# Patient Record
Sex: MALE | Race: WHITE | NOT HISPANIC OR LATINO | Employment: FULL TIME | URBAN - METROPOLITAN AREA
[De-identification: names, ages, dates, MRNs, and addresses within clinical notes are randomized per-mention and may not be internally consistent; named-entity substitution may affect disease eponyms.]

---

## 2018-06-06 ENCOUNTER — APPOINTMENT (EMERGENCY)
Dept: RADIOLOGY | Facility: HOSPITAL | Age: 53
End: 2018-06-06
Payer: SUBSIDIZED

## 2018-06-06 ENCOUNTER — HOSPITAL ENCOUNTER (EMERGENCY)
Facility: HOSPITAL | Age: 53
Discharge: HOME | End: 2018-06-06
Attending: EMERGENCY MEDICINE | Admitting: EMERGENCY MEDICINE
Payer: SUBSIDIZED

## 2018-06-06 VITALS
SYSTOLIC BLOOD PRESSURE: 155 MMHG | HEART RATE: 90 BPM | WEIGHT: 255 LBS | RESPIRATION RATE: 18 BRPM | HEIGHT: 72 IN | DIASTOLIC BLOOD PRESSURE: 94 MMHG | TEMPERATURE: 97.4 F | OXYGEN SATURATION: 96 % | BODY MASS INDEX: 34.54 KG/M2

## 2018-06-06 DIAGNOSIS — S62.91XA CLOSED FRACTURE OF RIGHT HAND, INITIAL ENCOUNTER: Primary | ICD-10-CM

## 2018-06-06 PROCEDURE — 73130 X-RAY EXAM OF HAND: CPT

## 2018-06-06 PROCEDURE — 99283 EMERGENCY DEPT VISIT LOW MDM: CPT

## 2018-06-06 RX ORDER — NAPROXEN 500 MG/1
500 TABLET ORAL 2 TIMES DAILY WITH MEALS
Qty: 20 TABLET | Refills: 0 | Status: SHIPPED | OUTPATIENT
Start: 2018-06-06 | End: 2020-12-05

## 2018-06-06 RX ORDER — IBUPROFEN 400 MG/1
400 TABLET ORAL EVERY 6 HOURS PRN
COMMUNITY
End: 2020-12-05

## 2018-06-06 NOTE — ED PROVIDER NOTES
History  Chief Complaint   Patient presents with    Hand Injury     patient c/o right hand and swelling - states he got it caught between a dresser and the wall 1 5 weeks ago  46 y/o male presenting with right hand injury that occurred about a week and half ago after he was moving a dresser and slammed against his hand in the wall  Noted some swelling that has overall gone down however persists  Pain is 6/10 nonradiating  Pain is throbbing sensation  Denies numbness, paresthesias, discoloration or bruising  Prior to Admission Medications   Prescriptions Last Dose Informant Patient Reported? Taking?   ibuprofen (MOTRIN) 400 mg tablet   Yes Yes   Sig: Take 400 mg by mouth every 6 (six) hours as needed for mild pain      Facility-Administered Medications: None       History reviewed  No pertinent past medical history  History reviewed  No pertinent surgical history  History reviewed  No pertinent family history  I have reviewed and agree with the history as documented  Social History   Substance Use Topics    Smoking status: Never Smoker    Smokeless tobacco: Never Used    Alcohol use Yes      Comment: on weekends        Review of Systems   Constitutional: Negative  HENT: Negative  Eyes: Negative  Respiratory: Negative  Cardiovascular: Negative  Gastrointestinal: Negative  Genitourinary: Negative  Musculoskeletal: Positive for arthralgias and joint swelling  Negative for back pain, gait problem, myalgias, neck pain and neck stiffness  Skin: Negative  Neurological: Negative  All other systems reviewed and are negative  Physical Exam  Physical Exam   Constitutional: He is oriented to person, place, and time  He appears well-developed and well-nourished  HENT:   Head: Normocephalic and atraumatic  Eyes: Conjunctivae are normal    Cardiovascular: Normal rate and intact distal pulses  Pulmonary/Chest:   spo2 is 96% indicating adequate oxygenation  Musculoskeletal:        Arms:  Neurological: He is alert and oriented to person, place, and time  Skin: Skin is dry  Nursing note and vitals reviewed        Vital Signs  ED Triage Vitals [06/06/18 0954]   Temperature Pulse Respirations Blood Pressure SpO2   (!) 97 4 °F (36 3 °C) 90 18 155/94 96 %      Temp Source Heart Rate Source Patient Position - Orthostatic VS BP Location FiO2 (%)   Tympanic Monitor Sitting Left arm --      Pain Score       7           Vitals:    06/06/18 0954   BP: 155/94   Pulse: 90   Patient Position - Orthostatic VS: Sitting       Visual Acuity      ED Medications  Medications - No data to display    Diagnostic Studies  Results Reviewed     None                 XR hand 3+ views RIGHT   ED Interpretation by Ceci Lamar PA-C (06/06 1024)   5th metacarpal fracture                 Procedures  Static Splint Application  Date/Time: 6/6/2018 10:55 AM  Performed by: Elissa Griffin by: Nanette Gerard     Patient location:  Bedside  Procedure performed by emergency physician: Yes    Other Assisting Provider: Yes (comment)    Consent:     Consent obtained:  Verbal    Consent given by:  Patient    Risks discussed:  Discoloration    Alternatives discussed:  No treatment  Universal protocol:     Procedure explained and questions answered to patient or proxy's satisfaction: yes      Relevant documents present and verified: yes      Test results available and properly labeled: yes      Imaging studies available: yes      Required blood products, implants, devices, and special equipment available: yes      Site/side marked: yes      Immediately prior to procedure a time out was called: yes      Patient identity confirmed:  Verbally with patient  Indication:     Indications: fracture    Pre-procedure details:     Sensation:  Normal  Procedure details:     Laterality:  Right    Location:  Hand    Hand:  R hand    Splint type:  Ulnar gutter    Supplies:  Cotton padding, elastic bandage and prefabricated splint  Post-procedure details:     Pain:  Unchanged    Sensation:  Normal    Neurovascular Exam: skin pink, capillary refill <2 sec, normal pulses and skin intact, warm, and dry      Patient tolerance of procedure: Tolerated well, no immediate complications           Phone Contacts  ED Phone Contact    ED Course                               MDM  Number of Diagnoses or Management Options  Closed fracture of right hand, initial encounter:   Diagnosis management comments: Discussed x-ray results  Patient was placed in ulnar gutter splint assessed by me with good neurovascular exam before and after  Will have patient follow up with Orthopedics for re-evaluation or return for any worsening symptoms  Patient verbalizes understanding and agrees with the above assessment and plan  Amount and/or Complexity of Data Reviewed  Tests in the radiology section of CPT®: reviewed and ordered  Review and summarize past medical records: yes  Independent visualization of images, tracings, or specimens: yes      CritCare Time    Disposition  Final diagnoses:   Closed fracture of right hand, initial encounter     Time reflects when diagnosis was documented in both MDM as applicable and the Disposition within this note     Time User Action Codes Description Comment    6/6/2018 10:58 AM Mihir Rhodes Add [S62 [id number]] Closed fracture of right hand, initial encounter       ED Disposition     ED Disposition Condition Comment    Discharge  Pavel Lundberg discharge to home/self care      Condition at discharge: Good        Follow-up Information     Follow up With Specialties Details Why Contact Info Additional [address] Emergency Department Emergency Medicine Go to If symptoms worsen [address] [facility]  [phone] [facility], [place], [place], [address]    Ria Mota MD Orthopedic Surgery Schedule an appointment as soon as possible for a visit in 1 day  [address] [facility] [address]  [phone]             Patient's Medications   Discharge Prescriptions    NAPROXEN (NAPROSYN) 500 MG TABLET    Take 1 tablet (500 mg total) by mouth 2 (two) times a day with meals for 10 days       Start Date: 6/6/2018  End Date: 6/16/2018       Order Dose: 500 mg       Quantity: 20 tablet    Refills: 0     No discharge procedures on file      ED Provider  Electronically Signed by           Nazario Vargas PA-C  06/06/18 1053

## 2018-06-06 NOTE — DISCHARGE INSTRUCTIONS
Hand Fracture   WHAT YOU NEED TO KNOW:   A hand fracture is a break in one of the bones in your hand  This includes the bones in the wrist and fingers, and those that connect the wrist to the fingers  A hand fracture may be caused by twisting or bending the hand in the wrong way  It may also be caused by a fall, a crush injury, or a sports injury  DISCHARGE INSTRUCTIONS:   Return to the emergency department if:   · Your have severe pain that does not get better, even with pain medicine  · Your injured hand or forearm is cold, numb, or pale  · Your cast or splint gets wet, damaged, or comes off  · Your arm feels warm, tender, and painful  It may look swollen and red  Contact your healthcare provider if:   · You have new sores around your brace, cast, or splint  · You notice a bad smell coming from under your cast     · You have questions or concerns about your condition or care  Medicines:   · NSAIDs , such as ibuprofen, help decrease swelling, pain, and fever  This medicine is available with or without a doctor's order  NSAIDs can cause stomach bleeding or kidney problems in certain people  If you take blood thinner medicine, always ask your healthcare provider if NSAIDs are safe for you  Always read the medicine label and follow directions  · Acetaminophen  decreases pain and fever  It is available without a doctor's order  Ask how much to take and how often to take it  Follow directions  Acetaminophen can cause liver damage if not taken correctly  · Prescription pain medicine  may be given  Ask how to take this medicine safely  · Take your medicine as directed  Contact your healthcare provider if you think your medicine is not helping or if you have side effects  Tell him or her if you are allergic to any medicine  Keep a list of the medicines, vitamins, and herbs you take  Include the amounts, and when and why you take them  Bring the list or the pill bottles to follow-up visits   Carry your medicine list with you in case of an emergency  Follow up with your healthcare provider or hand specialist as directed: You may need to return to have your cast, splint, or stitches removed  Write down your questions so you remember to ask them during your visits  Manage your symptoms:   · Wear your splint as directed  Do not remove the splint until you follow up with your healthcare provider or hand specialist      · Apply ice  on your hand for 15 to 20 minutes every hour or as directed  Use an ice pack, or put crushed ice in a plastic bag  Cover it with a towel before you apply it to your skin  Ice helps prevent tissue damage and decreases swelling and pain  · Elevate  your hand above the level of his or her heart as often as you can  This will help decrease swelling and pain  Prop your hand on pillows or blankets to keep it elevated comfortably  · Go to physical therapy as directed  A physical therapist teaches you exercises to help improve movement and strength and to decrease pain  Bathing with a cast or splint:  Do not let your cast or splint get wet  Before bathing, cover the cast or splint with a plastic bag  Tape the bag to your skin above the cast or splint to seal out the water  Keep your hand out of the water in case the bag leaks  Follow instructions about when it is okay to take a bath or shower  Cast or splint care:   · Check the skin around the cast or splint for redness or sores every day  · Do not push down or lean on any part of the cast or splint because it may break  · Do not use a sharp or pointed object to scratch your skin under the cast or splint  Activity:  You may not be able to drive for up to 2 weeks  Ask when it is safe for you to drive and return to other activities such as sports  © 2017 [address] Information is for End User's use only and may not be sold, redistributed or otherwise used for commercial purposes   All illustrations and images included in CareNotes® are the copyrighted property of A D A M , Inc  or Ronaldo Ritter  The above information is an  only  It is not intended as medical advice for individual conditions or treatments  Talk to your doctor, nurse or pharmacist before following any medical regimen to see if it is safe and effective for you

## 2020-09-17 ENCOUNTER — APPOINTMENT (EMERGENCY)
Dept: RADIOLOGY | Facility: HOSPITAL | Age: 55
End: 2020-09-17

## 2020-09-17 ENCOUNTER — HOSPITAL ENCOUNTER (EMERGENCY)
Facility: HOSPITAL | Age: 55
Discharge: HOME | End: 2020-09-17
Attending: EMERGENCY MEDICINE | Admitting: EMERGENCY MEDICINE

## 2020-09-17 VITALS
RESPIRATION RATE: 20 BRPM | WEIGHT: 212 LBS | SYSTOLIC BLOOD PRESSURE: 173 MMHG | HEART RATE: 91 BPM | TEMPERATURE: 98.2 F | BODY MASS INDEX: 28.75 KG/M2 | DIASTOLIC BLOOD PRESSURE: 111 MMHG | OXYGEN SATURATION: 97 %

## 2020-09-17 DIAGNOSIS — S02.842A CLOSED FRACTURE OF LATERAL WALL OF LEFT ORBIT, INITIAL ENCOUNTER (HCC): Primary | ICD-10-CM

## 2020-09-17 PROCEDURE — 70486 CT MAXILLOFACIAL W/O DYE: CPT

## 2020-09-17 PROCEDURE — G1004 CDSM NDSC: HCPCS

## 2020-09-17 PROCEDURE — 99284 EMERGENCY DEPT VISIT MOD MDM: CPT

## 2020-09-17 PROCEDURE — 70450 CT HEAD/BRAIN W/O DYE: CPT

## 2020-09-17 PROCEDURE — 71045 X-RAY EXAM CHEST 1 VIEW: CPT

## 2020-09-17 PROCEDURE — 72125 CT NECK SPINE W/O DYE: CPT

## 2020-09-17 PROCEDURE — 99284 EMERGENCY DEPT VISIT MOD MDM: CPT | Performed by: EMERGENCY MEDICINE

## 2020-09-17 RX ORDER — TRAMADOL HYDROCHLORIDE 50 MG/1
50 TABLET ORAL EVERY 6 HOURS PRN
Qty: 10 TABLET | Refills: 0 | Status: SHIPPED | OUTPATIENT
Start: 2020-09-17 | End: 2020-09-27
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
## 2020-12-05 ENCOUNTER — HOSPITAL ENCOUNTER (INPATIENT)
Facility: HOSPITAL | Age: 55
LOS: 3 days | Discharge: HOME | DRG: 053 | End: 2020-12-08
Attending: GENERAL PRACTICE | Admitting: ANESTHESIOLOGY
Payer: COMMERCIAL

## 2020-12-05 ENCOUNTER — APPOINTMENT (EMERGENCY)
Dept: RADIOLOGY | Facility: HOSPITAL | Age: 55
DRG: 053 | End: 2020-12-05
Payer: COMMERCIAL

## 2020-12-05 DIAGNOSIS — F10.10 ALCOHOL ABUSE: Chronic | ICD-10-CM

## 2020-12-05 DIAGNOSIS — F10.230 ALCOHOL WITHDRAWAL SYNDROME WITHOUT COMPLICATION (HCC): ICD-10-CM

## 2020-12-05 DIAGNOSIS — Z72.0 TOBACCO ABUSE: Chronic | ICD-10-CM

## 2020-12-05 DIAGNOSIS — F10.230 ALCOHOL WITHDRAWAL SEIZURE WITHOUT COMPLICATION (HCC): Primary | ICD-10-CM

## 2020-12-05 DIAGNOSIS — R56.9 ALCOHOL WITHDRAWAL SEIZURE WITHOUT COMPLICATION (HCC): Primary | ICD-10-CM

## 2020-12-05 DIAGNOSIS — R74.01 TRANSAMINITIS: ICD-10-CM

## 2020-12-05 PROBLEM — N17.9 AKI (ACUTE KIDNEY INJURY) (HCC): Status: ACTIVE | Noted: 2020-12-05

## 2020-12-05 PROBLEM — F10.239 ALCOHOL WITHDRAWAL SEIZURE (HCC): Status: ACTIVE | Noted: 2020-12-05

## 2020-12-05 PROBLEM — D69.6 THROMBOCYTOPENIA (HCC): Status: ACTIVE | Noted: 2020-12-05

## 2020-12-05 PROBLEM — E87.6 HYPOKALEMIA: Status: ACTIVE | Noted: 2020-12-05

## 2020-12-05 PROBLEM — R74.8 ELEVATED LIPASE: Status: ACTIVE | Noted: 2020-12-05

## 2020-12-05 PROBLEM — E87.20 LACTIC ACIDOSIS: Status: ACTIVE | Noted: 2020-12-05

## 2020-12-05 PROBLEM — E87.2 LACTIC ACIDOSIS: Status: ACTIVE | Noted: 2020-12-05

## 2020-12-05 PROBLEM — F10.939 ALCOHOL WITHDRAWAL SEIZURE (HCC): Status: ACTIVE | Noted: 2020-12-05

## 2020-12-05 LAB
ALBUMIN SERPL BCP-MCNC: 3.7 G/DL (ref 3.5–5)
ALP SERPL-CCNC: 208 U/L (ref 46–116)
ALT SERPL W P-5'-P-CCNC: 48 U/L (ref 12–78)
ANION GAP SERPL CALCULATED.3IONS-SCNC: 13 MMOL/L (ref 4–13)
ANION GAP SERPL CALCULATED.3IONS-SCNC: 30 MMOL/L (ref 4–13)
ANION GAP SERPL CALCULATED.3IONS-SCNC: 7 MMOL/L (ref 4–13)
AST SERPL W P-5'-P-CCNC: 175 U/L (ref 5–45)
BASE EX.OXY STD BLDV CALC-SCNC: 97.2 % (ref 60–80)
BASE EXCESS BLDV CALC-SCNC: -4.1 MMOL/L
BASOPHILS # BLD AUTO: 0.11 THOUSANDS/ΜL (ref 0–0.1)
BASOPHILS NFR BLD AUTO: 1 % (ref 0–1)
BILIRUB DIRECT SERPL-MCNC: 2.27 MG/DL (ref 0–0.2)
BILIRUB SERPL-MCNC: 4.5 MG/DL (ref 0.2–1)
BUN SERPL-MCNC: 6 MG/DL (ref 5–25)
BUN SERPL-MCNC: 7 MG/DL (ref 5–25)
BUN SERPL-MCNC: 8 MG/DL (ref 5–25)
CALCIUM SERPL-MCNC: 7.8 MG/DL (ref 8.3–10.1)
CALCIUM SERPL-MCNC: 8.2 MG/DL (ref 8.3–10.1)
CALCIUM SERPL-MCNC: 9 MG/DL (ref 8.3–10.1)
CHLORIDE SERPL-SCNC: 100 MMOL/L (ref 100–108)
CHLORIDE SERPL-SCNC: 101 MMOL/L (ref 100–108)
CHLORIDE SERPL-SCNC: 94 MMOL/L (ref 100–108)
CK MB SERPL-MCNC: 2.3 NG/ML (ref 0–5)
CK MB SERPL-MCNC: <1 % (ref 0–2.5)
CK SERPL-CCNC: 270 U/L (ref 39–308)
CO2 SERPL-SCNC: 10 MMOL/L (ref 21–32)
CO2 SERPL-SCNC: 21 MMOL/L (ref 21–32)
CO2 SERPL-SCNC: 24 MMOL/L (ref 21–32)
CREAT SERPL-MCNC: 0.83 MG/DL (ref 0.6–1.3)
CREAT SERPL-MCNC: 0.94 MG/DL (ref 0.6–1.3)
CREAT SERPL-MCNC: 1.36 MG/DL (ref 0.6–1.3)
EOSINOPHIL # BLD AUTO: 0.19 THOUSAND/ΜL (ref 0–0.61)
EOSINOPHIL NFR BLD AUTO: 2 % (ref 0–6)
ERYTHROCYTE [DISTWIDTH] IN BLOOD BY AUTOMATED COUNT: 14.4 % (ref 11.6–15.1)
ETHANOL SERPL-MCNC: <3 MG/DL (ref 0–3)
FLUAV RNA RESP QL NAA+PROBE: NEGATIVE
FLUBV RNA RESP QL NAA+PROBE: NEGATIVE
GFR SERPL CREATININE-BSD FRML MDRD: 100 ML/MIN/1.73SQ M
GFR SERPL CREATININE-BSD FRML MDRD: 59 ML/MIN/1.73SQ M
GFR SERPL CREATININE-BSD FRML MDRD: 92 ML/MIN/1.73SQ M
GLUCOSE SERPL-MCNC: 144 MG/DL (ref 65–140)
GLUCOSE SERPL-MCNC: 146 MG/DL (ref 65–140)
GLUCOSE SERPL-MCNC: 97 MG/DL (ref 65–140)
HCO3 BLDV-SCNC: 19.3 MMOL/L (ref 24–30)
HCT VFR BLD AUTO: 50.8 % (ref 36.5–49.3)
HGB BLD-MCNC: 16.2 G/DL (ref 12–17)
IMM GRANULOCYTES # BLD AUTO: 0.05 THOUSAND/UL (ref 0–0.2)
IMM GRANULOCYTES NFR BLD AUTO: 1 % (ref 0–2)
INR PPP: 1.49 (ref 0.84–1.19)
LACTATE SERPL-SCNC: 1.4 MMOL/L (ref 0.5–2)
LACTATE SERPL-SCNC: 13.1 MMOL/L (ref 0.5–2)
LACTATE SERPL-SCNC: 2.3 MMOL/L (ref 0.5–2)
LIPASE SERPL-CCNC: 588 U/L (ref 73–393)
LYMPHOCYTES # BLD AUTO: 1.65 THOUSANDS/ΜL (ref 0.6–4.47)
LYMPHOCYTES NFR BLD AUTO: 16 % (ref 14–44)
MAGNESIUM SERPL-MCNC: 1.8 MG/DL (ref 1.6–2.6)
MCH RBC QN AUTO: 33.1 PG (ref 26.8–34.3)
MCHC RBC AUTO-ENTMCNC: 31.9 G/DL (ref 31.4–37.4)
MCV RBC AUTO: 104 FL (ref 82–98)
MONOCYTES # BLD AUTO: 0.94 THOUSAND/ΜL (ref 0.17–1.22)
MONOCYTES NFR BLD AUTO: 9 % (ref 4–12)
NEUTROPHILS # BLD AUTO: 7.15 THOUSANDS/ΜL (ref 1.85–7.62)
NEUTS SEG NFR BLD AUTO: 71 % (ref 43–75)
NRBC BLD AUTO-RTO: 0 /100 WBCS
O2 CT BLDV-SCNC: 20.3 ML/DL
PCO2 BLDV: 31 MM HG (ref 42–50)
PH BLDV: 7.41 [PH] (ref 7.3–7.4)
PHOSPHATE SERPL-MCNC: 1.8 MG/DL (ref 2.7–4.5)
PLATELET # BLD AUTO: 43 THOUSANDS/UL (ref 149–390)
PMV BLD AUTO: 11.6 FL (ref 8.9–12.7)
PO2 BLDV: 187.6 MM HG (ref 35–45)
POTASSIUM SERPL-SCNC: 3.1 MMOL/L (ref 3.5–5.3)
POTASSIUM SERPL-SCNC: 3.7 MMOL/L (ref 3.5–5.3)
POTASSIUM SERPL-SCNC: 3.8 MMOL/L (ref 3.5–5.3)
PROT SERPL-MCNC: 9.4 G/DL (ref 6.4–8.2)
PROTHROMBIN TIME: 17.8 SECONDS (ref 11.6–14.5)
RBC # BLD AUTO: 4.9 MILLION/UL (ref 3.88–5.62)
RSV RNA RESP QL NAA+PROBE: NEGATIVE
SARS-COV-2 RNA RESP QL NAA+PROBE: NEGATIVE
SODIUM SERPL-SCNC: 132 MMOL/L (ref 136–145)
SODIUM SERPL-SCNC: 134 MMOL/L (ref 136–145)
SODIUM SERPL-SCNC: 134 MMOL/L (ref 136–145)
TROPONIN I SERPL-MCNC: <0.02 NG/ML
WBC # BLD AUTO: 10.09 THOUSAND/UL (ref 4.31–10.16)

## 2020-12-05 PROCEDURE — 83690 ASSAY OF LIPASE: CPT | Performed by: GENERAL PRACTICE

## 2020-12-05 PROCEDURE — 82248 BILIRUBIN DIRECT: CPT | Performed by: GENERAL PRACTICE

## 2020-12-05 PROCEDURE — 83605 ASSAY OF LACTIC ACID: CPT | Performed by: GENERAL PRACTICE

## 2020-12-05 PROCEDURE — 84100 ASSAY OF PHOSPHORUS: CPT

## 2020-12-05 PROCEDURE — 80048 BASIC METABOLIC PNL TOTAL CA: CPT | Performed by: GENERAL PRACTICE

## 2020-12-05 PROCEDURE — 70450 CT HEAD/BRAIN W/O DYE: CPT

## 2020-12-05 PROCEDURE — 83605 ASSAY OF LACTIC ACID: CPT | Performed by: NURSE PRACTITIONER

## 2020-12-05 PROCEDURE — 83735 ASSAY OF MAGNESIUM: CPT | Performed by: GENERAL PRACTICE

## 2020-12-05 PROCEDURE — 80048 BASIC METABOLIC PNL TOTAL CA: CPT | Performed by: NURSE PRACTITIONER

## 2020-12-05 PROCEDURE — 83930 ASSAY OF BLOOD OSMOLALITY: CPT | Performed by: GENERAL PRACTICE

## 2020-12-05 PROCEDURE — 85610 PROTHROMBIN TIME: CPT | Performed by: NURSE PRACTITIONER

## 2020-12-05 PROCEDURE — 82553 CREATINE MB FRACTION: CPT

## 2020-12-05 PROCEDURE — 84484 ASSAY OF TROPONIN QUANT: CPT | Performed by: GENERAL PRACTICE

## 2020-12-05 PROCEDURE — 87040 BLOOD CULTURE FOR BACTERIA: CPT | Performed by: NURSE PRACTITIONER

## 2020-12-05 PROCEDURE — 96375 TX/PRO/DX INJ NEW DRUG ADDON: CPT

## 2020-12-05 PROCEDURE — 72125 CT NECK SPINE W/O DYE: CPT

## 2020-12-05 PROCEDURE — 93005 ELECTROCARDIOGRAM TRACING: CPT

## 2020-12-05 PROCEDURE — 82550 ASSAY OF CK (CPK): CPT

## 2020-12-05 PROCEDURE — 82805 BLOOD GASES W/O2 SATURATION: CPT | Performed by: NURSE PRACTITIONER

## 2020-12-05 PROCEDURE — 36415 COLL VENOUS BLD VENIPUNCTURE: CPT | Performed by: GENERAL PRACTICE

## 2020-12-05 PROCEDURE — 80320 DRUG SCREEN QUANTALCOHOLS: CPT | Performed by: GENERAL PRACTICE

## 2020-12-05 PROCEDURE — 0241U HB NFCT DS VIR RESP RNA 4 TRGT: CPT | Performed by: NURSE PRACTITIONER

## 2020-12-05 PROCEDURE — 99223 1ST HOSP IP/OBS HIGH 75: CPT | Performed by: ANESTHESIOLOGY

## 2020-12-05 PROCEDURE — 87081 CULTURE SCREEN ONLY: CPT | Performed by: NURSE PRACTITIONER

## 2020-12-05 PROCEDURE — G1004 CDSM NDSC: HCPCS

## 2020-12-05 PROCEDURE — 71045 X-RAY EXAM CHEST 1 VIEW: CPT

## 2020-12-05 PROCEDURE — 99285 EMERGENCY DEPT VISIT HI MDM: CPT

## 2020-12-05 PROCEDURE — 96365 THER/PROPH/DIAG IV INF INIT: CPT

## 2020-12-05 PROCEDURE — 99285 EMERGENCY DEPT VISIT HI MDM: CPT | Performed by: GENERAL PRACTICE

## 2020-12-05 PROCEDURE — 96361 HYDRATE IV INFUSION ADD-ON: CPT

## 2020-12-05 PROCEDURE — 80053 COMPREHEN METABOLIC PANEL: CPT | Performed by: GENERAL PRACTICE

## 2020-12-05 PROCEDURE — 85025 COMPLETE CBC W/AUTO DIFF WBC: CPT | Performed by: GENERAL PRACTICE

## 2020-12-05 RX ORDER — ONDANSETRON 2 MG/ML
INJECTION INTRAMUSCULAR; INTRAVENOUS
Status: COMPLETED
Start: 2020-12-05 | End: 2020-12-05

## 2020-12-05 RX ORDER — POTASSIUM CHLORIDE 14.9 MG/ML
20 INJECTION INTRAVENOUS ONCE
Status: COMPLETED | OUTPATIENT
Start: 2020-12-05 | End: 2020-12-05

## 2020-12-05 RX ORDER — MAGNESIUM SULFATE HEPTAHYDRATE 40 MG/ML
2 INJECTION, SOLUTION INTRAVENOUS ONCE
Status: COMPLETED | OUTPATIENT
Start: 2020-12-05 | End: 2020-12-05

## 2020-12-05 RX ORDER — POTASSIUM CHLORIDE 29.8 MG/ML
40 INJECTION INTRAVENOUS ONCE
Status: DISCONTINUED | OUTPATIENT
Start: 2020-12-05 | End: 2020-12-05

## 2020-12-05 RX ORDER — PHENOBARBITAL SODIUM 65 MG/ML
130 INJECTION INTRAMUSCULAR ONCE
Status: COMPLETED | OUTPATIENT
Start: 2020-12-05 | End: 2020-12-05

## 2020-12-05 RX ORDER — SODIUM CHLORIDE, SODIUM GLUCONATE, SODIUM ACETATE, POTASSIUM CHLORIDE, MAGNESIUM CHLORIDE, SODIUM PHOSPHATE, DIBASIC, AND POTASSIUM PHOSPHATE .53; .5; .37; .037; .03; .012; .00082 G/100ML; G/100ML; G/100ML; G/100ML; G/100ML; G/100ML; G/100ML
75 INJECTION, SOLUTION INTRAVENOUS CONTINUOUS
Status: DISCONTINUED | OUTPATIENT
Start: 2020-12-05 | End: 2020-12-06

## 2020-12-05 RX ORDER — THIAMINE MONONITRATE (VIT B1) 100 MG
100 TABLET ORAL DAILY
Status: DISCONTINUED | OUTPATIENT
Start: 2020-12-06 | End: 2020-12-08 | Stop reason: HOSPADM

## 2020-12-05 RX ORDER — LORAZEPAM 2 MG/ML
2 INJECTION INTRAMUSCULAR ONCE
Status: COMPLETED | OUTPATIENT
Start: 2020-12-05 | End: 2020-12-05

## 2020-12-05 RX ORDER — ONDANSETRON 2 MG/ML
4 INJECTION INTRAMUSCULAR; INTRAVENOUS EVERY 6 HOURS PRN
Status: DISCONTINUED | OUTPATIENT
Start: 2020-12-05 | End: 2020-12-08 | Stop reason: HOSPADM

## 2020-12-05 RX ORDER — FOLIC ACID 1 MG/1
1 TABLET ORAL DAILY
Status: DISCONTINUED | OUTPATIENT
Start: 2020-12-06 | End: 2020-12-08 | Stop reason: HOSPADM

## 2020-12-05 RX ADMIN — MAGNESIUM SULFATE HEPTAHYDRATE 2 G: 40 INJECTION, SOLUTION INTRAVENOUS at 18:26

## 2020-12-05 RX ADMIN — SODIUM CHLORIDE 1000 ML: 0.9 INJECTION, SOLUTION INTRAVENOUS at 17:29

## 2020-12-05 RX ADMIN — SODIUM CHLORIDE 1000 ML: 0.9 INJECTION, SOLUTION INTRAVENOUS at 16:46

## 2020-12-05 RX ADMIN — POTASSIUM CHLORIDE 20 MEQ: 14.9 INJECTION, SOLUTION INTRAVENOUS at 18:38

## 2020-12-05 RX ADMIN — SODIUM CHLORIDE, SODIUM GLUCONATE, SODIUM ACETATE, POTASSIUM CHLORIDE, MAGNESIUM CHLORIDE, SODIUM PHOSPHATE, DIBASIC, AND POTASSIUM PHOSPHATE 125 ML/HR: .53; .5; .37; .037; .03; .012; .00082 INJECTION, SOLUTION INTRAVENOUS at 22:38

## 2020-12-05 RX ADMIN — LORAZEPAM 2 MG: 2 INJECTION INTRAMUSCULAR; INTRAVENOUS at 16:46

## 2020-12-05 RX ADMIN — POTASSIUM CHLORIDE 20 MEQ: 14.9 INJECTION, SOLUTION INTRAVENOUS at 20:58

## 2020-12-05 RX ADMIN — ONDANSETRON 4 MG: 2 INJECTION INTRAMUSCULAR; INTRAVENOUS at 21:07

## 2020-12-05 RX ADMIN — PHENOBARBITAL SODIUM 130 MG: 65 INJECTION INTRAMUSCULAR; INTRAVENOUS at 21:09

## 2020-12-05 RX ADMIN — POTASSIUM & SODIUM PHOSPHATES POWDER PACK 280-160-250 MG 2 PACKET: 280-160-250 PACK at 23:09

## 2020-12-05 RX ADMIN — SODIUM CHLORIDE 500 ML: 0.9 INJECTION, SOLUTION INTRAVENOUS at 21:00

## 2020-12-05 RX ADMIN — PHENOBARBITAL SODIUM 373 MG: 65 INJECTION INTRAMUSCULAR at 19:43

## 2020-12-06 PROBLEM — Z72.0 TOBACCO ABUSE: Chronic | Status: ACTIVE | Noted: 2020-12-06

## 2020-12-06 PROBLEM — N17.9 AKI (ACUTE KIDNEY INJURY) (HCC): Status: RESOLVED | Noted: 2020-12-05 | Resolved: 2020-12-06

## 2020-12-06 PROBLEM — E87.20 LACTIC ACIDOSIS: Status: RESOLVED | Noted: 2020-12-05 | Resolved: 2020-12-06

## 2020-12-06 PROBLEM — E87.6 HYPOKALEMIA: Status: RESOLVED | Noted: 2020-12-05 | Resolved: 2020-12-06

## 2020-12-06 PROBLEM — E87.2 LACTIC ACIDOSIS: Status: RESOLVED | Noted: 2020-12-05 | Resolved: 2020-12-06

## 2020-12-06 LAB
ALBUMIN SERPL BCP-MCNC: 2.7 G/DL (ref 3.5–5)
ALP SERPL-CCNC: 141 U/L (ref 46–116)
ALT SERPL W P-5'-P-CCNC: 36 U/L (ref 12–78)
ANION GAP SERPL CALCULATED.3IONS-SCNC: 10 MMOL/L (ref 4–13)
AST SERPL W P-5'-P-CCNC: 138 U/L (ref 5–45)
BASOPHILS # BLD AUTO: 0.03 THOUSANDS/ΜL (ref 0–0.1)
BASOPHILS NFR BLD AUTO: 1 % (ref 0–1)
BILIRUB SERPL-MCNC: 4.3 MG/DL (ref 0.2–1)
BUN SERPL-MCNC: 5 MG/DL (ref 5–25)
CALCIUM ALBUM COR SERPL-MCNC: 8.7 MG/DL (ref 8.3–10.1)
CALCIUM SERPL-MCNC: 7.7 MG/DL (ref 8.3–10.1)
CHLORIDE SERPL-SCNC: 101 MMOL/L (ref 100–108)
CO2 SERPL-SCNC: 24 MMOL/L (ref 21–32)
CREAT SERPL-MCNC: 0.9 MG/DL (ref 0.6–1.3)
EOSINOPHIL # BLD AUTO: 0.09 THOUSAND/ΜL (ref 0–0.61)
EOSINOPHIL NFR BLD AUTO: 2 % (ref 0–6)
ERYTHROCYTE [DISTWIDTH] IN BLOOD BY AUTOMATED COUNT: 14.3 % (ref 11.6–15.1)
GFR SERPL CREATININE-BSD FRML MDRD: 96 ML/MIN/1.73SQ M
GLUCOSE SERPL-MCNC: 111 MG/DL (ref 65–140)
HCT VFR BLD AUTO: 39 % (ref 36.5–49.3)
HGB BLD-MCNC: 13.2 G/DL (ref 12–17)
IMM GRANULOCYTES # BLD AUTO: 0.01 THOUSAND/UL (ref 0–0.2)
IMM GRANULOCYTES NFR BLD AUTO: 0 % (ref 0–2)
LIPASE SERPL-CCNC: 414 U/L (ref 73–393)
LYMPHOCYTES # BLD AUTO: 0.38 THOUSANDS/ΜL (ref 0.6–4.47)
LYMPHOCYTES NFR BLD AUTO: 7 % (ref 14–44)
MAGNESIUM SERPL-MCNC: 2.1 MG/DL (ref 1.6–2.6)
MCH RBC QN AUTO: 32.6 PG (ref 26.8–34.3)
MCHC RBC AUTO-ENTMCNC: 33.1 G/DL (ref 31.4–37.4)
MCV RBC AUTO: 99 FL (ref 82–98)
MONOCYTES # BLD AUTO: 0.45 THOUSAND/ΜL (ref 0.17–1.22)
MONOCYTES NFR BLD AUTO: 8 % (ref 4–12)
NEUTROPHILS # BLD AUTO: 4.7 THOUSANDS/ΜL (ref 1.85–7.62)
NEUTS SEG NFR BLD AUTO: 82 % (ref 43–75)
NRBC BLD AUTO-RTO: 0 /100 WBCS
OSMOLALITY UR/SERPL-RTO: 284 MMOL/KG (ref 282–298)
PHOSPHATE SERPL-MCNC: 2.3 MG/DL (ref 2.7–4.5)
PLATELET # BLD AUTO: 23 THOUSANDS/UL (ref 149–390)
PMV BLD AUTO: 11.3 FL (ref 8.9–12.7)
POTASSIUM SERPL-SCNC: 3.2 MMOL/L (ref 3.5–5.3)
PROT SERPL-MCNC: 7 G/DL (ref 6.4–8.2)
RBC # BLD AUTO: 3.96 MILLION/UL (ref 3.88–5.62)
SODIUM SERPL-SCNC: 135 MMOL/L (ref 136–145)
WBC # BLD AUTO: 5.66 THOUSAND/UL (ref 4.31–10.16)

## 2020-12-06 PROCEDURE — 99233 SBSQ HOSP IP/OBS HIGH 50: CPT | Performed by: ANESTHESIOLOGY

## 2020-12-06 PROCEDURE — 83735 ASSAY OF MAGNESIUM: CPT | Performed by: NURSE PRACTITIONER

## 2020-12-06 PROCEDURE — 85025 COMPLETE CBC W/AUTO DIFF WBC: CPT | Performed by: NURSE PRACTITIONER

## 2020-12-06 PROCEDURE — 80053 COMPREHEN METABOLIC PANEL: CPT | Performed by: NURSE PRACTITIONER

## 2020-12-06 PROCEDURE — 84100 ASSAY OF PHOSPHORUS: CPT | Performed by: NURSE PRACTITIONER

## 2020-12-06 PROCEDURE — 83690 ASSAY OF LIPASE: CPT | Performed by: NURSE PRACTITIONER

## 2020-12-06 RX ORDER — PHENOBARBITAL SODIUM 65 MG/ML
65 INJECTION INTRAMUSCULAR ONCE
Status: COMPLETED | OUTPATIENT
Start: 2020-12-06 | End: 2020-12-06

## 2020-12-06 RX ORDER — POTASSIUM CHLORIDE 20 MEQ/1
40 TABLET, EXTENDED RELEASE ORAL ONCE
Status: COMPLETED | OUTPATIENT
Start: 2020-12-06 | End: 2020-12-06

## 2020-12-06 RX ORDER — ACETAMINOPHEN 325 MG/1
650 TABLET ORAL EVERY 6 HOURS PRN
Status: DISCONTINUED | OUTPATIENT
Start: 2020-12-06 | End: 2020-12-08 | Stop reason: HOSPADM

## 2020-12-06 RX ORDER — POTASSIUM CHLORIDE 14.9 MG/ML
20 INJECTION INTRAVENOUS ONCE
Status: COMPLETED | OUTPATIENT
Start: 2020-12-06 | End: 2020-12-06

## 2020-12-06 RX ORDER — NICOTINE 21 MG/24HR
14 PATCH, TRANSDERMAL 24 HOURS TRANSDERMAL DAILY
Status: DISCONTINUED | OUTPATIENT
Start: 2020-12-06 | End: 2020-12-08 | Stop reason: HOSPADM

## 2020-12-06 RX ADMIN — PHENOBARBITAL SODIUM 279 MG: 65 INJECTION INTRAMUSCULAR at 07:45

## 2020-12-06 RX ADMIN — POTASSIUM CHLORIDE 20 MEQ: 14.9 INJECTION, SOLUTION INTRAVENOUS at 07:52

## 2020-12-06 RX ADMIN — DIBASIC SODIUM PHOSPHATE, MONOBASIC POTASSIUM PHOSPHATE AND MONOBASIC SODIUM PHOSPHATE 1 TABLET: 852; 155; 130 TABLET ORAL at 17:51

## 2020-12-06 RX ADMIN — POTASSIUM CHLORIDE 20 MEQ: 14.9 INJECTION, SOLUTION INTRAVENOUS at 10:51

## 2020-12-06 RX ADMIN — DIBASIC SODIUM PHOSPHATE, MONOBASIC POTASSIUM PHOSPHATE AND MONOBASIC SODIUM PHOSPHATE 1 TABLET: 852; 155; 130 TABLET ORAL at 12:43

## 2020-12-06 RX ADMIN — Medication 100 MG: at 08:21

## 2020-12-06 RX ADMIN — PHENOBARBITAL SODIUM 65 MG: 65 INJECTION INTRAMUSCULAR; INTRAVENOUS at 12:44

## 2020-12-06 RX ADMIN — SODIUM CHLORIDE, SODIUM GLUCONATE, SODIUM ACETATE, POTASSIUM CHLORIDE, MAGNESIUM CHLORIDE, SODIUM PHOSPHATE, DIBASIC, AND POTASSIUM PHOSPHATE 75 ML/HR: .53; .5; .37; .037; .03; .012; .00082 INJECTION, SOLUTION INTRAVENOUS at 07:43

## 2020-12-06 RX ADMIN — POTASSIUM CHLORIDE 40 MEQ: 1500 TABLET, EXTENDED RELEASE ORAL at 07:54

## 2020-12-06 RX ADMIN — ACETAMINOPHEN 650 MG: 325 TABLET, FILM COATED ORAL at 12:43

## 2020-12-06 RX ADMIN — DIBASIC SODIUM PHOSPHATE, MONOBASIC POTASSIUM PHOSPHATE AND MONOBASIC SODIUM PHOSPHATE 1 TABLET: 852; 155; 130 TABLET ORAL at 21:54

## 2020-12-06 RX ADMIN — PHENOBARBITAL SODIUM 279 MG: 65 INJECTION INTRAMUSCULAR at 01:16

## 2020-12-06 RX ADMIN — B-COMPLEX W/ C & FOLIC ACID TAB 1 TABLET: TAB at 08:20

## 2020-12-06 RX ADMIN — FOLIC ACID 1 MG: 1 TABLET ORAL at 08:27

## 2020-12-07 ENCOUNTER — APPOINTMENT (INPATIENT)
Dept: RADIOLOGY | Facility: HOSPITAL | Age: 55
DRG: 053 | End: 2020-12-07
Payer: COMMERCIAL

## 2020-12-07 PROBLEM — E80.6 HYPERBILIRUBINEMIA: Status: ACTIVE | Noted: 2020-12-05

## 2020-12-07 LAB
ALBUMIN SERPL BCP-MCNC: 2.8 G/DL (ref 3.5–5)
ALP SERPL-CCNC: 151 U/L (ref 46–116)
ALT SERPL W P-5'-P-CCNC: 37 U/L (ref 12–78)
ANION GAP SERPL CALCULATED.3IONS-SCNC: 9 MMOL/L (ref 4–13)
AST SERPL W P-5'-P-CCNC: 141 U/L (ref 5–45)
BILIRUB SERPL-MCNC: 5.1 MG/DL (ref 0.2–1)
BUN SERPL-MCNC: 7 MG/DL (ref 5–25)
CALCIUM ALBUM COR SERPL-MCNC: 9.3 MG/DL (ref 8.3–10.1)
CALCIUM SERPL-MCNC: 8.3 MG/DL (ref 8.3–10.1)
CHLORIDE SERPL-SCNC: 97 MMOL/L (ref 100–108)
CO2 SERPL-SCNC: 25 MMOL/L (ref 21–32)
CREAT SERPL-MCNC: 0.85 MG/DL (ref 0.6–1.3)
ERYTHROCYTE [DISTWIDTH] IN BLOOD BY AUTOMATED COUNT: 14.1 % (ref 11.6–15.1)
GFR SERPL CREATININE-BSD FRML MDRD: 99 ML/MIN/1.73SQ M
GLUCOSE SERPL-MCNC: 93 MG/DL (ref 65–140)
HCT VFR BLD AUTO: 41 % (ref 36.5–49.3)
HGB BLD-MCNC: 13.9 G/DL (ref 12–17)
MAGNESIUM SERPL-MCNC: 1.8 MG/DL (ref 1.6–2.6)
MCH RBC QN AUTO: 33.6 PG (ref 26.8–34.3)
MCHC RBC AUTO-ENTMCNC: 33.9 G/DL (ref 31.4–37.4)
MCV RBC AUTO: 99 FL (ref 82–98)
MRSA NOSE QL CULT: NORMAL
PHOSPHATE SERPL-MCNC: 3.2 MG/DL (ref 2.7–4.5)
PLATELET # BLD AUTO: 29 THOUSANDS/UL (ref 149–390)
PMV BLD AUTO: 12.3 FL (ref 8.9–12.7)
POTASSIUM SERPL-SCNC: 3.6 MMOL/L (ref 3.5–5.3)
PROT SERPL-MCNC: 7.5 G/DL (ref 6.4–8.2)
RBC # BLD AUTO: 4.14 MILLION/UL (ref 3.88–5.62)
SODIUM SERPL-SCNC: 131 MMOL/L (ref 136–145)
WBC # BLD AUTO: 6.23 THOUSAND/UL (ref 4.31–10.16)

## 2020-12-07 PROCEDURE — 99232 SBSQ HOSP IP/OBS MODERATE 35: CPT | Performed by: FAMILY MEDICINE

## 2020-12-07 PROCEDURE — 85027 COMPLETE CBC AUTOMATED: CPT | Performed by: NURSE PRACTITIONER

## 2020-12-07 PROCEDURE — 83735 ASSAY OF MAGNESIUM: CPT | Performed by: NURSE PRACTITIONER

## 2020-12-07 PROCEDURE — 76705 ECHO EXAM OF ABDOMEN: CPT

## 2020-12-07 PROCEDURE — 80053 COMPREHEN METABOLIC PANEL: CPT | Performed by: NURSE PRACTITIONER

## 2020-12-07 PROCEDURE — 84100 ASSAY OF PHOSPHORUS: CPT | Performed by: NURSE PRACTITIONER

## 2020-12-07 RX ADMIN — B-COMPLEX W/ C & FOLIC ACID TAB 1 TABLET: TAB at 08:43

## 2020-12-07 RX ADMIN — FOLIC ACID 1 MG: 1 TABLET ORAL at 08:43

## 2020-12-07 RX ADMIN — Medication 100 MG: at 08:43

## 2020-12-07 RX ADMIN — ACETAMINOPHEN 650 MG: 325 TABLET, FILM COATED ORAL at 23:03

## 2020-12-08 VITALS
HEIGHT: 67 IN | OXYGEN SATURATION: 96 % | RESPIRATION RATE: 18 BRPM | TEMPERATURE: 98.3 F | HEART RATE: 76 BPM | SYSTOLIC BLOOD PRESSURE: 133 MMHG | WEIGHT: 217.59 LBS | BODY MASS INDEX: 34.15 KG/M2 | DIASTOLIC BLOOD PRESSURE: 80 MMHG

## 2020-12-08 PROCEDURE — 99239 HOSP IP/OBS DSCHRG MGMT >30: CPT | Performed by: FAMILY MEDICINE

## 2020-12-08 RX ORDER — MULTIVITAMIN
1 TABLET ORAL DAILY
Qty: 30 TABLET | Refills: 0 | Status: SHIPPED | OUTPATIENT
Start: 2020-12-08

## 2020-12-08 RX ORDER — NICOTINE 21 MG/24HR
1 PATCH, TRANSDERMAL 24 HOURS TRANSDERMAL DAILY
Qty: 28 PATCH | Refills: 0 | Status: SHIPPED | OUTPATIENT
Start: 2020-12-09 | End: 2022-01-15 | Stop reason: HOSPADM

## 2020-12-08 RX ORDER — FOLIC ACID 1 MG/1
1 TABLET ORAL DAILY
Qty: 30 TABLET | Refills: 0 | Status: ON HOLD | OUTPATIENT
Start: 2020-12-09 | End: 2022-01-15 | Stop reason: SDUPTHER

## 2020-12-08 RX ORDER — LANOLIN ALCOHOL/MO/W.PET/CERES
100 CREAM (GRAM) TOPICAL DAILY
Qty: 30 TABLET | Refills: 0 | Status: ON HOLD | OUTPATIENT
Start: 2020-12-09 | End: 2022-01-15 | Stop reason: SDUPTHER

## 2020-12-08 RX ADMIN — B-COMPLEX W/ C & FOLIC ACID TAB 1 TABLET: TAB at 08:38

## 2020-12-08 RX ADMIN — Medication 100 MG: at 08:38

## 2020-12-08 RX ADMIN — FOLIC ACID 1 MG: 1 TABLET ORAL at 08:38

## 2020-12-08 RX ADMIN — ACETAMINOPHEN 650 MG: 325 TABLET, FILM COATED ORAL at 11:59

## 2020-12-09 LAB
ATRIAL RATE: 101 BPM
ATRIAL RATE: 106 BPM
P AXIS: 24 DEGREES
P AXIS: 29 DEGREES
PR INTERVAL: 146 MS
PR INTERVAL: 158 MS
QRS AXIS: 46 DEGREES
QRS AXIS: 67 DEGREES
QRSD INTERVAL: 92 MS
QRSD INTERVAL: 92 MS
QT INTERVAL: 376 MS
QT INTERVAL: 384 MS
QTC INTERVAL: 487 MS
QTC INTERVAL: 510 MS
T WAVE AXIS: 23 DEGREES
T WAVE AXIS: 29 DEGREES
VENTRICULAR RATE: 101 BPM
VENTRICULAR RATE: 106 BPM

## 2020-12-09 PROCEDURE — 93010 ELECTROCARDIOGRAM REPORT: CPT | Performed by: INTERNAL MEDICINE

## 2020-12-10 LAB
BACTERIA BLD CULT: NORMAL
BACTERIA BLD CULT: NORMAL

## 2021-03-23 ENCOUNTER — APPOINTMENT (EMERGENCY)
Dept: RADIOLOGY | Facility: HOSPITAL | Age: 56
DRG: 521 | End: 2021-03-23
Payer: COMMERCIAL

## 2021-03-23 ENCOUNTER — HOSPITAL ENCOUNTER (INPATIENT)
Facility: HOSPITAL | Age: 56
LOS: 6 days | Discharge: NURSING, REHAB OR LONG-TERM CARE | DRG: 521 | End: 2021-03-29
Attending: EMERGENCY MEDICINE | Admitting: FAMILY MEDICINE
Payer: COMMERCIAL

## 2021-03-23 DIAGNOSIS — R74.01 TRANSAMINITIS: ICD-10-CM

## 2021-03-23 DIAGNOSIS — E87.6 HYPOKALEMIA: ICD-10-CM

## 2021-03-23 DIAGNOSIS — M25.551 RIGHT HIP PAIN: ICD-10-CM

## 2021-03-23 DIAGNOSIS — E80.6 HYPERBILIRUBINEMIA: ICD-10-CM

## 2021-03-23 DIAGNOSIS — K74.60 DECOMPENSATED HEPATIC CIRRHOSIS (HCC): ICD-10-CM

## 2021-03-23 DIAGNOSIS — F10.239 ALCOHOL WITHDRAWAL SYNDROME WITH COMPLICATION (HCC): Primary | ICD-10-CM

## 2021-03-23 DIAGNOSIS — K72.90 DECOMPENSATED HEPATIC CIRRHOSIS (HCC): ICD-10-CM

## 2021-03-23 DIAGNOSIS — R56.9 SEIZURE (HCC): ICD-10-CM

## 2021-03-23 DIAGNOSIS — S01.512A LACERATION OF TONGUE, INITIAL ENCOUNTER: ICD-10-CM

## 2021-03-23 DIAGNOSIS — E87.1 HYPONATREMIA: ICD-10-CM

## 2021-03-23 DIAGNOSIS — S02.2XXA NASAL FRACTURE: ICD-10-CM

## 2021-03-23 DIAGNOSIS — R25.1 TREMORS OF NERVOUS SYSTEM: ICD-10-CM

## 2021-03-23 PROBLEM — E87.8 ELECTROLYTE ABNORMALITY: Status: ACTIVE | Noted: 2021-03-23

## 2021-03-23 PROBLEM — K70.10 ALCOHOLIC HEPATITIS: Status: ACTIVE | Noted: 2021-03-23

## 2021-03-23 PROBLEM — S01.552A OPEN WOUND OF TONGUE DUE TO BITE: Status: ACTIVE | Noted: 2021-03-23

## 2021-03-23 PROBLEM — F10.939 ALCOHOL WITHDRAWAL (HCC): Status: ACTIVE | Noted: 2021-03-23

## 2021-03-23 LAB
ALBUMIN SERPL BCP-MCNC: 2.5 G/DL (ref 3.5–5)
ALP SERPL-CCNC: 165 U/L (ref 46–116)
ALT SERPL W P-5'-P-CCNC: 42 U/L (ref 12–78)
ANION GAP SERPL CALCULATED.3IONS-SCNC: 10 MMOL/L (ref 4–13)
APAP SERPL-MCNC: <2 UG/ML (ref 10–20)
APTT PPP: 37 SECONDS (ref 23–37)
AST SERPL W P-5'-P-CCNC: 196 U/L (ref 5–45)
BASOPHILS # BLD AUTO: 0.02 THOUSANDS/ΜL (ref 0–0.1)
BASOPHILS NFR BLD AUTO: 0 % (ref 0–1)
BILIRUB SERPL-MCNC: 10 MG/DL (ref 0.2–1)
BUN SERPL-MCNC: 11 MG/DL (ref 5–25)
CALCIUM ALBUM COR SERPL-MCNC: 9.4 MG/DL (ref 8.3–10.1)
CALCIUM SERPL-MCNC: 8.2 MG/DL (ref 8.3–10.1)
CHLORIDE SERPL-SCNC: 94 MMOL/L (ref 100–108)
CO2 SERPL-SCNC: 24 MMOL/L (ref 21–32)
CREAT SERPL-MCNC: 0.97 MG/DL (ref 0.6–1.3)
EOSINOPHIL # BLD AUTO: 0.02 THOUSAND/ΜL (ref 0–0.61)
EOSINOPHIL NFR BLD AUTO: 0 % (ref 0–6)
ERYTHROCYTE [DISTWIDTH] IN BLOOD BY AUTOMATED COUNT: 15.8 % (ref 11.6–15.1)
ETHANOL SERPL-MCNC: <3 MG/DL (ref 0–3)
GFR SERPL CREATININE-BSD FRML MDRD: 88 ML/MIN/1.73SQ M
GLUCOSE SERPL-MCNC: 162 MG/DL (ref 65–140)
HCT VFR BLD AUTO: 37.6 % (ref 36.5–49.3)
HGB BLD-MCNC: 13.1 G/DL (ref 12–17)
IMM GRANULOCYTES # BLD AUTO: 0.04 THOUSAND/UL (ref 0–0.2)
IMM GRANULOCYTES NFR BLD AUTO: 1 % (ref 0–2)
INR PPP: 1.79 (ref 0.84–1.19)
LYMPHOCYTES # BLD AUTO: 0.32 THOUSANDS/ΜL (ref 0.6–4.47)
LYMPHOCYTES NFR BLD AUTO: 4 % (ref 14–44)
MCH RBC QN AUTO: 34.5 PG (ref 26.8–34.3)
MCHC RBC AUTO-ENTMCNC: 34.8 G/DL (ref 31.4–37.4)
MCV RBC AUTO: 99 FL (ref 82–98)
MONOCYTES # BLD AUTO: 0.38 THOUSAND/ΜL (ref 0.17–1.22)
MONOCYTES NFR BLD AUTO: 5 % (ref 4–12)
NEUTROPHILS # BLD AUTO: 6.68 THOUSANDS/ΜL (ref 1.85–7.62)
NEUTS SEG NFR BLD AUTO: 90 % (ref 43–75)
NRBC BLD AUTO-RTO: 0 /100 WBCS
PLATELET # BLD AUTO: 21 THOUSANDS/UL (ref 149–390)
PMV BLD AUTO: 10.2 FL (ref 8.9–12.7)
POTASSIUM SERPL-SCNC: 2.8 MMOL/L (ref 3.5–5.3)
PROT SERPL-MCNC: 7.2 G/DL (ref 6.4–8.2)
PROTHROMBIN TIME: 20.5 SECONDS (ref 11.6–14.5)
RBC # BLD AUTO: 3.8 MILLION/UL (ref 3.88–5.62)
SALICYLATES SERPL-MCNC: <3 MG/DL (ref 3–20)
SODIUM SERPL-SCNC: 128 MMOL/L (ref 136–145)
WBC # BLD AUTO: 7.46 THOUSAND/UL (ref 4.31–10.16)

## 2021-03-23 PROCEDURE — 99285 EMERGENCY DEPT VISIT HI MDM: CPT | Performed by: EMERGENCY MEDICINE

## 2021-03-23 PROCEDURE — 70450 CT HEAD/BRAIN W/O DYE: CPT

## 2021-03-23 PROCEDURE — 74177 CT ABD & PELVIS W/CONTRAST: CPT

## 2021-03-23 PROCEDURE — 96365 THER/PROPH/DIAG IV INF INIT: CPT

## 2021-03-23 PROCEDURE — 85025 COMPLETE CBC W/AUTO DIFF WBC: CPT | Performed by: EMERGENCY MEDICINE

## 2021-03-23 PROCEDURE — 94760 N-INVAS EAR/PLS OXIMETRY 1: CPT

## 2021-03-23 PROCEDURE — G1004 CDSM NDSC: HCPCS

## 2021-03-23 PROCEDURE — 80143 DRUG ASSAY ACETAMINOPHEN: CPT | Performed by: EMERGENCY MEDICINE

## 2021-03-23 PROCEDURE — 80179 DRUG ASSAY SALICYLATE: CPT | Performed by: EMERGENCY MEDICINE

## 2021-03-23 PROCEDURE — 80053 COMPREHEN METABOLIC PANEL: CPT | Performed by: EMERGENCY MEDICINE

## 2021-03-23 PROCEDURE — 36415 COLL VENOUS BLD VENIPUNCTURE: CPT | Performed by: EMERGENCY MEDICINE

## 2021-03-23 PROCEDURE — 99285 EMERGENCY DEPT VISIT HI MDM: CPT

## 2021-03-23 PROCEDURE — 82077 ASSAY SPEC XCP UR&BREATH IA: CPT | Performed by: EMERGENCY MEDICINE

## 2021-03-23 PROCEDURE — 85610 PROTHROMBIN TIME: CPT | Performed by: EMERGENCY MEDICINE

## 2021-03-23 PROCEDURE — 85730 THROMBOPLASTIN TIME PARTIAL: CPT | Performed by: EMERGENCY MEDICINE

## 2021-03-23 PROCEDURE — 96361 HYDRATE IV INFUSION ADD-ON: CPT

## 2021-03-23 PROCEDURE — 94762 N-INVAS EAR/PLS OXIMTRY CONT: CPT

## 2021-03-23 PROCEDURE — 96375 TX/PRO/DX INJ NEW DRUG ADDON: CPT

## 2021-03-23 RX ORDER — CALCIUM CARBONATE 200(500)MG
1000 TABLET,CHEWABLE ORAL DAILY PRN
Status: DISCONTINUED | OUTPATIENT
Start: 2021-03-23 | End: 2021-03-29 | Stop reason: HOSPADM

## 2021-03-23 RX ORDER — LORAZEPAM 2 MG/ML
1 INJECTION INTRAMUSCULAR ONCE
Status: COMPLETED | OUTPATIENT
Start: 2021-03-23 | End: 2021-03-23

## 2021-03-23 RX ORDER — NICOTINE 21 MG/24HR
1 PATCH, TRANSDERMAL 24 HOURS TRANSDERMAL DAILY
Status: DISCONTINUED | OUTPATIENT
Start: 2021-03-24 | End: 2021-03-29 | Stop reason: HOSPADM

## 2021-03-23 RX ORDER — POTASSIUM CHLORIDE 20 MEQ/1
40 TABLET, EXTENDED RELEASE ORAL ONCE
Status: COMPLETED | OUTPATIENT
Start: 2021-03-23 | End: 2021-03-23

## 2021-03-23 RX ORDER — CHLORDIAZEPOXIDE HYDROCHLORIDE 5 MG/1
50 CAPSULE, GELATIN COATED ORAL ONCE
Status: COMPLETED | OUTPATIENT
Start: 2021-03-23 | End: 2021-03-23

## 2021-03-23 RX ORDER — SODIUM CHLORIDE 9 MG/ML
75 INJECTION, SOLUTION INTRAVENOUS CONTINUOUS
Status: DISCONTINUED | OUTPATIENT
Start: 2021-03-23 | End: 2021-03-23

## 2021-03-23 RX ORDER — SODIUM CHLORIDE AND POTASSIUM CHLORIDE .9; .15 G/100ML; G/100ML
75 SOLUTION INTRAVENOUS CONTINUOUS
Status: DISCONTINUED | OUTPATIENT
Start: 2021-03-24 | End: 2021-03-27

## 2021-03-23 RX ORDER — POTASSIUM CHLORIDE 14.9 MG/ML
20 INJECTION INTRAVENOUS ONCE
Status: COMPLETED | OUTPATIENT
Start: 2021-03-23 | End: 2021-03-23

## 2021-03-23 RX ORDER — ONDANSETRON 2 MG/ML
4 INJECTION INTRAMUSCULAR; INTRAVENOUS EVERY 6 HOURS PRN
Status: DISCONTINUED | OUTPATIENT
Start: 2021-03-23 | End: 2021-03-29 | Stop reason: HOSPADM

## 2021-03-23 RX ORDER — CHLORDIAZEPOXIDE HYDROCHLORIDE 25 MG/1
50 CAPSULE, GELATIN COATED ORAL EVERY 6 HOURS SCHEDULED
Status: DISCONTINUED | OUTPATIENT
Start: 2021-03-24 | End: 2021-03-27

## 2021-03-23 RX ORDER — MAGNESIUM SULFATE 1 G/100ML
1 INJECTION INTRAVENOUS ONCE
Status: COMPLETED | OUTPATIENT
Start: 2021-03-23 | End: 2021-03-23

## 2021-03-23 RX ADMIN — MAGNESIUM SULFATE HEPTAHYDRATE 1 G: 1 INJECTION, SOLUTION INTRAVENOUS at 21:53

## 2021-03-23 RX ADMIN — CHLORDIAZEPOXIDE HYDROCHLORIDE 50 MG: 5 CAPSULE ORAL at 19:37

## 2021-03-23 RX ADMIN — POTASSIUM CHLORIDE 40 MEQ: 1500 TABLET, EXTENDED RELEASE ORAL at 19:36

## 2021-03-23 RX ADMIN — SODIUM CHLORIDE 1000 ML: 0.9 INJECTION, SOLUTION INTRAVENOUS at 18:33

## 2021-03-23 RX ADMIN — CHLORDIAZEPOXIDE HYDROCHLORIDE 50 MG: 25 CAPSULE ORAL at 23:43

## 2021-03-23 RX ADMIN — IOHEXOL 100 ML: 350 INJECTION, SOLUTION INTRAVENOUS at 19:26

## 2021-03-23 RX ADMIN — POTASSIUM CHLORIDE 20 MEQ: 14.9 INJECTION, SOLUTION INTRAVENOUS at 19:46

## 2021-03-23 RX ADMIN — SODIUM CHLORIDE 75 ML/HR: 0.9 INJECTION, SOLUTION INTRAVENOUS at 23:44

## 2021-03-23 RX ADMIN — LORAZEPAM 1 MG: 2 INJECTION INTRAMUSCULAR; INTRAVENOUS at 19:43

## 2021-03-23 NOTE — ED PROVIDER NOTES
History  Chief Complaint   Patient presents with    Tremors     patient states his last ETOH intake was about 5 days ago - states has had tremors since then, RLQ abdominal pain, some SOB  speaks in full sentences without difficulty   Abdominal Pain     78-year-old male presents the ED with complaints that had his last ETOH intake approximately 5 days ago state he eats he has some abdominal pain some shortness of breath does have the shakes  No fevers chills nausea vomiting or diarrhea  Patient is awake and alert states he does have a history of seizure with withdrawal in the past   States he has gone 5 days down wants to continue but feels terrible  Prior to Admission Medications   Prescriptions Last Dose Informant Patient Reported? Taking? Multiple Vitamin (multivitamin) tablet   No No   Sig: Take 1 tablet by mouth daily   folic acid (FOLVITE) 1 mg tablet   No No   Sig: Take 1 tablet (1 mg total) by mouth daily   nicotine (NICODERM CQ) 14 mg/24hr TD 24 hr patch   No No   Sig: Place 1 patch on the skin daily   thiamine 100 MG tablet   No No   Sig: Take 1 tablet (100 mg total) by mouth daily      Facility-Administered Medications: None       Past Medical History:   Diagnosis Date    ETOH abuse        Past Surgical History:   Procedure Laterality Date    GALLBLADDER SURGERY         History reviewed  No pertinent family history  I have reviewed and agree with the history as documented  E-Cigarette/Vaping    E-Cigarette Use Never User      E-Cigarette/Vaping Substances     Social History     Tobacco Use    Smoking status: Never Smoker    Smokeless tobacco: Current User   Substance Use Topics    Alcohol use: Yes     Comment: every other day has beery and mixed drinks  last drink 12 hours ago    Drug use: Yes     Types: Marijuana       Review of Systems   Constitutional: Negative for activity change, chills, diaphoresis and fever     HENT: Negative for congestion, ear pain, nosebleeds, sore throat, trouble swallowing and voice change  Eyes: Negative for pain, discharge and redness  Respiratory: Negative for apnea, cough, choking, shortness of breath, wheezing and stridor  Cardiovascular: Negative for chest pain and palpitations  Gastrointestinal: Negative for abdominal distention, abdominal pain, constipation, diarrhea, nausea and vomiting  Endocrine: Negative for polydipsia  Genitourinary: Negative for difficulty urinating, dysuria, flank pain, frequency, hematuria and urgency  Musculoskeletal: Negative for back pain, gait problem, joint swelling, myalgias, neck pain and neck stiffness  Skin: Negative for pallor and rash  Neurological: Positive for tremors  Negative for dizziness, syncope, speech difficulty, weakness, numbness and headaches  Hematological: Negative for adenopathy  Psychiatric/Behavioral: Negative for confusion, hallucinations, self-injury and suicidal ideas  The patient is not nervous/anxious  Physical Exam  Physical Exam  Vitals signs and nursing note reviewed  Constitutional:       General: He is not in acute distress  Appearance: He is well-developed  He is not diaphoretic  Comments: Patient has diffuse tremors most likely due to alcohol withdrawal    HENT:      Head: Normocephalic and atraumatic  Right Ear: External ear normal       Left Ear: External ear normal       Nose: Nose normal       Mouth/Throat:     Eyes:      Conjunctiva/sclera: Conjunctivae normal       Pupils: Pupils are equal, round, and reactive to light  Neck:      Musculoskeletal: Normal range of motion and neck supple  Cardiovascular:      Rate and Rhythm: Normal rate and regular rhythm  Heart sounds: Normal heart sounds  Pulmonary:      Effort: Pulmonary effort is normal       Breath sounds: Normal breath sounds  Abdominal:      General: Bowel sounds are normal       Palpations: Abdomen is soft  Musculoskeletal: Normal range of motion     Skin: General: Skin is warm and dry  Neurological:      Mental Status: He is alert and oriented to person, place, and time  Deep Tendon Reflexes: Reflexes are normal and symmetric  Vital Signs  ED Triage Vitals [03/23/21 1747]   Temperature Pulse Respirations Blood Pressure SpO2   98 4 °F (36 9 °C) (!) 112 19 135/87 96 %      Temp Source Heart Rate Source Patient Position - Orthostatic VS BP Location FiO2 (%)   Tympanic Monitor Sitting Left arm --      Pain Score       7           Vitals:    03/23/21 1915 03/23/21 1946 03/23/21 2000 03/23/21 2035   BP:  154/95  137/88   Pulse: 82 93 90 89   Patient Position - Orthostatic VS:  Lying  Lying         Visual Acuity  Visual Acuity      Most Recent Value   L Pupil Size (mm)  3   R Pupil Size (mm)  3          ED Medications  Medications   potassium chloride 20 mEq IVPB (premix) (20 mEq Intravenous New Bag 3/23/21 1946)   magnesium sulfate IVPB (premix) SOLN 1 g (has no administration in time range)   sodium chloride 0 9 % bolus 1,000 mL (0 mL Intravenous Stopped 3/23/21 1945)   chlordiazePOXIDE (LIBRIUM) capsule 50 mg (50 mg Oral Given 3/23/21 1937)   potassium chloride (K-DUR,KLOR-CON) CR tablet 40 mEq (40 mEq Oral Given 3/23/21 1936)   iohexol (OMNIPAQUE) 350 MG/ML injection (SINGLE-DOSE) 100 mL (100 mL Intravenous Given 3/23/21 1926)   LORazepam (ATIVAN) injection 1 mg (1 mg Intravenous Given 3/23/21 1943)       Diagnostic Studies  Results Reviewed     Procedure Component Value Units Date/Time    Salicylate level [[id number]]  (Abnormal) Collected: 03/23/21 1816    Lab Status: Final result Specimen: Blood from Arm, Left Updated: 36/07/97 0064     Salicylate Lvl <3 mg/dL     Acetaminophen level-If concentration is detectable, please discuss with medical  on call   [[id number]]  (Abnormal) Collected: 03/23/21 1816    Lab Status: Final result Specimen: Blood from Arm, Left Updated: 03/23/21 1842     Acetaminophen Level <2 ug/mL     Comprehensive metabolic panel [[id number]]  (Abnormal) Collected: 03/23/21 1816    Lab Status: Final result Specimen: Blood from Arm, Left Updated: 03/23/21 1840     Sodium 128 mmol/L      Potassium 2 8 mmol/L      Chloride 94 mmol/L      CO2 24 mmol/L      ANION GAP 10 mmol/L      BUN 11 mg/dL      Creatinine 0 97 mg/dL      Glucose 162 mg/dL      Calcium 8 2 mg/dL      Corrected Calcium 9 4 mg/dL       U/L      ALT 42 U/L      Alkaline Phosphatase 165 U/L      Total Protein 7 2 g/dL      Albumin 2 5 g/dL      Total Bilirubin 10 00 mg/dL      eGFR 88 ml/min/1 73sq m     Narrative:      [place] guidelines for Chronic Kidney Disease (CKD):     Stage 1 with normal or high GFR (GFR > 90 mL/min/1 73 square meters)    Stage 2 Mild CKD (GFR = 60-89 mL/min/1 73 square meters)    Stage 3A Moderate CKD (GFR = 45-59 mL/min/1 73 square meters)    Stage 3B Moderate CKD (GFR = 30-44 mL/min/1 73 square meters)    Stage 4 Severe CKD (GFR = 15-29 mL/min/1 73 square meters)    Stage 5 End Stage CKD (GFR <15 mL/min/1 73 square meters)  Note: GFR calculation is accurate only with a steady state creatinine    Protime-INR [[id number]]  (Abnormal) Collected: 03/23/21 1816    Lab Status: Final result Specimen: Blood from Arm, Left Updated: 03/23/21 1837     Protime 20 5 seconds      INR 1 79    APTT [[id number]]  (Normal) Collected: 03/23/21 1816    Lab Status: Final result Specimen: Blood from Arm, Left Updated: 03/23/21 1837     PTT 37 seconds     Ethanol [[id number]]  (Normal) Collected: 03/23/21 1816    Lab Status: Final result Specimen: Blood from Arm, Left Updated: 03/23/21 1835     Ethanol Lvl <3 mg/dL     CBC and differential [[id number]]  (Abnormal) Collected: 03/23/21 1816    Lab Status: Final result Specimen: Blood from Arm, Left Updated: 03/23/21 1835     WBC 7 46 Thousand/uL      RBC 3 80 Million/uL      Hemoglobin 13 1 g/dL      Hematocrit 37 6 %      MCV 99 fL      MCH 34 5 pg      MCHC 34 8 g/dL      RDW 15 8 % MPV 10 2 fL      Platelets 21 Thousands/uL      nRBC 0 /100 WBCs      Neutrophils Relative 90 %      Immat GRANS % 1 %      Lymphocytes Relative 4 %      Monocytes Relative 5 %      Eosinophils Relative 0 %      Basophils Relative 0 %      Neutrophils Absolute 6 68 Thousands/µL      Immature Grans Absolute 0 04 Thousand/uL      Lymphocytes Absolute 0 32 Thousands/µL      Monocytes Absolute 0 38 Thousand/µL      Eosinophils Absolute 0 02 Thousand/µL      Basophils Absolute 0 02 Thousands/µL     Rapid drug screen, urine [[id number]]     Lab Status: No result Specimen: Urine                  CT abdomen pelvis with contrast   Final Result by Tracy Grissom MD (03/23 1947)      Very abnormal appearance of liver most likely cirrhosis with either a cyst or potentially a low-density solid mass in the right lobe  Follow-up hepatic MRI should be considered for better characterization  Splenomegaly and possible tiny upper abdominal varices indicative of portal hypertension  Evidence of old skeletal trauma  Cholecystectomy  Very small left inguinal hernia containing fat            Workstation performed: [id number]         CT head without contrast   Final Result by Tracy Grissom MD (03/23 1930)      No acute intracranial abnormality  Right-sided nasal fractures with swelling  Age indeterminate                    Workstation performed: [id number]                    Procedures  Procedures         ED Course                                           MDM  Number of Diagnoses or Management Options  Diagnosis management comments: I spoke with Dr Patrick Davila from ICU and have requested that the patient be admitted to the critical care unit on step-down due to the fact that patient's sodium and potassium is low, he has a laceration in his tongue with swelling and ecchymosis that makes his tongue swollen, patient has nasal fracture, patient has had tremors since he has been in the ED despite receiving 50 mg of Librium as well as 1 mg of IV Ativan  She states she will have the nurse practitioner come evaluate the patient also  Disposition  Final diagnoses:   Alcohol withdrawal syndrome with complication (HCC)   Hypokalemia   Hyponatremia   Tremors of nervous system   Seizure ([facility] [address] )   Nasal fracture   Laceration of tongue, initial encounter     Time reflects when diagnosis was documented in both MDM as applicable and the Disposition within this note     Time User Action Codes Description Comment    3/23/2021  8:55 PM Paige Breach Add [N83 178] Alcohol withdrawal syndrome with complication ([facility] [address] )     3/73/4402  8:55 PM Paige Breach Add [E87 6] Hypokalemia     3/23/2021  8:55 PM Paige Breach Add [E87 1] Hyponatremia     3/23/2021  8:56 PM Paige Breach Add [R25 1] Tremors of nervous system     3/23/2021  8:56 PM [place] Breach Add [R56 9] Seizure ([facility] [address] )     3/23/2021  8:56 PM Paige Breach Add [S02  2XXA] Nasal fracture     3/23/2021  8:56 PM Paige Breach Add [L14 873O] Laceration of tongue, initial encounter       ED Disposition     ED Disposition Condition Date/Time Comment    Admit Stable Tue Mar 23, 2021  8:55 PM         Follow-up Information    None         Patient's Medications   Discharge Prescriptions    No medications on file     No discharge procedures on file      [address] Review     None          ED Provider  Electronically Signed by           Scarlett Chavez DO  03/23/21 2059       Scarlett Chavez DO  03/23/21 2100

## 2021-03-24 ENCOUNTER — APPOINTMENT (INPATIENT)
Dept: RADIOLOGY | Facility: HOSPITAL | Age: 56
DRG: 521 | End: 2021-03-24
Payer: COMMERCIAL

## 2021-03-24 PROBLEM — S02.2XXA CLOSED FRACTURE OF NASAL BONE: Status: ACTIVE | Noted: 2021-03-24

## 2021-03-24 LAB
ALBUMIN SERPL BCP-MCNC: 2.5 G/DL (ref 3.5–5)
ALP SERPL-CCNC: 152 U/L (ref 46–116)
ALT SERPL W P-5'-P-CCNC: 40 U/L (ref 12–78)
AMMONIA PLAS-SCNC: 52 UMOL/L (ref 11–35)
ANION GAP SERPL CALCULATED.3IONS-SCNC: 7 MMOL/L (ref 4–13)
AST SERPL W P-5'-P-CCNC: 191 U/L (ref 5–45)
BASOPHILS # BLD AUTO: 0.03 THOUSANDS/ΜL (ref 0–0.1)
BASOPHILS NFR BLD AUTO: 0 % (ref 0–1)
BILIRUB DIRECT SERPL-MCNC: 6.2 MG/DL (ref 0–0.2)
BILIRUB SERPL-MCNC: 9.7 MG/DL (ref 0.2–1)
BUN SERPL-MCNC: 10 MG/DL (ref 5–25)
CALCIUM SERPL-MCNC: 8.2 MG/DL (ref 8.3–10.1)
CHLORIDE SERPL-SCNC: 101 MMOL/L (ref 100–108)
CO2 SERPL-SCNC: 27 MMOL/L (ref 21–32)
CREAT SERPL-MCNC: 0.92 MG/DL (ref 0.6–1.3)
EOSINOPHIL # BLD AUTO: 0.1 THOUSAND/ΜL (ref 0–0.61)
EOSINOPHIL NFR BLD AUTO: 1 % (ref 0–6)
ERYTHROCYTE [DISTWIDTH] IN BLOOD BY AUTOMATED COUNT: 16 % (ref 11.6–15.1)
GFR SERPL CREATININE-BSD FRML MDRD: 93 ML/MIN/1.73SQ M
GLUCOSE SERPL-MCNC: 102 MG/DL (ref 65–140)
GLUCOSE SERPL-MCNC: 112 MG/DL (ref 65–140)
HCT VFR BLD AUTO: 36.8 % (ref 36.5–49.3)
HGB BLD-MCNC: 12.6 G/DL (ref 12–17)
IMM GRANULOCYTES # BLD AUTO: 0.02 THOUSAND/UL (ref 0–0.2)
IMM GRANULOCYTES NFR BLD AUTO: 0 % (ref 0–2)
INR PPP: 1.8 (ref 0.84–1.19)
LYMPHOCYTES # BLD AUTO: 0.56 THOUSANDS/ΜL (ref 0.6–4.47)
LYMPHOCYTES NFR BLD AUTO: 8 % (ref 14–44)
MAGNESIUM SERPL-MCNC: 1.9 MG/DL (ref 1.6–2.6)
MCH RBC QN AUTO: 34.2 PG (ref 26.8–34.3)
MCHC RBC AUTO-ENTMCNC: 34.2 G/DL (ref 31.4–37.4)
MCV RBC AUTO: 100 FL (ref 82–98)
MONOCYTES # BLD AUTO: 0.59 THOUSAND/ΜL (ref 0.17–1.22)
MONOCYTES NFR BLD AUTO: 9 % (ref 4–12)
NEUTROPHILS # BLD AUTO: 5.66 THOUSANDS/ΜL (ref 1.85–7.62)
NEUTS SEG NFR BLD AUTO: 82 % (ref 43–75)
NRBC BLD AUTO-RTO: 0 /100 WBCS
PLATELET # BLD AUTO: 26 THOUSANDS/UL (ref 149–390)
PMV BLD AUTO: 11 FL (ref 8.9–12.7)
POTASSIUM SERPL-SCNC: 3.3 MMOL/L (ref 3.5–5.3)
PROT SERPL-MCNC: 6.9 G/DL (ref 6.4–8.2)
PROTHROMBIN TIME: 20.7 SECONDS (ref 11.6–14.5)
RBC # BLD AUTO: 3.68 MILLION/UL (ref 3.88–5.62)
SODIUM SERPL-SCNC: 135 MMOL/L (ref 136–145)
WBC # BLD AUTO: 6.96 THOUSAND/UL (ref 4.31–10.16)

## 2021-03-24 PROCEDURE — 94760 N-INVAS EAR/PLS OXIMETRY 1: CPT

## 2021-03-24 PROCEDURE — 86256 FLUORESCENT ANTIBODY TITER: CPT | Performed by: NURSE PRACTITIONER

## 2021-03-24 PROCEDURE — 86705 HEP B CORE ANTIBODY IGM: CPT | Performed by: NURSE PRACTITIONER

## 2021-03-24 PROCEDURE — 86235 NUCLEAR ANTIGEN ANTIBODY: CPT | Performed by: NURSE PRACTITIONER

## 2021-03-24 PROCEDURE — 82728 ASSAY OF FERRITIN: CPT | Performed by: NURSE PRACTITIONER

## 2021-03-24 PROCEDURE — 93005 ELECTROCARDIOGRAM TRACING: CPT

## 2021-03-24 PROCEDURE — 83550 IRON BINDING TEST: CPT | Performed by: NURSE PRACTITIONER

## 2021-03-24 PROCEDURE — 94762 N-INVAS EAR/PLS OXIMTRY CONT: CPT

## 2021-03-24 PROCEDURE — 85025 COMPLETE CBC W/AUTO DIFF WBC: CPT | Performed by: PHYSICIAN ASSISTANT

## 2021-03-24 PROCEDURE — 83540 ASSAY OF IRON: CPT | Performed by: NURSE PRACTITIONER

## 2021-03-24 PROCEDURE — 86038 ANTINUCLEAR ANTIBODIES: CPT | Performed by: NURSE PRACTITIONER

## 2021-03-24 PROCEDURE — 86803 HEPATITIS C AB TEST: CPT | Performed by: NURSE PRACTITIONER

## 2021-03-24 PROCEDURE — 82140 ASSAY OF AMMONIA: CPT | Performed by: NURSE PRACTITIONER

## 2021-03-24 PROCEDURE — 83735 ASSAY OF MAGNESIUM: CPT | Performed by: PHYSICIAN ASSISTANT

## 2021-03-24 PROCEDURE — 99223 1ST HOSP IP/OBS HIGH 75: CPT | Performed by: FAMILY MEDICINE

## 2021-03-24 PROCEDURE — 82948 REAGENT STRIP/BLOOD GLUCOSE: CPT

## 2021-03-24 PROCEDURE — 86704 HEP B CORE ANTIBODY TOTAL: CPT | Performed by: NURSE PRACTITIONER

## 2021-03-24 PROCEDURE — 97167 OT EVAL HIGH COMPLEX 60 MIN: CPT

## 2021-03-24 PROCEDURE — 92610 EVALUATE SWALLOWING FUNCTION: CPT

## 2021-03-24 PROCEDURE — 87340 HEPATITIS B SURFACE AG IA: CPT | Performed by: NURSE PRACTITIONER

## 2021-03-24 PROCEDURE — 80076 HEPATIC FUNCTION PANEL: CPT | Performed by: PHYSICIAN ASSISTANT

## 2021-03-24 PROCEDURE — 76705 ECHO EXAM OF ABDOMEN: CPT

## 2021-03-24 PROCEDURE — 99254 IP/OBS CNSLTJ NEW/EST MOD 60: CPT | Performed by: INTERNAL MEDICINE

## 2021-03-24 PROCEDURE — 85610 PROTHROMBIN TIME: CPT | Performed by: PHYSICIAN ASSISTANT

## 2021-03-24 PROCEDURE — 80048 BASIC METABOLIC PNL TOTAL CA: CPT | Performed by: PHYSICIAN ASSISTANT

## 2021-03-24 PROCEDURE — 82105 ALPHA-FETOPROTEIN SERUM: CPT | Performed by: NURSE PRACTITIONER

## 2021-03-24 RX ORDER — ACETAMINOPHEN 325 MG/1
650 TABLET ORAL ONCE
Status: COMPLETED | OUTPATIENT
Start: 2021-03-24 | End: 2021-03-24

## 2021-03-24 RX ORDER — POTASSIUM CHLORIDE 14.9 MG/ML
20 INJECTION INTRAVENOUS ONCE
Status: COMPLETED | OUTPATIENT
Start: 2021-03-24 | End: 2021-03-24

## 2021-03-24 RX ORDER — POTASSIUM CHLORIDE 29.8 MG/ML
40 INJECTION INTRAVENOUS ONCE
Status: DISCONTINUED | OUTPATIENT
Start: 2021-03-24 | End: 2021-03-24

## 2021-03-24 RX ORDER — LORAZEPAM 2 MG/ML
4 INJECTION INTRAMUSCULAR ONCE
Status: DISCONTINUED | OUTPATIENT
Start: 2021-03-24 | End: 2021-03-29 | Stop reason: HOSPADM

## 2021-03-24 RX ORDER — DIAZEPAM 5 MG/ML
5 INJECTION, SOLUTION INTRAMUSCULAR; INTRAVENOUS ONCE
Status: COMPLETED | OUTPATIENT
Start: 2021-03-24 | End: 2021-03-24

## 2021-03-24 RX ORDER — LORAZEPAM 2 MG/ML
2 INJECTION INTRAMUSCULAR ONCE
Status: COMPLETED | OUTPATIENT
Start: 2021-03-24 | End: 2021-03-24

## 2021-03-24 RX ORDER — MAGNESIUM SULFATE 1 G/100ML
1 INJECTION INTRAVENOUS ONCE
Status: COMPLETED | OUTPATIENT
Start: 2021-03-24 | End: 2021-03-24

## 2021-03-24 RX ADMIN — CHLORDIAZEPOXIDE HYDROCHLORIDE 50 MG: 25 CAPSULE ORAL at 13:20

## 2021-03-24 RX ADMIN — POTASSIUM CHLORIDE 20 MEQ: 14.9 INJECTION, SOLUTION INTRAVENOUS at 14:01

## 2021-03-24 RX ADMIN — DIAZEPAM 5 MG: 10 INJECTION, SOLUTION INTRAMUSCULAR; INTRAVENOUS at 01:58

## 2021-03-24 RX ADMIN — CHLORDIAZEPOXIDE HYDROCHLORIDE 50 MG: 25 CAPSULE ORAL at 23:56

## 2021-03-24 RX ADMIN — ACETAMINOPHEN 650 MG: 325 TABLET, FILM COATED ORAL at 12:10

## 2021-03-24 RX ADMIN — CHLORDIAZEPOXIDE HYDROCHLORIDE 50 MG: 25 CAPSULE ORAL at 18:21

## 2021-03-24 RX ADMIN — DIAZEPAM 5 MG: 10 INJECTION, SOLUTION INTRAMUSCULAR; INTRAVENOUS at 02:58

## 2021-03-24 RX ADMIN — MAGNESIUM SULFATE HEPTAHYDRATE 1 G: 1 INJECTION, SOLUTION INTRAVENOUS at 10:42

## 2021-03-24 RX ADMIN — SODIUM CHLORIDE AND POTASSIUM CHLORIDE 75 ML/HR: .9; .15 SOLUTION INTRAVENOUS at 00:13

## 2021-03-24 RX ADMIN — SODIUM CHLORIDE AND POTASSIUM CHLORIDE 75 ML/HR: .9; .15 SOLUTION INTRAVENOUS at 18:27

## 2021-03-24 RX ADMIN — CHLORDIAZEPOXIDE HYDROCHLORIDE 50 MG: 25 CAPSULE ORAL at 06:46

## 2021-03-24 RX ADMIN — FOLIC ACID: 5 INJECTION, SOLUTION INTRAMUSCULAR; INTRAVENOUS; SUBCUTANEOUS at 10:27

## 2021-03-24 RX ADMIN — LORAZEPAM 2 MG: 2 INJECTION INTRAMUSCULAR; INTRAVENOUS at 00:13

## 2021-03-24 RX ADMIN — POTASSIUM CHLORIDE 20 MEQ: 14.9 INJECTION, SOLUTION INTRAVENOUS at 12:10

## 2021-03-24 NOTE — PHYSICAL THERAPY NOTE
03/24/21 1445   PT Last Visit   PT Visit Date 03/24/21   Note Type   Note type Evaluation   Cancel Reasons Other  (pt refused "please come back tomorrow")   Licensure   [place] License Number  [id number] [place] [address] [id number]

## 2021-03-24 NOTE — ASSESSMENT & PLAN NOTE
CTH showed Right-sided nasal fractures with swelling  Age indeterminate    Suspect new 2/2 fall vs seizure   Supportive care

## 2021-03-24 NOTE — POST FALL NOTE
Post Fall Note    Date of Fall: 03/24/21  Observer/Reported time of Fall: 70 Henny Pulliam  Name of Provider Notified: Virgilio Portillo  Time Provider Notified: 0211  Assessment of Patient Injury: No injury noted  Post Fall Interventions: Physician notified;Circumstances of fall reviewed and documented; Fall risk precautions implemented/maitained;Comforts rounds continued; Need for additional safety measures intiated if necessary      Brief Description of Events  patint's bed alarm went off, respiratory therapist saw him falling to floor  Hospitalist on duty informed  Last Recorded Vitals  Blood pressure 122/84, pulse (!) 125, temperature 98 1 °F (36 7 °C), temperature source Axillary, resp  rate 20, height 6' (1 829 m), weight 95 3 kg (210 lb), SpO2 96 %        Principal Problem:    Alcohol withdrawal (HCC)  Active Problems:    Transaminitis    Hyperbilirubinemia    Thrombocytopenia (HCC)    Tobacco abuse    Open wound of tongue due to bite    Electrolyte abnormality    Closed fracture of nasal bone

## 2021-03-24 NOTE — ASSESSMENT & PLAN NOTE
Na 128, K 2 8  ER did not check Mag, will obtain in AM   In ER given 40meq PO Kdur, 20meq IV potassium, 1gm IV magnesium  Recheck BMP in am   Fluids with K  Telemetry

## 2021-03-24 NOTE — PLAN OF CARE
Problem: Potential for Falls  Goal: Patient will remain free of falls  Description: INTERVENTIONS:  - Assess patient frequently for physical needs  -  Identify cognitive and physical deficits and behaviors that affect risk of falls  -  [place] fall precautions as indicated by assessment   - Educate patient/family on patient safety including physical limitations  - Instruct patient to call for assistance with activity based on assessment  - Modify environment to reduce risk of injury  - Consider OT/PT consult to assist with strengthening/mobility  Outcome: Progressing     Problem: Prexisting or High Potential for Compromised Skin Integrity  Goal: Skin integrity is maintained or improved  Description: INTERVENTIONS:  - Identify patients at risk for skin breakdown  - Assess and monitor skin integrity  - Assess and monitor nutrition and hydration status  - Monitor labs   - Assess for incontinence   - Turn and reposition patient  - Assist with mobility/ambulation  - Relieve pressure over bony prominences  - Avoid friction and shearing  - Provide appropriate hygiene as needed including keeping skin clean and dry  - Evaluate need for skin moisturizer/barrier cream  - Collaborate with interdisciplinary team   - Patient/family teaching  - Consider wound care consult   Outcome: Progressing     Problem: Nutrition/Hydration-ADULT  Goal: Nutrient/Hydration intake appropriate for improving, restoring or maintaining nutritional needs  Description: Monitor and assess patient's nutrition/hydration status for malnutrition  Collaborate with interdisciplinary team and initiate plan and interventions as ordered  Monitor patient's weight and dietary intake as ordered or per policy  Utilize nutrition screening tool and intervene as necessary  Determine patient's food preferences and provide high-protein, high-caloric foods as appropriate       INTERVENTIONS:  - Monitor oral intake, urinary output, labs, and treatment plans  - Assess nutrition and hydration status and recommend course of action  - Evaluate amount of meals eaten  - Assist patient with eating if necessary   - Allow adequate time for meals  - Recommend/ encourage appropriate diets, oral nutritional supplements, and vitamin/mineral supplements  - Order, calculate, and assess calorie counts as needed  - Recommend, monitor, and adjust tube feedings and TPN/PPN based on assessed needs  - Assess need for intravenous fluids  - Provide specific nutrition/hydration education as appropriate  - Include patient/family/caregiver in decisions related to nutrition  Outcome: Progressing     Problem: PAIN - ADULT  Goal: Verbalizes/displays adequate comfort level or baseline comfort level  Description: Interventions:  - Encourage patient to monitor pain and request assistance  - Assess pain using appropriate pain scale  - Administer analgesics based on type and severity of pain and evaluate response  - Implement non-pharmacological measures as appropriate and evaluate response  - Consider cultural and social influences on pain and pain management  - Notify physician/advanced practitioner if interventions unsuccessful or patient reports new pain  Outcome: Progressing     Problem: INFECTION - ADULT  Goal: Absence or prevention of progression during hospitalization  Description: INTERVENTIONS:  - Assess and monitor for signs and symptoms of infection  - Monitor lab/diagnostic results  - Monitor all insertion sites, i e  indwelling lines, tubes, and drains  - Monitor endotracheal if appropriate and nasal secretions for changes in amount and color  - [place] appropriate cooling/warming therapies per order  - Administer medications as ordered  - Instruct and encourage patient and family to use good hand hygiene technique  - Identify and instruct in appropriate isolation precautions for identified infection/condition  Outcome: Progressing  Goal: Absence of fever/infection during neutropenic period  Description: INTERVENTIONS:  - Monitor WBC    Outcome: Progressing     Problem: SAFETY ADULT  Goal: Patient will remain free of falls  Description: INTERVENTIONS:  - Assess patient frequently for physical needs  -  Identify cognitive and physical deficits and behaviors that affect risk of falls    -  [place] fall precautions as indicated by assessment   - Educate patient/family on patient safety including physical limitations  - Instruct patient to call for assistance with activity based on assessment  - Modify environment to reduce risk of injury  - Consider OT/PT consult to assist with strengthening/mobility  Outcome: Progressing  Goal: Maintain or return to baseline ADL function  Description: INTERVENTIONS:  -  Assess patient's ability to carry out ADLs; assess patient's baseline for ADL function and identify physical deficits which impact ability to perform ADLs (bathing, care of mouth/teeth, toileting, grooming, dressing, etc )  - Assess/evaluate cause of self-care deficits   - Assess range of motion  - Assess patient's mobility; develop plan if impaired  - Assess patient's need for assistive devices and provide as appropriate  - Encourage maximum independence but intervene and supervise when necessary  - Involve family in performance of ADLs  - Assess for home care needs following discharge   - Consider OT consult to assist with ADL evaluation and planning for discharge  - Provide patient education as appropriate  Outcome: Progressing  Goal: Maintain or return mobility status to optimal level  Description: INTERVENTIONS:  - Assess patient's baseline mobility status (ambulation, transfers, stairs, etc )    - Identify cognitive and physical deficits and behaviors that affect mobility  - Identify mobility aids required to assist with transfers and/or ambulation (gait belt, sit-to-stand, lift, walker, cane, etc )  - [place] fall precautions as indicated by assessment  - Record patient progress and toleration of activity level on Mobility SBAR; progress patient to next Phase/Stage  - Instruct patient to call for assistance with activity based on assessment  - Consider rehabilitation consult to assist with strengthening/weightbearing, etc   Outcome: Progressing     Problem: DISCHARGE PLANNING  Goal: Discharge to home or other facility with appropriate resources  Description: INTERVENTIONS:  - Identify barriers to discharge w/patient and caregiver  - Arrange for needed discharge resources and transportation as appropriate  - Identify discharge learning needs (meds, wound care, etc )  - Arrange for interpretive services to assist at discharge as needed  - Refer to Case Management Department for coordinating discharge planning if the patient needs post-hospital services based on physician/advanced practitioner order or complex needs related to functional status, cognitive ability, or social support system  Outcome: Progressing     Problem: Knowledge Deficit  Goal: Patient/family/caregiver demonstrates understanding of disease process, treatment plan, medications, and discharge instructions  Description: Complete learning assessment and assess knowledge base    Interventions:  - Provide teaching at level of understanding  - Provide teaching via preferred learning methods  Outcome: Progressing

## 2021-03-24 NOTE — UTILIZATION REVIEW
Initial Clinical Review    Admission: Date/Time/Statement:   Admission Orders (From admission, onward)     Ordered        03/23/21 2058  Inpatient Admission  Once                   Orders Placed This Encounter   Procedures    Inpatient Admission     Standing Status:   Standing     Number of Occurrences:   1     Order Specific Question:   Level of Care     Answer:   Med Surg [16]     Order Specific Question:   Estimated length of stay     Answer:   More than 2 Midnights     Order Specific Question:   Certification     Answer:   I certify that inpatient services are medically necessary for this patient for a duration of greater than two midnights  See H&P and MD Progress Notes for additional information about the patient's course of treatment  ED Arrival Information     Expected Arrival Acuity Means of Arrival Escorted By Service Admission Type    - 3/23/2021 17:35 Emergent Wheelchair Friend Hospitalist Emergency    Arrival Complaint    Shortness of Breath        Chief Complaint   Patient presents with    Tremors     patient states his last ETOH intake was about 5 days ago - states has had tremors since then, RLQ abdominal pain, some SOB  speaks in full sentences without difficulty   Abdominal Pain     Assessment/Plan:     55year old male presents to ed from home for evaluation and treatment of abdominal pain with shortness of breath  ON arrival he reports tremors and shortness of breath possibly related to alcohol withdrawal   He has not eaten in 3 days due to mouth pain  He has seen hallucinations of flies which he states is resolved  Last drink 5 days ago  PMHX : WITHDRAWAL SEIZURES Clinical assessment significant for diffuse tremors, tachycardia,salicylate <3, acetaminophen<2, sodium 128, platelets 21  Ct abdomen shows liver cirrhosis and los density solid mass on right liver lobe  Hepatic MRI suggested  Ct head shows closed nasal bone fracture    Nasal swelling and open laceration on the tongue with hematoma present  Fall vs seizure suspected  Bilirubin 10  Treated in ed with iv  9% ns bolus, librium x1 , iv kcl 20 meq x 1, kdur 20 meq x1, iv ativan x1, iv mag so4x1  Admit to inpatient medical surgical for alcohol withdrawal, hyperbilirubinemia, hyponatremia and hypokalemia  Plan includes consult gastroenterology, clear liquid diet, seizure precautions, ciwa assessments, telemetry, aspiration precautions, replete potassium, iv fluids and iv folic acid          ED Triage Vitals [03/23/21 1747]   98 4 °F (36 9 °C) (!) 112 19 135/87 96 %      Tympanic Monitor         Pain Score       7          03/23/21 95 3 kg (210 lb)     Additional Vital Signs:     Date/Time  Temp  Pulse  Resp  BP  MAP (mmHg)  SpO2  O2 Device   03/23/21 2357  --  92  --  127/81  --  --  --   03/23/21 2322  --  --  --  --  --  97 %  None (Room air)   03/23/21 2300  --  --  --  --  --  96 %  None (Room air)   03/23/21 2219  98 °F (36 7 °C)  77  20  141/73  --  97 %  None (Room air)   03/23/21 2148  --  87  20  144/79  106  97 %  None (Room air)   03/23/21 2035  98 9 °F (37 2 °C)  89  18  137/88  106  96 %  None (Room air)   03/23/21 2000  --  90  --  --  --  97 %  --   03/23/21 1946  --  93  20  154/95  119  98 %  None (Room air)   03/23/21 1915  --  82  --  --  --  97 %  --   03/23/21 1900  --  78  --  --  --  97 %  --   03/23/21 1845  --  78  --  150/83  111  97 %  --   03/23/21 1834  --  82  --  150/83  --  --  --     Row Name   03/23/21 1945    CIWA-Ar   BP   --    Pulse   --    Nausea and Vomiting   2    Tactile Disturbances   2    Tremor   7    Auditory Disturbances   0    Paroxysmal Sweats   1    Visual Disturbances   0    Anxiety   4    Headache, Fullness in Head   1    Agitation   0    Orientation and Clouding of Sensorium   0    CIWA-Ar Total   17            Pertinent Labs/Diagnostic Test Results:     CT abdomen pelvis with contrast    (03/23 1947)      Very abnormal appearance of liver most likely cirrhosis with either a cyst or potentially a low-density solid mass in the right lobe  Follow-up hepatic MRI should be considered for better characterization  Splenomegaly and possible tiny upper abdominal varices indicative of portal hypertension  Evidence of old skeletal trauma  Cholecystectomy  Very small left inguinal hernia containing fat      CT head without contrast   (03/23 1930)      No acute intracranial abnormality  Right-sided nasal fractures with swelling  Age indeterminate                 Results from last 7 days   Lab Units 03/23/21  1816   WBC Thousand/uL 7 46   HEMOGLOBIN g/dL 13 1   HEMATOCRIT % 37 6   PLATELETS Thousands/uL 21*   NEUTROS ABS Thousands/µL 6 68         Results from last 7 days   Lab Units 03/23/21  1816   SODIUM mmol/L 128*   POTASSIUM mmol/L 2 8*   CHLORIDE mmol/L 94*   CO2 mmol/L 24   ANION GAP mmol/L 10   BUN mg/dL 11   CREATININE mg/dL 0 97   EGFR ml/min/1 73sq m 88   CALCIUM mg/dL 8 2*   MAGNESIUM mg/dL  --      Results from last 7 days   Lab Units 03/23/21  1816   AST U/L 196*   ALT U/L 42   ALK PHOS U/L 165*   TOTAL PROTEIN g/dL 7 2   ALBUMIN g/dL 2 5*   TOTAL BILIRUBIN mg/dL 10 00*   BILIRUBIN DIRECT mg/dL  --        Results from last 7 days   Lab Units 03/23/21  1816   GLUCOSE RANDOM mg/dL 162*       Results from last 7 days   Lab Units 03/23/21  1816   PROTIME seconds 20 5*   INR  1 79*   PTT seconds 37         Results from last 7 days   Lab Units 03/23/21  1816   ETHANOL LVL mg/dL <3   ACETAMINOPHEN LVL ug/mL <2*   SALICYLATE LVL mg/dL <3*       ED Treatment:   Medication Administration from 03/23/2021 1735 to 03/23/2021 2217       Date/Time Order Dose Route Action     03/23/2021 1833 sodium chloride 0 9 % bolus 1,000 mL 1,000 mL Intravenous New Bag     03/23/2021 1937 chlordiazePOXIDE (LIBRIUM) capsule 50 mg 50 mg Oral Given     03/23/2021 1936 potassium chloride (K-DUR,KLOR-CON) CR tablet 40 mEq 40 mEq Oral Given     03/23/2021 1946 potassium chloride 20 mEq IVPB (premix) 20 mEq Intravenous New Bag     03/23/2021 1943 LORazepam (ATIVAN) injection 1 mg 1 mg Intravenous Given     03/23/2021 2153 magnesium sulfate IVPB (premix) SOLN 1 g 1 g Intravenous New Bag        Past Medical History:   Diagnosis    ETOH abuse     Present on Admission:   Tobacco abuse   Transaminitis   Hyperbilirubinemia   Thrombocytopenia (HCC)      Admitting Diagnosis: Hypokalemia [E87 6]  Hyponatremia [E87 1]  Seizure ([address] ) [R56 9]  Nasal fracture [S02  2XXA]  Abdominal pain [R10 9]  Tremors of nervous system [R25 1]  Occasional tremors [R25 1]  Laceration of tongue, initial encounter [S01 512A]  Alcohol withdrawal syndrome with complication (HCC) [Y51 370]      Age/Sex: 55 y o  male     Admission Orders:    chlordiazePOXIDE, 50 mg, Oral, [address]  folic acid 1 mg, thiamine 100 mg in 0 9% sodium chloride 100 mL IVPB, , Intravenous, Daily  LORazepam, 4 mg, Intravenous, Once  nicotine, 1 patch, Transdermal, Daily  potassium chloride, 20 mEq, Intravenous, Once    Followed by  potassium chloride, 20 mEq, Intravenous, Once      Continuous IV Infusions:  sodium chloride 0 9 % with KCl 20 mEq/L, 75 mL/hr, Intravenous, Continuous      PRN Meds:  calcium carbonate, 1,000 mg, Oral, Daily PRN  ondansetron, 4 mg, Intravenous, Q6H PRN        IP CONSULT TO MEDICAL CRITICAL CARE  IP CONSULT TO GASTROENTEROLOGY    Network Utilization Review Department  ATTENTION: Please call with any questions or concerns to [phone] and carefully listen to the prompts so that you are directed to the right person  All voicemails are confidential   Bri Marques all requests for admission clinical reviews, approved or denied determinations and any other requests to dedicated fax number below belonging to the campus where the patient is receiving treatment   List of dedicated fax numbers for the Facilities:  [address] [place] [address] DENIALS (Administrative/Medical Necessity) [phone]   [address] (Maternity/NICU/Pediatrics) 261 [facility],7Th Floor [facility] 40 125 [facility]  [phone]   Yasmin Atkinson 6517 (  Claire Mercado "[address]ny[address]) [address] [place] [address] Trena Tellez 1481 [phone]   [address] 951 [phone]

## 2021-03-24 NOTE — PLAN OF CARE
Pt  Will tolerate least restrictive diet with lthin iquids without s/s of aspiration over 3-4 sessions or as indicted by treating SLP  Pt  Will trial advanced materials with SLP for possible diet upgrade  SLP to determine if VBS with speech is indicated

## 2021-03-24 NOTE — SPEECH THERAPY NOTE
Speech Language/Pathology  Inpatient Bedside Swallow Evaluation        Patient Name: Boaz Cohen    HXLUW'P Date: 3/24/2021     Problem List  Principal Problem:    Alcohol withdrawal ([address] )  Active Problems:    Transaminitis    Hyperbilirubinemia    Thrombocytopenia (HCC)    Tobacco abuse    Open wound of tongue due to bite    Electrolyte abnormality    Closed fracture of nasal bone         Past Surgical History  Past Surgical History:   Procedure Laterality Date    GALLBLADDER SURGERY         Summary    Pt presents with at least mild oropharyngeal dysphagia based on the consistencies assessed during this evaluation  Recommendations:   Diet: mechanically altered/level 2 diet and thin liquids   Meds: as tolerated   Frequent Oral care: 4x/day  Aspiration precautions and compensatory swallowing strategies (new and carryover): upright posture, slow rate of feeding and cue patient to be mindful of where his tongue during chewing  Other Referrals/Recommendations/ considerations: speech to follow       Current Medical Status  Pt is a 55 y o  male who presented to [address] [place] [address] with past medical history significant for heavy alcohol use with prior withdrawal, tobacco abuse, who presents with alcohol withdrawal symptoms  Patient complains of shaking of all 4 extremities, sore throat and anxiety  Patient admits to drinking 2 glasses of vodka a day since he was 16years old and quit 5 days ago  Patient told critical care AP that he fell about 3 days ago but tells me that he has no idea how he obtained tongue bite, right scalp laceration and broke his right nasal bridge  Unsure if he fell or his he even had a seizure  States he has had with alcohol withdrawal seizures in the past   In the past he was only sober for 3 month period of time since he was a young teenager  States he is having pain in his mouth when unable to eat or drink anything over last 3 days  He denies shortness of breath or trouble swallowing  Tells me that a few days ago he developed hallucinations in which she was seeing flies but states this is now resolved  Reports head fullness but denies tactile disturbances  Currently he feels less anxious after getting Ativan and Librium in the emergency department and states his tremors are improved  During my assessment patient's CIWA score was 8-10  Charli Banda Pt  Is now being referred for a swallowing assessment due to recent tongue laceration  Assess for most appropriate PO diet  Past medical history:   Please see H&P for details    Recent special Studies:  3/23/21: CT head wo contrast: No acute intracranial abnormality  Right-sided nasal fractures with swelling  Age indeterminate  3/23/21: CT abdomen pelvis w contrast: Very abnormal appearance of liver most likely cirrhosis with either a cyst or potentially a low-density solid mass in the right lobe  Follow-up hepatic MRI should be considered for better characterization    Splenomegaly and possible tiny upper abdominal varices indicative of portal hypertension    Evidence of old skeletal trauma    Cholecystectomy    Very small left inguinal hernia containing fat    Social/Education/Vocational Hx:  Pt lives unknown  He is confused and questionable historian  Swallow Information   Current Risks for Dysphagia & Aspiration: acute tongue laceration  Current Symptoms/Concerns: decreased oral skills  Current Diet: NPO   Pre-hospitalization Diet: unknown     Baseline Assessment   Behavior/Cognition: alert    Speech/Language Status: able to follow commands inconsistently and verbal but impulsive and does not stay on topic    Patient Positioning: upright in bed     Swallow Mechanism Exam   Facial: swollen right cheek, bruised right chin  Labial: decreased ROM right side  Lingual: macroglossia mainly right side  Bleeding noted     Velum: symmetrical  Mandible: adequate ROM  Dentition: adequate  Vocal quality:clear/adequate   Volitional Cough: unable to initiate volitional cough   Respiratory: room air    Consistencies Assessed and Performance   Consistencies Administered: ice chips, thin liquids and puree  Pt  Was  Noted to continuously have his fingers in his mouth  Stated that he felt that a 'large piece' was stuck in his teeth  Currently on a 1:1  Pt  Was not able to follow directions consistently enough with max cues and constant repetitions about his tongue  Oral Stage: at least a mild impairment  Orientation to be able to self feed was appropriate  No assistance required, hand tremors noted bilaterally secondary to CIWA  Mastication was appropriate for puree, however due to his tongue swelling noted that he would pocket material in his cheeks while masticating  At risk for re-biting his tongue during mastication on harder solids  Bolus formation, and transfer were adequate and timely with materials trialed today  No significant oral residue was noted  Pharyngeal Stage: grossly WFL  Swallow initiation appeared prompt  Laryngeal rise was palpated and judged to be within functional limits  No coughing, throat clearing, change in vocal quality, or respiratory status noted with today's materials        Esophageal Concerns: none reported      Results Reviewed with: patient, MD and CNA   Dysphagia Goals: pt will tolerate Dys 2  with thin liquids without s/s of aspiration x3-4 sessions    Discharge recommendation: dependent on progress    Speech Therapy Prognosis   Prognosis: fair    Prognosis Considerations: medical status and prior medical history     Ria Vergara MS CCC-SLP  Speech Language Pathologist  Available via [facility] License # [id number]  [address] # [id number]

## 2021-03-24 NOTE — UTILIZATION REVIEW
Continued Stay Review    Date: 3-24-21                    Current Patient Class: inpatient  Current Level of Care: med surg    HPI:56 y o  male initially admitted on 3-23-21 for alcohol withdrawal    Assessment/Plan:       Potassium improving to 3 3 but less than normal  CIWA 18  Continue iv kcl, iv fluids, iv folic acid  US of liver ordered  OT evaluation completed showing decreased ability to complete activities of daily living, judgement and  Safety  Post acute rehab requested  Continual observation initiated  Dysphagia diet started        Pertinent Labs/Diagnostic Results:       Results from last 7 days   Lab Units 03/24/21  0502 03/23/21  1816   WBC Thousand/uL 6 96 7 46   HEMOGLOBIN g/dL 12 6 13 1   HEMATOCRIT % 36 8 37 6   PLATELETS Thousands/uL 26* 21*   NEUTROS ABS Thousands/µL 5 66 6 68         Results from last 7 days   Lab Units 03/24/21  0502 03/23/21  1816   SODIUM mmol/L 135* 128*   POTASSIUM mmol/L 3 3* 2 8*   CHLORIDE mmol/L 101 94*   CO2 mmol/L 27 24   ANION GAP mmol/L 7 10   BUN mg/dL 10 11   CREATININE mg/dL 0 92 0 97   EGFR ml/min/1 73sq m 93 88   CALCIUM mg/dL 8 2* 8 2*   MAGNESIUM mg/dL 1 9  --      Results from last 7 days   Lab Units 03/24/21  0502 03/23/21  1816   AST U/L 191* 196*   ALT U/L 40 42   ALK PHOS U/L 152* 165*   TOTAL PROTEIN g/dL 6 9 7 2   ALBUMIN g/dL 2 5* 2 5*   TOTAL BILIRUBIN mg/dL 9 70* 10 00*   BILIRUBIN DIRECT mg/dL 6 20*  --      Results from last 7 days   Lab Units 03/24/21  0225   POC GLUCOSE mg/dl 112     Results from last 7 days   Lab Units 03/24/21  0502 03/23/21  1816   GLUCOSE RANDOM mg/dL 102 162*       Results from last 7 days   Lab Units 03/24/21  0019 03/23/21  1816   PROTIME seconds 20 7* 20 5*   INR  1 80* 1 79*   PTT seconds  --  37       Results from last 7 days   Lab Units 03/23/21  1816   ETHANOL LVL mg/dL <3   ACETAMINOPHEN LVL ug/mL <2*   SALICYLATE LVL mg/dL <3*       Vital Signs:     Date/Time  Temp  Pulse  Resp  BP  MAP (mmHg)  SpO2  O2 Device   03/24/21 0800  98 1 °F (36 7 °C)  125  Abnormal   20  122/84  --  96 %  None (Room air)   03/24/21 0400  --  101  --  146/70  --  --  --   03/24/21 0227  98 8 °F (37 1 °C)  104  20  128/73  95  98 %  None (Room air)   03/24/21 0139  --  104  --  147/80  --  --  --             03/24/21   0800  03/24/21   0400   03/24/21   0139  03/23/21   2357   CIWA-Ar   BP  122/84  146/70   147/80  127/81   Pulse  125Abnormal   101   104  92   Nausea and Vomiting  0  0   1  0   Tactile Disturbances  0  0   1  1   Tremor  3  3   5  5   Auditory Disturbances  0  0   1  0   Paroxysmal Sweats  1  1   3  3   Visual Disturbances  0  0   1  1   Anxiety  1  1   3  1   Headache, Fullness in Head  0  0   0  0   Agitation  2  1   3  1   Orientation and Clouding of Sensorium  0  0   0  0   CIWA-Ar Total  7  6   18  12           Medications:       chlordiazePOXIDE, 50 mg, Oral, F8K LEATHA  folic acid 1 mg, thiamine 100 mg in 0 9% sodium chloride 100 mL IVPB, , Intravenous, Daily  LORazepam, 4 mg, Intravenous, Once  nicotine, 1 patch, Transdermal, Daily  potassium chloride, 20 mEq, Intravenous, Once    Followed by  potassium chloride, 20 mEq, Intravenous, Once      Continuous IV Infusions:  sodium chloride 0 9 % with KCl 20 mEq/L, 75 mL/hr, Intravenous, Continuous      PRN Meds:  calcium carbonate, 1,000 mg, Oral, Daily PRN  ondansetron, 4 mg, Intravenous, Q6H PRN        Discharge Plan: to be determined     Network Utilization Review Department  ATTENTION: Please call with any questions or concerns to [phone] and carefully listen to the prompts so that you are directed to the right person  All voicemails are confidential   Charo Colmenares all requests for admission clinical reviews, approved or denied determinations and any other requests to dedicated fax number below belonging to the campus where the patient is receiving treatment   List of dedicated fax numbers for the Facilities:  FACILITY NAME UR FAX NUMBER   ADMISSION DENIALS (Administrative/Medical Necessity) [phone]   [address] (Maternity/NICU/Pediatrics) 261 [facility] 40 125 [facility]  [phone]   Yasmin Atkinson 2837 (  Claire Mercado "[address]ny[address]) [address] [address] [place] [address] [phone]   [place] [address] [phone]

## 2021-03-24 NOTE — QUICK NOTE
Progress Note - Triage Asssessment   Sidra Quezada 55 y o  male MRN: [id number]    Time Called ( Time): 2010  Date Called: 03/23/21  Room#: ER 7  Person requesting evaluation: Dr Lashonda Giron     Situation:    50yo M with PMH  ETOH use, previous withdrawal, transaminitis, cx thrombocytopenia, tobacco use presents to the ER with CC sore throat and shaking  At baseline, he drinks 2 glasses of vodka per day  He denies drug use  He states 5 days ago, he stopped drinking ETOH in an effort to quit drinking altogether  He further reports that approx 3 days ago, he fell down "a couple stairs", denies LOC, but injured his nose and bit his tongue  In the ER, his initial CIWA was 17, which improved to 6 after IV Ativan and PO librium  Labs reveal hyponatremia, hypokalemia, transaminitis, thrombocytopenia  He is HD stable  CT head without acute intracranial abnormality, R sided nasal fx with swelling  CT abd/pel with likely cirrhosis and a new liver mass, splenomegaly, small left inguinal hernia  On assessment, he is AAO x3, in NAD  He is HD stable  nonlabored breathing  He can recount his full health history  He exhibits some mild BL UE shaking x/w ETOH withdrawal  No focal neuro deficits  Nasal bridge with swelling and R sided scab, without active bleeding  Tongue with marked R sided swelling and some oozing from laceration  He is handling his own secretions, able to swallow without difficulty  Tongue depressor used to examine posterior pharynx -- L side appears mildly erythematous without edema  He reports feeling improved since arrival, and states he can now comfortably swallow fluids and pills      Suggestions:   · Admit to [facility] with tele   · Seizure precautions, aspiration precautions, keep NPO   · CIWA protocol, daily thiamine, folic acid   · Aggressively replete electrolytes with goal K>4, Mag>2   · Serial CMP -- Tbili markedly elevated from previous, though rest of liver indices only mildly elevated from baseline · Baseline Na appears to be low 130s, platelet baseline 07-98  · HD stable   · Suggest US Liver, GI consult, serial abdominal exams for worsening transaminitis, new hepatic mass   · IV hydration as needed   · This was discussed with Dr Zaira Rico, Dr Lore Craig and Radha Adair with [facility]  It is decided he is appropriate for [facility] admission with SLIM  Suggestions for treatment plan on admission as above  Please note that Critical Care is in-house 24/7 and is please to review or assist with the patient's management as needed  All parties are aware they may contact Critical Care for any questions, concerns or updates  Interventions:   N/A         Triage Assessment:     Patient can be admitted to [facility] with SLIM       Recommendations discussed with Dr Zaira Rico, Dr Maryanne Nguyen with [facility]

## 2021-03-24 NOTE — PLAN OF CARE
Problem: Potential for Falls  Goal: Patient will remain free of falls  Description: INTERVENTIONS:  - Assess patient frequently for physical needs  -  Identify cognitive and physical deficits and behaviors that affect risk of falls  -  [place] fall precautions as indicated by assessment   - Educate patient/family on patient safety including physical limitations  - Instruct patient to call for assistance with activity based on assessment  - Modify environment to reduce risk of injury  - Consider OT/PT consult to assist with strengthening/mobility  Outcome: Progressing     Problem: Prexisting or High Potential for Compromised Skin Integrity  Goal: Skin integrity is maintained or improved  Description: INTERVENTIONS:  - Identify patients at risk for skin breakdown  - Assess and monitor skin integrity  - Assess and monitor nutrition and hydration status  - Monitor labs   - Assess for incontinence   - Turn and reposition patient  - Assist with mobility/ambulation  - Relieve pressure over bony prominences  - Avoid friction and shearing  - Provide appropriate hygiene as needed including keeping skin clean and dry  - Evaluate need for skin moisturizer/barrier cream  - Collaborate with interdisciplinary team   - Patient/family teaching  - Consider wound care consult   Outcome: Progressing     Problem: Nutrition/Hydration-ADULT  Goal: Nutrient/Hydration intake appropriate for improving, restoring or maintaining nutritional needs  Description: Monitor and assess patient's nutrition/hydration status for malnutrition  Collaborate with interdisciplinary team and initiate plan and interventions as ordered  Monitor patient's weight and dietary intake as ordered or per policy  Utilize nutrition screening tool and intervene as necessary  Determine patient's food preferences and provide high-protein, high-caloric foods as appropriate       INTERVENTIONS:  - Monitor oral intake, urinary output, labs, and treatment plans  - Assess nutrition and hydration status and recommend course of action  - Evaluate amount of meals eaten  - Assist patient with eating if necessary   - Allow adequate time for meals  - Recommend/ encourage appropriate diets, oral nutritional supplements, and vitamin/mineral supplements  - Order, calculate, and assess calorie counts as needed  - Recommend, monitor, and adjust tube feedings and TPN/PPN based on assessed needs  - Assess need for intravenous fluids  - Provide specific nutrition/hydration education as appropriate  - Include patient/family/caregiver in decisions related to nutrition  Outcome: Progressing     Problem: PAIN - ADULT  Goal: Verbalizes/displays adequate comfort level or baseline comfort level  Description: Interventions:  - Encourage patient to monitor pain and request assistance  - Assess pain using appropriate pain scale  - Administer analgesics based on type and severity of pain and evaluate response  - Implement non-pharmacological measures as appropriate and evaluate response  - Consider cultural and social influences on pain and pain management  - Notify physician/advanced practitioner if interventions unsuccessful or patient reports new pain  Outcome: Progressing     Problem: INFECTION - ADULT  Goal: Absence or prevention of progression during hospitalization  Description: INTERVENTIONS:  - Assess and monitor for signs and symptoms of infection  - Monitor lab/diagnostic results  - Monitor all insertion sites, i e  indwelling lines, tubes, and drains  - Monitor endotracheal if appropriate and nasal secretions for changes in amount and color  - [place] appropriate cooling/warming therapies per order  - Administer medications as ordered  - Instruct and encourage patient and family to use good hand hygiene technique  - Identify and instruct in appropriate isolation precautions for identified infection/condition  Outcome: Progressing  Goal: Absence of fever/infection during neutropenic period  Description: INTERVENTIONS:  - Monitor WBC    Outcome: Progressing     Problem: SAFETY ADULT  Goal: Patient will remain free of falls  Description: INTERVENTIONS:  - Assess patient frequently for physical needs  -  Identify cognitive and physical deficits and behaviors that affect risk of falls    -  [place] fall precautions as indicated by assessment   - Educate patient/family on patient safety including physical limitations  - Instruct patient to call for assistance with activity based on assessment  - Modify environment to reduce risk of injury  - Consider OT/PT consult to assist with strengthening/mobility  Outcome: Progressing  Goal: Maintain or return to baseline ADL function  Description: INTERVENTIONS:  -  Assess patient's ability to carry out ADLs; assess patient's baseline for ADL function and identify physical deficits which impact ability to perform ADLs (bathing, care of mouth/teeth, toileting, grooming, dressing, etc )  - Assess/evaluate cause of self-care deficits   - Assess range of motion  - Assess patient's mobility; develop plan if impaired  - Assess patient's need for assistive devices and provide as appropriate  - Encourage maximum independence but intervene and supervise when necessary  - Involve family in performance of ADLs  - Assess for home care needs following discharge   - Consider OT consult to assist with ADL evaluation and planning for discharge  - Provide patient education as appropriate  Outcome: Progressing  Goal: Maintain or return mobility status to optimal level  Description: INTERVENTIONS:  - Assess patient's baseline mobility status (ambulation, transfers, stairs, etc )    - Identify cognitive and physical deficits and behaviors that affect mobility  - Identify mobility aids required to assist with transfers and/or ambulation (gait belt, sit-to-stand, lift, walker, cane, etc )  - [place] fall precautions as indicated by assessment  - Record patient progress and toleration of activity level on Mobility SBAR; progress patient to next Phase/Stage  - Instruct patient to call for assistance with activity based on assessment  - Consider rehabilitation consult to assist with strengthening/weightbearing, etc   Outcome: Progressing     Problem: DISCHARGE PLANNING  Goal: Discharge to home or other facility with appropriate resources  Description: INTERVENTIONS:  - Identify barriers to discharge w/patient and caregiver  - Arrange for needed discharge resources and transportation as appropriate  - Identify discharge learning needs (meds, wound care, etc )  - Arrange for interpretive services to assist at discharge as needed  - Refer to Case Management Department for coordinating discharge planning if the patient needs post-hospital services based on physician/advanced practitioner order or complex needs related to functional status, cognitive ability, or social support system  Outcome: Progressing     Problem: Knowledge Deficit  Goal: Patient/family/caregiver demonstrates understanding of disease process, treatment plan, medications, and discharge instructions  Description: Complete learning assessment and assess knowledge base    Interventions:  - Provide teaching at level of understanding  - Provide teaching via preferred learning methods  Outcome: Progressing

## 2021-03-24 NOTE — ASSESSMENT & PLAN NOTE
Severe right tongue laceration and hematoma   Unknown etiology per patient   Highly suspicious of possible w/d seizure, ?fall   Ice chips, supportive care, Seriel exams   NPO, speech consult   Consider ENT c/s ?

## 2021-03-24 NOTE — OCCUPATIONAL THERAPY NOTE
Occupational Therapy Evaluation       03/24/21 1155   Note Type   Note type Evaluation   Restrictions/Precautions   Other Precautions 1:1;Impulsive; Chair Alarm; Bed Alarm; Fall Risk   Pain Assessment   Pain Assessment Tool 0-10   Pain Score 7   Pain Location/Orientation Orientation: Bilateral;Location: Hip   Home Living   Type of [address] Multi-level;Stairs to enter with rails  (10 MAGUI)   Bathroom Toilet Standard   Bathroom Equipment Other (Comment)  (None)   Home Equipment Cane   Additional Comments Patient presented to hospital with c/o tremors, recent fall down stairs few days ago resulting in nasal fracture, hx ETOH abuse  Patient s/p fall in hospital when trying to get OOB overnight   Prior Function   Level of [place] Independent with ADLs and functional mobility   Lives With Alone   Receives Help From Friend(s)   ADL Assistance Independent   IADLs Needs assistance   Falls in the last 6 months 1 to 4   Comments Patient is a questionable historian, confused at times; patient reports PTA he was independent in ADLs and mobility,using his mother's cane 'occassionally'; patient reporting staying on first floor and sleeping on a couch and not using stairs however per chart patient fell down stairs a few days ago;  Patient reports he does not drive, has friends  groceries for him   ADL   Eating Assistance 5  Supervision/Setup   Grooming Assistance 5  Supervision/Setup   UB Bathing Assistance 4  Minimal Assistance   LB [address] 4  [address] 4  [address] 3  Moderate Assistance   LB Dressing Deficit Don/doff R shoe;Don/doff L shoe  (Poor dynamic balance while reaching to doff socks)   Toileting Assistance  4  Minimal Assistance   Bed Mobility   Supine to Sit 4  Minimal assistance   Sit to Supine 5  Supervision   Transfers   Sit to Stand 4  Minimal assistance   Additional items Assist x 1   Stand to Sit 4  Minimal assistance   Additional items Assist x 1   Functional Mobility   Functional Mobility 4  Minimal assistance   Additional Comments Patient ambulated short household distance in room x 2 with RW, patient unsteady and impulsive at times, poor dynamic balance when turning, mod verbal cues for safety awareness   Balance   Static Sitting Fair +   Dynamic Sitting Fair -   Static Standing Fair -   Dynamic Standing Fair -   Activity Tolerance   Activity Tolerance Patient limited by fatigue;Patient limited by pain   RUE Assessment   RUE Assessment WFL   LUE Assessment   LUE Assessment WFL   Cognition   Overall Cognitive Status Impaired   Arousal/Participation Alert; Cooperative  (Anxious, forgetful)   Attention Attends with cues to redirect   Orientation Level Oriented to person;Oriented to place   Assessment   Limitation Decreased ADL status; Decreased UE strength;Decreased Safe judgement during ADL;Decreased endurance;Decreased self-care trans;Decreased high-level ADLs   Prognosis Good   Assessment Patient evaluated by Occupational Therapy  Patient admitted with Alcohol withdrawal ([address] )  The patients occupational profile, medical and therapy history includes a extensive additional review of physical, cognitive, or psychosocial history related to current functional performance  Comorbidities affecting functional mobility and ADLS include: ETOH abuse  Prior to admission, patient was independent with functional mobility with cane, independent with ADLS and requiring assist for IADLS  The evaluation identifies the following performance deficits: weakness, impaired balance, decreased endurance, increased fall risk, new onset of impairment of functional mobility, decreased ADLS, decreased IADLS, decreased activity tolerance, decreased safety awareness, impaired judgement and decreased strength, that result in activity limitations and/or participation restrictions   This evaluation requires clinical decision making of high complexity, because the patient presents with comorbidites that affect occupational performance and required significant modification of tasks or assistance with consideration of multiple treatment options  The Barthel Index was used as a functional outcome tool presenting with a score of 50, indicating marked limitations of functional mobility and ADLS  The patient's raw score on the -PAC Daily Activity inpatient short form is 17, standardized score is 37 26, less than 39 4  Patients at this level are likely to benefit from DC to post-acute rehabilitation services  Please refer to the recommendation of the Occupational Therapist for safe DC planning  Patient will benefit from skilled Occupational Therapy services to address above deficits and facilitate a safe return to prior level of function  Goals   Patient Goals "to walk out of here"   STG Time Frame   (1-7 days)   Short Term Goal  Goals established to promote patient goal 'to walk out of here':  Patient will increase standing tolerance to 5 minutes during ADL task to decrease assistance level and decrease fall risk; Patient will increase bed mobility to independent in preparation for ADLS and transfers;  Patient will increase functional mobility to and from bathroom with rolling walker with supervision to increase performance with ADLS and to use a toilet; Patient will tolerate 5 minutes of UE ROM/strengthening to increase general activity tolerance and performance in ADLS/IADLS; Patient will improve functional activity tolerance to 5 minutes of sustained functional tasks to increase participation in basic self-care and decrease assistance level;  Patient will be able to to verbalize understanding and perform energy conservation/proper body mechanics during ADLS and functional mobility at least 75% of the time with minimal cueing to decrease signs of fatigue and increase stamina to return to prior level of function; Patient will increase dynamic sitting balance to fair to improve the ability to sit at edge of bed or on a chair for ADLS;  Patient will increase dynamic standing balance to fair to improve postural stability and decrease fall risk during standing ADLS and transfers  LTG Time Frame   (8-14 days)   Long Term Goal Patient will increase standing tolerance to 10 minutes during ADL task to decrease assistance level and decrease fall risk; Patient will increase functional mobility to and from bathroom with rolling walker independently to increase performance with ADLS and to use a toilet; Patient will tolerate 10 minutes of UE ROM/strengthening to increase general activity tolerance and performance in ADLS/IADLS; Patient will improve functional activity tolerance to 10 minutes of sustained functional tasks to increase participation in basic self-care and decrease assistance level;  Patient will be able to to verbalize understanding and perform energy conservation/proper body mechanics during ADLS and functional mobility at least 90% of the time with nocueing to decrease signs of fatigue and increase stamina to return to prior level of function; Patient will increase static/dynamic sitting balance to fair+ to improve the ability to sit at edge of bed or on a chair for ADLS;  Patient will increase static/dynamic standing balance to fair+ to improve postural stability and decrease fall risk during standing ADLS and transfers     Functional Transfer Goals   Pt Will Perform All Functional Transfers   (STG supervision, LTG independent)   ADL Goals   Pt Will Perform Eating   (LTG independent)   Pt Will Perform Grooming   (LTG independent)   Pt Will Perform Bathing   (STG min assist, LTG supervision)   Pt Will Perform UE Dressing   (STG supervision, LTG independent)   Pt Will Perform LE Dressing   (STG min assist, LTG supervision)   Pt Will Perform Toileting   (STG supervision, LTG independent)   Plan   Treatment Interventions ADL retraining;Functional transfer training;UE strengthening/ROM; Endurance training;Patient/family training;Equipment evaluation/education; Compensatory technique education;Continued evaluation; Energy conservation; Activityengagement   Goal Expiration Date 04/07/21   OT Frequency 3-5x/wk   Recommendation   OT Discharge Recommendation Post-Acute Rehabilitation Services   AM-PAC Daily Activity Inpatient   Lower Body Dressing 2   Bathing 2   Toileting 3   Upper Body Dressing 3   Grooming 3   Eating 4   Daily Activity Raw Score 17   Daily Activity Standardized Score (Calc for Raw Score >=11) 37 26   AM-PAC Applied Cognition Inpatient   Following a Speech/Presentation 4   Understanding Ordinary Conversation 4   Taking Medications 3   Remembering Where Things Are Placed or Put Away 3   Remembering List of 4-5 Errands 3   Taking Care of Complicated Tasks 3   Applied Cognition Raw Score 20   Applied Cognition Standardized Score 41 76   Barthel Index   Feeding 10   Bathing 0   Grooming Score 0   Dressing Score 5   Bladder Score 10   Bowels Score 10   Toilet Use Score 5   Transfers (Bed/Chair) Score 10   Mobility (Level Surface) Score 0   Stairs Score 0   Barthel Index Score 48   Licensure   [place] License Number  MARIBEL [id number]/L [id number]

## 2021-03-24 NOTE — H&P
Karrie 46  H&P- Marielena Oconnell 1965, 55 y o  male MRN: [id number]  Unit/Bed#: [address] [place] [address] Encounter: [id number]  Primary Care Provider: Fitz Koch MD   Date and time admitted to hospital: 3/23/2021  5:47 PM    * Alcohol withdrawal ([facility] [address] )  Assessment & Plan  Admits to drinking heavily since he was 14, longest being sober about 3 months, history of withdrawal seizures  Last drink was 5 days ago  CIWA protocol, PRN ativan  Librium 50mg q6    Case management consult for HOST referral   Thiamine/FA/MV    Hyperbilirubinemia  Assessment & Plan  T bili 10 on admission, previously 4-5 range   , Alk phos 165   CT A/P "Very abnormal appearance of liver most likely cirrhosis with either a cyst or potentially a low-density solid mass in the right lobe  Splenomegaly and possible tiny upper abdominal varices indicative of portal hypertension "  Will order RUQ ultrasound   Consult GI     Closed fracture of nasal bone  Assessment & Plan  CTH showed Right-sided nasal fractures with swelling  Age indeterminate  Suspect new 2/2 fall vs seizure   Supportive care     Electrolyte abnormality  Assessment & Plan  Na 128, K 2 8  ER did not check Mag, will obtain in AM   In ER given 40meq PO Kdur, 20meq IV potassium, 1gm IV magnesium  Recheck BMP in am   Fluids with K  Telemetry     Open wound of tongue due to bite  Assessment & Plan  Severe right tongue laceration and hematoma   Unknown etiology per patient   Highly suspicious of possible w/d seizure, ?fall   Ice chips, supportive care, Seriel exams   NPO, speech consult   Consider ENT c/s ?     Tobacco abuse  Assessment & Plan  NRT     Thrombocytopenia (HCC)  Assessment & Plan  Likely 2/2 underlying cirrhosis   Hold dvt ppx   Daily CBC/plt    VTE Prophylaxis: Pharmacologic VTE Prophylaxis contraindicated due to Thrombocytopenia  / sequential compression device   Code Status:  Full code  POLST: POLST form is not discussed and not completed at this time   Discussion with family:  Patient declined    Anticipated Length of Stay:  Patient will be admitted on an Inpatient basis with an anticipated length of stay of  greater than 2 midnights  Justification for Hospital Stay:  Alcohol withdrawal, possible seizure, GI evaluation    Total Time for Visit, including Counseling / Coordination of Care: 45 minutes  Greater than 50% of this total time spent on direct patient counseling and coordination of care  Chief Complaint:   Alcohol withdrawal    History of Present Illness:    Cindy Swan is a 55 y o  male with past medical history significant for heavy alcohol use with prior withdrawal, tobacco abuse, who presents with alcohol withdrawal symptoms  Patient complains of shaking of all 4 extremities, sore throat and anxiety  Patient admits to drinking 2 glasses of vodka a day since he was 16years old and quit 5 days ago  Patient told critical care AP that he fell about 3 days ago but tells me that he has no idea how he obtained tongue bite, right scalp laceration and broke his right nasal bridge  Unsure if he fell or his he even had a seizure  States he has had with alcohol withdrawal seizures in the past   In the past he was only sober for 3 month period of time since he was a young teenager  States he is having pain in his mouth when unable to eat or drink anything over last 3 days  He denies shortness of breath or trouble swallowing  Tells me that a few days ago he developed hallucinations in which she was seeing flies but states this is now resolved  Reports head fullness but denies tactile disturbances  Currently he feels less anxious after getting Ativan and Librium in the emergency department and states his tremors are improved  During my assessment patient's CIWA score was 8-10  Review of Systems:    Review of Systems   Constitutional: Positive for diaphoresis  Negative for chills and fever  HENT: Negative for congestion      Respiratory: Negative for shortness of breath  Cardiovascular: Negative for chest pain  Gastrointestinal: Negative for abdominal pain  Genitourinary: Negative for dysuria  Musculoskeletal: Positive for gait problem  Negative for back pain  Skin: Positive for wound  Neurological: Positive for tremors and headaches  Psychiatric/Behavioral: Positive for confusion and hallucinations  The patient is nervous/anxious  Past Medical and Surgical History:     Past Medical History:   Diagnosis Date    ETOH abuse        Past Surgical History:   Procedure Laterality Date    GALLBLADDER SURGERY         Meds/Allergies:    Prior to Admission medications    Medication Sig Start Date End Date Taking? Authorizing Provider   folic acid (FOLVITE) 1 mg tablet Take 1 tablet (1 mg total) by mouth daily 12/9/20  Yes Lashanda Issa DO   Multiple Vitamin (multivitamin) tablet Take 1 tablet by mouth daily 12/8/20  Yes Lashanda Issa DO   thiamine 100 MG tablet Take 1 tablet (100 mg total) by mouth daily 12/9/20  Yes Milagros Monk DO   nicotine (NICODERM CQ) 14 mg/24hr TD 24 hr patch Place 1 patch on the skin daily  Patient not taking: Reported on 3/23/2021 12/9/20   Lashanda Issa DO     I have reviewed home medications with patient personally  Allergies: No Known Allergies    Social History:     Marital Status: Single   Occupation:   Patient Pre-hospital Living Situation: brother   Patient Pre-hospital Level of Mobility: no limits   Patient Pre-hospital Diet Restrictions: none   Substance Use History:   Social History     Substance and Sexual Activity   Alcohol Use Yes    Comment: every other day has beery and mixed drinks   last drink 12 hours ago     Social History     Tobacco Use   Smoking Status Never Smoker   Smokeless Tobacco Current User     Social History     Substance and Sexual Activity   Drug Use Not Currently    Types: Marijuana       Family History:    Family History   Problem Relation Age of Onset    Heart disease Mother     Heart disease Father        Physical Exam:     Vitals:   Blood Pressure: 127/81 (03/23/21 2357)  Pulse: 92 (03/23/21 2357)  Temperature: 98 °F (36 7 °C) (03/23/21 2219)  Temp Source: Oral (03/23/21 2219)  Respirations: 20 (03/23/21 2219)  Height: 6' (182 9 cm) (03/23/21 2232)  Weight - Scale: 95 3 kg (210 lb) (03/23/21 2232)  SpO2: 97 % (03/23/21 2322)    Physical Exam  Constitutional:       General: He is in acute distress  Appearance: Normal appearance  He is ill-appearing and diaphoretic  HENT:      Head: Normocephalic  Comments: Right temoporal head lac and right upper nasal bridge lac     Mouth/Throat:      Mouth: Mucous membranes are dry  Comments: Large, severe right sided hematoma on tongue with associated edema and bleeding   Eyes:      General: Scleral icterus present  Neck:      Musculoskeletal: Normal range of motion and neck supple  Cardiovascular:      Rate and Rhythm: Regular rhythm  Tachycardia present  Pulmonary:      Effort: Pulmonary effort is normal       Breath sounds: Normal breath sounds  Abdominal:      General: Bowel sounds are normal       Palpations: Abdomen is soft  Musculoskeletal: Normal range of motion  General: No swelling  Skin:     Coloration: Skin is jaundiced  Neurological:      General: No focal deficit present  Mental Status: He is alert  Comments: Slight tremors b/l hands    Psychiatric:         Mood and Affect: Mood normal       Comments: Anxious      Additional Data:     Lab Results: I have personally reviewed pertinent reports        Results from last 7 days   Lab Units 03/23/21  1816   WBC Thousand/uL 7 46   HEMOGLOBIN g/dL 13 1   HEMATOCRIT % 37 6   PLATELETS Thousands/uL 21*   NEUTROS PCT % 90*   LYMPHS PCT % 4*   MONOS PCT % 5   EOS PCT % 0     Results from last 7 days   Lab Units 03/23/21  1816   SODIUM mmol/L 128*   POTASSIUM mmol/L 2 8*   CHLORIDE mmol/L 94*   CO2 mmol/L 24   BUN mg/dL 11 CREATININE mg/dL 0 97   ANION GAP mmol/L 10   CALCIUM mg/dL 8 2*   ALBUMIN g/dL 2 5*   TOTAL BILIRUBIN mg/dL 10 00*   ALK PHOS U/L 165*   ALT U/L 42   AST U/L 196*   GLUCOSE RANDOM mg/dL 162*     Results from last 7 days   Lab Units 03/23/21  1816   INR  1 79*                   Imaging: I have personally reviewed pertinent reports  CT abdomen pelvis with contrast   Final Result by Tracy Grissom MD (03/23 1947)      Very abnormal appearance of liver most likely cirrhosis with either a cyst or potentially a low-density solid mass in the right lobe  Follow-up hepatic MRI should be considered for better characterization  Splenomegaly and possible tiny upper abdominal varices indicative of portal hypertension  Evidence of old skeletal trauma  Cholecystectomy  Very small left inguinal hernia containing fat            Workstation performed: [id number]         CT head without contrast   Final Result by Tracy Grissom MD (03/23 1930)      No acute intracranial abnormality  Right-sided nasal fractures with swelling  Age indeterminate  Workstation performed: [id number]         US liver    (Results Pending)       EKG, Pathology, and Other Studies Reviewed on Admission:   · EKG: none to review     [facility]     ** Please Note: This note has been constructed using a voice recognition system   **

## 2021-03-24 NOTE — QUICK NOTE
Provider on-call notified patient with increasing agitation, and pulling off telemetry, difficulty really orientation  Administered 5 mg IV Valium at 0158     0211 Notified by RN that patient got himself out of bed and fell onto his buttocks, witnessed by respiratory therapy he was walking down the hallway  Patient not complaining of any pain  Again appears agitated, increasing tremors  Will order another 5 mg IV Valium and 1-1

## 2021-03-24 NOTE — ASSESSMENT & PLAN NOTE
Admits to drinking heavily since he was 16, longest being sober about 3 months, history of withdrawal seizures  Last drink was 5 days ago  CIWA protocol, PRN ativan  Librium 50mg q6    Case management consult for HOST referral   Thiamine/FA/MV

## 2021-03-24 NOTE — CONSULTS
Physician Requesting Consult: Tejas Vasquez DO    Reason for Consult / Principal Problem: alcohol withdrawal, transaminitis     Assessment/Plan:  1  Decompensated Cirrhosis of the liver associated with thrombocytopenia  Decompensated cirrhosis of the liver associated with transaminitis, thrombocytopenia, coagulopathy, and portal hypertension most likely due to alcohol use  Ultrasound done 12/2020 showed : Liver enlarged  The liver measures 20 cm in the midclavicular line  Surface contour is nodular  There is diffuse coarsened heterogeneous echotexture suggesting underlying cirrhotic changes  No evidence of suspicious mass  Liver enzymes and coagulopathy studies have been elevated prior to admission  Liver enzymes on admission:  total bilirubin 10, alk-phos 165, , and ALT 40  Liver enzymes this a m :  Total bilirubin 9 7, direct bilirubin 6 2, alk-phos 152, , and ALT 40  CBC shows thrombocytopenia platelets 21 on admission and platelets 26 this a m  Hemoglobin 12 6  this a m  PT 20 5 and INR 1 79 on admission  CT abdomen/pelvis done 3/23 showed very abnormal appearance of liver most likely cirrhosis with either a cyst or potentially a low-density solid mass in right lobe  Follow-up hepatic MRI should be considered  Splenomegaly and possibly tiny upper abdominal varices indicated of portal hypertension  Evidence of old skeletal trauma  Cholecystectomy  Very small left inguinal hernia containing fat  Ultrasound of liver done today showed cirrhosis  Right hepatic lobe mass noted on CT not identified on the sonogram   Most likely, this is due to difficulties in for feel penetration  Mass best evaluation would be with MRI when patient's condition allows  No ascites seen on ultrasound or CT imaging  No signs of peripheral edema  Positive scleral icterus    Positive jaundice   -Continue CIWA protocol  -Check ammonia level  -Check chronic hepatitis panel, AMA, ASMA ,MARCEL, Iron panel, and AFP  -Continue to monitor liver enzymes  -Patient advised to stop all alcohol use and consequences of continued alcohol use in association with underlying liver disease explained to patient in detail   -Patient encouraged to seek inpatient rehabilitation concerning alcohol usage   -Continue supportive care  -EGD and colonoscopy needs to be done in the future to evaluate for varices   -No need to start diuretics at present time patient has no signs of fluid overload  2  Liver mass  CT abdomen/pelvis done 3/23 showed very abnormal appearance of liver most likely cirrhosis with either a cyst or potentially a low-density solid mass in right lobe  Follow-up hepatic MRI should be considered  Splenomegaly and possibly tiny upper abdominal varices indicated of portal hypertension  Evidence of old skeletal trauma  Cholecystectomy  Very small left inguinal hernia containing fat  Ultrasound of liver done 3/24 showed cirrhosis  Right hepatic lobe mass noted on CT not identified on the sonogram   Most likely, this is due to difficulties in field penetration  Mass best evaluation would be with MRI when patient's condition allows  -Will obtain MRI when patient is no longer going through DTs to further evaluate liver mass  HPI: Jaron Sorensen is a 55 y o  male  Alcohol withdrawal ([facility] [address] )  This is a 55year old male with past medical history the decompensated cirrhosis of the liver secondary to alcohol use, ETOH abuse, tobacco abuse who presented to [place] [id number] on 03/23/2021 with complaints of alcohol withdrawal symptoms and withdrawal seizures  Patient admits to drinking 2 glasses of vodka a day since he was 16years old and stated he quit drinking 5 days ago  Patient reported that he fell approximately 3 days ago but has no idea how he obtained tongue bite, right scalp laceration and broke his right nasal bridge    Patient reports having alcohol withdrawal symptoms in the past   Patient also reported he was only sober for a period of 3 months since age 16  Patient also reported developing hallucinations a few days prior to admission which has since resolved  Patient reported feeling less anxious after receiving Ativan and Librium in the emergency room  GI was consulted secondary to elevated liver enzymes  Patient has a history of decompensated cirrhosis of the liver with thrombocytopenia and coagulopathy  US of abdomen with liver Dopplers 12/07/2020  Liver enlarged  The liver measures 20 cm in the midclavicular line  Surface contour is nodular  There is diffuse coarsened heterogeneous echotexture suggesting underlying cirrhotic changes  No evidence of suspicious mass  Previous medical records show elevated liver enzymes in December 2020  CT abdomen/pelvis done 3/23 showed very abnormal appearance of liver most likely cirrhosis with either a cyst or potentially a low-density solid mass in right lobe  Follow-up hepatic MRI should be considered  Splenomegaly and possibly tiny upper abdominal varices indicated of portal hypertension  Evidence of old skeletal trauma  Cholecystectomy  Very small left inguinal hernia containing fat  Ultrasound of liver done 3/24 showed cirrhosis  Right hepatic lobe mass noted on CT not identified on the sonogram   Most likely, this is due to difficulties in for feel penetration  Mass best evaluation would be with MRI when patient's condition allows  Patient denies nausea, vomiting, acid reflux, heartburn, dysphagia, epigastric or abdominal pain  Patient denies blood in stool, blood from rectal area, or black tarry stool  Patient never had hepatitis panel checked or thorough workup for underlying liver disease  Patient has never had an EGD or colonoscopy  Patient does not smoke tobacco but does use marijuana  No family history gastric or colon cancer      Allergies: No Known Allergies    Medications:  Current Facility-Administered Medications:     calcium carbonate (TUMS) chewable tablet 1,000 mg, 1,000 mg, Oral, Daily PRN, Joseph Herr PA-C    chlordiazePOXIDE (LIBRIUM) capsule 50 mg, 50 mg, Oral, Q6H LEATHA, Joseph Herr PA-C, 50 mg at 87/67/37 6056    folic acid 1 mg, thiamine (VITAMIN B1) 100 mg in sodium chloride 0 9 % 100 mL IV piggyback, , Intravenous, Daily, Joseph Herr PA-C, New Bag at 03/24/21 1027    LORazepam (ATIVAN) injection 4 mg, 4 mg, Intravenous, Once, Lashanda Revankar, DO    nicotine (NICODERM CQ) 14 mg/24hr TD 24 hr patch 1 patch, 1 patch, Transdermal, Daily, Joseph Herr PA-C    ondansetron (ZOFRAN) injection 4 mg, 4 mg, Intravenous, Q6H PRN, Joseph Herr PA-C    potassium chloride 20 mEq IVPB (premix), 20 mEq, Intravenous, Once, Last Rate: 50 mL/hr at 03/24/21 1210, 20 mEq at 03/24/21 1210 **FOLLOWED BY** potassium chloride 20 mEq IVPB (premix), 20 mEq, Intravenous, Once, Lashanda Revankar, DO    sodium chloride 0 9 % with KCl 20 mEq/L infusion (premix), 75 mL/hr, Intravenous, Continuous, Joseph Herr PA-C, Last Rate: 75 mL/hr at 03/24/21 0013, 75 mL/hr at 03/24/21 0013    Past Medical history:  Past Medical History:   Diagnosis Date    ETOH abuse        Past Surgical History:   Past Surgical History:   Procedure Laterality Date    GALLBLADDER SURGERY         Social history:   Social History     Tobacco Use    Smoking status: Never Smoker    Smokeless tobacco: Current User   Substance Use Topics    Alcohol use: Yes     Comment: every other day has beery and mixed drinks  last drink 12 hours ago    Drug use: Not Currently     Types: Marijuana       Review of Systems: Review of Systems   Skin: Positive for color change and wound  Neurological: Positive for tremors  All other systems reviewed and are negative  Physical Exam: Physical Exam  Vitals signs and nursing note reviewed  Constitutional:       General: He is not in acute distress    HENT:      Head: Normocephalic and atraumatic  Mouth/Throat:      Mouth: Mucous membranes are dry  Eyes:      General: Scleral icterus present  Pupils: Pupils are equal, round, and reactive to light  Neck:      Musculoskeletal: Normal range of motion and neck supple  Cardiovascular:      Rate and Rhythm: Normal rate and regular rhythm  Pulses: Normal pulses  Heart sounds: Normal heart sounds  Pulmonary:      Effort: No respiratory distress  Breath sounds: No stridor  No wheezing, rhonchi or rales  Comments: Lungs with decreased breath sounds at bases  Abdominal:      General: Bowel sounds are normal  There is no distension  Palpations: Abdomen is soft  There is no mass  Tenderness: There is no abdominal tenderness  There is no guarding or rebound  Hernia: No hernia is present  Musculoskeletal:      Right lower leg: No edema  Left lower leg: No edema  Skin:     General: Skin is warm and dry  Capillary Refill: Capillary refill takes less than 2 seconds  Coloration: Skin is jaundiced  Skin is not pale  Comments: Abrasion noted to right facial area and laceration to right scalp  Neurological:      Mental Status: He is alert and oriented to person, place, and time  Comments: Tremors noted in hands and upper extremities  Patient restless and impulsive  1:1 Observation at bedside             Lab Results:   Recent Results (from the past 24 hour(s))   CBC and differential    Collection Time: 03/23/21  6:16 PM   Result Value Ref Range    WBC 7 46 4 31 - 10 16 Thousand/uL    RBC 3 80 (L) 3 88 - 5 62 Million/uL    Hemoglobin 13 1 12 0 - 17 0 g/dL    Hematocrit 37 6 36 5 - 49 3 %    MCV 99 (H) 82 - 98 fL    MCH 34 5 (H) 26 8 - 34 3 pg    MCHC 34 8 31 4 - 37 4 g/dL    RDW 15 8 (H) 11 6 - 15 1 %    MPV 10 2 8 9 - 12 7 fL    Platelets 21 (LL) 968 - 390 Thousands/uL    nRBC 0 /100 WBCs    Neutrophils Relative 90 (H) 43 - 75 %    Immat GRANS % 1 0 - 2 %    Lymphocytes Relative 4 (L) 14 - 44 %    Monocytes Relative 5 4 - 12 %    Eosinophils Relative 0 0 - 6 %    Basophils Relative 0 0 - 1 %    Neutrophils Absolute 6 68 1 85 - 7 62 Thousands/µL    Immature Grans Absolute 0 04 0 00 - 0 20 Thousand/uL    Lymphocytes Absolute 0 32 (L) 0 60 - 4 47 Thousands/µL    Monocytes Absolute 0 38 0 17 - 1 22 Thousand/µL    Eosinophils Absolute 0 02 0 00 - 0 61 Thousand/µL    Basophils Absolute 0 02 0 00 - 0 10 Thousands/µL   Protime-INR    Collection Time: 03/23/21  6:16 PM   Result Value Ref Range    Protime 20 5 (H) 11 6 - 14 5 seconds    INR 1 79 (H) 0 84 - 1 19   APTT    Collection Time: 03/23/21  6:16 PM   Result Value Ref Range    PTT 37 23 - 37 seconds   Comprehensive metabolic panel    Collection Time: 03/23/21  6:16 PM   Result Value Ref Range    Sodium 128 (L) 136 - 145 mmol/L    Potassium 2 8 (L) 3 5 - 5 3 mmol/L    Chloride 94 (L) 100 - 108 mmol/L    CO2 24 21 - 32 mmol/L    ANION GAP 10 4 - 13 mmol/L    BUN 11 5 - 25 mg/dL    Creatinine 0 97 0 60 - 1 30 mg/dL    Glucose 162 (H) 65 - 140 mg/dL    Calcium 8 2 (L) 8 3 - 10 1 mg/dL    Corrected Calcium 9 4 8 3 - 10 1 mg/dL     (H) 5 - 45 U/L    ALT 42 12 - 78 U/L    Alkaline Phosphatase 165 (H) 46 - 116 U/L    Total Protein 7 2 6 4 - 8 2 g/dL    Albumin 2 5 (L) 3 5 - 5 0 g/dL    Total Bilirubin 10 00 (H) 0 20 - 1 00 mg/dL    eGFR 88 ml/min/1 73sq m   Ethanol    Collection Time: 03/23/21  6:16 PM   Result Value Ref Range    Ethanol Lvl <3 0 - 3 mg/dL   Salicylate level    Collection Time: 03/23/21  6:16 PM   Result Value Ref Range    Salicylate Lvl <3 (L) 3 - 20 mg/dL   Acetaminophen level-If concentration is detectable, please discuss with medical  on call      Collection Time: 03/23/21  6:16 PM   Result Value Ref Range    Acetaminophen Level <2 (L) 10 - 20 ug/mL   Protime-INR    Collection Time: 03/24/21 12:19 AM   Result Value Ref Range    Protime 20 7 (H) 11 6 - 14 5 seconds    INR 1 80 (H) 0 84 - 1 19   Fingerstick Glucose (POCT)    Collection Time: 03/24/21  2:25 AM   Result Value Ref Range    POC Glucose 112 65 - 140 mg/dl   Basic metabolic panel    Collection Time: 03/24/21  5:02 AM   Result Value Ref Range    Sodium 135 (L) 136 - 145 mmol/L    Potassium 3 3 (L) 3 5 - 5 3 mmol/L    Chloride 101 100 - 108 mmol/L    CO2 27 21 - 32 mmol/L    ANION GAP 7 4 - 13 mmol/L    BUN 10 5 - 25 mg/dL    Creatinine 0 92 0 60 - 1 30 mg/dL    Glucose 102 65 - 140 mg/dL    Calcium 8 2 (L) 8 3 - 10 1 mg/dL    eGFR 93 ml/min/1 73sq m   Hepatic function panel    Collection Time: 03/24/21  5:02 AM   Result Value Ref Range    Total Bilirubin 9 70 (H) 0 20 - 1 00 mg/dL    Bilirubin, Direct 6 20 (H) 0 00 - 0 20 mg/dL    Alkaline Phosphatase 152 (H) 46 - 116 U/L     (H) 5 - 45 U/L    ALT 40 12 - 78 U/L    Total Protein 6 9 6 4 - 8 2 g/dL    Albumin 2 5 (L) 3 5 - 5 0 g/dL   CBC and differential    Collection Time: 03/24/21  5:02 AM   Result Value Ref Range    WBC 6 96 4 31 - 10 16 Thousand/uL    RBC 3 68 (L) 3 88 - 5 62 Million/uL    Hemoglobin 12 6 12 0 - 17 0 g/dL    Hematocrit 36 8 36 5 - 49 3 %     (H) 82 - 98 fL    MCH 34 2 26 8 - 34 3 pg    MCHC 34 2 31 4 - 37 4 g/dL    RDW 16 0 (H) 11 6 - 15 1 %    MPV 11 0 8 9 - 12 7 fL    Platelets 26 (LL) 519 - 390 Thousands/uL    nRBC 0 /100 WBCs    Neutrophils Relative 82 (H) 43 - 75 %    Immat GRANS % 0 0 - 2 %    Lymphocytes Relative 8 (L) 14 - 44 %    Monocytes Relative 9 4 - 12 %    Eosinophils Relative 1 0 - 6 %    Basophils Relative 0 0 - 1 %    Neutrophils Absolute 5 66 1 85 - 7 62 Thousands/µL    Immature Grans Absolute 0 02 0 00 - 0 20 Thousand/uL    Lymphocytes Absolute 0 56 (L) 0 60 - 4 47 Thousands/µL    Monocytes Absolute 0 59 0 17 - 1 22 Thousand/µL    Eosinophils Absolute 0 10 0 00 - 0 61 Thousand/µL    Basophils Absolute 0 03 0 00 - 0 10 Thousands/µL   Magnesium    Collection Time: 03/24/21  5:02 AM   Result Value Ref Range    Magnesium 1 9 1 6 - 2 6 mg/dL           Imaging Studies: Ct Head Without Contrast    Result Date: 3/23/2021  Narrative: CT BRAIN - WITHOUT CONTRAST INDICATION:   Head trauma, mod-severe fall with low platelet count  COMPARISON:  12/5/2020 TECHNIQUE:  CT examination of the brain was performed  In addition to axial images, sagittal and coronal 2D reformatted images were created and submitted for interpretation  Radiation dose length product (DLP) for this visit:  911 3 mGy-cm   This examination, like all CT scans performed in the [facility], was performed utilizing techniques to minimize radiation dose exposure, including the use of iterative reconstruction and automated exposure control  IMAGE QUALITY:  Diagnostic  FINDINGS: PARENCHYMA:  No intracranial mass, mass effect or midline shift  No CT signs of acute infarction  No acute parenchymal hemorrhage  VENTRICLES AND EXTRA-AXIAL SPACES:  Normal for the patient's age  VISUALIZED ORBITS AND PARANASAL SINUSES:  Paranasal sinuses are clear  Orbits appear intact  There are some swelling along the right side of the nose and there are deformities of right nasal bones which are age indeterminate but possibly acute given the presence of the swelling  The right nasal bones appear somewhat thickened as well  CALVARIUM AND EXTRACRANIAL SOFT TISSUES:  Normal      Impression: No acute intracranial abnormality  Right-sided nasal fractures with swelling  Age indeterminate  Workstation performed: [id number]     Ct Abdomen Pelvis With Contrast    Result Date: 3/23/2021  Narrative: CT ABDOMEN AND PELVIS WITH IV CONTRAST INDICATION:   Abdominal pain, acute, nonlocalized abd pain with low platelet count  COMPARISON:  Ultrasound from 12/7/2020 TECHNIQUE:  CT examination of the abdomen and pelvis was performed  Axial, sagittal, and coronal 2D reformatted images were created from the source data and submitted for interpretation  Radiation dose length product (DLP) for this visit:  734 99 mGy-cm     This examination, like all CT scans performed in the [facility], was performed utilizing techniques to minimize radiation dose exposure, including the use of iterative  reconstruction and automated exposure control  IV Contrast:  100 mL of iohexol (OMNIPAQUE) Enteric Contrast:  Enteric contrast was not administered  FINDINGS: ABDOMEN LOWER CHEST:  There is some focal atelectasis posterior limb the left lower lobe adjacent to some old rib fractures  LIVER/BILIARY TREE:  The liver is markedly abnormal in appearance  It is diffusely heterogeneous in attenuation with innumerable tiny low-density nodular foci throughout the entire parenchyma  Overall, the liver is generally somewhat low in attenuation  There is a mass in the posterior aspect of the right lobe of the liver which is relatively well-defined and round in shape and measures 2 2 cm diameter  It measures 25 Hounsfield units internally on portal venous phase imaging  This is indeterminate in etiology  It might just be a slightly complex cyst although a low-density solid mass is not excluded  The liver surface is nodular  There is recanalization of the Umbilical vein  The portal vein and intrahepatic portal branches appear patent  There is no biliary obstruction seen  GALLBLADDER:  Gallbladder is surgically absent  SPLEEN:  The spleen is enlarged, measuring 17 6 x 16 6 x 8 0 cm  PANCREAS:  Unremarkable  ADRENAL GLANDS:  Unremarkable  KIDNEYS/URETERS:  Questionable tiny cystic foci in the left kidney  No worrisome appearing lesions  No hydronephrosis or kidney stones  STOMACH AND BOWEL:  Unremarkable  APPENDIX:  No findings to suggest appendicitis  ABDOMINOPELVIC CAVITY:  No significant ascites  No free air  No pathologic-appearing lymphadenopathy  VESSELS:  Aortic caliber is normal   There appear to be possible small varices within the upper abdomen  As mentioned above, there is recanalization of the paraumbilical vein    There are a few mildly prominent venous collaterals in the periumbilical region  PELVIS REPRODUCTIVE ORGANS:  Unremarkable for patient's age  URINARY BLADDER:  Unremarkable  ABDOMINAL WALL/INGUINAL REGIONS:  Very small fat-containing left inguinal hernia noted  OSSEOUS STRUCTURES:  Several old healed left posterior rib fractures  No acute fracture or destructive bone lesion seen  There is some disc degeneration and lumbar spine  There is evidence of old trauma to the right pubic bone and left ischial tuberosity  Impression: Very abnormal appearance of liver most likely cirrhosis with either a cyst or potentially a low-density solid mass in the right lobe  Follow-up hepatic MRI should be considered for better characterization  Splenomegaly and possible tiny upper abdominal varices indicative of portal hypertension  Evidence of old skeletal trauma  Cholecystectomy   Very small left inguinal hernia containing fat Workstation performed: [id number]       Problem List:   Patient Active Problem List   Diagnosis    Alcohol withdrawal seizure ([address] )    Alcohol abuse    Transaminitis    Hyperbilirubinemia    Thrombocytopenia ([address] )    Tobacco abuse    Open wound of tongue due to bite    Alcohol withdrawal ([facility] [address]    Electrolyte abnormality    Closed fracture of nasal bone         TIMMY Durbin

## 2021-03-24 NOTE — ASSESSMENT & PLAN NOTE
T bili 10 on admission, previously 4-5 range   , Alk phos 165   CT A/P "Very abnormal appearance of liver most likely cirrhosis with either a cyst or potentially a low-density solid mass in the right lobe   Splenomegaly and possible tiny upper abdominal varices indicative of portal hypertension "  Will order RUQ ultrasound   Consult GI

## 2021-03-25 ENCOUNTER — APPOINTMENT (INPATIENT)
Dept: RADIOLOGY | Facility: HOSPITAL | Age: 56
DRG: 521 | End: 2021-03-25
Payer: COMMERCIAL

## 2021-03-25 PROBLEM — R16.0 LIVER MASS: Status: ACTIVE | Noted: 2021-03-25

## 2021-03-25 PROBLEM — K74.60 DECOMPENSATED HEPATIC CIRRHOSIS (HCC): Status: ACTIVE | Noted: 2021-03-25

## 2021-03-25 PROBLEM — K72.90 DECOMPENSATED HEPATIC CIRRHOSIS (HCC): Status: ACTIVE | Noted: 2021-03-25

## 2021-03-25 LAB
AFP-TM SERPL-MCNC: 11 NG/ML (ref 0.5–8)
ALBUMIN SERPL BCP-MCNC: 2.4 G/DL (ref 3.5–5)
ALP SERPL-CCNC: 150 U/L (ref 46–116)
ALT SERPL W P-5'-P-CCNC: 43 U/L (ref 12–78)
ANION GAP SERPL CALCULATED.3IONS-SCNC: 9 MMOL/L (ref 4–13)
AST SERPL W P-5'-P-CCNC: 165 U/L (ref 5–45)
BILIRUB SERPL-MCNC: 9.3 MG/DL (ref 0.2–1)
BUN SERPL-MCNC: 11 MG/DL (ref 5–25)
CALCIUM ALBUM COR SERPL-MCNC: 9.2 MG/DL (ref 8.3–10.1)
CALCIUM SERPL-MCNC: 7.9 MG/DL (ref 8.3–10.1)
CHLORIDE SERPL-SCNC: 101 MMOL/L (ref 100–108)
CO2 SERPL-SCNC: 24 MMOL/L (ref 21–32)
CREAT SERPL-MCNC: 1 MG/DL (ref 0.6–1.3)
ERYTHROCYTE [DISTWIDTH] IN BLOOD BY AUTOMATED COUNT: 16.3 % (ref 11.6–15.1)
FERRITIN SERPL-MCNC: 575 NG/ML (ref 8–388)
GFR SERPL CREATININE-BSD FRML MDRD: 84 ML/MIN/1.73SQ M
GLUCOSE SERPL-MCNC: 108 MG/DL (ref 65–140)
HBV CORE AB SER QL: NORMAL
HBV CORE IGM SER QL: NORMAL
HBV SURFACE AG SER QL: NORMAL
HCT VFR BLD AUTO: 35.3 % (ref 36.5–49.3)
HCV AB SER QL: NORMAL
HGB BLD-MCNC: 11.8 G/DL (ref 12–17)
IRON SATN MFR SERPL: 13 %
IRON SERPL-MCNC: 20 UG/DL (ref 65–175)
MAGNESIUM SERPL-MCNC: 1.8 MG/DL (ref 1.6–2.6)
MCH RBC QN AUTO: 34.2 PG (ref 26.8–34.3)
MCHC RBC AUTO-ENTMCNC: 33.4 G/DL (ref 31.4–37.4)
MCV RBC AUTO: 102 FL (ref 82–98)
PLATELET # BLD AUTO: 33 THOUSANDS/UL (ref 149–390)
PMV BLD AUTO: 12.2 FL (ref 8.9–12.7)
POTASSIUM SERPL-SCNC: 3.5 MMOL/L (ref 3.5–5.3)
PROT SERPL-MCNC: 6.9 G/DL (ref 6.4–8.2)
RBC # BLD AUTO: 3.45 MILLION/UL (ref 3.88–5.62)
SODIUM SERPL-SCNC: 134 MMOL/L (ref 136–145)
TIBC SERPL-MCNC: 151 UG/DL (ref 250–450)
WBC # BLD AUTO: 5.67 THOUSAND/UL (ref 4.31–10.16)

## 2021-03-25 PROCEDURE — 92526 ORAL FUNCTION THERAPY: CPT

## 2021-03-25 PROCEDURE — 97116 GAIT TRAINING THERAPY: CPT

## 2021-03-25 PROCEDURE — 83735 ASSAY OF MAGNESIUM: CPT | Performed by: FAMILY MEDICINE

## 2021-03-25 PROCEDURE — 99232 SBSQ HOSP IP/OBS MODERATE 35: CPT | Performed by: PHYSICIAN ASSISTANT

## 2021-03-25 PROCEDURE — 94760 N-INVAS EAR/PLS OXIMETRY 1: CPT

## 2021-03-25 PROCEDURE — G1004 CDSM NDSC: HCPCS

## 2021-03-25 PROCEDURE — 85027 COMPLETE CBC AUTOMATED: CPT | Performed by: FAMILY MEDICINE

## 2021-03-25 PROCEDURE — 80053 COMPREHEN METABOLIC PANEL: CPT | Performed by: FAMILY MEDICINE

## 2021-03-25 PROCEDURE — 97163 PT EVAL HIGH COMPLEX 45 MIN: CPT

## 2021-03-25 PROCEDURE — 99232 SBSQ HOSP IP/OBS MODERATE 35: CPT | Performed by: INTERNAL MEDICINE

## 2021-03-25 PROCEDURE — A9585 GADOBUTROL INJECTION: HCPCS | Performed by: NURSE PRACTITIONER

## 2021-03-25 PROCEDURE — 74183 MRI ABD W/O CNTR FLWD CNTR: CPT

## 2021-03-25 RX ORDER — LORAZEPAM 1 MG/1
2 TABLET ORAL ONCE
Status: COMPLETED | OUTPATIENT
Start: 2021-03-25 | End: 2021-03-25

## 2021-03-25 RX ORDER — LACTULOSE 20 G/30ML
20 SOLUTION ORAL 2 TIMES DAILY
Status: DISCONTINUED | OUTPATIENT
Start: 2021-03-25 | End: 2021-03-29 | Stop reason: HOSPADM

## 2021-03-25 RX ADMIN — GADOBUTROL 9 ML: 604.72 INJECTION INTRAVENOUS at 13:46

## 2021-03-25 RX ADMIN — FOLIC ACID: 5 INJECTION, SOLUTION INTRAMUSCULAR; INTRAVENOUS; SUBCUTANEOUS at 09:41

## 2021-03-25 RX ADMIN — CHLORDIAZEPOXIDE HYDROCHLORIDE 50 MG: 25 CAPSULE ORAL at 23:06

## 2021-03-25 RX ADMIN — LACTULOSE 20 G: 10 SOLUTION ORAL at 17:26

## 2021-03-25 RX ADMIN — CHLORDIAZEPOXIDE HYDROCHLORIDE 50 MG: 25 CAPSULE ORAL at 11:41

## 2021-03-25 RX ADMIN — CHLORDIAZEPOXIDE HYDROCHLORIDE 50 MG: 25 CAPSULE ORAL at 05:11

## 2021-03-25 RX ADMIN — LACTULOSE 20 G: 10 SOLUTION ORAL at 11:41

## 2021-03-25 RX ADMIN — CHLORDIAZEPOXIDE HYDROCHLORIDE 50 MG: 25 CAPSULE ORAL at 17:26

## 2021-03-25 RX ADMIN — LORAZEPAM 2 MG: 1 TABLET ORAL at 23:50

## 2021-03-25 NOTE — PLAN OF CARE
Problem: Potential for Falls  Goal: Patient will remain free of falls  Description: INTERVENTIONS:  - Assess patient frequently for physical needs  -  Identify cognitive and physical deficits and behaviors that affect risk of falls  -  [place] fall precautions as indicated by assessment   - Educate patient/family on patient safety including physical limitations  - Instruct patient to call for assistance with activity based on assessment  - Modify environment to reduce risk of injury  - Consider OT/PT consult to assist with strengthening/mobility  Outcome: Progressing     Problem: Prexisting or High Potential for Compromised Skin Integrity  Goal: Skin integrity is maintained or improved  Description: INTERVENTIONS:  - Identify patients at risk for skin breakdown  - Assess and monitor skin integrity  - Assess and monitor nutrition and hydration status  - Monitor labs   - Assess for incontinence   - Turn and reposition patient  - Assist with mobility/ambulation  - Relieve pressure over bony prominences  - Avoid friction and shearing  - Provide appropriate hygiene as needed including keeping skin clean and dry  - Evaluate need for skin moisturizer/barrier cream  - Collaborate with interdisciplinary team   - Patient/family teaching  - Consider wound care consult   Outcome: Progressing     Problem: Nutrition/Hydration-ADULT  Goal: Nutrient/Hydration intake appropriate for improving, restoring or maintaining nutritional needs  Description: Monitor and assess patient's nutrition/hydration status for malnutrition  Collaborate with interdisciplinary team and initiate plan and interventions as ordered  Monitor patient's weight and dietary intake as ordered or per policy  Utilize nutrition screening tool and intervene as necessary  Determine patient's food preferences and provide high-protein, high-caloric foods as appropriate       INTERVENTIONS:  - Monitor oral intake, urinary output, labs, and treatment plans  - Assess nutrition and hydration status and recommend course of action  - Evaluate amount of meals eaten  - Assist patient with eating if necessary   - Allow adequate time for meals  - Recommend/ encourage appropriate diets, oral nutritional supplements, and vitamin/mineral supplements  - Order, calculate, and assess calorie counts as needed  - Recommend, monitor, and adjust tube feedings and TPN/PPN based on assessed needs  - Assess need for intravenous fluids  - Provide specific nutrition/hydration education as appropriate  - Include patient/family/caregiver in decisions related to nutrition  Outcome: Progressing     Problem: PAIN - ADULT  Goal: Verbalizes/displays adequate comfort level or baseline comfort level  Description: Interventions:  - Encourage patient to monitor pain and request assistance  - Assess pain using appropriate pain scale  - Administer analgesics based on type and severity of pain and evaluate response  - Implement non-pharmacological measures as appropriate and evaluate response  - Consider cultural and social influences on pain and pain management  - Notify physician/advanced practitioner if interventions unsuccessful or patient reports new pain  Outcome: Progressing     Problem: INFECTION - ADULT  Goal: Absence or prevention of progression during hospitalization  Description: INTERVENTIONS:  - Assess and monitor for signs and symptoms of infection  - Monitor lab/diagnostic results  - Monitor all insertion sites, i e  indwelling lines, tubes, and drains  - Monitor endotracheal if appropriate and nasal secretions for changes in amount and color  - [place] appropriate cooling/warming therapies per order  - Administer medications as ordered  - Instruct and encourage patient and family to use good hand hygiene technique  - Identify and instruct in appropriate isolation precautions for identified infection/condition  Outcome: Progressing  Goal: Absence of fever/infection during neutropenic period  Description: INTERVENTIONS:  - Monitor WBC    Outcome: Progressing     Problem: SAFETY ADULT  Goal: Patient will remain free of falls  Description: INTERVENTIONS:  - Assess patient frequently for physical needs  -  Identify cognitive and physical deficits and behaviors that affect risk of falls    -  [place] fall precautions as indicated by assessment   - Educate patient/family on patient safety including physical limitations  - Instruct patient to call for assistance with activity based on assessment  - Modify environment to reduce risk of injury  - Consider OT/PT consult to assist with strengthening/mobility  Outcome: Progressing  Goal: Maintain or return to baseline ADL function  Description: INTERVENTIONS:  -  Assess patient's ability to carry out ADLs; assess patient's baseline for ADL function and identify physical deficits which impact ability to perform ADLs (bathing, care of mouth/teeth, toileting, grooming, dressing, etc )  - Assess/evaluate cause of self-care deficits   - Assess range of motion  - Assess patient's mobility; develop plan if impaired  - Assess patient's need for assistive devices and provide as appropriate  - Encourage maximum independence but intervene and supervise when necessary  - Involve family in performance of ADLs  - Assess for home care needs following discharge   - Consider OT consult to assist with ADL evaluation and planning for discharge  - Provide patient education as appropriate  Outcome: Progressing  Goal: Maintain or return mobility status to optimal level  Description: INTERVENTIONS:  - Assess patient's baseline mobility status (ambulation, transfers, stairs, etc )    - Identify cognitive and physical deficits and behaviors that affect mobility  - Identify mobility aids required to assist with transfers and/or ambulation (gait belt, sit-to-stand, lift, walker, cane, etc )  - [place] fall precautions as indicated by assessment  - Record patient progress and toleration of activity level on Mobility SBAR; progress patient to next Phase/Stage  - Instruct patient to call for assistance with activity based on assessment  - Consider rehabilitation consult to assist with strengthening/weightbearing, etc   Outcome: Progressing     Problem: DISCHARGE PLANNING  Goal: Discharge to home or other facility with appropriate resources  Description: INTERVENTIONS:  - Identify barriers to discharge w/patient and caregiver  - Arrange for needed discharge resources and transportation as appropriate  - Identify discharge learning needs (meds, wound care, etc )  - Arrange for interpretive services to assist at discharge as needed  - Refer to Case Management Department for coordinating discharge planning if the patient needs post-hospital services based on physician/advanced practitioner order or complex needs related to functional status, cognitive ability, or social support system  Outcome: Progressing     Problem: Knowledge Deficit  Goal: Patient/family/caregiver demonstrates understanding of disease process, treatment plan, medications, and discharge instructions  Description: Complete learning assessment and assess knowledge base    Interventions:  - Provide teaching at level of understanding  - Provide teaching via preferred learning methods  Outcome: Progressing

## 2021-03-25 NOTE — PLAN OF CARE
Problem: Potential for Falls  Goal: Patient will remain free of falls  Description: INTERVENTIONS:  - Assess patient frequently for physical needs  -  Identify cognitive and physical deficits and behaviors that affect risk of falls  -  [place] fall precautions as indicated by assessment   - Educate patient/family on patient safety including physical limitations  - Instruct patient to call for assistance with activity based on assessment  - Modify environment to reduce risk of injury  - Consider OT/PT consult to assist with strengthening/mobility  3/25/2021 1127 by Freida Alonzo RN  Outcome: Progressing  3/25/2021 [facility] [place]  [address]  by Freida Alonzo RN  Outcome: Progressing     Problem: Prexisting or High Potential for Compromised Skin Integrity  Goal: Skin integrity is maintained or improved  Description: INTERVENTIONS:  - Identify patients at risk for skin breakdown  - Assess and monitor skin integrity  - Assess and monitor nutrition and hydration status  - Monitor labs   - Assess for incontinence   - Turn and reposition patient  - Assist with mobility/ambulation  - Relieve pressure over bony prominences  - Avoid friction and shearing  - Provide appropriate hygiene as needed including keeping skin clean and dry  - Evaluate need for skin moisturizer/barrier cream  - Collaborate with interdisciplinary team   - Patient/family teaching  - Consider wound care consult   3/25/2021 1127 by Freida Alonzo RN  Outcome: Progressing  3/25/2021 [facility] [place]  [address]  by Freida Alonzo RN  Outcome: Progressing     Problem: Nutrition/Hydration-ADULT  Goal: Nutrient/Hydration intake appropriate for improving, restoring or maintaining nutritional needs  Description: Monitor and assess patient's nutrition/hydration status for malnutrition  Collaborate with interdisciplinary team and initiate plan and interventions as ordered  Monitor patient's weight and dietary intake as ordered or per policy   Utilize nutrition screening tool and intervene as necessary  Determine patient's food preferences and provide high-protein, high-caloric foods as appropriate       INTERVENTIONS:  - Monitor oral intake, urinary output, labs, and treatment plans  - Assess nutrition and hydration status and recommend course of action  - Evaluate amount of meals eaten  - Assist patient with eating if necessary   - Allow adequate time for meals  - Recommend/ encourage appropriate diets, oral nutritional supplements, and vitamin/mineral supplements  - Order, calculate, and assess calorie counts as needed  - Recommend, monitor, and adjust tube feedings and TPN/PPN based on assessed needs  - Assess need for intravenous fluids  - Provide specific nutrition/hydration education as appropriate  - Include patient/family/caregiver in decisions related to nutrition  3/25/2021 1127 by Ashanti Rubin RN  Outcome: Progressing  3/25/2021 0947 by Ashanti Rubin RN  Outcome: Progressing     Problem: PAIN - ADULT  Goal: Verbalizes/displays adequate comfort level or baseline comfort level  Description: Interventions:  - Encourage patient to monitor pain and request assistance  - Assess pain using appropriate pain scale  - Administer analgesics based on type and severity of pain and evaluate response  - Implement non-pharmacological measures as appropriate and evaluate response  - Consider cultural and social influences on pain and pain management  - Notify physician/advanced practitioner if interventions unsuccessful or patient reports new pain  3/25/2021 1127 by Ashanti Rubin RN  Outcome: Progressing  3/25/2021 [facility]  97  by Ashanti Rubin RN  Outcome: Progressing     Problem: INFECTION - ADULT  Goal: Absence or prevention of progression during hospitalization  Description: INTERVENTIONS:  - Assess and monitor for signs and symptoms of infection  - Monitor lab/diagnostic results  - Monitor all insertion sites, i e  indwelling lines, tubes, and drains  - Monitor endotracheal if appropriate and nasal secretions for changes in amount and color  - [place] appropriate cooling/warming therapies per order  - Administer medications as ordered  - Instruct and encourage patient and family to use good hand hygiene technique  - Identify and instruct in appropriate isolation precautions for identified infection/condition  3/25/2021 1127 by Fatoumata Bush RN  Outcome: Progressing  3/25/2021 [facility] [place]  [address]  by Fatoumata Bush RN  Outcome: Progressing  Goal: Absence of fever/infection during neutropenic period  Description: INTERVENTIONS:  - Monitor WBC    3/25/2021 1127 by Fatoumata Bush RN  Outcome: Progressing  3/25/2021 [facility] [place]  97  by Fatoumata Bush RN  Outcome: Progressing     Problem: SAFETY ADULT  Goal: Patient will remain free of falls  Description: INTERVENTIONS:  - Assess patient frequently for physical needs  -  Identify cognitive and physical deficits and behaviors that affect risk of falls    -  [place] fall precautions as indicated by assessment   - Educate patient/family on patient safety including physical limitations  - Instruct patient to call for assistance with activity based on assessment  - Modify environment to reduce risk of injury  - Consider OT/PT consult to assist with strengthening/mobility  3/25/2021 1127 by Fatoumata Bush RN  Outcome: Progressing  3/25/2021 [facility]  97  by Fatoumata Bush RN  Outcome: Progressing  Goal: Maintain or return to baseline ADL function  Description: INTERVENTIONS:  -  Assess patient's ability to carry out ADLs; assess patient's baseline for ADL function and identify physical deficits which impact ability to perform ADLs (bathing, care of mouth/teeth, toileting, grooming, dressing, etc )  - Assess/evaluate cause of self-care deficits   - Assess range of motion  - Assess patient's mobility; develop plan if impaired  - Assess patient's need for assistive devices and provide as appropriate  - Encourage maximum independence but intervene and supervise when necessary  - Involve family in performance of ADLs  - Assess for home care needs following discharge   - Consider OT consult to assist with ADL evaluation and planning for discharge  - Provide patient education as appropriate  3/25/2021 1127 by Rod Mike RN  Outcome: Progressing  3/25/2021 [facility]  by Rod Mike RN  Outcome: Progressing  Goal: Maintain or return mobility status to optimal level  Description: INTERVENTIONS:  - Assess patient's baseline mobility status (ambulation, transfers, stairs, etc )    - Identify cognitive and physical deficits and behaviors that affect mobility  - Identify mobility aids required to assist with transfers and/or ambulation (gait belt, sit-to-stand, lift, walker, cane, etc )  - [place] fall precautions as indicated by assessment  - Record patient progress and toleration of activity level on Mobility SBAR; progress patient to next Phase/Stage  - Instruct patient to call for assistance with activity based on assessment  - Consider rehabilitation consult to assist with strengthening/weightbearing, etc   3/25/2021 1127 by Rod Mike RN  Outcome: Progressing  3/25/2021 0947 by Rod Mike RN  Outcome: Progressing     Problem: DISCHARGE PLANNING  Goal: Discharge to home or other facility with appropriate resources  Description: INTERVENTIONS:  - Identify barriers to discharge w/patient and caregiver  - Arrange for needed discharge resources and transportation as appropriate  - Identify discharge learning needs (meds, wound care, etc )  - Arrange for interpretive services to assist at discharge as needed  - Refer to Case Management Department for coordinating discharge planning if the patient needs post-hospital services based on physician/advanced practitioner order or complex needs related to functional status, cognitive ability, or social support system  3/25/2021 1127 by Rod Mike RN  Outcome: Progressing  3/25/2021 [facility] [place]  by Rod Mike RN  Outcome: Progressing     Problem: Knowledge Deficit  Goal: Patient/family/caregiver demonstrates understanding of disease process, treatment plan, medications, and discharge instructions  Description: Complete learning assessment and assess knowledge base    Interventions:  - Provide teaching at level of understanding  - Provide teaching via preferred learning methods  3/25/2021 1127 by Bogdan Marquez RN  Outcome: Progressing  3/25/2021 0947 by Bogdan Marquez, RN  Outcome: Progressing

## 2021-03-25 NOTE — SPEECH THERAPY NOTE
Speech Language/Pathology    Speech/Language Pathology Progress Note    Patient Name: Cindy BOURNE Date: 3/25/2021     Problem List  Principal Problem:    Alcohol withdrawal ([address] )  Active Problems:    Transaminitis    Hyperbilirubinemia    Thrombocytopenia (HCC)    Tobacco abuse    Open wound of tongue due to bite    Electrolyte abnormality    Closed fracture of nasal bone       Past Medical History  Past Medical History:   Diagnosis Date    ETOH abuse         Past Surgical History  Past Surgical History:   Procedure Laterality Date    GALLBLADDER SURGERY           Subjective:  Pt  Was in bed awake, still with a 1:1  Had pulled out IV  Had breakfast tray in front of him  Objective:  Pt  Was seen for dysphagia therapy to follow up on diet tolerance and to assess for possible diet upgrade  1:1 stated that he continues to "pick at his tongue"  Upon visual exam, his tongue has stopped bleeding, however remains swollen and enlarged on right side  ROM has only slightly improved  He had pureed eggs and sausage for breakfast with yogurt and hot tea and juice  Pt  Was able to eat 100% with no s/s of aspiration  Due to CIWA remains tremulous and impulsive  Assessment:  No s/s of aspiration on current diet  Pt 's tongue remains swollen so will continue to benefit from decreased diet consistency so that he does not re-bite his tongue and can masticate appropriately  Plan/Recommendations:  Continue current diet recommendations  Speech to follow       Roly Verduzco MS CCC-SLP  Speech Language Pathologist  Available via [facility] License # [id number]  [address] # [id number]

## 2021-03-25 NOTE — ASSESSMENT & PLAN NOTE
Admits to drinking heavily since he was 16, longest being sober about 3 months, history of withdrawal seizures  Last drink was 5 days ago  CIWA protocol, PRN ativan  Librium 50mg q6  - continuing this dosing for now, patient still symptomatic  Case management consult for HOST referral   Thiamine/FA/MV

## 2021-03-25 NOTE — ASSESSMENT & PLAN NOTE
CT abdomen pelvis on presentation showed abnormal liver appearance with possible mass in right lobe    Follow-up ultrasound did not confirm this  Gastroenterology ordering MRI

## 2021-03-25 NOTE — PROGRESS NOTES
Progress Note - [facility]  Lana Strickland 55 y o  male MRN: [id number]    Unit/Bed#: 3 [address] Encounter: [id number]      Assessment/Plan:  1  Decompensated Cirrhosis of the liver associated with thrombocytopenia  Decompensated cirrhosis of the liver associated with transaminitis, thrombocytopenia, coagulopathy, and portal hypertension most likely due to alcohol use  Ultrasound done 12/2020 showed : Liver enlarged  The liver measures 20 cm in the midclavicular line  Surface contour is nodular  There is diffuse coarsened heterogeneous echotexture suggesting underlying cirrhotic changes  No evidence of suspicious mass  Liver enzymes and coagulopathy studies have been elevated prior to admission  Liver enzymes : total bilirubin 10, alk-phos 150, , and ALT 43  Total bilirubin 9 3, direct bilirubin 6 2  Thrombocytopenia platelets 33  Hemoglobin 11 8  PT 20 5 and INR 1 79 on admission  Ammonia level 55  CT abdomen/pelvis done 3/23 showed very abnormal appearance of liver most likely cirrhosis with either a cyst or potentially a low-density solid mass in right lobe  Follow-up hepatic MRI should be considered  Splenomegaly and possibly tiny upper abdominal varices indicated of portal hypertension  Evidence of old skeletal trauma  Cholecystectomy  Very small left inguinal hernia containing fat  Ultrasound of liver done today showed cirrhosis  Right hepatic lobe mass noted on CT not identified on the sonogram   Most likely, this is due to difficulties in for feel penetration  Mass best evaluation would be with MRI when patient's condition allows  No ascites seen on ultrasound or CT imaging  No signs of peripheral edema  Positive scleral icterus  Positive jaundice  MELD score 23   Child Erazo Score class B   -Continue CIWA protocol  -Start lactulose 20 g b i d   -Check chronic hepatitis panel, AMA, ASMA ,MARCEL, Iron panel, and AFP  -Continue to monitor liver enzymes, ammonia level, CBC, and INR  -Patient advised to stop all alcohol use and consequences of continued alcohol use in association with underlying liver disease explained to patient in detail   -Patient encouraged to seek inpatient rehabilitation concerning alcohol usage   -Continue supportive care  -EGD and colonoscopy needs to be done in the future to evaluate for varices   -No need to start diuretics at present time patient has no signs of fluid overload      2  Liver mass  CT abdomen/pelvis done 3/23 showed very abnormal appearance of liver most likely cirrhosis with either a cyst or potentially a low-density solid mass in right lobe  Follow-up hepatic MRI should be considered  Splenomegaly and possibly tiny upper abdominal varices indicated of portal hypertension  Evidence of old skeletal trauma  Cholecystectomy  Very small left inguinal hernia containing fat  Ultrasound of liver done 3/24 showed cirrhosis  Right hepatic lobe mass noted on CT not identified on the sonogram   Most likely, this is due to difficulties in field penetration  AFP 11   -Will obtain MRI to further evaluate liver mass        Subjective:   Patient lying in bed in no acute distress  Remains impulsive and restless  Patient patient tell me he was in [facility] and year was 57  patient denies nausea, vomiting, acid reflux, heartburn, epigastric or abdominal pain  Per nursing staff no reports of blood in stool, blood rectal area, or black tarry stool  No shortness of breath or chest pain  Objective:     Vitals: Blood pressure 146/89, pulse (!) 106, temperature 98 1 °F (36 7 °C), temperature source Tympanic, resp  rate 19, height 6' (1 829 m), weight 95 3 kg (210 lb), SpO2 98 %  ,Body mass index is 28 48 kg/m²  Intake/Output Summary (Last 24 hours) at 3/25/2021 1345  Last data filed at 3/25/2021 0700  Gross per 24 hour   Intake 1191 25 ml   Output 1325 ml   Net -133 75 ml       Physical Exam: Physical Exam  Vitals signs and nursing note reviewed  Constitutional:       General: He is not in acute distress  Eyes:      General: Scleral icterus present  Cardiovascular:      Rate and Rhythm: Normal rate and regular rhythm  Pulses: Normal pulses  Heart sounds: Normal heart sounds  Pulmonary:      Effort: Pulmonary effort is normal  No respiratory distress  Breath sounds: No stridor  No wheezing, rhonchi or rales  Abdominal:      General: Bowel sounds are normal  There is no distension  Palpations: Abdomen is soft  There is no mass  Tenderness: There is no abdominal tenderness  There is no guarding or rebound  Hernia: No hernia is present  Musculoskeletal:      Right lower leg: No edema  Left lower leg: No edema  Skin:     General: Skin is warm and dry  Capillary Refill: Capillary refill takes less than 2 seconds  Coloration: Skin is jaundiced  Skin is not pale  Neurological:      Mental Status: He is alert  Comments: Patient restless and impulsive  No tremors noted  Disoriented to place and year  Patient stated was 57 and he was in [facility]           Invasive Devices     Peripheral Intravenous Line            Peripheral IV 03/25/21 Right;Ventral (anterior) Forearm less than 1 day                Current Facility-Administered Medications:     calcium carbonate (TUMS) chewable tablet 1,000 mg, 1,000 mg, Oral, Daily PRN, Joseph Herr PA-C    chlordiazePOXIDE (LIBRIUM) capsule 50 mg, 50 mg, Oral, Q6H LEATHA, Joseph Herr PA-C, 50 mg at 43/15/99 0310    folic acid 1 mg, thiamine (VITAMIN B1) 100 mg in sodium chloride 0 9 % 100 mL IV piggyback, , Intravenous, Daily, Joseph Herr PA-C, New Bag at 03/25/21 0941    Gadobutrol injection (SINGLE-DOSE) SOLN 9 mL, 9 mL, Intravenous, Once in imaging, TIMMY Solares    lactulose oral solution 20 g, 20 g, Oral, BID, TIMMY Solares, 20 g at 03/25/21 1141    LORazepam (ATIVAN) injection 4 mg, 4 mg, Intravenous, Once, Chetna Urban Revjessikaar,     nicotine (NICODERM CQ) 14 mg/24hr TD 24 hr patch 1 patch, 1 patch, Transdermal, Daily, Joseph Herr PA-C, Stopped at 03/25/21 0937    ondansetron (ZOFRAN) injection 4 mg, 4 mg, Intravenous, Q6H PRN, Joseph Herr PA-C    sodium chloride 0 9 % with KCl 20 mEq/L infusion (premix), 75 mL/hr, Intravenous, Continuous, Joseph Herr PA-C, Last Rate: 75 mL/hr at 03/24/21 1827, 75 mL/hr at 03/24/21 1827    Lab Results:   Recent Results (from the past 24 hour(s))   Ammonia    Collection Time: 03/24/21  7:31 PM   Result Value Ref Range    Ammonia 52 (H) 11 - 35 umol/L   Chronic Hepatitis Panel    Collection Time: 03/24/21  7:31 PM   Result Value Ref Range    Hepatitis B Surface Ag Non-reactive Non-reactive, NonReactive - Confirmed    Hepatitis C Ab Non-reactive Non-reactive    Hep B C IgM Non-reactive Non-reactive    Hep B Core Total Ab Non-reactive Non-reactive   AFP tumor marker    Collection Time: 03/24/21  7:32 PM   Result Value Ref Range    AFP TUMOR MARKER 11 0 (H) 0 5 - 8 ng/mL   Iron Saturation %    Collection Time: 03/24/21  7:32 PM   Result Value Ref Range    Iron Saturation 13 %    TIBC 151 (L) 250 - 450 ug/dL    Iron 20 (L) 65 - 175 ug/dL   Ferritin    Collection Time: 03/24/21  7:32 PM   Result Value Ref Range    Ferritin 575 (H) 8 - 388 ng/mL   Comprehensive metabolic panel    Collection Time: 03/25/21  5:10 AM   Result Value Ref Range    Sodium 134 (L) 136 - 145 mmol/L    Potassium 3 5 3 5 - 5 3 mmol/L    Chloride 101 100 - 108 mmol/L    CO2 24 21 - 32 mmol/L    ANION GAP 9 4 - 13 mmol/L    BUN 11 5 - 25 mg/dL    Creatinine 1 00 0 60 - 1 30 mg/dL    Glucose 108 65 - 140 mg/dL    Calcium 7 9 (L) 8 3 - 10 1 mg/dL    Corrected Calcium 9 2 8 3 - 10 1 mg/dL     (H) 5 - 45 U/L    ALT 43 12 - 78 U/L    Alkaline Phosphatase 150 (H) 46 - 116 U/L    Total Protein 6 9 6 4 - 8 2 g/dL    Albumin 2 4 (L) 3 5 - 5 0 g/dL    Total Bilirubin 9 30 (H) 0 20 - 1 00 mg/dL    eGFR 84 ml/min/1 73sq m   CBC    Collection Time: 03/25/21  5:10 AM   Result Value Ref Range    WBC 5 67 4 31 - 10 16 Thousand/uL    RBC 3 45 (L) 3 88 - 5 62 Million/uL    Hemoglobin 11 8 (L) 12 0 - 17 0 g/dL    Hematocrit 35 3 (L) 36 5 - 49 3 %     (H) 82 - 98 fL    MCH 34 2 26 8 - 34 3 pg    MCHC 33 4 31 4 - 37 4 g/dL    RDW 16 3 (H) 11 6 - 15 1 %    Platelets 33 (LL) 898 - 390 Thousands/uL    MPV 12 2 8 9 - 12 7 fL   Magnesium    Collection Time: 03/25/21  5:10 AM   Result Value Ref Range    Magnesium 1 8 1 6 - 2 6 mg/dL             Imaging Studies: Ct Head Without Contrast    Result Date: 3/23/2021  Narrative: CT BRAIN - WITHOUT CONTRAST INDICATION:   Head trauma, mod-severe fall with low platelet count  COMPARISON:  12/5/2020 TECHNIQUE:  CT examination of the brain was performed  In addition to axial images, sagittal and coronal 2D reformatted images were created and submitted for interpretation  Radiation dose length product (DLP) for this visit:  911 3 mGy-cm   This examination, like all CT scans performed in the [facility], was performed utilizing techniques to minimize radiation dose exposure, including the use of iterative reconstruction and automated exposure control  IMAGE QUALITY:  Diagnostic  FINDINGS: PARENCHYMA:  No intracranial mass, mass effect or midline shift  No CT signs of acute infarction  No acute parenchymal hemorrhage  VENTRICLES AND EXTRA-AXIAL SPACES:  Normal for the patient's age  VISUALIZED ORBITS AND PARANASAL SINUSES:  Paranasal sinuses are clear  Orbits appear intact  There are some swelling along the right side of the nose and there are deformities of right nasal bones which are age indeterminate but possibly acute given the presence of the swelling  The right nasal bones appear somewhat thickened as well  CALVARIUM AND EXTRACRANIAL SOFT TISSUES:  Normal      Impression: No acute intracranial abnormality  Right-sided nasal fractures with swelling    Age indeterminate  Workstation performed: [id number]     Us Liver    Result Date: 3/24/2021  Narrative: RIGHT UPPER QUADRANT ULTRASOUND INDICATION:     ? mass on CTA/P    history of alcohol abuse  COMPARISON:  CT of the abdomen and pelvis from March 23, 2021  TECHNIQUE:   Real-time ultrasound of the right upper quadrant was performed with a curvilinear transducer with both volumetric sweeps and still imaging techniques  FINDINGS: PANCREAS:  Portions of the pancreas are obscured by bowel gas  Visualized portions of the pancreas are unremarkable  AORTA AND IVC:  Obscured by bowel gas  LIVER: Size:  Within normal range  The liver measures 17 0 cm in the midclavicular line  Contour:  Nodular surface contour  Parenchyma:  Diffuse heterogeneity of the hepatic echotexture  No mass identified  Specifically, no evidence of mass in the region of the abnormality in segment 7, demonstrated on yesterday's CTA  This is likely due to limitations in the evaluation of the far field of the liver  Color Doppler demonstrates normal flow in the hepatic artery  Flow could not be confirmed in the portal vein with color Doppler  With pulse wave Doppler, there is a pulsatile waveform in the main portal vein (note: There is normal enhancement of the portal vein on yesterday's CT  GALLBLADDER: Post-cholecystectomy  BILE DUCTS: No intrahepatic or extrahepatic bile duct dilatation  CBD measures 6 mm  No evidence of choledocholithiasis  RIGHT KIDNEY: Right kidney measures 13 1 x 6 3 cm  No mass, calculus or hydronephrosis  ASCITES:   None  Impression: 1  Cirrhosis  2   Right hepatic lobe mass noted on CT not identified on this sonogram   Most likely, this is due to difficulties and far field penetration  As noted in the CT report, this mass is best evaluated with MRI when the patient's condition allows   Workstation performed: [id number]     Ct Abdomen Pelvis With Contrast    Result Date: 3/23/2021  Narrative: CT ABDOMEN AND PELVIS WITH IV CONTRAST INDICATION:   Abdominal pain, acute, nonlocalized abd pain with low platelet count  COMPARISON:  Ultrasound from 12/7/2020 TECHNIQUE:  CT examination of the abdomen and pelvis was performed  Axial, sagittal, and coronal 2D reformatted images were created from the source data and submitted for interpretation  Radiation dose length product (DLP) for this visit:  734 99 mGy-cm   This examination, like all CT scans performed in the [facility], was performed utilizing techniques to minimize radiation dose exposure, including the use of iterative  reconstruction and automated exposure control  IV Contrast:  100 mL of iohexol (OMNIPAQUE) Enteric Contrast:  Enteric contrast was not administered  FINDINGS: ABDOMEN LOWER CHEST:  There is some focal atelectasis posterior limb the left lower lobe adjacent to some old rib fractures  LIVER/BILIARY TREE:  The liver is markedly abnormal in appearance  It is diffusely heterogeneous in attenuation with innumerable tiny low-density nodular foci throughout the entire parenchyma  Overall, the liver is generally somewhat low in attenuation  There is a mass in the posterior aspect of the right lobe of the liver which is relatively well-defined and round in shape and measures 2 2 cm diameter  It measures 25 Hounsfield units internally on portal venous phase imaging  This is indeterminate in etiology  It might just be a slightly complex cyst although a low-density solid mass is not excluded  The liver surface is nodular  There is recanalization of the Umbilical vein  The portal vein and intrahepatic portal branches appear patent  There is no biliary obstruction seen  GALLBLADDER:  Gallbladder is surgically absent  SPLEEN:  The spleen is enlarged, measuring 17 6 x 16 6 x 8 0 cm  PANCREAS:  Unremarkable  ADRENAL GLANDS:  Unremarkable  KIDNEYS/URETERS:  Questionable tiny cystic foci in the left kidney  No worrisome appearing lesions    No hydronephrosis or kidney stones  STOMACH AND BOWEL:  Unremarkable  APPENDIX:  No findings to suggest appendicitis  ABDOMINOPELVIC CAVITY:  No significant ascites  No free air  No pathologic-appearing lymphadenopathy  VESSELS:  Aortic caliber is normal   There appear to be possible small varices within the upper abdomen  As mentioned above, there is recanalization of the paraumbilical vein  There are a few mildly prominent venous collaterals in the periumbilical region  PELVIS REPRODUCTIVE ORGANS:  Unremarkable for patient's age  URINARY BLADDER:  Unremarkable  ABDOMINAL WALL/INGUINAL REGIONS:  Very small fat-containing left inguinal hernia noted  OSSEOUS STRUCTURES:  Several old healed left posterior rib fractures  No acute fracture or destructive bone lesion seen  There is some disc degeneration and lumbar spine  There is evidence of old trauma to the right pubic bone and left ischial tuberosity  Impression: Very abnormal appearance of liver most likely cirrhosis with either a cyst or potentially a low-density solid mass in the right lobe  Follow-up hepatic MRI should be considered for better characterization  Splenomegaly and possible tiny upper abdominal varices indicative of portal hypertension  Evidence of old skeletal trauma  Cholecystectomy   Very small left inguinal hernia containing fat Workstation performed: [id number]           TIMMY Cabral

## 2021-03-25 NOTE — ASSESSMENT & PLAN NOTE
Patient presents with decompensated liver cirrhosis with thrombocytopenia, coagulopathy, transaminitis, and portal hypertension, most likely due to heavy alcohol abuse  CT with irregular appearance of liver and possible mass  Appreciate GI following along  Will order hepatic MRI as DTs are improved  Checking serologies, hepatitis panel  AFP mildly elevated  Checking ammonia level  Trend LFTs  Deferring diuretic at this time as patient euvolemic    Outpatient colonoscopy/EGD recommended

## 2021-03-25 NOTE — PROGRESS NOTES
Karrie 46  Progress Note - Jaron Sorensen 1965, 55 y o  male MRN: [id number]  Unit/Bed#: [address] [place] [address] Encounter: [id number]  Primary Care Provider: Sam Perea MD   Date and time admitted to hospital: 3/23/2021  5:47 PM    Decompensated hepatic cirrhosis [facility])  Assessment & Plan  Patient presents with decompensated liver cirrhosis with thrombocytopenia, coagulopathy, transaminitis, and portal hypertension, most likely due to heavy alcohol abuse  CT with irregular appearance of liver and possible mass  Appreciate GI following along  Will order hepatic MRI as DTs are improved  Checking serologies, hepatitis panel  AFP mildly elevated  Checking ammonia level  Trend LFTs  Deferring diuretic at this time as patient euvolemic  Outpatient colonoscopy/EGD recommended    * Alcohol withdrawal ([facility] [address] )  Assessment & Plan  Admits to drinking heavily since he was 14, longest being sober about 3 months, history of withdrawal seizures  Last drink was 5 days ago  CIWA protocol, PRN ativan  Librium 50mg q6  - continuing this dosing for now, patient still symptomatic  Case management consult for HOST referral   Thiamine/FA/MV    Liver mass  Assessment & Plan  CT abdomen pelvis on presentation showed abnormal liver appearance with possible mass in right lobe  Follow-up ultrasound did not confirm this  Gastroenterology ordering MRI    Closed fracture of nasal bone  Assessment & Plan  [facility] showed Right-sided nasal fractures with swelling  Age indeterminate    Suspect new 2/2 fall vs seizure   Supportive care     Electrolyte abnormality  Assessment & Plan  Na 128, K 2 8  ER did not check Mag, will obtain in AM   In ER given 40meq PO Kdur, 20meq IV potassium, 1gm IV magnesium  Recheck BMP in am   Fluids with K  Telemetry     Open wound of tongue due to bite  Assessment & Plan  Severe right tongue laceration and hematoma   Unknown etiology per patient   Highly suspicious of possible w/d seizure, ?fall   Ice chips, supportive care, Seriel exams   NPO, speech consult   Consider ENT c/s ? Tobacco abuse  Assessment & Plan  NRT     Thrombocytopenia (HCC)  Assessment & Plan  Likely 2/2 underlying cirrhosis   Hold dvt ppx   Daily CBC/plt    Hyperbilirubinemia  Assessment & Plan  T bili 10 on admission, previously 4-5 range   , Alk phos 165   CT A/P "Very abnormal appearance of liver most likely cirrhosis with either a cyst or potentially a low-density solid mass in the right lobe  Splenomegaly and possible tiny upper abdominal varices indicative of portal hypertension "  RUQ ultrasound suspicious for liver mass  GI following, plan to order MRI    Transaminitis  Assessment & Plan  In the setting of alcohol abuse  Also checking hepatitis panel and serologies as above      VTE Pharmacologic Prophylaxis:   Pharmacologic: Pharmacologic VTE Prophylaxis contraindicated due to Thrombocytopenia  Mechanical VTE Prophylaxis in Place: Yes    Patient Centered Rounds: I have performed bedside rounds with nursing staff today  Discussions with Specialists or Other Care Team Provider:  Discussed with nursing, case management, GI    Education and Discussions with Family / Patient:  I discussed with patient  He asked me not to update his family at this time because he does not want them to be worried  Time Spent for Care: 45 minutes  More than 50% of total time spent on counseling and coordination of care as described above  Current Length of Stay: 2 day(s)    Current Patient Status: Inpatient   Certification Statement: The patient will continue to require additional inpatient hospital stay due to Hepatic MRI, acute alcohol withdrawal requiring frequent monitoring    Discharge Plan:  Not ready    Code Status: Level 1 - Full Code      Subjective:   Uncomfortable  No shortness of breath or abdominal pain  No chest pain      Objective:     Vitals:   Temp (24hrs), Av 6 °F (37 °C), Min:97 7 °F (36 5 °C), Max:99 4 °F (37 4 °C)    Temp:  [97 7 °F (36 5 °C)-99 4 °F (37 4 °C)] 98 1 °F (36 7 °C)  HR:  [104-112] 106  Resp:  [18-19] 19  BP: (139-146)/(74-92) 146/89  SpO2:  [96 %-98 %] 98 %  Body mass index is 28 48 kg/m²  Input and Output Summary (last 24 hours): Intake/Output Summary (Last 24 hours) at 3/25/2021 1625  Last data filed at 3/25/2021 0700  Gross per 24 hour   Intake 1191 25 ml   Output 1325 ml   Net -133 75 ml       Physical Exam:     Physical Exam  Vitals signs and nursing note reviewed  Exam conducted with a chaperone present  Constitutional:       Appearance: He is well-developed  He is diaphoretic  HENT:      Head: Normocephalic and atraumatic  Eyes:      Conjunctiva/sclera: Conjunctivae normal    Neck:      Musculoskeletal: Neck supple  Cardiovascular:      Rate and Rhythm: Regular rhythm  Tachycardia present  Heart sounds: No murmur  Pulmonary:      Effort: Pulmonary effort is normal  No respiratory distress  Breath sounds: Normal breath sounds  Abdominal:      Palpations: Abdomen is soft  Tenderness: There is no abdominal tenderness  Skin:     General: Skin is warm  Neurological:      Mental Status: He is alert           Additional Data:     Labs:    Results from last 7 days   Lab Units 03/25/21  0510 03/24/21  0502   WBC Thousand/uL 5 67 6 96   HEMOGLOBIN g/dL 11 8* 12 6   HEMATOCRIT % 35 3* 36 8   PLATELETS Thousands/uL 33* 26*   NEUTROS PCT %  --  82*   LYMPHS PCT %  --  8*   MONOS PCT %  --  9   EOS PCT %  --  1     Results from last 7 days   Lab Units 03/25/21  0510   SODIUM mmol/L 134*   POTASSIUM mmol/L 3 5   CHLORIDE mmol/L 101   CO2 mmol/L 24   BUN mg/dL 11   CREATININE mg/dL 1 00   ANION GAP mmol/L 9   CALCIUM mg/dL 7 9*   ALBUMIN g/dL 2 4*   TOTAL BILIRUBIN mg/dL 9 30*   ALK PHOS U/L 150*   ALT U/L 43   AST U/L 165*   GLUCOSE RANDOM mg/dL 108     Results from last 7 days   Lab Units 03/24/21  0019   INR  1 80*     Results from last 7 days   Lab Units 03/24/21  0225   POC GLUCOSE mg/dl 112                   * I Have Reviewed All Lab Data Listed Above  * Additional Pertinent Lab Tests Reviewed: All Labs Within Last 24 Hours Reviewed    Imaging:    Imaging Reports Reviewed Today Include:  CT abdomen pelvis, ultrasound  Imaging Personally Reviewed by Myself Includes:  None    Recent Cultures (last 7 days):           Last 24 Hours Medication List:   Current Facility-Administered Medications   Medication Dose Route Frequency Provider Last Rate    calcium carbonate  1,000 mg Oral Daily PRN Joseph Herr PA-C      chlordiazePOXIDE  50 mg Oral Q6H [facility] Joseph Herr PA-C      folic acid 1 mg, thiamine 100 mg in 0 9% sodium chloride 100 mL IVPB   Intravenous Daily Joseph Herr PA-C      lactulose  20 g Oral BID TIMMY Solares      LORazepam  4 mg Intravenous Once Lashanda Revankar, DO      nicotine  1 patch Transdermal Daily Joseph Herr PA-C      ondansetron  4 mg Intravenous Q6H PRN Joseph Herr PA-C      sodium chloride 0 9 % with KCl 20 mEq/L  75 mL/hr Intravenous Continuous Joseph Herr PA-C 75 mL/hr (03/24/21 1827)        Today, Patient Was Seen By: Shae Lucero PA-C    ** Please Note: Dictation voice to text software may have been used in the creation of this document   **

## 2021-03-25 NOTE — UTILIZATION REVIEW
Continued Stay Review    Date: 3/25/21                        Current Patient Class: inpatient  Current Level of Care: med surg  HPI:56 y o  male initially admitted on 3/23/21  Assessment/Plan:   Tmax 99 4  Alcohol withdrawal with scleral icterus & jaundice, disoriented to year & place ([facility] [address]  MELD score 23, CIWA score @ 12, tachycardic with persistent tremors, sweats, anxiety, impulsive, restless & agitated, requiring ativan  Remains on Librium dosing at this time  IVF & daily banana bag continued     Pertinent Labs/Diagnostic Results:   Results from last 7 days   Lab Units 03/25/21  0510 03/24/21  0502 03/23/21  1816   WBC Thousand/uL 5 67 6 96 7 46   HEMOGLOBIN g/dL 11 8* 12 6 13 1   HEMATOCRIT % 35 3* 36 8 37 6   PLATELETS Thousands/uL 33* 26* 21*   NEUTROS ABS Thousands/µL  --  5 66 6 68     Results from last 7 days   Lab Units 03/25/21  0510 03/24/21  0502 03/23/21  1816   SODIUM mmol/L 134* 135* 128*   POTASSIUM mmol/L 3 5 3 3* 2 8*   CHLORIDE mmol/L 101 101 94*   CO2 mmol/L 24 27 24   ANION GAP mmol/L 9 7 10   BUN mg/dL 11 10 11   CREATININE mg/dL 1 00 0 92 0 97   EGFR ml/min/1 73sq m 84 93 88   CALCIUM mg/dL 7 9* 8 2* 8 2*   MAGNESIUM mg/dL 1 8 1 9  --      Results from last 7 days   Lab Units 03/25/21  0510 03/24/21  1931 03/24/21  0502 03/23/21  1816   AST U/L 165*  --  191* 196*   ALT U/L 43  --  40 42   ALK PHOS U/L 150*  --  152* 165*   TOTAL PROTEIN g/dL 6 9  --  6 9 7 2   ALBUMIN g/dL 2 4*  --  2 5* 2 5*   TOTAL BILIRUBIN mg/dL 9 30*  --  9 70* 10 00*   BILIRUBIN DIRECT mg/dL  --   --  6 20*  --    AMMONIA umol/L  --  52*  --   --      Results from last 7 days   Lab Units 03/24/21  0225   POC GLUCOSE mg/dl 112     Results from last 7 days   Lab Units 03/25/21  0510 03/24/21  0502 03/23/21  1816   GLUCOSE RANDOM mg/dL 108 102 162*     Results from last 7 days   Lab Units 03/24/21  0019 03/23/21  1816   PROTIME seconds 20 7* 20 5*   INR  1 80* 1 79*   PTT seconds  --  37 Results from last 7 days   Lab Units 03/24/21  1932   FERRITIN ng/mL 575*     Results from last 7 days   Lab Units 03/24/21  1931   HEP B S AG  Non-reactive   HEP C AB  Non-reactive   HEP B C IGM  Non-reactive   HEP B C TOTAL AB  Non-reactive     Results from last 7 days   Lab Units 03/23/21  1816   ETHANOL LVL mg/dL <3   ACETAMINOPHEN LVL ug/mL <2*   SALICYLATE LVL mg/dL <3*     Vital Signs:   03/25/21 2020  100 °F (37 8 °C)  85  18  134/77  --  96 %  None (Room air)  Lying   03/25/21 0803  98 1 °F (36 7 °C)  106  Abnormal   19  146/89  --  98 %  None (Room air)  Lying   03/25/21 0307  --  --  --  --  --  97 %  None (Room air)  --   03/25/21 0300  97 7 °F (36 5 °C)  104  18  142/74  102  --  --  Lying   03/25/21 0046  99 4 °F (37 4 °C)  112  Abnormal   18  141/92  111  --  --  Lying     Medications:   chlordiazePOXIDE, 50 mg, Oral, M0R LEATHA  folic acid 1 mg, thiamine 100 mg in 0 9% sodium chloride 100 mL IVPB, , Intravenous, Daily  lactulose, 20 g, Oral, BID  LORazepam, 4 mg, Intravenous, Once  nicotine, 1 patch, Transdermal, Daily    Continuous IV Infusions:  sodium chloride 0 9 % with KCl 20 mEq/L, 75 mL/hr, Intravenous, Continuous    PRN Meds:  calcium carbonate, 1,000 mg, Oral, Daily PRN  ondansetron, 4 mg, Intravenous, Q6H PRN    Discharge Plan: [facility]    Network Utilization Review Department  ATTENTION: Please call with any questions or concerns to [phone] and carefully listen to the prompts so that you are directed to the right person  All voicemails are confidential   Natasha García all requests for admission clinical reviews, approved or denied determinations and any other requests to dedicated fax number below belonging to the campus where the patient is receiving treatment   List of dedicated fax numbers for the Facilities:  [address] DENIALS (Administrative/Medical Necessity) [phone]   [address] (Maternity/NICU/Pediatrics) [phone] 5000 [facility] Radha Matthew [phone]   [address] [phone]   Yasmin Atkinson 7985 (  Claire Mercado "Bonny" 103) [address] [place] [address] Trena Tellez [facility] [phone]   [place] [address] [facility] [phone]

## 2021-03-25 NOTE — ASSESSMENT & PLAN NOTE
T bili 10 on admission, previously 4-5 range   , Alk phos 165   CT A/P "Very abnormal appearance of liver most likely cirrhosis with either a cyst or potentially a low-density solid mass in the right lobe   Splenomegaly and possible tiny upper abdominal varices indicative of portal hypertension "  RUQ ultrasound suspicious for liver mass  GI following, plan to order MRI

## 2021-03-26 LAB
ACTIN IGG SERPL-ACNC: 14 UNITS (ref 0–19)
ALBUMIN SERPL BCP-MCNC: 2.4 G/DL (ref 3.5–5)
ALP SERPL-CCNC: 151 U/L (ref 46–116)
ALT SERPL W P-5'-P-CCNC: 44 U/L (ref 12–78)
ANION GAP SERPL CALCULATED.3IONS-SCNC: 10 MMOL/L (ref 4–13)
AST SERPL W P-5'-P-CCNC: 143 U/L (ref 5–45)
BILIRUB SERPL-MCNC: 10.2 MG/DL (ref 0.2–1)
BUN SERPL-MCNC: 10 MG/DL (ref 5–25)
CALCIUM ALBUM COR SERPL-MCNC: 9.6 MG/DL (ref 8.3–10.1)
CALCIUM SERPL-MCNC: 8.3 MG/DL (ref 8.3–10.1)
CHLORIDE SERPL-SCNC: 102 MMOL/L (ref 100–108)
CO2 SERPL-SCNC: 25 MMOL/L (ref 21–32)
CREAT SERPL-MCNC: 0.96 MG/DL (ref 0.6–1.3)
ERYTHROCYTE [DISTWIDTH] IN BLOOD BY AUTOMATED COUNT: 16.4 % (ref 11.6–15.1)
GFR SERPL CREATININE-BSD FRML MDRD: 89 ML/MIN/1.73SQ M
GLUCOSE SERPL-MCNC: 85 MG/DL (ref 65–140)
HCT VFR BLD AUTO: 37.1 % (ref 36.5–49.3)
HGB BLD-MCNC: 12.3 G/DL (ref 12–17)
INR PPP: 1.8 (ref 0.84–1.19)
MCH RBC QN AUTO: 34.4 PG (ref 26.8–34.3)
MCHC RBC AUTO-ENTMCNC: 33.2 G/DL (ref 31.4–37.4)
MCV RBC AUTO: 104 FL (ref 82–98)
MITOCHONDRIA M2 IGG SER-ACNC: <20 UNITS (ref 0–20)
PLATELET # BLD AUTO: 40 THOUSANDS/UL (ref 149–390)
PMV BLD AUTO: 11.5 FL (ref 8.9–12.7)
POTASSIUM SERPL-SCNC: 3.6 MMOL/L (ref 3.5–5.3)
PROT SERPL-MCNC: 7.2 G/DL (ref 6.4–8.2)
PROTHROMBIN TIME: 20.6 SECONDS (ref 11.6–14.5)
RBC # BLD AUTO: 3.58 MILLION/UL (ref 3.88–5.62)
RYE IGE QN: NEGATIVE
SODIUM SERPL-SCNC: 137 MMOL/L (ref 136–145)
WBC # BLD AUTO: 5.38 THOUSAND/UL (ref 4.31–10.16)

## 2021-03-26 PROCEDURE — 99232 SBSQ HOSP IP/OBS MODERATE 35: CPT | Performed by: NURSE PRACTITIONER

## 2021-03-26 PROCEDURE — 85610 PROTHROMBIN TIME: CPT | Performed by: NURSE PRACTITIONER

## 2021-03-26 PROCEDURE — 80053 COMPREHEN METABOLIC PANEL: CPT | Performed by: NURSE PRACTITIONER

## 2021-03-26 PROCEDURE — 99232 SBSQ HOSP IP/OBS MODERATE 35: CPT | Performed by: INTERNAL MEDICINE

## 2021-03-26 PROCEDURE — 85027 COMPLETE CBC AUTOMATED: CPT | Performed by: NURSE PRACTITIONER

## 2021-03-26 PROCEDURE — 92526 ORAL FUNCTION THERAPY: CPT

## 2021-03-26 RX ORDER — LORAZEPAM 1 MG/1
2 TABLET ORAL ONCE
Status: COMPLETED | OUTPATIENT
Start: 2021-03-26 | End: 2021-03-26

## 2021-03-26 RX ADMIN — SODIUM CHLORIDE AND POTASSIUM CHLORIDE 75 ML/HR: .9; .15 SOLUTION INTRAVENOUS at 16:08

## 2021-03-26 RX ADMIN — LACTULOSE 20 G: 10 SOLUTION ORAL at 10:24

## 2021-03-26 RX ADMIN — CHLORDIAZEPOXIDE HYDROCHLORIDE 50 MG: 25 CAPSULE ORAL at 12:04

## 2021-03-26 RX ADMIN — LACTULOSE 20 G: 10 SOLUTION ORAL at 17:53

## 2021-03-26 RX ADMIN — CHLORDIAZEPOXIDE HYDROCHLORIDE 50 MG: 25 CAPSULE ORAL at 23:51

## 2021-03-26 RX ADMIN — CHLORDIAZEPOXIDE HYDROCHLORIDE 50 MG: 25 CAPSULE ORAL at 17:53

## 2021-03-26 RX ADMIN — FOLIC ACID: 5 INJECTION, SOLUTION INTRAMUSCULAR; INTRAVENOUS; SUBCUTANEOUS at 10:24

## 2021-03-26 RX ADMIN — CHLORDIAZEPOXIDE HYDROCHLORIDE 50 MG: 25 CAPSULE ORAL at 05:54

## 2021-03-26 RX ADMIN — SODIUM CHLORIDE AND POTASSIUM CHLORIDE 75 ML/HR: .9; .15 SOLUTION INTRAVENOUS at 02:15

## 2021-03-26 RX ADMIN — LORAZEPAM 2 MG: 1 TABLET ORAL at 10:15

## 2021-03-26 NOTE — ASSESSMENT & PLAN NOTE
T bili 10 on admission, previously 4-5 range   , Alk phos 165   CT A/P "Very abnormal appearance of liver most likely cirrhosis with either a cyst or potentially a low-density solid mass in the right lobe   Splenomegaly and possible tiny upper abdominal varices indicative of portal hypertension "  RUQ ultrasound suspicious for liver mass  GI following  MRI performed, follow-up on results

## 2021-03-26 NOTE — CASE MANAGEMENT
LOS: 2 DAYS  UNPLANNED RA RISK SCORE: 12  RA: NO  BUNDLE: NO    CM met with patient and discussed available services and the role of CM  Patient states he lives with his sister and her  in a house  He lives in the attack on the first floor  There are 15 steps into the house and a total of 14 steps to his bedroom  There is one bathroom on the 2nd floor  He states he was independent of all ADL's and ambulated without problems prior to admission  Discussed PT's recommendation for  SNF at [place] and he was agreeable  Patient was provided with choice and a referral was placed to [facility], [facility] and [facility] in [place]  Patient as a h/o alcoholism and admits it  He states he has never been to rehab before  He states he has attended AA in the past  [facility]ussed [facility] and he is interested in information  Information provided  No h/o MH issues  Patient has medication coverage and uses the [facility] in [place]  His PCP is Dr Rip Dockery  His sister provides transportation for patient  No POA or LW and information provided  Patient will need transport to SNF at [place]  CM to follow

## 2021-03-26 NOTE — ASSESSMENT & PLAN NOTE
In the setting of alcohol abuse  Also checking hepatitis panel and serologies  Hepatitis panel negative

## 2021-03-26 NOTE — UTILIZATION REVIEW
Continued Stay Review    Date:  3-26-21                        Current Patient Class: inpatient  Current Level of Care: med surg    HPI:56 y o  male initially admitted on  3-23-21 for alcohol withdrawal with advanced liver disease and jaundice Decompensated  Cirrhosis of the liver associated with thrombocytopenia  Liver mass  Assessment/Plan:     Platelets slowly improving 21 to 40  Mental status is improving  Tremors present in upper extremities  Continue iv fluids with kcl 75/hr  Ciwa 7-8  Patient received lactulose, librium and ativan          Pertinent Labs/Diagnostic Results:       Results from last 7 days   Lab Units 03/26/21 0427 03/25/21  0510 03/24/21  0502 03/23/21  1816   WBC Thousand/uL 5 38 5 67 6 96 7 46   HEMOGLOBIN g/dL 12 3 11 8* 12 6 13 1   HEMATOCRIT % 37 1 35 3* 36 8 37 6   PLATELETS Thousands/uL 40* 33* 26* 21*   NEUTROS ABS Thousands/µL  --   --  5 66 6 68         Results from last 7 days   Lab Units 03/26/21 0427 03/25/21  0510 03/24/21  0502 03/23/21  1816   SODIUM mmol/L 137 134* 135* 128*   POTASSIUM mmol/L 3 6 3 5 3 3* 2 8*   CHLORIDE mmol/L 102 101 101 94*   CO2 mmol/L 25 24 27 24   ANION GAP mmol/L 10 9 7 10   BUN mg/dL 10 11 10 11   CREATININE mg/dL 0 96 1 00 0 92 0 97   EGFR ml/min/1 73sq m 89 84 93 88   CALCIUM mg/dL 8 3 7 9* 8 2* 8 2*   MAGNESIUM mg/dL  --  1 8 1 9  --      Results from last 7 days   Lab Units 03/26/21 0427 03/25/21  0510 03/24/21  1931 03/24/21  0502 03/23/21  1816   AST U/L 143* 165*  --  191* 196*   ALT U/L 44 43  --  40 42   ALK PHOS U/L 151* 150*  --  152* 165*   TOTAL PROTEIN g/dL 7 2 6 9  --  6 9 7 2   ALBUMIN g/dL 2 4* 2 4*  --  2 5* 2 5*   TOTAL BILIRUBIN mg/dL 10 20* 9 30*  --  9 70* 10 00*   BILIRUBIN DIRECT mg/dL  --   --   --  6 20*  --    AMMONIA umol/L  --   --  52*  --   --      Results from last 7 days   Lab Units 03/24/21  0225   POC GLUCOSE mg/dl 112     Results from last 7 days   Lab Units 03/26/21  0427 03/25/21  0510 03/24/21  0502 03/23/21  1816   GLUCOSE RANDOM mg/dL 85 108 102 162*       Results from last 7 days   Lab Units 03/26/21  0427 03/24/21  0019 03/23/21  1816   PROTIME seconds 20 6* 20 7* 20 5*   INR  1 80* 1 80* 1 79*   PTT seconds  --   --  37       Results from last 7 days   Lab Units 03/24/21  1932   FERRITIN ng/mL 575*     Results from last 7 days   Lab Units 03/24/21  1931   HEP B S AG  Non-reactive   HEP C AB  Non-reactive   HEP B C IGM  Non-reactive   HEP B C TOTAL AB  Non-reactive         Results from last 7 days   Lab Units 03/23/21  1816   ETHANOL LVL mg/dL <3   ACETAMINOPHEN LVL ug/mL <2*   SALICYLATE LVL mg/dL <3*       Vital Signs:     Date/Time  Temp  Pulse  Resp  BP  MAP (mmHg)  SpO2  O2 Device  Patient Position - Orthostatic VS   03/26/21 1500  98 4 °F (36 9 °C)  90  16  124/79  --  95 %  None (Room air)  Sitting   03/26/21 0900  98 4 °F (36 9 °C)  81  18  118/74  79  95 %  None (Room air)  Lying   03/26/21 0500  --  92  --  119/75  --  --  --  --   03/26/21 0100  --  85  --  122/76  --  --  --  --       Row Name  03/26/21   1500  03/26/21   0900  03/26/21   0500  03/26/21   0100  03/25/21   2343   CIWA-Ar   BP  124/79  118/74  119/75  122/76  126/87   Pulse  90  81  92  85  88   Nausea and Vomiting  0  0  0  0  0   Tactile Disturbances  0  1  0  0  1   Tremor  4  4  4  3  4   Auditory Disturbances  0  0  0  0  0   Paroxysmal Sweats  1  1  2  1  1   Visual Disturbances  0  0  0  0  0   Anxiety  1  1  1  1  5   Headache, Fullness in Head  0  0  0  0  0   Agitation  0  0  0  0  1   Orientation and Clouding of Sensorium  1  1  0  0  0   CIWA-Ar Total  7  8  7  5  12           Medications:       Medication Administration - last 24 hours from 03/25/2021 1532 to 03/26/2021 1532       Date/Time Order Dose Route Action     03/26/2021 1204 chlordiazePOXIDE (LIBRIUM) capsule 50 mg 50 mg Oral Given     03/26/2021 0559 chlordiazePOXIDE (LIBRIUM) capsule 50 mg 50 mg Oral Given     03/25/2021 9449 chlordiazePOXIDE (LIBRIUM) capsule 50 mg 50 mg Oral Given     03/25/2021 1726 chlordiazePOXIDE (LIBRIUM) capsule 50 mg 50 mg Oral Given     03/26/2021 1024 lactulose oral solution 20 g 20 g Oral Given     03/25/2021 1726 lactulose oral solution 20 g 20 g Oral Given     03/25/2021 2350 LORazepam (ATIVAN) tablet 2 mg 2 mg Oral Given     03/26/2021 1015 LORazepam (ATIVAN) tablet 2 mg 2 mg Oral Given       chlordiazePOXIDE, 50 mg, Oral, U1L LEATHA  folic acid 1 mg, thiamine 100 mg in 0 9% sodium chloride 100 mL IVPB, , Intravenous, Daily  lactulose, 20 g, Oral, BID  LORazepam, 4 mg, Intravenous, Once  nicotine, 1 patch, Transdermal, Daily      Continuous IV Infusions:  sodium chloride 0 9 % with KCl 20 mEq/L, 75 mL/hr, Intravenous, Continuous      PRN Meds:  calcium carbonate, 1,000 mg, Oral, Daily PRN  ondansetron, 4 mg, Intravenous, Q6H PRN        Discharge Plan: To be determined     Network Utilization Review Department  ATTENTION: Please call with any questions or concerns to [phone] and carefully listen to the prompts so that you are directed to the right person  All voicemails are confidential   Helene [place] all requests for admission clinical reviews, approved or denied determinations and any other requests to dedicated fax number below belonging to the campus where the patient is receiving treatment   List of dedicated fax numbers for the Facilities:  [address] DENIALS (Administrative/Medical Necessity) [phone]   [address] (Maternity/NICU/Pediatrics) [phone]   [address] [place] [address] Dr Montserrat Atkinson 3463 (  Claire Mercado "Bonny" 103) [address] [place] [address] Trena Tellez [address] [place]) [facility] [place] [address] [phone]

## 2021-03-26 NOTE — PROGRESS NOTES
Progress Note - [facility] Gastroenterology  Lore Chu 55 y o  male MRN: [id number]    Unit/Bed#: [address] [place] [address] Encounter: [id number]      Assessment/Plan:  1  Decompensated Cirrhosis of the liver associated with thrombocytopenia  Decompensated cirrhosis of the liver associated with transaminitis, thrombocytopenia, coagulopathy, and portal hypertension most likely due to alcohol use  Ultrasound done 12/2020 showed : Liver enlarged  The liver measures 20 cm in the midclavicular line  Surface contour is nodular  There is diffuse coarsened heterogeneous echotexture suggesting underlying cirrhotic changes   No evidence of suspicious mass  Liver enzymes and coagulopathy studies have been elevated prior to admission  Liver enzymes : total bilirubin 10, alk-phos 151, , and ALT 44  Total bilirubin 10 2, direct bilirubin 6 2  Thrombocytopenia platelets 40  Hemoglobin 12 3  PT 20 6 and INR 1 80  Ammonia level 55  CT abdomen/pelvis done 3/23 showed very abnormal appearance of liver most likely cirrhosis with either a cyst or potentially a low-density solid mass in right lobe  Follow-up hepatic MRI should be considered   Splenomegaly and possibly tiny upper abdominal varices indicated of portal hypertension  Evidence of old skeletal trauma  Cholecystectomy  Very small left inguinal hernia containing fat   Ultrasound of liver done today showed cirrhosis   Right hepatic lobe mass noted on CT not identified on the sonogram   Most likely, this is due to difficulties in for feel penetration  MRI results pending  No ascites seen on ultrasound or CT imaging   No signs of peripheral edema  Hepatitis panel nonreactive   Positive scleral icterus  Positive jaundice  MELD score 23  Child Erazo Score class B  Patient's mental status a little clearer today  Patient  Mental status improving, he is aware he is in [facility] and [facility] but does not know the month or year  Continues with Scleral icterus and jaundice  Continues to be impulsive and restless  No signs of GI bleed  Positive tremors of upper extremities  No Asterixis   -Continue [facility] protocol  -Continue lactulose 20 g b i d   -AMA, ASMA ,MARCEL still pending  -Continue to monitor liver enzymes, ammonia level and  CBC  -Patient advised to stop all alcohol use and consequences of continued alcohol use in association with underlying liver disease including death explained to patient in detail   -Patient encouraged to seek inpatient rehabilitation concerning alcohol usage   -Continue supportive care  -EGD and colonoscopy needs to be done in the future to evaluate for varices   -No need to start diuretics at present time patient has no signs of fluid overload      2  Liver mass  CT abdomen/pelvis done 3/23 showed very abnormal appearance of liver most likely cirrhosis with either a cyst or potentially a low-density solid mass in right lobe   Follow-up hepatic MRI should be considered   Splenomegaly and possibly tiny upper abdominal varices indicated of portal hypertension   Evidence of old skeletal trauma   Cholecystectomy   Very small left inguinal hernia containing fat   Ultrasound of liver done 3/24 showed cirrhosis   Right hepatic lobe mass noted on CT not identified on the sonogram   Most likely, this is due to difficulties in field penetration  AFP 11   -MRI results pending    Subjective:   Patient's mental status a little clearer today  Patient aware he is in [facility] and [employer] but does not know the month or year  Denies nausea, vomiting, acid reflux, heartburn, dysphagia, epigastric, or abdominal pain  Denies blood in stool,  blood from rectal area, or black tarry stool  Tolerating diet  No fever or chills  No rash or itching  Objective:     Vitals: Blood pressure 118/74, pulse 81, temperature 98 4 °F (36 9 °C), temperature source Oral, resp  rate 18, height 6' (1 829 m), weight 95 3 kg (210 lb), SpO2 95 %  ,Body mass index is 28 48 kg/m²  Intake/Output Summary (Last 24 hours) at 3/26/2021 1209  Last data filed at 3/25/2021 1830  Gross per 24 hour   Intake 240 ml   Output --   Net 240 ml       Physical Exam: Physical Exam  Vitals signs and nursing note reviewed  Constitutional:       General: He is not in acute distress  Eyes:      General: Scleral icterus present  Neck:      Musculoskeletal: Normal range of motion and neck supple  Cardiovascular:      Rate and Rhythm: Normal rate and regular rhythm  Pulses: Normal pulses  Heart sounds: Normal heart sounds  Pulmonary:      Effort: No respiratory distress  Breath sounds: No stridor  No wheezing, rhonchi or rales  Abdominal:      General: There is no distension  Palpations: There is no mass  Tenderness: There is no abdominal tenderness  There is no guarding or rebound  Hernia: No hernia is present  Musculoskeletal:      Right lower leg: No edema  Left lower leg: No edema  Skin:     General: Skin is warm and dry  Coloration: Skin is jaundiced  Skin is not pale  Findings: No rash  Neurological:      Mental Status: He is alert  Comments: Patient able tell me his at [facility]   Patient is unsure of month and year  Patient able to tell me the President Fortino Richards is Chris  Tremors noted to upper extremities            Invasive Devices     Peripheral Intravenous Line            Peripheral IV 03/25/21 Right;Ventral (anterior) Forearm 1 day                Current Facility-Administered Medications:     calcium carbonate (TUMS) chewable tablet 1,000 mg, 1,000 mg, Oral, Daily PRN, Joseph Herr PA-C    chlordiazePOXIDE (LIBRIUM) capsule 50 mg, 50 mg, Oral, Q6H LEATHA, Joseph Herr PA-C, 50 mg at 15/87/64 1468    folic acid 1 mg, thiamine (VITAMIN B1) 100 mg in sodium chloride 0 9 % 100 mL IV piggyback, , Intravenous, Daily, Joseph Herr PA-C, Stopped at 03/26/21 1121    lactulose oral solution 20 g, 20 g, Oral, BID, Emily Noé CRNP, 20 g at 03/26/21 1024    LORazepam (ATIVAN) injection 4 mg, 4 mg, Intravenous, Once, Lashanda Issa DO    nicotine (NICODERM CQ) 14 mg/24hr TD 24 hr patch 1 patch, 1 patch, Transdermal, Daily, Joseph Herr PA-C, Stopped at 03/25/21 0937    ondansetron (ZOFRAN) injection 4 mg, 4 mg, Intravenous, Q6H PRN, Joseph Herr PA-C    sodium chloride 0 9 % with KCl 20 mEq/L infusion (premix), 75 mL/hr, Intravenous, Continuous, Joseph Herr PA-C, Last Rate: 75 mL/hr at 03/26/21 0215, 75 mL/hr at 03/26/21 0215    Lab Results:   Recent Results (from the past 24 hour(s))   Comprehensive metabolic panel    Collection Time: 03/26/21  4:27 AM   Result Value Ref Range    Sodium 137 136 - 145 mmol/L    Potassium 3 6 3 5 - 5 3 mmol/L    Chloride 102 100 - 108 mmol/L    CO2 25 21 - 32 mmol/L    ANION GAP 10 4 - 13 mmol/L    BUN 10 5 - 25 mg/dL    Creatinine 0 96 0 60 - 1 30 mg/dL    Glucose 85 65 - 140 mg/dL    Calcium 8 3 8 3 - 10 1 mg/dL    Corrected Calcium 9 6 8 3 - 10 1 mg/dL     (H) 5 - 45 U/L    ALT 44 12 - 78 U/L    Alkaline Phosphatase 151 (H) 46 - 116 U/L    Total Protein 7 2 6 4 - 8 2 g/dL    Albumin 2 4 (L) 3 5 - 5 0 g/dL    Total Bilirubin 10 20 (H) 0 20 - 1 00 mg/dL    eGFR 89 ml/min/1 73sq m   CBC and Platelet    Collection Time: 03/26/21  4:27 AM   Result Value Ref Range    WBC 5 38 4 31 - 10 16 Thousand/uL    RBC 3 58 (L) 3 88 - 5 62 Million/uL    Hemoglobin 12 3 12 0 - 17 0 g/dL    Hematocrit 37 1 36 5 - 49 3 %     (H) 82 - 98 fL    MCH 34 4 (H) 26 8 - 34 3 pg    MCHC 33 2 31 4 - 37 4 g/dL    RDW 16 4 (H) 11 6 - 15 1 %    Platelets 40 (LL) 095 - 390 Thousands/uL    MPV 11 5 8 9 - 12 7 fL   Protime-INR    Collection Time: 03/26/21  4:27 AM   Result Value Ref Range    Protime 20 6 (H) 11 6 - 14 5 seconds    INR 1 80 (H) 0 84 - 1 19             Imaging Studies: Ct Head Without Contrast    Result Date: 3/23/2021  Narrative: CT BRAIN - WITHOUT CONTRAST INDICATION:   Head trauma, mod-severe fall with low platelet count  COMPARISON:  12/5/2020 TECHNIQUE:  CT examination of the brain was performed  In addition to axial images, sagittal and coronal 2D reformatted images were created and submitted for interpretation  Radiation dose length product (DLP) for this visit:  911 3 mGy-cm   This examination, like all CT scans performed in the [facility], was performed utilizing techniques to minimize radiation dose exposure, including the use of iterative reconstruction and automated exposure control  IMAGE QUALITY:  Diagnostic  FINDINGS: PARENCHYMA:  No intracranial mass, mass effect or midline shift  No CT signs of acute infarction  No acute parenchymal hemorrhage  VENTRICLES AND EXTRA-AXIAL SPACES:  Normal for the patient's age  VISUALIZED ORBITS AND PARANASAL SINUSES:  Paranasal sinuses are clear  Orbits appear intact  There are some swelling along the right side of the nose and there are deformities of right nasal bones which are age indeterminate but possibly acute given the presence of the swelling  The right nasal bones appear somewhat thickened as well  CALVARIUM AND EXTRACRANIAL SOFT TISSUES:  Normal      Impression: No acute intracranial abnormality  Right-sided nasal fractures with swelling  Age indeterminate  Workstation performed: [id number]     Us Liver    Result Date: 3/24/2021  Narrative: RIGHT UPPER QUADRANT ULTRASOUND INDICATION:     ? mass on CTA/P    history of alcohol abuse  COMPARISON:  CT of the abdomen and pelvis from March 23, 2021  TECHNIQUE:   Real-time ultrasound of the right upper quadrant was performed with a curvilinear transducer with both volumetric sweeps and still imaging techniques  FINDINGS: PANCREAS:  Portions of the pancreas are obscured by bowel gas  Visualized portions of the pancreas are unremarkable  AORTA AND IVC:  Obscured by bowel gas  LIVER: Size:  Within normal range    The liver measures 17 0 cm in the midclavicular line  Contour:  Nodular surface contour  Parenchyma:  Diffuse heterogeneity of the hepatic echotexture  No mass identified  Specifically, no evidence of mass in the region of the abnormality in segment 7, demonstrated on yesterday's CTA  This is likely due to limitations in the evaluation of the far field of the liver  Color Doppler demonstrates normal flow in the hepatic artery  Flow could not be confirmed in the portal vein with color Doppler  With pulse wave Doppler, there is a pulsatile waveform in the main portal vein (note: There is normal enhancement of the portal vein on yesterday's CT  GALLBLADDER: Post-cholecystectomy  BILE DUCTS: No intrahepatic or extrahepatic bile duct dilatation  CBD measures 6 mm  No evidence of choledocholithiasis  RIGHT KIDNEY: Right kidney measures 13 1 x 6 3 cm  No mass, calculus or hydronephrosis  ASCITES:   None  Impression: 1  Cirrhosis  2   Right hepatic lobe mass noted on CT not identified on this sonogram   Most likely, this is due to difficulties and far field penetration  As noted in the CT report, this mass is best evaluated with MRI when the patient's condition allows  Workstation performed: [id number]     Ct Abdomen Pelvis With Contrast    Result Date: 3/23/2021  Narrative: CT ABDOMEN AND PELVIS WITH IV CONTRAST INDICATION:   Abdominal pain, acute, nonlocalized abd pain with low platelet count  COMPARISON:  Ultrasound from 12/7/2020 TECHNIQUE:  CT examination of the abdomen and pelvis was performed  Axial, sagittal, and coronal 2D reformatted images were created from the source data and submitted for interpretation  Radiation dose length product (DLP) for this visit:  734 99 mGy-cm   This examination, like all CT scans performed in the [facility], was performed utilizing techniques to minimize radiation dose exposure, including the use of iterative  reconstruction and automated exposure control   IV Contrast:  100 mL of iohexol (OMNIPAQUE) Enteric Contrast:  Enteric contrast was not administered  FINDINGS: ABDOMEN LOWER CHEST:  There is some focal atelectasis posterior limb the left lower lobe adjacent to some old rib fractures  LIVER/BILIARY TREE:  The liver is markedly abnormal in appearance  It is diffusely heterogeneous in attenuation with innumerable tiny low-density nodular foci throughout the entire parenchyma  Overall, the liver is generally somewhat low in attenuation  There is a mass in the posterior aspect of the right lobe of the liver which is relatively well-defined and round in shape and measures 2 2 cm diameter  It measures 25 Hounsfield units internally on portal venous phase imaging  This is indeterminate in etiology  It might just be a slightly complex cyst although a low-density solid mass is not excluded  The liver surface is nodular  There is recanalization of the Umbilical vein  The portal vein and intrahepatic portal branches appear patent  There is no biliary obstruction seen  GALLBLADDER:  Gallbladder is surgically absent  SPLEEN:  The spleen is enlarged, measuring 17 6 x 16 6 x 8 0 cm  PANCREAS:  Unremarkable  ADRENAL GLANDS:  Unremarkable  KIDNEYS/URETERS:  Questionable tiny cystic foci in the left kidney  No worrisome appearing lesions  No hydronephrosis or kidney stones  STOMACH AND BOWEL:  Unremarkable  APPENDIX:  No findings to suggest appendicitis  ABDOMINOPELVIC CAVITY:  No significant ascites  No free air  No pathologic-appearing lymphadenopathy  VESSELS:  Aortic caliber is normal   There appear to be possible small varices within the upper abdomen  As mentioned above, there is recanalization of the paraumbilical vein  There are a few mildly prominent venous collaterals in the periumbilical region  PELVIS REPRODUCTIVE ORGANS:  Unremarkable for patient's age  URINARY BLADDER:  Unremarkable   ABDOMINAL WALL/INGUINAL REGIONS:  Very small fat-containing left inguinal hernia noted  OSSEOUS STRUCTURES:  Several old healed left posterior rib fractures  No acute fracture or destructive bone lesion seen  There is some disc degeneration and lumbar spine  There is evidence of old trauma to the right pubic bone and left ischial tuberosity  Impression: Very abnormal appearance of liver most likely cirrhosis with either a cyst or potentially a low-density solid mass in the right lobe  Follow-up hepatic MRI should be considered for better characterization  Splenomegaly and possible tiny upper abdominal varices indicative of portal hypertension  Evidence of old skeletal trauma  Cholecystectomy   Very small left inguinal hernia containing fat Workstation performed: [id number]           TIMMY Isabel

## 2021-03-26 NOTE — ASSESSMENT & PLAN NOTE
Severe right tongue laceration and hematoma   Unknown etiology per patient   Highly suspicious of possible w/d seizure, ?fall   Ice chips, supportive care, Serial exams   Speech consulted, indicated that patient may have regular textured food with thin liquids

## 2021-03-26 NOTE — PROGRESS NOTES
Karrie 46  Progress Note - Sidra Quezada 1965, 55 y o  male MRN: [id number]  Unit/Bed#: [address] [place] [address] Encounter: [id number]  Primary Care Provider: Ashley Barrera MD   Date and time admitted to hospital: 3/23/2021  5:47 PM    * Alcohol withdrawal ([facility] [address] )  Assessment & Plan  Admits to drinking heavily since he was 16, longest being sober about 3 months, history of withdrawal seizures  Last drink was 5 days ago  CIWA protocol, PRN ativan  Librium 50mg q6  - continuing this dosing for now, patient still symptomatic with tremors, impulsivity  Case management consult for HOST referral   Thiamine/FA/MV    Tobacco abuse  Assessment & Plan  Nicotine patch  Encourage smoking cessation    Transaminitis  Assessment & Plan  In the setting of alcohol abuse  Also checking hepatitis panel and serologies  Hepatitis panel negative    Hyperbilirubinemia  Assessment & Plan  T bili 10 on admission, previously 4-5 range   , Alk phos 165   CT A/P "Very abnormal appearance of liver most likely cirrhosis with either a cyst or potentially a low-density solid mass in the right lobe   Splenomegaly and possible tiny upper abdominal varices indicative of portal hypertension "  RUQ ultrasound suspicious for liver mass  GI following  MRI performed, follow-up on results    Thrombocytopenia ([facility] [address] )  Assessment & Plan  Likely 2/2 underlying cirrhosis   Hold dvt ppx   Daily CBC/plt    Open wound of tongue due to bite  Assessment & Plan  Severe right tongue laceration and hematoma   Unknown etiology per patient   Highly suspicious of possible w/d seizure, ?fall   Ice chips, supportive care, Serial exams   Speech consulted, indicated that patient may have regular textured food with thin liquids     Electrolyte abnormality  Assessment & Plan  Na 128, K 2 8 on admission  Electrolytes repleted, improved  Recheck BMP in am   Fluids with K  Telemetry showed no overnight events, discontinued    Closed fracture of nasal bone  Assessment & Plan  [facility] showed Right-sided nasal fractures with swelling  Age indeterminate  Suspect new 2/2 fall vs seizure   Supportive care     Decompensated hepatic cirrhosis [facility])  Assessment & Plan  Patient presents with decompensated liver cirrhosis with thrombocytopenia, coagulopathy, transaminitis, and portal hypertension, most likely due to heavy alcohol abuse  CT with irregular appearance of liver and possible mass  Appreciate GI following along  Hepatic MRI as DTs are improved, follow-up on results  Checking serologies, hepatitis panel; hepatitis panel negative  AFP mildly elevated  Check ammonia level  Trend LFTs  Deferring diuretic at this time as patient euvolemic  Outpatient colonoscopy/EGD recommended    Liver mass  Assessment & Plan  CT abdomen pelvis on presentation showed abnormal liver appearance with possible mass in right lobe  Follow-up ultrasound did not confirm this  Follow-up on MRI results  GI following        VTE Pharmacologic Prophylaxis:   Pharmacologic: Pharmacologic VTE Prophylaxis contraindicated due to Thrombocytopenia  Mechanical VTE Prophylaxis in Place: Yes    Patient Centered Rounds: I have performed bedside rounds with nursing staff today  Discussions with Specialists or Other Care Team Provider:  Attending and Case Management    Education and Discussions with Family / Patient:  I spoke with the patient at the bedside, I have answered all questions to the best of my abilities  Time Spent for Care: 30 minutes  More than 50% of total time spent on counseling and coordination of care as described above  Current Length of Stay: 3 day(s)    Current Patient Status: Inpatient   Certification Statement: The patient will continue to require additional inpatient hospital stay due to Continued CIWA monitoring, IV fluids, repeat labs in a m      Discharge Plan:  Not stable for discharge today    Code Status: Level 1 - Full Code      Subjective:   Patient seen sleeping, awoken when spoken to  He is not certain where he is at, thinks that he is at [employer]  He is tremulous and unsteady with his gait  He is also impulsive  He is able to eat some food, denies any nausea or vomiting  States that he is very tired    Objective:     Vitals:   Temp (24hrs), Av °F (37 2 °C), Min:98 4 °F (36 9 °C), Max:100 °F (37 8 °C)    Temp:  [98 4 °F (36 9 °C)-100 °F (37 8 °C)] 98 4 °F (36 9 °C)  HR:  [81-92] 90  Resp:  [16-19] 16  BP: (118-134)/(74-87) 124/79  SpO2:  [95 %-97 %] 95 %  Body mass index is 28 48 kg/m²  Input and Output Summary (last 24 hours):     No intake or output data in the 24 hours ending 21    Physical Exam:     Physical Exam  Vitals signs and nursing note reviewed  Constitutional:       General: He is not in acute distress  Appearance: He is ill-appearing  HENT:      Head:      Comments: Patient has laceration on right side of his nose, edges approximated, no drainage  Does appear swollen  Also has laceration on right side tongue     Mouth/Throat:      Mouth: Mucous membranes are moist    Eyes:      Extraocular Movements: Extraocular movements intact  Neck:      Musculoskeletal: Normal range of motion  Cardiovascular:      Rate and Rhythm: Normal rate and regular rhythm  Pulses: Normal pulses  Heart sounds: Normal heart sounds  Pulmonary:      Breath sounds: Normal breath sounds  Abdominal:      General: Bowel sounds are normal  There is no distension  Palpations: Abdomen is soft  Tenderness: There is no abdominal tenderness  Genitourinary:     Comments: Voiding spontaneously  Musculoskeletal:      Comments: Tremulous, gait unsteady   Skin:     Capillary Refill: Capillary refill takes less than 2 seconds  Comments: Multiple small lacerations and ecchymotic areas noted on patient's forearms, when asked if patient fell he stated he could not remember     Neurological:      Comments: Patient is able to tell me his name, was not able to tell me where were at told me that we were at [facility]; was unable to tell me the chain of events that led him to being here at the hospital   He drops off to sleep very quickly   Psychiatric:      Comments: Quiet, cooperative with exam         Additional Data:     Labs:    Results from last 7 days   Lab Units 03/26/21 0427 03/24/21  0502   WBC Thousand/uL 5 38   < > 6 96   HEMOGLOBIN g/dL 12 3   < > 12 6   HEMATOCRIT % 37 1   < > 36 8   PLATELETS Thousands/uL 40*   < > 26*   NEUTROS PCT %  --   --  82*   LYMPHS PCT %  --   --  8*   MONOS PCT %  --   --  9   EOS PCT %  --   --  1    < > = values in this interval not displayed  Results from last 7 days   Lab Units 03/26/21 0427   POTASSIUM mmol/L 3 6   CHLORIDE mmol/L 102   CO2 mmol/L 25   BUN mg/dL 10   CREATININE mg/dL 0 96   CALCIUM mg/dL 8 3   ALK PHOS U/L 151*   ALT U/L 44   AST U/L 143*     Results from last 7 days   Lab Units 03/26/21  0427   INR  1 80*       * I Have Reviewed All Lab Data Listed Above  * Additional Pertinent Lab Tests Reviewed:  All Labs Within Last 24 Hours Reviewed    Imaging:    Imaging Reports Reviewed Today Include:  None    Imaging Personally Reviewed by Myself Includes:  None    Recent Cultures (last 7 days):           Last 24 Hours Medication List:   Current Facility-Administered Medications   Medication Dose Route Frequency Provider Last Rate    calcium carbonate  1,000 mg Oral Daily PRN Joseph Herr PA-C      chlordiazePOXIDE  50 mg Oral Q6H [address] Joseph Herr PA-C      folic acid 1 mg, thiamine 100 mg in 0 9% sodium chloride 100 mL IVPB   Intravenous Daily Joseph Herr PA-C Stopped (03/26/21 1121)    lactulose  20 g Oral BID TIMMY Solares      LORazepam  4 mg Intravenous Once Lashanda Revankar,       nicotine  1 patch Transdermal Daily Joseph Herr PA-C      ondansetron  4 mg Intravenous Q6H PRN Joseph Herr PA-C      sodium chloride 0 9 % with KCl 20 mEq/L  75 mL/hr Intravenous Continuous Ld Herr PA-C 75 mL/hr (03/26/21 1267)        Today, Patient Was Seen By: TIMMY Antunez    ** Please Note: Dictation voice to text software may have been used in the creation of this document   **

## 2021-03-26 NOTE — ASSESSMENT & PLAN NOTE
Admits to drinking heavily since he was 16, longest being sober about 3 months, history of withdrawal seizures  Last drink was 5 days ago  CIWA protocol, PRN ativan  Librium 50mg q6  - continuing this dosing for now, patient still symptomatic with tremors, impulsivity  Case management consult for HOST referral   Thiamine/FA/MV

## 2021-03-26 NOTE — SPEECH THERAPY NOTE
Speech Language/Pathology    Speech/Language Pathology Progress Note    Patient Name: Anne-Mraie Lo  [id number]'I Date: 3/26/2021     Problem List  Principal Problem:    Alcohol withdrawal ([facility] )  Active Problems:    Transaminitis    Hyperbilirubinemia    Thrombocytopenia (HCC)    Tobacco abuse    Open wound of tongue due to bite    Electrolyte abnormality    Closed fracture of nasal bone    Decompensated hepatic cirrhosis ([facility] )    Liver mass       Past Medical History  Past Medical History:   Diagnosis Date    ETOH abuse         Past Surgical History  Past Surgical History:   Procedure Laterality Date    GALLBLADDER SURGERY           Subjective:  Pt  Sitting in bed watching TV  Now off 1:1  Noting better mentation with answering questions  Oriented to self and able to recall some events and information from the prior day  Objective:  Pt  Was seen for dysphagia treatment as follow up to assess current diet and possible diet upgrade  With exam of tongue, pt  Has increased ROM, slightly less swelling, however continues to bleed slightly  He was noted to have stopped picking it and poking it  Stated "now that my tongue is not in a ball, I can breathe again"  Pt  Is able to exhibit adequate ROM for mastication of hard solids if he uses extra caution  Assessment:  No s/s of aspiration on harder consistencies and thin liquids  Tongue appears to be improved with less swelling and more ROM  Pt  Has better mentation and is able to masticate harder consistencies while being mindful not to bite his tongue       Plan/Recommendations:  Upgrade diet consistency to regular with thin liquids  Medications as tolerated  Speech to discharge at this time  Reconsult if warranted    Gadiel West 88 CCC-SLP  Speech Language Pathologist  Available via Christina SHANNON License # [id number]  [address] # [id number]

## 2021-03-26 NOTE — ASSESSMENT & PLAN NOTE
Na 128, K 2 8 on admission  Electrolytes repleted, improved  Recheck BMP in am   Fluids with K  Telemetry showed no overnight events, discontinued

## 2021-03-26 NOTE — ASSESSMENT & PLAN NOTE
Patient presents with decompensated liver cirrhosis with thrombocytopenia, coagulopathy, transaminitis, and portal hypertension, most likely due to heavy alcohol abuse  CT with irregular appearance of liver and possible mass  Appreciate GI following along  Hepatic MRI as DTs are improved, follow-up on results  Checking serologies, hepatitis panel; hepatitis panel negative  AFP mildly elevated  Check ammonia level  Trend LFTs  Deferring diuretic at this time as patient euvolemic    Outpatient colonoscopy/EGD recommended

## 2021-03-26 NOTE — CASE MANAGEMENT
CM received call back from [facility] at [facility] and patient is accepted at their facility

## 2021-03-26 NOTE — ASSESSMENT & PLAN NOTE
CT abdomen pelvis on presentation showed abnormal liver appearance with possible mass in right lobe    Follow-up ultrasound did not confirm this  Follow-up on MRI results  GI following

## 2021-03-27 LAB
ALBUMIN SERPL BCP-MCNC: 2.2 G/DL (ref 3.5–5)
ALP SERPL-CCNC: 144 U/L (ref 46–116)
ALT SERPL W P-5'-P-CCNC: 41 U/L (ref 12–78)
AMMONIA PLAS-SCNC: 14 UMOL/L (ref 11–35)
AMMONIA PLAS-SCNC: <10 UMOL/L (ref 11–35)
ANION GAP SERPL CALCULATED.3IONS-SCNC: 7 MMOL/L (ref 4–13)
AST SERPL W P-5'-P-CCNC: 112 U/L (ref 5–45)
ATRIAL RATE: 104 BPM
ATRIAL RATE: 95 BPM
BILIRUB SERPL-MCNC: 9.6 MG/DL (ref 0.2–1)
BUN SERPL-MCNC: 8 MG/DL (ref 5–25)
CALCIUM ALBUM COR SERPL-MCNC: 9.4 MG/DL (ref 8.3–10.1)
CALCIUM SERPL-MCNC: 8 MG/DL (ref 8.3–10.1)
CHLORIDE SERPL-SCNC: 106 MMOL/L (ref 100–108)
CO2 SERPL-SCNC: 26 MMOL/L (ref 21–32)
CREAT SERPL-MCNC: 0.87 MG/DL (ref 0.6–1.3)
ERYTHROCYTE [DISTWIDTH] IN BLOOD BY AUTOMATED COUNT: 16.2 % (ref 11.6–15.1)
GFR SERPL CREATININE-BSD FRML MDRD: 97 ML/MIN/1.73SQ M
GLUCOSE SERPL-MCNC: 93 MG/DL (ref 65–140)
HCT VFR BLD AUTO: 36 % (ref 36.5–49.3)
HGB BLD-MCNC: 12 G/DL (ref 12–17)
MAGNESIUM SERPL-MCNC: 1.7 MG/DL (ref 1.6–2.6)
MCH RBC QN AUTO: 35 PG (ref 26.8–34.3)
MCHC RBC AUTO-ENTMCNC: 33.3 G/DL (ref 31.4–37.4)
MCV RBC AUTO: 105 FL (ref 82–98)
P AXIS: 18 DEGREES
P AXIS: 30 DEGREES
PLATELET # BLD AUTO: 45 THOUSANDS/UL (ref 149–390)
PMV BLD AUTO: 10.4 FL (ref 8.9–12.7)
POTASSIUM SERPL-SCNC: 3.7 MMOL/L (ref 3.5–5.3)
PR INTERVAL: 152 MS
PR INTERVAL: 156 MS
PROT SERPL-MCNC: 6.7 G/DL (ref 6.4–8.2)
QRS AXIS: 56 DEGREES
QRS AXIS: 67 DEGREES
QRSD INTERVAL: 90 MS
QRSD INTERVAL: 90 MS
QT INTERVAL: 412 MS
QT INTERVAL: 420 MS
QTC INTERVAL: 527 MS
QTC INTERVAL: 541 MS
RBC # BLD AUTO: 3.43 MILLION/UL (ref 3.88–5.62)
SODIUM SERPL-SCNC: 139 MMOL/L (ref 136–145)
T WAVE AXIS: 42 DEGREES
T WAVE AXIS: 46 DEGREES
VENTRICULAR RATE: 104 BPM
VENTRICULAR RATE: 95 BPM
WBC # BLD AUTO: 4.11 THOUSAND/UL (ref 4.31–10.16)

## 2021-03-27 PROCEDURE — 80053 COMPREHEN METABOLIC PANEL: CPT | Performed by: NURSE PRACTITIONER

## 2021-03-27 PROCEDURE — 94760 N-INVAS EAR/PLS OXIMETRY 1: CPT

## 2021-03-27 PROCEDURE — 85027 COMPLETE CBC AUTOMATED: CPT | Performed by: NURSE PRACTITIONER

## 2021-03-27 PROCEDURE — 82140 ASSAY OF AMMONIA: CPT | Performed by: NURSE PRACTITIONER

## 2021-03-27 PROCEDURE — 99232 SBSQ HOSP IP/OBS MODERATE 35: CPT | Performed by: NURSE PRACTITIONER

## 2021-03-27 PROCEDURE — 99232 SBSQ HOSP IP/OBS MODERATE 35: CPT | Performed by: INTERNAL MEDICINE

## 2021-03-27 PROCEDURE — 83735 ASSAY OF MAGNESIUM: CPT | Performed by: NURSE PRACTITIONER

## 2021-03-27 PROCEDURE — 93010 ELECTROCARDIOGRAM REPORT: CPT | Performed by: INTERNAL MEDICINE

## 2021-03-27 RX ORDER — CHLORDIAZEPOXIDE HYDROCHLORIDE 25 MG/1
50 CAPSULE, GELATIN COATED ORAL EVERY 8 HOURS SCHEDULED
Status: DISCONTINUED | OUTPATIENT
Start: 2021-03-27 | End: 2021-03-28

## 2021-03-27 RX ADMIN — CHLORDIAZEPOXIDE HYDROCHLORIDE 50 MG: 25 CAPSULE ORAL at 13:33

## 2021-03-27 RX ADMIN — CHLORDIAZEPOXIDE HYDROCHLORIDE 50 MG: 25 CAPSULE ORAL at 06:21

## 2021-03-27 RX ADMIN — FOLIC ACID: 5 INJECTION, SOLUTION INTRAMUSCULAR; INTRAVENOUS; SUBCUTANEOUS at 10:29

## 2021-03-27 RX ADMIN — SODIUM CHLORIDE AND POTASSIUM CHLORIDE 75 ML/HR: .9; .15 SOLUTION INTRAVENOUS at 09:46

## 2021-03-27 RX ADMIN — SODIUM CHLORIDE AND POTASSIUM CHLORIDE 75 ML/HR: .9; .15 SOLUTION INTRAVENOUS at 03:41

## 2021-03-27 RX ADMIN — LACTULOSE 20 G: 10 SOLUTION ORAL at 17:04

## 2021-03-27 RX ADMIN — CHLORDIAZEPOXIDE HYDROCHLORIDE 50 MG: 25 CAPSULE ORAL at 22:00

## 2021-03-27 RX ADMIN — LACTULOSE 20 G: 10 SOLUTION ORAL at 09:45

## 2021-03-27 NOTE — ASSESSMENT & PLAN NOTE
T bili 10 on admission, previously 4-5 range  T bili 7 2 today  , Alk phos 165, today  Alk phos 161  CT A/P "Very abnormal appearance of liver most likely cirrhosis with either a cyst or potentially a low-density solid mass in the right lobe  Splenomegaly and possible tiny upper abdominal varices indicative of portal hypertension "  RUQ ultrasound suspicious for liver mass  GI following  MRI abdomen showed cirrhosis without evidence for hepatocellular carcinoma and portal hypertension  Discussed with patient

## 2021-03-27 NOTE — ASSESSMENT & PLAN NOTE
Admits to drinking heavily since he was 16, longest being sober about 3 months, history of withdrawal seizures  Last drink was 5 days ago  Case management consult for HOST referral   [facility] protocol, PRN ativan  Started on Librium 50mg q6 - patient improving - Librium adjusted to 25 mg Q8 , will tapered down to 20 mg q 12 hours    Patient continues to improve, still symptomatic with mild tremors   Continue Thiamine/FA/MV  Continue lactulose 20 g BID as per GI

## 2021-03-27 NOTE — PROGRESS NOTES
[address] [facility]  Progress Note - Alisia Barnett 1965, 55 y o  male MRN: [id number]  Unit/Bed#: [address] [place] [address] Encounter: [id number]  Primary Care Provider: Satinder Mauricio MD   Date and time admitted to hospital: 3/23/2021  5:47 PM    * Alcohol withdrawal ([facility] )  Assessment & Plan  Admits to drinking heavily since he was 16, longest being sober about 3 months, history of withdrawal seizures  Last drink was 5 days ago  Case management consult for HOST referral   CIWA protocol, PRN ativan  Started on Librium 50mg q6 patient improving - Librium adjusted to 50 mg Q8   Patient improving, still symptomatic with mild tremors  Continue Thiamine/FA/MV  Continue lactulose 20 g BID as per GI    Tobacco abuse  Assessment & Plan  Nicotine patch  Encourage smoking cessation    Transaminitis  Assessment & Plan  In the setting of alcohol abuse  Also checking hepatitis panel and serologies  Hepatitis panel negative    Hyperbilirubinemia  Assessment & Plan  T bili 10 on admission, previously 4-5 range   , Alk phos 165   CT A/P "Very abnormal appearance of liver most likely cirrhosis with either a cyst or potentially a low-density solid mass in the right lobe  Splenomegaly and possible tiny upper abdominal varices indicative of portal hypertension "  RUQ ultrasound suspicious for liver mass  GI following  MRI abdomen showed cirrhosis without evidence for hepatocellular carcinoma and portal hypertension  Discussed with patient       Thrombocytopenia ([facility] )  Assessment & Plan  Likely 2/2 underlying cirrhosis   Hold dvt ppx   Daily CBC/plt    Open wound of tongue due to bite  Assessment & Plan  Severe right tongue laceration and hematoma   Unknown etiology per patient   Highly suspicious of possible w/d seizure, ?fall   Ice chips, supportive care, Serial exams   Speech consulted, indicated that patient may have regular textured food with thin liquids     Electrolyte abnormality  Assessment & Plan  Na 128, K 2 8 on admission   today  Fluids with K stopped as patient tolerates regular diet       Closed fracture of nasal bone  Assessment & Plan  CTH showed Right-sided nasal fractures with swelling  Age indeterminate  Suspect new 2/2 fall vs seizure   Supportive care     Decompensated hepatic cirrhosis [facility])  Assessment & Plan  Patient presents with decompensated liver cirrhosis with thrombocytopenia, coagulopathy, transaminitis, and portal hypertension, most likely due to heavy alcohol abuse  CT with irregular appearance of liver and possible mass  Appreciate GI following along  MRI showed cirrhosis without evidence for hepatocellular carcinoma  Portal hypertension evidenced by recanalized periumbilical vein, splenomegaly, trace ascites and probable tiny esophageal varices  Checking serologies, hepatitis panel; hepatitis panel negative  AFP mildly elevated  Trend LFTs  Deferring diuretic at this time as patient euvolemic  Outpatient colonoscopy/EGD recommended  Check ammonia level in a m  Liver mass  Assessment & Plan  CT abdomen pelvis on presentation showed abnormal liver appearance with possible mass in right lobe  Follow-up ultrasound did not confirm this  MRI showed cirrhosis without evidence for hepatocellular carcinoma, portal hypertension  GI following      VTE Pharmacologic Prophylaxis:   Pharmacologic: Pharmacologic VTE Prophylaxis contraindicated due to Thrombocytopenia  Mechanical VTE Prophylaxis in Place: Yes    Patient Centered Rounds: I have performed bedside rounds with nursing staff today  Discussions with Specialists or Other Care Team Provider:  Attending and Case Management    Education and Discussions with Family / Patient:  I spoke with the patient at the bedside, I have answered all questions to the best of my abilities  Time Spent for Care: 30 minutes  More than 50% of total time spent on counseling and coordination of care as described above      Current Length of Stay: 4 day(s)    Current Patient Status: Inpatient   Certification Statement: The patient will continue to require additional inpatient hospital stay due to Continued CIWA monitoring, repeat labs in a m  Discharge Plan:  Not stable for discharge today    Code Status: Level 1 - Full Code      Subjective:   Patient seen sleeping, awoken when spoken to  He is not certain where he is at, thinks that he is at [employer]  He is tremulous and unsteady with his gait  He is also impulsive  He is able to eat some food, denies any nausea or vomiting  States that he is very tired    Objective:     Vitals:   Temp (24hrs), Av 3 °F (36 8 °C), Min:98 1 °F (36 7 °C), Max:98 6 °F (37 °C)    Temp:  [98 1 °F (36 7 °C)-98 6 °F (37 °C)] 98 6 °F (37 °C)  HR:  [72-92] 92  Resp:  [16-20] 18  BP: (108-126)/(76-88) 108/79  SpO2:  [95 %-96 %] 95 %  Body mass index is 28 48 kg/m²  Input and Output Summary (last 24 hours): Intake/Output Summary (Last 24 hours) at 3/27/2021 1341  Last data filed at 3/27/2021 0946  Gross per 24 hour   Intake 666 25 ml   Output 950 ml   Net -283 75 ml       Physical Exam:     Physical Exam  Vitals signs and nursing note reviewed  Constitutional:       General: He is not in acute distress  HENT:      Head:      Comments: Patient has laceration on right side of his nose, edges approximated, no drainage  Does appear swollen  Also has laceration on right side tongue     Mouth/Throat:      Mouth: Mucous membranes are moist    Eyes:      Extraocular Movements: Extraocular movements intact  Neck:      Musculoskeletal: Normal range of motion  Cardiovascular:      Rate and Rhythm: Normal rate and regular rhythm  Pulses: Normal pulses  Heart sounds: Normal heart sounds  Pulmonary:      Breath sounds: Normal breath sounds  Abdominal:      General: Bowel sounds are normal  There is no distension  Palpations: Abdomen is soft  Tenderness: There is no abdominal tenderness  Genitourinary:     Comments: Voiding spontaneously  Musculoskeletal:      Comments: Tremulous   Skin:     Capillary Refill: Capillary refill takes less than 2 seconds  Comments: Multiple small lacerations and ecchymotic areas noted on patient's forearms  Neurological:      Mental Status: He is oriented to person, place, and time  Psychiatric:         Mood and Affect: Mood normal          Behavior: Behavior normal          Thought Content: Thought content normal          Judgment: Judgment normal       Comments: Quiet, cooperative with exam         Additional Data:     Labs:    Results from last 7 days   Lab Units 03/27/21  0628  03/24/21  0502   WBC Thousand/uL 4 11*   < > 6 96   HEMOGLOBIN g/dL 12 0   < > 12 6   HEMATOCRIT % 36 0*   < > 36 8   PLATELETS Thousands/uL 45*   < > 26*   NEUTROS PCT %  --   --  82*   LYMPHS PCT %  --   --  8*   MONOS PCT %  --   --  9   EOS PCT %  --   --  1    < > = values in this interval not displayed  Results from last 7 days   Lab Units 03/27/21  0628   POTASSIUM mmol/L 3 7   CHLORIDE mmol/L 106   CO2 mmol/L 26   BUN mg/dL 8   CREATININE mg/dL 0 87   CALCIUM mg/dL 8 0*   ALK PHOS U/L 144*   ALT U/L 41   AST U/L 112*     Results from last 7 days   Lab Units 03/26/21  0427   INR  1 80*       * I Have Reviewed All Lab Data Listed Above  * Additional Pertinent Lab Tests Reviewed:  All Labs Within Last 24 Hours Reviewed    Imaging:    Imaging Reports Reviewed Today Include:  MRI abdomen    Imaging Personally Reviewed by Myself Includes:  None    Recent Cultures (last 7 days):           Last 24 Hours Medication List:   Current Facility-Administered Medications   Medication Dose Route Frequency Provider Last Rate    calcium carbonate  1,000 mg Oral Daily PRN Dilan Herr PA-C      chlordiazePOXIDE  50 mg Oral [facility] TIMMY Randhawa      folic acid 1 mg, thiamine 100 mg in 0 9% sodium chloride 100 mL IVPB   Intravenous Daily Joseph Herr LUCIE 0 mL/hr at 03/26/21 1121    lactulose  20 g Oral BID TIMMY Solares      LORazepam  4 mg Intravenous Once Lashanda Revankar, DO      nicotine  1 patch Transdermal Daily Joseph Herr PA-C      ondansetron  4 mg Intravenous Q6H PRN Lesa Herr PA-C          Today, Patient Was Seen By: TIMMY Schmidt    ** Please Note: Dictation voice to text software may have been used in the creation of this document   **

## 2021-03-27 NOTE — PROGRESS NOTES
Progress Note - Syl Martinez 55 y o  male MRN: [id number]    Unit/Bed#: [address] [place] [address] Encounter: [id number]        Assessment/Plan: 1  Decompensated Cirrhosis of the liver associated with thrombocytopenia  Decompensated cirrhosis of the liver associated with transaminitis, thrombocytopenia, coagulopathy, and portal hypertension most likely due to alcohol use  Ultrasound done 12/2020 showed : Liver enlarged  Surface contour is nodular  There is diffuse coarsened heterogeneous echotexture suggesting underlying cirrhotic changes   No evidence of suspicious mass  Liver enzymes and coagulopathy studies have been elevated prior to admission  CT abdomen/pelvis done 3/23 showed very abnormal appearance of liver most likely cirrhosis with either a cyst or potentially a low-density solid mass in right lobe  Follow-up hepatic MRI should be considered   Splenomegaly and possibly tiny upper abdominal varices indicated of portal hypertension  Evidence of old skeletal trauma  Cholecystectomy  Very small left inguinal hernia containing fat   Ultrasound of liver done today showed cirrhosis   Right hepatic lobe mass noted on CT not identified on the sonogram   Most likely, this is due to difficulties in for feel penetration  No ascites seen on ultrasound or CT imaging   No signs of peripheral edema  Hepatitis panel nonreactive  MELD score 23  -Continue CIWA protocol  -Continue lactulose 20 g b i d   -AMA, ASMA ,MARCEL are WNL  -Continue to monitor liver enzymes, ammonia level and CBC  -Patient advised to stop all alcohol use  -Patient encouraged to seek inpatient rehabilitation concerning alcohol usage    -EGD and colonoscopy needs to be done in the future to evaluate for varices   -No need to start diuretics at present time patient has no signs of fluid overload      2  Liver mass  CT abdomen/pelvis done 3/23 showed very abnormal appearance of liver most likely cirrhosis with either a cyst or potentially a low-density solid mass in right lobe    Splenomegaly and possibly tiny upper abdominal varices indicated of portal hypertension   Evidence of old skeletal trauma   Cholecystectomy   Very small left inguinal hernia containing fat   Ultrasound of liver done 3/24 showed cirrhosis   Right hepatic lobe mass noted on CT not identified on the sonogram   Most likely, this is due to difficulties in field penetration   AFP 11  MRI showed cirrhosis without evidence for hepatocellular carcinoma  2 cm nodule in the right hepatic lobe on CT likely corresponds to a regenerative nodule  Periodic surveillance will be necessary with follow-up MRI  Portal hypertension evidenced by recanalized periumbilical vein, splenomegaly, trace ascites and probable tiny esophageal varices  Spoke with hospitalist team regarding case and case will be discussed with Dr Deepti Tellez    Subjective:   Patient is lying in bed  He is sleeping and offers no pertinent complaints  Patient states that he moved his bowels once today and he knows where he is and he knows the current date  Patient denies any abdominal pain and is tolerating a diet      Objective:     Vitals: /79 (BP Location: Left arm)   Pulse 92   Temp 98 6 °F (37 °C) (Oral)   Resp 18   Ht 6' (1 829 m)   Wt 95 3 kg (210 lb)   SpO2 95%   BMI 28 48 kg/m²       Physical Exam:  Gen-alert no acute distress  Heart normal S1-S2  Lungs clear to auscultate  Abd-positive bowel sounds nontender nondistended, no r r g       Lab, Imaging and other studies:   Recent Results (from the past 72 hour(s))   Ammonia    Collection Time: 03/24/21  7:31 PM   Result Value Ref Range    Ammonia 52 (H) 11 - 35 umol/L   Chronic Hepatitis Panel    Collection Time: 03/24/21  7:31 PM   Result Value Ref Range    Hepatitis B Surface Ag Non-reactive Non-reactive, NonReactive - Confirmed    Hepatitis C Ab Non-reactive Non-reactive    Hep B C IgM Non-reactive Non-reactive    Hep B Core Total Ab Non-reactive Non-reactive   Anti-smooth muscle antibody, IgG    Collection Time: 03/24/21  7:32 PM   Result Value Ref Range    Smooth Muscle Ab 14 0 - 19 Units   AFP tumor marker    Collection Time: 03/24/21  7:32 PM   Result Value Ref Range    AFP TUMOR MARKER 11 0 (H) 0 5 - 8 ng/mL   Iron Saturation %    Collection Time: 03/24/21  7:32 PM   Result Value Ref Range    Iron Saturation 13 %    TIBC 151 (L) 250 - 450 ug/dL    Iron 20 (L) 65 - 175 ug/dL   Ferritin    Collection Time: 03/24/21  7:32 PM   Result Value Ref Range    Ferritin 575 (H) 8 - 388 ng/mL   Antimitochondrial antibody    Collection Time: 03/24/21  7:33 PM   Result Value Ref Range    Mitochondrial Ab <20 0 0 0 - 20 0 Units   MARCEL Screen w/ Reflex to Titer/Pattern    Collection Time: 03/24/21  7:34 PM   Result Value Ref Range    MARCEL Negative Negative   Comprehensive metabolic panel    Collection Time: 03/25/21  5:10 AM   Result Value Ref Range    Sodium 134 (L) 136 - 145 mmol/L    Potassium 3 5 3 5 - 5 3 mmol/L    Chloride 101 100 - 108 mmol/L    CO2 24 21 - 32 mmol/L    ANION GAP 9 4 - 13 mmol/L    BUN 11 5 - 25 mg/dL    Creatinine 1 00 0 60 - 1 30 mg/dL    Glucose 108 65 - 140 mg/dL    Calcium 7 9 (L) 8 3 - 10 1 mg/dL    Corrected Calcium 9 2 8 3 - 10 1 mg/dL     (H) 5 - 45 U/L    ALT 43 12 - 78 U/L    Alkaline Phosphatase 150 (H) 46 - 116 U/L    Total Protein 6 9 6 4 - 8 2 g/dL    Albumin 2 4 (L) 3 5 - 5 0 g/dL    Total Bilirubin 9 30 (H) 0 20 - 1 00 mg/dL    eGFR 84 ml/min/1 73sq m   CBC    Collection Time: 03/25/21  5:10 AM   Result Value Ref Range    WBC 5 67 4 31 - 10 16 Thousand/uL    RBC 3 45 (L) 3 88 - 5 62 Million/uL    Hemoglobin 11 8 (L) 12 0 - 17 0 g/dL    Hematocrit 35 3 (L) 36 5 - 49 3 %     (H) 82 - 98 fL    MCH 34 2 26 8 - 34 3 pg    MCHC 33 4 31 4 - 37 4 g/dL    RDW 16 3 (H) 11 6 - 15 1 %    Platelets 33 (LL) 606 - 390 Thousands/uL    MPV 12 2 8 9 - 12 7 fL   Magnesium    Collection Time: 03/25/21  5:10 AM   Result Value Ref Range    Magnesium 1 8 1 6 - 2 6 mg/dL Comprehensive metabolic panel    Collection Time: 03/26/21  4:27 AM   Result Value Ref Range    Sodium 137 136 - 145 mmol/L    Potassium 3 6 3 5 - 5 3 mmol/L    Chloride 102 100 - 108 mmol/L    CO2 25 21 - 32 mmol/L    ANION GAP 10 4 - 13 mmol/L    BUN 10 5 - 25 mg/dL    Creatinine 0 96 0 60 - 1 30 mg/dL    Glucose 85 65 - 140 mg/dL    Calcium 8 3 8 3 - 10 1 mg/dL    Corrected Calcium 9 6 8 3 - 10 1 mg/dL     (H) 5 - 45 U/L    ALT 44 12 - 78 U/L    Alkaline Phosphatase 151 (H) 46 - 116 U/L    Total Protein 7 2 6 4 - 8 2 g/dL    Albumin 2 4 (L) 3 5 - 5 0 g/dL    Total Bilirubin 10 20 (H) 0 20 - 1 00 mg/dL    eGFR 89 ml/min/1 73sq m   CBC and Platelet    Collection Time: 03/26/21  4:27 AM   Result Value Ref Range    WBC 5 38 4 31 - 10 16 Thousand/uL    RBC 3 58 (L) 3 88 - 5 62 Million/uL    Hemoglobin 12 3 12 0 - 17 0 g/dL    Hematocrit 37 1 36 5 - 49 3 %     (H) 82 - 98 fL    MCH 34 4 (H) 26 8 - 34 3 pg    MCHC 33 2 31 4 - 37 4 g/dL    RDW 16 4 (H) 11 6 - 15 1 %    Platelets 40 (LL) 454 - 390 Thousands/uL    MPV 11 5 8 9 - 12 7 fL   Protime-INR    Collection Time: 03/26/21  4:27 AM   Result Value Ref Range    Protime 20 6 (H) 11 6 - 14 5 seconds    INR 1 80 (H) 0 84 - 1 19   Ammonia    Collection Time: 03/27/21  6:28 AM   Result Value Ref Range    Ammonia 14 11 - 35 umol/L   CBC and Platelet    Collection Time: 03/27/21  6:28 AM   Result Value Ref Range    WBC 4 11 (L) 4 31 - 10 16 Thousand/uL    RBC 3 43 (L) 3 88 - 5 62 Million/uL    Hemoglobin 12 0 12 0 - 17 0 g/dL    Hematocrit 36 0 (L) 36 5 - 49 3 %     (H) 82 - 98 fL    MCH 35 0 (H) 26 8 - 34 3 pg    MCHC 33 3 31 4 - 37 4 g/dL    RDW 16 2 (H) 11 6 - 15 1 %    Platelets 45 (LL) 554 - 390 Thousands/uL    MPV 10 4 8 9 - 12 7 fL   Comprehensive metabolic panel    Collection Time: 03/27/21  6:28 AM   Result Value Ref Range    Sodium 139 136 - 145 mmol/L    Potassium 3 7 3 5 - 5 3 mmol/L    Chloride 106 100 - 108 mmol/L    CO2 26 21 - 32 mmol/L ANION GAP 7 4 - 13 mmol/L    BUN 8 5 - 25 mg/dL    Creatinine 0 87 0 60 - 1 30 mg/dL    Glucose 93 65 - 140 mg/dL    Calcium 8 0 (L) 8 3 - 10 1 mg/dL    Corrected Calcium 9 4 8 3 - 10 1 mg/dL     (H) 5 - 45 U/L    ALT 41 12 - 78 U/L    Alkaline Phosphatase 144 (H) 46 - 116 U/L    Total Protein 6 7 6 4 - 8 2 g/dL    Albumin 2 2 (L) 3 5 - 5 0 g/dL    Total Bilirubin 9 60 (H) 0 20 - 1 00 mg/dL    eGFR 97 ml/min/1 73sq m   Magnesium    Collection Time: 03/27/21  6:28 AM   Result Value Ref Range    Magnesium 1 7 1 6 - 2 6 mg/dL

## 2021-03-27 NOTE — ASSESSMENT & PLAN NOTE
Patient presents with decompensated liver cirrhosis with thrombocytopenia, coagulopathy, transaminitis, and portal hypertension, most likely due to heavy alcohol abuse  CT with irregular appearance of liver and possible mass  Appreciate GI following along  MRI showed cirrhosis without evidence for hepatocellular carcinoma  Portal hypertension evidenced by recanalized periumbilical vein, splenomegaly, trace ascites and probable tiny esophageal varices  Checking serologies, hepatitis panel; hepatitis panel negative  AFP mildly elevated  Trend LFTs  Deferring diuretic at this time as patient euvolemic    Outpatient colonoscopy/EGD recommended

## 2021-03-27 NOTE — UTILIZATION REVIEW
Continued Stay Review    Date: 3/27/21                        Current Patient Class: inpatient  Current Level of Care: med surg  HPI:56 y o  male initially admitted on 3/23/21  Assessment/Plan:   Tremulous & disoriented, unsteady gait, impulsive  Remains on librium dosing & daily IV banana bag  Continue CIWA monitoring   LFT's trending downward, repeat labs scheduled in am   Pertinent Labs/Diagnostic Results:   Results from last 7 days   Lab Units 03/27/21  0628 03/26/21 0427 03/25/21  0510 03/24/21  0502 03/23/21  1816   WBC Thousand/uL 4 11* 5 38 5 67 6 96 7 46   HEMOGLOBIN g/dL 12 0 12 3 11 8* 12 6 13 1   HEMATOCRIT % 36 0* 37 1 35 3* 36 8 37 6   PLATELETS Thousands/uL 45* 40* 33* 26* 21*   NEUTROS ABS Thousands/µL  --   --   --  5 66 6 68     Results from last 7 days   Lab Units 03/27/21 0628 03/26/21 0427 03/25/21  0510 03/24/21  0502 03/23/21  1816   SODIUM mmol/L 139 137 134* 135* 128*   POTASSIUM mmol/L 3 7 3 6 3 5 3 3* 2 8*   CHLORIDE mmol/L 106 102 101 101 94*   CO2 mmol/L 26 25 24 27 24   ANION GAP mmol/L 7 10 9 7 10   BUN mg/dL 8 10 11 10 11   CREATININE mg/dL 0 87 0 96 1 00 0 92 0 97   EGFR ml/min/1 73sq m 97 89 84 93 88   CALCIUM mg/dL 8 0* 8 3 7 9* 8 2* 8 2*   MAGNESIUM mg/dL 1 7  --  1 8 1 9  --      Results from last 7 days   Lab Units 03/27/21  0628 03/26/21 0427 03/25/21  0510 03/24/21  1931 03/24/21  0502 03/23/21  1816   AST U/L 112* 143* 165*  --  191* 196*   ALT U/L 41 44 43  --  40 42   ALK PHOS U/L 144* 151* 150*  --  152* 165*   TOTAL PROTEIN g/dL 6 7 7 2 6 9  --  6 9 7 2   ALBUMIN g/dL 2 2* 2 4* 2 4*  --  2 5* 2 5*   TOTAL BILIRUBIN mg/dL 9 60* 10 20* 9 30*  --  9 70* 10 00*   BILIRUBIN DIRECT mg/dL  --   --   --   --  6 20*  --    AMMONIA umol/L 14  --   --  52*  --   --      Results from last 7 days   Lab Units 03/24/21  0225   POC GLUCOSE mg/dl 112     Results from last 7 days   Lab Units 03/27/21  0628 03/26/21  0427 03/25/21  0510 03/24/21  0502 03/23/21  1816   GLUCOSE RANDOM mg/dL 93 85 108 102 162*     Results from last 7 days   Lab Units 03/26/21  0427 03/24/21  0019 03/23/21  1816   PROTIME seconds 20 6* 20 7* 20 5*   INR  1 80* 1 80* 1 79*   PTT seconds  --   --  37     Results from last 7 days   Lab Units 03/24/21  1932   FERRITIN ng/mL 575*     Results from last 7 days   Lab Units 03/24/21  1931   HEP B S AG  Non-reactive   HEP C AB  Non-reactive   HEP B C IGM  Non-reactive   HEP B C TOTAL AB  Non-reactive     Results from last 7 days   Lab Units 03/23/21  1816   ETHANOL LVL mg/dL <3   ACETAMINOPHEN LVL ug/mL <2*   SALICYLATE LVL mg/dL <3*     Vital Signs: /79 (BP Location: Left arm)   Pulse 92   Temp 98 6 °F (37 °C) (Oral)   Resp 18   Ht 6' (1 829 m)   Wt 95 3 kg (210 lb)   SpO2 95%   BMI 28 48 kg/m²     Medications:   chlordiazePOXIDE, 50 mg, Oral, M1A LEATHA  folic acid 1 mg, thiamine 100 mg in 0 9% sodium chloride 100 mL IVPB, , Intravenous, Daily  lactulose, 20 g, Oral, BID  LORazepam, 4 mg, Intravenous, Once  nicotine, 1 patch, Transdermal, Daily    PRN Meds:  calcium carbonate, 1,000 mg, Oral, Daily PRN  ondansetron, 4 mg, Intravenous, Q6H PRN    Discharge Plan: d  Network Utilization Review Department  ATTENTION: Please call with any questions or concerns to [phone] and carefully listen to the prompts so that you are directed to the right person  All voicemails are confidential   Tila Zenon all requests for admission clinical reviews, approved or denied determinations and any other requests to dedicated fax number below belonging to the campus where the patient is receiving treatment   List of dedicated fax numbers for the Facilities:  [address] DENIALS (Administrative/Medical Necessity) [phone]   [address] (Maternity/NICU/Pediatrics) 261 [facility],7Th Floor [place] [address] Taryn Miles [facility] [address] [address] [place] [address] Trena Tellez [address] [phone]   [place] [address] [phone]

## 2021-03-27 NOTE — ASSESSMENT & PLAN NOTE
CT abdomen pelvis on presentation showed abnormal liver appearance with possible mass in right lobe  Follow-up ultrasound did not confirm this  MRI showed cirrhosis without evidence for hepatocellular carcinoma, portal hypertension    GI following, follow-up with GI as an outpatient

## 2021-03-28 LAB
ALBUMIN SERPL BCP-MCNC: 2.3 G/DL (ref 3.5–5)
ALP SERPL-CCNC: 161 U/L (ref 46–116)
ALT SERPL W P-5'-P-CCNC: 42 U/L (ref 12–78)
ANION GAP SERPL CALCULATED.3IONS-SCNC: 9 MMOL/L (ref 4–13)
AST SERPL W P-5'-P-CCNC: 100 U/L (ref 5–45)
BILIRUB SERPL-MCNC: 7.2 MG/DL (ref 0.2–1)
BUN SERPL-MCNC: 9 MG/DL (ref 5–25)
CALCIUM ALBUM COR SERPL-MCNC: 9.8 MG/DL (ref 8.3–10.1)
CALCIUM SERPL-MCNC: 8.4 MG/DL (ref 8.3–10.1)
CHLORIDE SERPL-SCNC: 103 MMOL/L (ref 100–108)
CO2 SERPL-SCNC: 24 MMOL/L (ref 21–32)
CREAT SERPL-MCNC: 1.08 MG/DL (ref 0.6–1.3)
ERYTHROCYTE [DISTWIDTH] IN BLOOD BY AUTOMATED COUNT: 16.4 % (ref 11.6–15.1)
GFR SERPL CREATININE-BSD FRML MDRD: 77 ML/MIN/1.73SQ M
GLUCOSE SERPL-MCNC: 95 MG/DL (ref 65–140)
HCT VFR BLD AUTO: 38.1 % (ref 36.5–49.3)
HGB BLD-MCNC: 12.5 G/DL (ref 12–17)
INR PPP: 1.61 (ref 0.84–1.19)
MCH RBC QN AUTO: 34.6 PG (ref 26.8–34.3)
MCHC RBC AUTO-ENTMCNC: 32.8 G/DL (ref 31.4–37.4)
MCV RBC AUTO: 106 FL (ref 82–98)
PLATELET # BLD AUTO: 57 THOUSANDS/UL (ref 149–390)
PMV BLD AUTO: 10.9 FL (ref 8.9–12.7)
POTASSIUM SERPL-SCNC: 3.7 MMOL/L (ref 3.5–5.3)
PROT SERPL-MCNC: 7.3 G/DL (ref 6.4–8.2)
PROTHROMBIN TIME: 19 SECONDS (ref 11.6–14.5)
RBC # BLD AUTO: 3.61 MILLION/UL (ref 3.88–5.62)
SODIUM SERPL-SCNC: 136 MMOL/L (ref 136–145)
WBC # BLD AUTO: 5.33 THOUSAND/UL (ref 4.31–10.16)

## 2021-03-28 PROCEDURE — 85610 PROTHROMBIN TIME: CPT | Performed by: NURSE PRACTITIONER

## 2021-03-28 PROCEDURE — 99232 SBSQ HOSP IP/OBS MODERATE 35: CPT | Performed by: NURSE PRACTITIONER

## 2021-03-28 PROCEDURE — 80053 COMPREHEN METABOLIC PANEL: CPT | Performed by: NURSE PRACTITIONER

## 2021-03-28 PROCEDURE — 94760 N-INVAS EAR/PLS OXIMETRY 1: CPT

## 2021-03-28 PROCEDURE — 85027 COMPLETE CBC AUTOMATED: CPT | Performed by: NURSE PRACTITIONER

## 2021-03-28 RX ORDER — IBUPROFEN 400 MG/1
400 TABLET ORAL ONCE
Status: COMPLETED | OUTPATIENT
Start: 2021-03-28 | End: 2021-03-28

## 2021-03-28 RX ORDER — CHLORDIAZEPOXIDE HYDROCHLORIDE 25 MG/1
25 CAPSULE, GELATIN COATED ORAL EVERY 8 HOURS SCHEDULED
Status: DISCONTINUED | OUTPATIENT
Start: 2021-03-28 | End: 2021-03-29

## 2021-03-28 RX ADMIN — CHLORDIAZEPOXIDE HYDROCHLORIDE 25 MG: 25 CAPSULE ORAL at 20:54

## 2021-03-28 RX ADMIN — CHLORDIAZEPOXIDE HYDROCHLORIDE 50 MG: 25 CAPSULE ORAL at 06:19

## 2021-03-28 RX ADMIN — FOLIC ACID: 5 INJECTION, SOLUTION INTRAMUSCULAR; INTRAVENOUS; SUBCUTANEOUS at 09:27

## 2021-03-28 RX ADMIN — CHLORDIAZEPOXIDE HYDROCHLORIDE 25 MG: 25 CAPSULE ORAL at 17:32

## 2021-03-28 RX ADMIN — LACTULOSE 20 G: 10 SOLUTION ORAL at 17:32

## 2021-03-28 RX ADMIN — IBUPROFEN 400 MG: 400 TABLET ORAL at 06:19

## 2021-03-28 RX ADMIN — LACTULOSE 20 G: 10 SOLUTION ORAL at 10:12

## 2021-03-28 NOTE — PROGRESS NOTES
Karrie 46  Progress Note - Cornell Grande 1965, 55 y o  male MRN: [id number]  Unit/Bed#: [address] [place] [address] Encounter: [id number]  Primary Care Provider: Sesar Cage MD   Date and time admitted to hospital: 3/23/2021  5:47 PM    * Alcohol withdrawal ([facility] )  Assessment & Plan  Admits to drinking heavily since he was 14, longest being sober about 3 months, history of withdrawal seizures  Last drink was 5 days ago  Case management consult for HOST referral   WA protocol, PRN ativan  Started on Librium 50mg q6 - patient improving - Librium adjusted to 25 mg Q8   Patient continues to improve, still symptomatic with mild tremors   Continue Thiamine/FA/MV  Continue lactulose 20 g BID as per GI    Liver mass  Assessment & Plan  CT abdomen pelvis on presentation showed abnormal liver appearance with possible mass in right lobe  Follow-up ultrasound did not confirm this  MRI showed cirrhosis without evidence for hepatocellular carcinoma, portal hypertension  GI following    Decompensated hepatic cirrhosis ([facility] 75 )  Assessment & Plan  Patient presents with decompensated liver cirrhosis with thrombocytopenia, coagulopathy, transaminitis, and portal hypertension, most likely due to heavy alcohol abuse  CT with irregular appearance of liver and possible mass  Appreciate GI following along  MRI showed cirrhosis without evidence for hepatocellular carcinoma  Portal hypertension evidenced by recanalized periumbilical vein, splenomegaly, trace ascites and probable tiny esophageal varices  Checking serologies, hepatitis panel; hepatitis panel negative  AFP mildly elevated  Trend LFTs  Deferring diuretic at this time as patient euvolemic    Outpatient colonoscopy/EGD recommended      Hyperbilirubinemia  Assessment & Plan  T bili 10 on admission, previously 4-5 range  T bili 7 2 today  , Alk phos 165, today  Alk phos 161  CT A/P "Very abnormal appearance of liver most likely cirrhosis with either a cyst or potentially a low-density solid mass in the right lobe  Splenomegaly and possible tiny upper abdominal varices indicative of portal hypertension "  RUQ ultrasound suspicious for liver mass  GI following  MRI abdomen showed cirrhosis without evidence for hepatocellular carcinoma and portal hypertension  Discussed with patient  Transaminitis  Assessment & Plan  In the setting of alcohol abuse  Also checking hepatitis panel and serologies  Hepatitis panel negative    Tobacco abuse  Assessment & Plan  Nicotine patch  Encourage smoking cessation    Thrombocytopenia (HCC)  Assessment & Plan  Likely 2/2 underlying cirrhosis   Hold dvt ppx   Daily CBC/plt    Open wound of tongue due to bite  Assessment & Plan  Severe right tongue laceration and hematoma   Unknown etiology per patient   Highly suspicious of possible w/d seizure, ?fall   Ice chips, supportive care, Serial exams   Speech consulted, indicated that patient may have regular textured food with thin liquids     Electrolyte abnormality  Assessment & Plan  Na 128, K 2 8 on admission  , K 3 7 today  Fluids with K stopped as patient tolerates regular diet       Closed fracture of nasal bone  Assessment & Plan  CTH showed Right-sided nasal fractures with swelling  Age indeterminate  Suspect new 2/2 fall vs seizure   Supportive care       VTE Pharmacologic Prophylaxis:   Pharmacologic: Pharmacologic VTE Prophylaxis contraindicated due to Thrombocytopenia  Mechanical VTE Prophylaxis in Place: Yes    Patient Centered Rounds: I have performed bedside rounds with nursing staff today  Discussions with Specialists or Other Care Team Provider:  Attending and Case Management    Education and Discussions with Family / Patient:  I spoke with the patient at the bedside, I have answered all questions to the best of my abilities  Time Spent for Care: 30 minutes    More than 50% of total time spent on counseling and coordination of care as described above  Current Length of Stay: 5 day(s)    Current Patient Status: Inpatient   Certification Statement: The patient will continue to require additional inpatient hospital stay due to Continued CIWA monitoring, repeat labs in a m  Discharge Plan:  Not stable for discharge today    Code Status: Level 1 - Full Code      Subjective:   Patient seen sleeping, awoken when spoken to  He is not certain where he is at, thinks that he is at [employer]  He is tremulous and unsteady with his gait  He is also impulsive  He is able to eat some food, denies any nausea or vomiting  States that he is very tired    Objective:     Vitals:   Temp (24hrs), Av 6 °F (37 °C), Min:98 1 °F (36 7 °C), Max:99 2 °F (37 3 °C)    Temp:  [98 1 °F (36 7 °C)-99 2 °F (37 3 °C)] 98 1 °F (36 7 °C)  HR:  [74-89] 74  Resp:  [18-20] 18  BP: (102-140)/(62-88) 127/81  SpO2:  [95 %-98 %] 95 %  Body mass index is 28 48 kg/m²  Input and Output Summary (last 24 hours): Intake/Output Summary (Last 24 hours) at 3/28/2021 1043  Last data filed at 3/27/2021 1830  Gross per 24 hour   Intake 800 ml   Output 500 ml   Net 300 ml       Physical Exam:     Physical Exam  Vitals signs and nursing note reviewed  Constitutional:       General: He is not in acute distress  HENT:      Head:      Comments: Patient has laceration on right side of his nose, edges approximated, no drainage  Does appear swollen  Also has laceration on right side tongue     Mouth/Throat:      Mouth: Mucous membranes are moist    Eyes:      Extraocular Movements: Extraocular movements intact  Comments: Scleral icterus   Neck:      Musculoskeletal: Normal range of motion  Cardiovascular:      Rate and Rhythm: Normal rate and regular rhythm  Pulses: Normal pulses  Heart sounds: Normal heart sounds  Pulmonary:      Breath sounds: Normal breath sounds  Abdominal:      General: Bowel sounds are normal  There is no distension        Palpations: Abdomen is soft  Tenderness: There is no abdominal tenderness  Genitourinary:     Comments: Voiding spontaneously  Musculoskeletal:      Comments: Tremulous   Skin:     Capillary Refill: Capillary refill takes less than 2 seconds  Comments: Multiple small lacerations and ecchymotic areas noted on patient's forearms  Positive jaundice   Neurological:      Mental Status: He is oriented to person, place, and time  Psychiatric:         Mood and Affect: Mood normal          Behavior: Behavior normal          Thought Content: Thought content normal          Judgment: Judgment normal       Comments: Quiet, cooperative with exam         Additional Data:     Labs:    Results from last 7 days   Lab Units 03/28/21  0600  03/24/21  0502   WBC Thousand/uL 5 33   < > 6 96   HEMOGLOBIN g/dL 12 5   < > 12 6   HEMATOCRIT % 38 1   < > 36 8   PLATELETS Thousands/uL 57*   < > 26*   NEUTROS PCT %  --   --  82*   LYMPHS PCT %  --   --  8*   MONOS PCT %  --   --  9   EOS PCT %  --   --  1    < > = values in this interval not displayed  Results from last 7 days   Lab Units 03/28/21  0600   POTASSIUM mmol/L 3 7   CHLORIDE mmol/L 103   CO2 mmol/L 24   BUN mg/dL 9   CREATININE mg/dL 1 08   CALCIUM mg/dL 8 4   ALK PHOS U/L 161*   ALT U/L 42   AST U/L 100*     Results from last 7 days   Lab Units 03/28/21  0600   INR  1 61*       * I Have Reviewed All Lab Data Listed Above  * Additional Pertinent Lab Tests Reviewed:  All Labs Within Last 24 Hours Reviewed    Imaging:    Imaging Reports Reviewed Today Include: None    Imaging Personally Reviewed by Myself Includes:  None    Recent Cultures (last 7 days):           Last 24 Hours Medication List:   Current Facility-Administered Medications   Medication Dose Route Frequency Provider Last Rate    calcium carbonate  1,000 mg Oral Daily PRN Joseph Herr PA-C      chlordiazePOXIDE  25 mg Oral [facility] TIMMY Read      folic acid 1 mg, thiamine 100 mg in 0 9% sodium chloride 100 mL IVPB   Intravenous Daily Joseph Herr PA-C 0 mL/hr at 03/26/21 1121    lactulose  20 g Oral BID TIMMY Solares      LORazepam  4 mg Intravenous Once Lashanda Revankar, DO      nicotine  1 patch Transdermal Daily Joseph Herr PA-C      ondansetron  4 mg Intravenous Q6H PRN Elfida Marbin Herr PA-C          Today, Patient Was Seen By: TIMMY Fraser    ** Please Note: Dictation voice to text software may have been used in the creation of this document   **

## 2021-03-28 NOTE — UTILIZATION REVIEW
Continued Stay Review    Date: 3/28/21                        Current Patient Class: inpatient  Current Level of Care: med surg  HPI:56 y o  male initially admitted on 3/23/21  Assessment/Plan:   Tmax 99 2  Scleral icterus, jaundiced  A&O, poor safety awareness  Persistent tremors & anxiety, CIWA score 6   Remains on librium dosing & IV banana bag for alcohol withdrawal    Pertinent Labs/Diagnostic Results:   Results from last 7 days   Lab Units 03/28/21  0600 03/27/21  0628 03/26/21  0427 03/25/21  0510 03/24/21  0502 03/23/21  1816   WBC Thousand/uL 5 33 4 11* 5 38 5 67 6 96 7 46   HEMOGLOBIN g/dL 12 5 12 0 12 3 11 8* 12 6 13 1   HEMATOCRIT % 38 1 36 0* 37 1 35 3* 36 8 37 6   PLATELETS Thousands/uL 57* 45* 40* 33* 26* 21*   NEUTROS ABS Thousands/µL  --   --   --   --  5 66 6 68     Results from last 7 days   Lab Units 03/28/21  0600 03/27/21  0628 03/26/21 0427 03/25/21  0510 03/24/21  0502   SODIUM mmol/L 136 139 137 134* 135*   POTASSIUM mmol/L 3 7 3 7 3 6 3 5 3 3*   CHLORIDE mmol/L 103 106 102 101 101   CO2 mmol/L 24 26 25 24 27   ANION GAP mmol/L 9 7 10 9 7   BUN mg/dL 9 8 10 11 10   CREATININE mg/dL 1 08 0 87 0 96 1 00 0 92   EGFR ml/min/1 73sq m 77 97 89 84 93   CALCIUM mg/dL 8 4 8 0* 8 3 7 9* 8 2*   MAGNESIUM mg/dL  --  1 7  --  1 8 1 9     Results from last 7 days   Lab Units 03/28/21  0600 03/27/21  1435 03/27/21  0628 03/26/21  0427 03/25/21  0510 03/24/21  1931 03/24/21  0502   AST U/L 100*  --  112* 143* 165*  --  191*   ALT U/L 42  --  41 44 43  --  40   ALK PHOS U/L 161*  --  144* 151* 150*  --  152*   TOTAL PROTEIN g/dL 7 3  --  6 7 7 2 6 9  --  6 9   ALBUMIN g/dL 2 3*  --  2 2* 2 4* 2 4*  --  2 5*   TOTAL BILIRUBIN mg/dL 7 20*  --  9 60* 10 20* 9 30*  --  9 70*   BILIRUBIN DIRECT mg/dL  --   --   --   --   --   --  6 20*   AMMONIA umol/L  --  <10* 14  --   --  52*  --      Results from last 7 days   Lab Units 03/24/21  0225   POC GLUCOSE mg/dl 112     Results from last 7 days   Lab Units 03/28/21  0600 03/27/21  0628 03/26/21  0427 03/25/21  0510 03/24/21  0502 03/23/21  1816   GLUCOSE RANDOM mg/dL 95 93 85 108 102 162*     Results from last 7 days   Lab Units 03/28/21  0600 03/26/21  0427 03/24/21  0019 03/23/21  1816   PROTIME seconds 19 0* 20 6* 20 7* 20 5*   INR  1 61* 1 80* 1 80* 1 79*   PTT seconds  --   --   --  37     Results from last 7 days   Lab Units 03/24/21  1932   FERRITIN ng/mL 575*     Results from last 7 days   Lab Units 03/24/21  1931   HEP B S AG  Non-reactive   HEP C AB  Non-reactive   HEP B C IGM  Non-reactive   HEP B C TOTAL AB  Non-reactive     Results from last 7 days   Lab Units 03/23/21  1816   ETHANOL LVL mg/dL <3   ACETAMINOPHEN LVL ug/mL <2*   SALICYLATE LVL mg/dL <3*     Vital Signs: /81 (BP Location: Left arm)   Pulse 74   Temp 98 1 °F (36 7 °C) (Oral)   Resp 18   Ht 6' (1 829 m)   Wt 95 3 kg (210 lb)   SpO2 95%   BMI 28 48 kg/m²     Medications:   chlordiazePOXIDE, 25 mg, Oral, N6G LEATHA  folic acid 1 mg, thiamine 100 mg in 0 9% sodium chloride 100 mL IVPB, , Intravenous, Daily  lactulose, 20 g, Oral, BID  nicotine, 1 patch, Transdermal, Daily    PRN Meds:  calcium carbonate, 1,000 mg, Oral, Daily PRN  ondansetron, 4 mg, Intravenous, Q6H PRN    Discharge Plan: tbd  Network Utilization Review Department  ATTENTION: Please call with any questions or concerns to [phone] and carefully listen to the prompts so that you are directed to the right person  All voicemails are confidential   Maci Hunter all requests for admission clinical reviews, approved or denied determinations and any other requests to dedicated fax number below belonging to the campus where the patient is receiving treatment   List of dedicated fax numbers for the Facilities:  [address] DENIALS (Administrative/Medical Necessity) [phone]   [address] (Maternity/NICU/Pediatrics) [address] [place] [address],[address] Floor [phone]   [address]  [phone]   Yasmin Atkinson [address] ([address]) [address] [place] [address] Trena Tellez [address] [phone]   [place] [address] [phone]

## 2021-03-29 ENCOUNTER — TELEPHONE (OUTPATIENT)
Dept: GASTROENTEROLOGY | Facility: CLINIC | Age: 56
End: 2021-03-29

## 2021-03-29 VITALS
RESPIRATION RATE: 18 BRPM | BODY MASS INDEX: 28.44 KG/M2 | TEMPERATURE: 98.5 F | SYSTOLIC BLOOD PRESSURE: 111 MMHG | WEIGHT: 210 LBS | OXYGEN SATURATION: 98 % | HEIGHT: 72 IN | HEART RATE: 85 BPM | DIASTOLIC BLOOD PRESSURE: 66 MMHG

## 2021-03-29 PROBLEM — M25.551 RIGHT HIP PAIN: Status: ACTIVE | Noted: 2021-03-29

## 2021-03-29 LAB
ALBUMIN SERPL BCP-MCNC: 2.1 G/DL (ref 3.5–5)
ALP SERPL-CCNC: 151 U/L (ref 46–116)
ALT SERPL W P-5'-P-CCNC: 40 U/L (ref 12–78)
ANION GAP SERPL CALCULATED.3IONS-SCNC: 6 MMOL/L (ref 4–13)
AST SERPL W P-5'-P-CCNC: 87 U/L (ref 5–45)
BILIRUB SERPL-MCNC: 5.9 MG/DL (ref 0.2–1)
BUN SERPL-MCNC: 9 MG/DL (ref 5–25)
CALCIUM ALBUM COR SERPL-MCNC: 9.9 MG/DL (ref 8.3–10.1)
CALCIUM SERPL-MCNC: 8.4 MG/DL (ref 8.3–10.1)
CHLORIDE SERPL-SCNC: 105 MMOL/L (ref 100–108)
CO2 SERPL-SCNC: 26 MMOL/L (ref 21–32)
CREAT SERPL-MCNC: 0.97 MG/DL (ref 0.6–1.3)
ERYTHROCYTE [DISTWIDTH] IN BLOOD BY AUTOMATED COUNT: 16.4 % (ref 11.6–15.1)
GFR SERPL CREATININE-BSD FRML MDRD: 88 ML/MIN/1.73SQ M
GLUCOSE SERPL-MCNC: 91 MG/DL (ref 65–140)
HCT VFR BLD AUTO: 36.2 % (ref 36.5–49.3)
HGB BLD-MCNC: 11.9 G/DL (ref 12–17)
INR PPP: 1.66 (ref 0.84–1.19)
MCH RBC QN AUTO: 34.4 PG (ref 26.8–34.3)
MCHC RBC AUTO-ENTMCNC: 32.9 G/DL (ref 31.4–37.4)
MCV RBC AUTO: 105 FL (ref 82–98)
PLATELET # BLD AUTO: 56 THOUSANDS/UL (ref 149–390)
PMV BLD AUTO: 11.1 FL (ref 8.9–12.7)
POTASSIUM SERPL-SCNC: 4 MMOL/L (ref 3.5–5.3)
PROT SERPL-MCNC: 6.8 G/DL (ref 6.4–8.2)
PROTHROMBIN TIME: 19.4 SECONDS (ref 11.6–14.5)
RBC # BLD AUTO: 3.46 MILLION/UL (ref 3.88–5.62)
SODIUM SERPL-SCNC: 137 MMOL/L (ref 136–145)
WBC # BLD AUTO: 4.35 THOUSAND/UL (ref 4.31–10.16)

## 2021-03-29 PROCEDURE — 99239 HOSP IP/OBS DSCHRG MGMT >30: CPT | Performed by: INTERNAL MEDICINE

## 2021-03-29 PROCEDURE — 85610 PROTHROMBIN TIME: CPT | Performed by: NURSE PRACTITIONER

## 2021-03-29 PROCEDURE — 97110 THERAPEUTIC EXERCISES: CPT

## 2021-03-29 PROCEDURE — 97535 SELF CARE MNGMENT TRAINING: CPT

## 2021-03-29 PROCEDURE — 97530 THERAPEUTIC ACTIVITIES: CPT

## 2021-03-29 PROCEDURE — NC001 PR NO CHARGE: Performed by: INTERNAL MEDICINE

## 2021-03-29 PROCEDURE — 99232 SBSQ HOSP IP/OBS MODERATE 35: CPT | Performed by: INTERNAL MEDICINE

## 2021-03-29 PROCEDURE — 85027 COMPLETE CBC AUTOMATED: CPT | Performed by: NURSE PRACTITIONER

## 2021-03-29 PROCEDURE — 80053 COMPREHEN METABOLIC PANEL: CPT | Performed by: NURSE PRACTITIONER

## 2021-03-29 PROCEDURE — 94760 N-INVAS EAR/PLS OXIMETRY 1: CPT

## 2021-03-29 RX ORDER — ACETAMINOPHEN 325 MG/1
650 TABLET ORAL EVERY 6 HOURS PRN
Status: DISCONTINUED | OUTPATIENT
Start: 2021-03-29 | End: 2021-03-29 | Stop reason: HOSPADM

## 2021-03-29 RX ORDER — CHLORDIAZEPOXIDE HYDROCHLORIDE 25 MG/1
25 CAPSULE, GELATIN COATED ORAL EVERY 12 HOURS SCHEDULED
Status: DISCONTINUED | OUTPATIENT
Start: 2021-03-29 | End: 2021-03-29 | Stop reason: HOSPADM

## 2021-03-29 RX ORDER — CHLORDIAZEPOXIDE HYDROCHLORIDE 25 MG/1
25 CAPSULE, GELATIN COATED ORAL EVERY 12 HOURS SCHEDULED
Qty: 4 CAPSULE | Refills: 0
Start: 2021-03-29 | End: 2022-01-15 | Stop reason: HOSPADM

## 2021-03-29 RX ORDER — LACTULOSE 20 G/30ML
20 SOLUTION ORAL 2 TIMES DAILY
Refills: 0 | Status: ON HOLD
Start: 2021-03-29 | End: 2022-01-15 | Stop reason: SDUPTHER

## 2021-03-29 RX ORDER — ACETAMINOPHEN 325 MG/1
650 TABLET ORAL EVERY 6 HOURS PRN
Refills: 0
Start: 2021-03-29 | End: 2022-04-22 | Stop reason: HOSPADM

## 2021-03-29 RX ORDER — CALCIUM CARBONATE 200(500)MG
1000 TABLET,CHEWABLE ORAL DAILY PRN
Refills: 0
Start: 2021-03-29 | End: 2022-04-22 | Stop reason: HOSPADM

## 2021-03-29 RX ORDER — IBUPROFEN 400 MG/1
800 TABLET ORAL
Status: DISCONTINUED | OUTPATIENT
Start: 2021-03-29 | End: 2021-03-29 | Stop reason: HOSPADM

## 2021-03-29 RX ORDER — IBUPROFEN 800 MG/1
800 TABLET ORAL
Qty: 4 TABLET | Refills: 0
Start: 2021-03-29 | End: 2022-01-15 | Stop reason: HOSPADM

## 2021-03-29 RX ADMIN — FOLIC ACID: 5 INJECTION, SOLUTION INTRAMUSCULAR; INTRAVENOUS; SUBCUTANEOUS at 10:44

## 2021-03-29 RX ADMIN — LACTULOSE 20 G: 10 SOLUTION ORAL at 10:44

## 2021-03-29 RX ADMIN — CHLORDIAZEPOXIDE HYDROCHLORIDE 25 MG: 25 CAPSULE ORAL at 05:53

## 2021-03-29 RX ADMIN — LACTULOSE 20 G: 10 SOLUTION ORAL at 18:29

## 2021-03-29 RX ADMIN — IBUPROFEN 800 MG: 400 TABLET ORAL at 14:49

## 2021-03-29 RX ADMIN — CHLORDIAZEPOXIDE HYDROCHLORIDE 25 MG: 25 CAPSULE ORAL at 14:50

## 2021-03-29 NOTE — UTILIZATION REVIEW
Continued Stay Review    Date:   3/29/21                        Current Patient Class: inpatient    Current Level of Care: acute    Assessment/Plan:   Alcoholic withdrawal continue symptomatic with tremors,continue thiamine fa mv continue librium taper  Continue lactulose bid per gi   Liver mass f/u with gi outpatient   Thrombocytopenia likely 2/2 Cirrhosis hold dvt ppx ,daily cbc plt    Pertinent Labs/Diagnostic Results:       Results from last 7 days   Lab Units 03/29/21  0530 03/28/21  0600 03/27/21  0628 03/26/21  0427 03/25/21  0510 03/24/21  0502 03/23/21  1816   WBC Thousand/uL 4 35 5 33 4 11* 5 38 5 67 6 96 7 46   HEMOGLOBIN g/dL 11 9* 12 5 12 0 12 3 11 8* 12 6 13 1   HEMATOCRIT % 36 2* 38 1 36 0* 37 1 35 3* 36 8 37 6   PLATELETS Thousands/uL 56* 57* 45* 40* 33* 26* 21*   NEUTROS ABS Thousands/µL  --   --   --   --   --  5 66 6 68     Results from last 7 days   Lab Units 03/29/21  0530 03/28/21  0600 03/27/21  0628 03/26/21  0427 03/25/21  0510 03/24/21  0502   SODIUM mmol/L 137 136 139 137 134* 135*   POTASSIUM mmol/L 4 0 3 7 3 7 3 6 3 5 3 3*   CHLORIDE mmol/L 105 103 106 102 101 101   CO2 mmol/L 26 24 26 25 24 27   ANION GAP mmol/L 6 9 7 10 9 7   BUN mg/dL 9 9 8 10 11 10   CREATININE mg/dL 0 97 1 08 0 87 0 96 1 00 0 92   EGFR ml/min/1 73sq m 88 77 97 89 84 93   CALCIUM mg/dL 8 4 8 4 8 0* 8 3 7 9* 8 2*   MAGNESIUM mg/dL  --   --  1 7  --  1 8 1 9     Results from last 7 days   Lab Units 03/29/21  0530 03/28/21  0600 03/27/21  1435 03/27/21  0628 03/26/21  0427 03/25/21  0510 03/24/21  1931 03/24/21  0502   AST U/L 87* 100*  --  112* 143* 165*  --  191*   ALT U/L 40 42  --  41 44 43  --  40   ALK PHOS U/L 151* 161*  --  144* 151* 150*  --  152*   TOTAL PROTEIN g/dL 6 8 7 3  --  6 7 7 2 6 9  --  6 9   ALBUMIN g/dL 2 1* 2 3*  --  2 2* 2 4* 2 4*  --  2 5*   TOTAL BILIRUBIN mg/dL 5 90* 7 20*  --  9 60* 10 20* 9 30*  --  9 70*   BILIRUBIN DIRECT mg/dL  --   --   --   --   --   --   --  6 20*   AMMONIA umol/L  -- --  <10* 14  --   --  52*  --      Results from last 7 days   Lab Units 03/24/21  0225   POC GLUCOSE mg/dl 112     Results from last 7 days   Lab Units 03/29/21  0530 03/28/21  0600 03/27/21  0628 03/26/21  0427 03/25/21  0510 03/24/21  0502 03/23/21  1816   GLUCOSE RANDOM mg/dL 91 95 93 85 108 102 162*     Results from last 7 days   Lab Units 03/29/21  0530 03/28/21  0600 03/26/21  0427  03/23/21  1816   PROTIME seconds 19 4* 19 0* 20 6*   < > 20 5*   INR  1 66* 1 61* 1 80*   < > 1 79*   PTT seconds  --   --   --   --  37    < > = values in this interval not displayed  Results from last 7 days   Lab Units 03/24/21  1932   FERRITIN ng/mL 575*     Results from last 7 days   Lab Units 03/24/21  1931   HEP B S AG  Non-reactive   HEP C AB  Non-reactive   HEP B C IGM  Non-reactive   HEP B C TOTAL AB  Non-reactive     Results from last 7 days   Lab Units 03/23/21  1816   ETHANOL LVL mg/dL <3   ACETAMINOPHEN LVL ug/mL <2*   SALICYLATE LVL mg/dL <3*       Vital Signs:   03/29/21 0900  98 3 °F (36 8 °C)  69  18  114/70  95 %  None (Room air)  Lying   03/28/21 2053  --  --  --  --  97 %  None (Room air)  --   03/28/21 2025  98 7 °F (37 1 °C)  73  18  109/69  94 %  None (Room air)           Medications:   Scheduled Medications:  chlordiazePOXIDE, 25 mg, Oral, Q5X LEATHA  folic acid 1 mg, thiamine 100 mg in 0 9% sodium chloride 100 mL IVPB, , Intravenous, Daily  lactulose, 20 g, Oral, BID  LORazepam, 4 mg, Intravenous, Once  nicotine, 1 patch, Transdermal, Daily      Continuous IV Infusions:     PRN Meds:  calcium carbonate, 1,000 mg, Oral, Daily PRN  ondansetron, 4 mg, Intravenous, Q6H PRN        Discharge Plan: tbd  Network Utilization Review Department  ATTENTION: Please call with any questions or concerns to [phone] and carefully listen to the prompts so that you are directed to the right person   All voicemails are confidential   Albert Hercules all requests for admission clinical reviews, approved or denied determinations and any other requests to dedicated fax number below belonging to the campus where the patient is receiving treatment   List of dedicated fax numbers for the Facilities:  [address] [place] [address] DENIALS (Administrative/Medical Necessity) [phone]   [address] (Maternity/NICU/Pediatrics) [phone] [address] Montserrat Atkinson [address] (  [address]) [address] [address] Yolanda Tellez 1481 [address] [place] [address] [phone]

## 2021-03-29 NOTE — PLAN OF CARE
Problem: Potential for Falls  Goal: Patient will remain free of falls  Description: INTERVENTIONS:  - Assess patient frequently for physical needs  -  Identify cognitive and physical deficits and behaviors that affect risk of falls  -  Strawn fall precautions as indicated by assessment   - Educate patient/family on patient safety including physical limitations  - Instruct patient to call for assistance with activity based on assessment  - Modify environment to reduce risk of injury  - Consider OT/PT consult to assist with strengthening/mobility  Outcome: Progressing     Problem: Prexisting or High Potential for Compromised Skin Integrity  Goal: Skin integrity is maintained or improved  Description: INTERVENTIONS:  - Identify patients at risk for skin breakdown  - Assess and monitor skin integrity  - Assess and monitor nutrition and hydration status  - Monitor labs   - Assess for incontinence   - Turn and reposition patient  - Assist with mobility/ambulation  - Relieve pressure over bony prominences  - Avoid friction and shearing  - Provide appropriate hygiene as needed including keeping skin clean and dry  - Evaluate need for skin moisturizer/barrier cream  - Collaborate with interdisciplinary team   - Patient/family teaching  - Consider wound care consult   Outcome: Progressing     Problem: Nutrition/Hydration-ADULT  Goal: Nutrient/Hydration intake appropriate for improving, restoring or maintaining nutritional needs  Description: Monitor and assess patient's nutrition/hydration status for malnutrition  Collaborate with interdisciplinary team and initiate plan and interventions as ordered  Monitor patient's weight and dietary intake as ordered or per policy  Utilize nutrition screening tool and intervene as necessary  Determine patient's food preferences and provide high-protein, high-caloric foods as appropriate       INTERVENTIONS:  - Monitor oral intake, urinary output, labs, and treatment plans  - Assess nutrition and hydration status and recommend course of action  - Evaluate amount of meals eaten  - Assist patient with eating if necessary   - Allow adequate time for meals  - Recommend/ encourage appropriate diets, oral nutritional supplements, and vitamin/mineral supplements  - Order, calculate, and assess calorie counts as needed  - Recommend, monitor, and adjust tube feedings and TPN/PPN based on assessed needs  - Assess need for intravenous fluids  - Provide specific nutrition/hydration education as appropriate  - Include patient/family/caregiver in decisions related to nutrition  Outcome: Progressing     Problem: PAIN - ADULT  Goal: Verbalizes/displays adequate comfort level or baseline comfort level  Description: Interventions:  - Encourage patient to monitor pain and request assistance  - Assess pain using appropriate pain scale  - Administer analgesics based on type and severity of pain and evaluate response  - Implement non-pharmacological measures as appropriate and evaluate response  - Consider cultural and social influences on pain and pain management  - Notify physician/advanced practitioner if interventions unsuccessful or patient reports new pain  Outcome: Progressing     Problem: PAIN - ADULT  Goal: Verbalizes/displays adequate comfort level or baseline comfort level  Description: Interventions:  - Encourage patient to monitor pain and request assistance  - Assess pain using appropriate pain scale  - Administer analgesics based on type and severity of pain and evaluate response  - Implement non-pharmacological measures as appropriate and evaluate response  - Consider cultural and social influences on pain and pain management  - Notify physician/advanced practitioner if interventions unsuccessful or patient reports new pain  Outcome: Progressing     Problem: INFECTION - ADULT  Goal: Absence or prevention of progression during hospitalization  Description: INTERVENTIONS:  - Assess and monitor for signs and symptoms of infection  - Monitor lab/diagnostic results  - Monitor all insertion sites, i e  indwelling lines, tubes, and drains  - Monitor endotracheal if appropriate and nasal secretions for changes in amount and color  - [place] appropriate cooling/warming therapies per order  - Administer medications as ordered  - Instruct and encourage patient and family to use good hand hygiene technique  - Identify and instruct in appropriate isolation precautions for identified infection/condition  Outcome: Progressing  Goal: Absence of fever/infection during neutropenic period  Description: INTERVENTIONS:  - Monitor WBC    Outcome: Progressing

## 2021-03-29 NOTE — DISCHARGE SUMMARY
Discharge Summary - [facility] Internal Medicine    Patient Information: Ayaz Damon 55 y o  male MRN: [id number]  Unit/Bed#: [facility] 664-87 Encounter: [id number]    Discharging Physician / Practitioner: Kaylan Cervantes MD  PCP: Nataly Osuna MD  Admission Date: 3/23/2021  Discharge Date: 03/29/21    Reason for Admission: Tremors (patient states his last ETOH intake was about 5 days ago - states has had tremors since then, RLQ abdominal pain, some SOB  speaks in full sentences without difficulty ) and Abdominal Pain      Discharge Diagnoses:     Primary Discharge Diagnosis:     Principal Problem:    Alcohol withdrawal ([address] )  Active Problems:    Liver mass    Decompensated hepatic cirrhosis (HCC)    Hyperbilirubinemia    Transaminitis    Thrombocytopenia (HCC)    Tobacco abuse    Open wound of tongue due to bite    Electrolyte abnormality    Closed fracture of nasal bone    Right hip pain  Resolved Problems:    * No resolved hospital problems  *    Consultations During Hospital Stay:  IP CONSULT TO MEDICAL CRITICAL CARE  IP CONSULT TO GASTROENTEROLOGY    Procedures Performed:     Significant Findings:   · Refer to hospital course and above listed diagnosis related plan for details    Imaging while in hospital:  Of the abdomen with and without contrast  Liver ultrasound  CT scan of the abdomen pelvis with contrast  CT of the head without contrast  Incidental Findings:   Liver nodule follow-up with GI as an outpatient  Test Results Pending at Discharge (will require follow up):   · As per After Visit Summary     Outpatient Tests Requested:    Complications:  Refer to hospital course and above listed diagnosis related plan, if any    Hospital Course:     Ayaz Damon is a 55 y o  male patient with PMHx of alcohol abuse, prior history of alcohol withdrawal, who originally presented to the hospital on 3/23/2021 due to chief complaints of shaking tremors    Patient was initially admitted for alcohol withdrawal  Patient was treated with CIWA protocol and was on Librium taper  He received Ativan p r n  As well for withdrawal symptoms  During his hospitalization patient initially was found to have transaminitis which gradually improved  GI was consulted  Patient had a CT scan of the head and was negative for any acute intracranial abnormalities, patient had a CT scan of the abdomen pelvis which showed a liver nodule or lesion  Patient underwent MRI of the abdomen which did not show any malignant features and showed a liver nodule well-circumscribed  Patient needs to follow-up with GI as an outpatient for colonoscopy  Patient hemoglobin was stable  Patient was gradually weaned on does Librium and eventually was on Librium 25 mg twice a day  Patient was deconditioned and weak he also complained of some right hip pain for which he was started on ibuprofen  Patient was recommended to go to short-term rehab and patient was approved for the same  Patient will be discharged to short-term rehab at [facility]  He will need to follow-up with primary care physician GI as an outpatient  Please see above list of diagnoses and related plan for additional information  Condition at Discharge: fair     Discharge Day Visit / Exam:     Subjective:  I have seen and examined the patient at bedside  Overnight events reviewed with the RN  Vitals: Blood Pressure: 111/66 (03/29/21 1449)  Pulse: 85 (03/29/21 1449)  Temperature: 98 5 °F (36 9 °C) (03/29/21 1449)  Temp Source: Oral (03/29/21 1449)  Respirations: 18 (03/29/21 1449)  Height: 6' (182 9 cm) (03/23/21 2232)  Weight - Scale: 95 3 kg (210 lb) (03/23/21 2232)  SpO2: 98 % (03/29/21 1449)  Exam:   Physical Exam  General Exam: Alert and Oriented x 3, NAD  Eyes: ANDRESSA  Neck: Supple  CVS: S1, S2 [place], RRR    R/S: Clear to auscultate anteriorly  Abd: Soft, NT, ND, BS+ve  Extremities: No edema noted  Skin: No acute Rash noted  CNS: No acute FND   Moves all 4 extremities  Psych: Co-operative, Not agitated  Discharge instructions/Information to patient and family:(Discharge Medications and Follow up):   See after visit summary for information provided to patient and family  Provisions for Follow-Up Care:  See after visit summary for information related to follow-up care and any pertinent home health orders  Disposition: Short-term rehab at [address] [facility] Readmission:  No     Discharge Statement:  I spent 35 minutes discharging the patient  This time was spent on the day of discharge  I had direct contact with the patient on the day of discharge  Greater than 50% of the total time was spent examining patient, answering all patient questions, arranging and discussing plan of care with patient as well as directly providing post-discharge instructions  Additional time then spent on discharge activities  Discharge Medications:  See after visit summary for reconciled discharge medications provided to patient and family  ** Please Note: "This note has been constructed using a voice recognition system  Therefore there may be syntax, spelling, and/or grammatical errors   Please call if you have any questions  "**

## 2021-03-29 NOTE — PROGRESS NOTES
Progress Note- Gloria Boyd 55 y o  male MRN: [id number]    Unit/Bed#: [address] [place] [address] Encounter: [id number]      Assessment and Plan: 1  Decompensated Cirrhosis of the liver associated with thrombocytopenia  Decompensated cirrhosis associated with transaminitis, thrombocytopenia, coagulopathy, and portal hypertension most likely due to alcohol use   MELD score 23>>20  MDF 37 6 on admission, now 28 4  No current need for steroids   -Hepatitis panel nonreactive  AMA, ASMA ,MARCEL are WNL  -Continue CIWA protocol per primary team  -Patient advised to stop all alcohol use and encouraged to seek inpatient rehabilitation concerning alcohol usage  -HE: Continue lactulose 20 g b i d   -HCC Surveillance: AFP 11, CT imaging showed cyst or potential low density solid mass in right lobe  MRI done for follow up which showed 2cm regenerative nodule in right hepatic lobe  No evidence of HCC  Next AFP and imaging due at the end of September (every 6 months)  -Ascites: No need to start diuretics at present time patient has no signs of fluid overload  Should follow 2gm low sodium diet  -Variceal Surveillance: EGD needs to be done in the future to evaluate for varices  Probably tiny esophageal varices seen on MRI  Colonoscopy should be done at that time as well  -Transplant: MELD improving to 20 today  Not currently a transplant candidate due to active alcohol use  -Continue to monitor liver enzymes and CBC  -Complications of advanced liver disease discussed   -Follow up closely as an outpatient        2  Liver mass  CT abdomen/pelvis done 3/23 showed very abnormal appearance of liver most likely cirrhosis with either a cyst or potentially a low-density solid mass in right lobe  -AFP 11    -MRI showed cirrhosis without evidence for hepatocellular carcinoma   2 cm nodule in the right hepatic lobe on CT likely corresponds to a regenerative nodule     -Periodic surveillance will be necessary with follow-up MRI       ______________________________________________________________________    Subjective:     Patient seen lying in bed  C/o right hip pain/pain under right ribs  Reports he was supposed to get R hip replacement in the past but never did  Discussed again the importance of alcohol cessation, he is not interested in alcohol rehab because he is worried about taking off from work and going without pay  Discussed there may be options regarding this such as FMLA leave etc  He denies any reflux, nausea/vomiting, abdominal pain, constipation, diarrhea, confusion, melena, hematochezia  Medication Administration - last 24 hours from 03/28/2021 0840 to 03/29/2021 0840       Date/Time Order Dose Route Action Action by     75/34/0050 9661 folic acid 1 mg, thiamine (VITAMIN B1) 100 mg in sodium chloride 0 9 % 100 mL IV piggyback   Intravenous [address] Tisha Kenyon RN     03/28/2021 1011 nicotine (NICODERM CQ) 14 mg/24hr TD 24 hr patch 1 patch 1 patch Transdermal Not Given Tisha Kenyon RN     03/28/2021 0924 sodium chloride 0 9 % with KCl 20 mEq/L infusion (premix) 0 mL/hr Intravenous Stopped Tisha Kenyon RN     03/28/2021 1732 lactulose oral solution 20 g 20 g Oral Given Tisha Kenyon RN     03/28/2021 1012 lactulose oral solution 20 g 20 g Oral Given Tisha Kenyon RN     03/29/2021 0553 chlordiazePOXIDE (LIBRIUM) capsule 25 mg 25 mg Oral Given Casey Obrien RN     03/28/2021 2309 chlordiazePOXIDE (LIBRIUM) capsule 25 mg   Oral Canceled Entry Casey Obrien RN     03/28/2021 2054 chlordiazePOXIDE (LIBRIUM) capsule 25 mg 25 mg Oral Given Casey Obrien RN     03/28/2021 1732 chlordiazePOXIDE (LIBRIUM) capsule 25 mg 25 mg Oral Given Tisha Kenyon RN          Objective:     Vitals: Blood pressure 109/69, pulse 73, temperature 98 7 °F (37 1 °C), temperature source Oral, resp  rate 18, height 6' (1 829 m), weight 95 3 kg (210 lb), SpO2 97 %  ,Body mass index is 28 48 kg/m²      No intake or output data in the 24 hours ending 03/29/21 0840    Physical Exam:   General Appearance: Awake and alert, in no acute distress  Abdomen: Soft, slightly tender R abdomen/hip area no rebound tenderness or guarding present, non-distended; bowel sounds normal; no masses or no organomegaly    Invasive Devices     Peripheral Intravenous Line            Peripheral IV 03/25/21 Right;Ventral (anterior) Forearm 4 days                Lab Results:  No results displayed because visit has over 200 results  Imaging Studies: I have personally reviewed pertinent imaging studies

## 2021-03-29 NOTE — TELEPHONE ENCOUNTER
----- Message from Cedrick Kaiser, [address] sent at 3/29/2021  1:35 PM EDT -----  Regarding: hosptial f/u  Please schedule patient for hospital follow up with Dr Molly Salguero or Dr Jong Dodge regarding cirrhosis  He will need EGD/Colonoscopy as well which we can schedule at appointment  I think he is going to be discharged to a nursing home for rehab  Thank you!

## 2021-03-29 NOTE — OCCUPATIONAL THERAPY NOTE
OT TREATMENT       03/29/21 1518   Note Type   Note Type Treatment   Restrictions/Precautions   Other Precautions Chair Alarm; Bed Alarm; Fall Risk;Pain   Pain Assessment   Pain Assessment Tool 0-10   Pain Score 7   Pain Location/Orientation Orientation: Right;Location: Hip   ADL   Eating Assistance 7  Independent   Grooming Assistance 5  Supervision/Setup   Grooming Deficit Wash/dry face; Wash/dry hands   Grooming Comments seated   UB Bathing Assistance 4  Minimal Assistance   LB Bathing Assistance 3  Moderate Assistance   UB Dressing Assistance 4  [address] 4  Minimal Assistance   LB Dressing Deficit Don/doff R sock; Don/doff L sock   LB Dressing Comments seated on edge of bed    Toileting Assistance  4  Minimal Assistance   Toileting Deficit Bedside commode   Bed Mobility   Supine to Sit 5  Supervision   Sit to Supine 5  Supervision   Transfers   Sit to Stand 4  Minimal assistance   Stand to Sit 4  Minimal assistance   Toilet transfer 4  Minimal assistance   Additional items Commode   Functional Mobility   Functional Mobility 4  Minimal assistance   Additional Comments 10 feet x 2   Additional items Rolling walker   ROM- Right Upper Extremities   R Shoulder AROM; Flexion; Horizontal ABduction   R Elbow AROM;Elbow flexion;Elbow extension   R Hand AROM; Index finger; Thumb; Long finger;Ring finger;Little finger   R Weight/Reps/Sets 20 times each seated on edge of bed    ROM - Left Upper Extremities    L Shoulder AROM; Flexion; Horizontal ABduction   L Elbow AROM;Elbow flexion;Elbow extension   L Hand AROM; Thumb; Index finger; Long finger;Ring finger;Little finger   L Weight/Reps/Sets 20 times each seated on edge of bed    Cognition   Following Commands Follows multistep commands with increased time or repetition   Assessment   Assessment Patient is motivated, cooperative and pleasant  Patient is limited by R hip pain  Patient tolerated treatment well    Patient requires min assist for transfers and functional mobility, min/mod assist for LE ADLS  Patient will benefit from continued OT services to maximize functional performance with ADLS  The patient's raw score on the AM-PAC Daily Activity inpatient short form is 18, standardized score is 38 66, less than 39 4  Patients at this level are likely to benefit from DC to post-acute rehabilitation services  Please refer to the recommendation of the Occupational Therapist for safe DC planning  Plan   Treatment Interventions ADL retraining;Functional transfer training;UE strengthening/ROM; Patient/family training; Endurance training;Equipment evaluation/education; Activityengagement; Compensatory technique education   OT Frequency 3-5x/wk   Recommendation   OT Discharge Recommendation Post-Acute Rehabilitation Services   AM-[facility] Daily Activity Inpatient   Lower Body Dressing 3   Bathing 2   Toileting 3   Upper Body Dressing 3   Grooming 3   Eating 4   Daily Activity Raw Score 18   Daily Activity Standardized Score (Calc for Raw Score >=11) 38 66   AM-[facility] Applied Cognition Inpatient   Following a Speech/Presentation 4   Understanding Ordinary Conversation 4   Taking Medications 3   Remembering Where Things Are Placed or Put Away 3   Remembering List of 4-5 Errands 3   Taking Care of Complicated Tasks 3   Applied Cognition Raw Score 20   Applied Cognition Standardized Score 85 00   Licensure   [place] License Number  [place] [address] OTR/L [id number]

## 2021-03-29 NOTE — PHYSICAL THERAPY NOTE
PT TREATMENT     03/29/21 1400   Note Type   Note Type Treatment   Pain Assessment   Pain Assessment Tool 0-10   Pain Score 7  (Right hip and pelvis area)   Restrictions/Precautions   Other Precautions Chair Alarm; Bed Alarm; Fall Risk;Pain   General   Chart Reviewed Yes   Family/Caregiver Present No   Cognition   Arousal/Participation Cooperative   Subjective   Subjective Patient reports chronic pain in right hip and pelvis  Worse at this time due to fall prior to admission   Bed Mobility   Supine to Sit 5  Supervision   Sit to Supine 4  Minimal assistance   Additional items Assist x 1;Verbal cues;LE management   Transfers   Sit to Stand 4  Minimal assistance   Additional items Assist x 1;Verbal cues   Stand to Sit 4  Minimal assistance   Additional items Assist x 1;Verbal cues   Ambulation/Elevation   Gait Assistance 4  Minimal assist   Additional items Assist x 1;Verbal cues; Tactile cues   Assistive Device Rolling walker   Distance 40 ft with change in direction and guidance for proper walker usage at times  Shortened stride length and narrow base of support demonstrated  Second gait without assistive device Min to mod assist of 1 person, 30 ft  Activity Tolerance   Activity Tolerance Patient limited by fatigue;Patient limited by pain   Nurse Made Aware yes   Exercises   Heelslides Supine;10 reps;Bilateral   Hip Flexion Sitting;15 reps;Bilateral   Hip Abduction Sitting;15 reps;Bilateral   Knee AROM Short Arc Quad Supine;10 reps;Bilateral   Knee AROM Long Arc Quad Sitting;15 reps;Bilateral   Ankle Pumps Sitting;20 reps;Bilateral   Marching Standing;10 reps;Bilateral   Balance training  Sidestepping and backward walking completed for balance and strength training   Assessment   Assessment Patient cooperative although lethargic at times  Patient required roller walker for safe gait and will benefit from continued physical therapy with progression as tolerated to regain independence       The patient's AM-PAC Basic Mobility Inpatient Short Form Raw Score is 19, Standardized Score is 42 48  A standardized score less than 42 9 suggests the patient may benefit from discharge to post-acute rehabilitation services  Please also refer to the recommendation of the Physical Therapist for safe discharge planning  Plan   Treatment/Interventions ADL retraining;Functional transfer training;LE strengthening/ROM; Therapeutic exercise; Endurance training;Patient/family training;Equipment eval/education; Bed mobility;Gait training; Compensatory technique education   PT Frequency 5x/wk   Recommendation   PT Discharge Recommendation Post-Acute Rehabilitation Services   AM-PAC Basic Mobility Inpatient   Turning in Bed Without Bedrails 4   Lying on Back to Sitting on Edge of Flat Bed 4   Moving Bed to Chair 3   Standing Up From Chair 3   Walk in Room 3   Climb 3-5 Stairs 2   Basic Mobility Inpatient Raw Score 19   Basic Mobility Standardized Score 47 26   Licensure   [place] License Number  [employer] [id number]

## 2021-03-29 NOTE — PLAN OF CARE
Problem: OCCUPATIONAL THERAPY ADULT  Goal: Performs self-care activities at highest level of function for planned discharge setting  See evaluation for individualized goals  Description:   Outcome: Progressing  Note: Limitation: Decreased ADL status, Decreased UE strength, Decreased Safe judgement during ADL, Decreased endurance, Decreased self-care trans, Decreased high-level ADLs  Prognosis: Good  Assessment: Patient is motivated, cooperative and pleasant  Patient is limited by R hip pain  Patient tolerated treatment well  Patient requires min assist for transfers and functional mobility, min/mod assist for LE ADLS  Patient will benefit from continued OT services to maximize functional performance with ADLS        OT Discharge Recommendation: Post-Acute Rehabilitation Services

## 2021-03-29 NOTE — PROGRESS NOTES
[address] [facility]  Progress Note - Jarad Gallardo 1965, 55 y o  male MRN: [id number]  Unit/Bed#: [address] [place] [address] Encounter: [id number]  Primary Care Provider: Vickey Ellis MD   Date and time admitted to hospital: 3/23/2021  5:47 PM    * Alcohol withdrawal ([place] [address] )  Assessment & Plan  Admits to drinking heavily since he was 14, longest being sober about 3 months, history of withdrawal seizures  Last drink was 5 days ago  Case management consult for HOST referral   CIWA protocol, PRN ativan  Started on Librium 50mg q6 - patient improving - Librium adjusted to 25 mg Q8 , will tapered down to 20 mg q 12 hours  Patient continues to improve, still symptomatic with mild tremors   Continue Thiamine/FA/MV  Continue lactulose 20 g BID as per GI       Liver mass  Assessment & Plan  CT abdomen pelvis on presentation showed abnormal liver appearance with possible mass in right lobe  Follow-up ultrasound did not confirm this  MRI showed cirrhosis without evidence for hepatocellular carcinoma, portal hypertension  GI following, follow-up with GI as an outpatient    Decompensated hepatic cirrhosis ([place] [address] )  Assessment & Plan  Patient presents with decompensated liver cirrhosis with thrombocytopenia, coagulopathy, transaminitis, and portal hypertension, most likely due to heavy alcohol abuse  CT with irregular appearance of liver and possible mass  Appreciate GI following along  MRI showed cirrhosis without evidence for hepatocellular carcinoma  Portal hypertension evidenced by recanalized periumbilical vein, splenomegaly, trace ascites and probable tiny esophageal varices  Checking serologies, hepatitis panel; hepatitis panel negative  AFP mildly elevated  Trend LFTs  Deferring diuretic at this time as patient euvolemic    Outpatient colonoscopy/EGD recommended      Hyperbilirubinemia  Assessment & Plan  T bili 10 on admission, previously 4-5 range  T bili 7 2 today  , Alk phos 165, today  Alk phos 161  CT A/P "Very abnormal appearance of liver most likely cirrhosis with either a cyst or potentially a low-density solid mass in the right lobe  Splenomegaly and possible tiny upper abdominal varices indicative of portal hypertension "  RUQ ultrasound suspicious for liver mass  GI following  MRI abdomen showed cirrhosis without evidence for hepatocellular carcinoma and portal hypertension  Discussed with patient  Transaminitis  Assessment & Plan  In the setting of alcohol abuse  Also checking hepatitis panel and serologies  Hepatitis panel negative    Right hip pain  Assessment & Plan  Treat the patient with NSAIDs and Tylenol as needed  Most likely secondary to arthritis  Closed fracture of nasal bone  Assessment & Plan  [facility] showed Right-sided nasal fractures with swelling  Age indeterminate  Suspect new 2/2 fall vs seizure   Supportive care     Electrolyte abnormality  Assessment & Plan  Improved after potassium supplementation  Open wound of tongue due to bite  Assessment & Plan  Severe right tongue laceration and hematoma   Unknown etiology per patient   Highly suspicious of possible w/d seizure, ?fall   Ice chips, supportive care, Serial exams   Speech consulted, indicated that patient may have regular textured food with thin liquids     Tobacco abuse  Assessment & Plan  Nicotine patch  Encourage smoking cessation    Thrombocytopenia ([address] )  Assessment & Plan  Likely 2/2 underlying cirrhosis   Hold dvt ppx   Daily CBC/plt      Subjective:   I have seen and Examined the patient at the bedside  No CP or Sob  No fevers or chills, No nausea or vomiting  Overnight events reviewed with the RN  No Other complains  Patient currently denies any withdrawal like symptoms  Is on Librium taper and is doing well  But is very deconditioned and tired  Did attempt some physical therapy but was very deconditioned and weak    Did complain of some right hip pain and has history of osteoarthritis and chronic hip pain the past this has worsened now  Having significant pain while tried to do physical therapy  Review of System:   Denies any CP or SOB  Denies any Cough or Cold  Denies any Fevers or chills  Denies any focal tingling numbness or weakness in any extremities  Denies any abdominal pain, Nausea or vomiting  Objective:   Temp (24hrs), Av 5 °F (36 9 °C), Min:98 3 °F (36 8 °C), Max:98 7 °F (37 1 °C)    Temp:  [98 3 °F (36 8 °C)-98 7 °F (37 1 °C)] 98 5 °F (36 9 °C)  HR:  [69-85] 85  Resp:  [18] 18  BP: (109-114)/(66-70) 111/66  SpO2:  [94 %-98 %] 98 %  Body mass index is 28 48 kg/m²  Input and Output Summary (last 24 hours):   No intake or output data in the 24 hours ending 21 1543  I/O        07 -  0700  07 -  0700  07 -  0700    P  O  1000      I V  (mL/kg) 281 3 (3)      Total Intake(mL/kg) 1281 3 (13 4)      Urine (mL/kg/hr) 500 (0 2)      Stool 0      Total Output 500      Net +781  3             Unmeasured Urine Occurrence 1 x      Unmeasured Stool Occurrence 1 x  1 x          Physical Exam:   General : Alert, Awake and oriented x 2-3, NAD  Slightly weak and tired  Neck : Supple  Eyes:  ANDRESSA, EOMI  CVS : S1, S2, RRR    R/S : Clear to auscultate anteriorly  Abd: Soft, NT, ND  Bs+ve  Extremity: Trace pedal edema noted  Right hip pain and mild tenderness noted in the muscle region around the hip joint  Skin: No acute Rash noted  CNS: No acute FND  Additional Data:     Labs & Imaging Data Reviewed :    Results from last 7 days   Lab Units 21  0530  21  0502   WBC Thousand/uL 4 35   < > 6 96   HEMOGLOBIN g/dL 11 9*   < > 12 6   HEMATOCRIT % 36 2*   < > 36 8   PLATELETS Thousands/uL 56*   < > 26*   NEUTROS PCT %  --   --  82*   LYMPHS PCT %  --   --  8*   MONOS PCT %  --   --  9   EOS PCT %  --   --  1    < > = values in this interval not displayed       Results from last 7 days   Lab Units 21  9094   POTASSIUM mmol/L 4 0   CHLORIDE mmol/L 105   CO2 mmol/L 26   BUN mg/dL 9   CREATININE mg/dL 0 97   CALCIUM mg/dL 8 4   ALK PHOS U/L 151*   ALT U/L 40   AST U/L 87*     Results from last 7 days   Lab Units 03/29/21  0530   INR  1 66*     No results found for: HGBA1C   MRI abdomen w wo contrast   Final Result by Taisha Ibarra DO (03/26 2203)      Study limited by respiratory motion  Cirrhosis without evidence for hepatocellular carcinoma  2 cm nodule in the right hepatic lobe on CT likely corresponds to a regenerative nodule  Periodic ultrasound surveillance may be warranted  Portal hypertension evidenced by recanalized periumbilical vein, splenomegaly, trace ascites and probable tiny esophageal varices  Workstation performed: [id number]         US liver   Final Result by Landry Paredes MD (03/24 1407)      1  Cirrhosis  2   Right hepatic lobe mass noted on CT not identified on this sonogram   Most likely, this is due to difficulties and far field penetration  As noted in the CT report, this mass is best evaluated with MRI when the patient's condition allows  Workstation performed: [id number]         CT abdomen pelvis with contrast   Final Result by Soren Villar MD (03/23 1947)      Very abnormal appearance of liver most likely cirrhosis with either a cyst or potentially a low-density solid mass in the right lobe  Follow-up hepatic MRI should be considered for better characterization  Splenomegaly and possible tiny upper abdominal varices indicative of portal hypertension  Evidence of old skeletal trauma  Cholecystectomy  Very small left inguinal hernia containing fat            Workstation performed: [id number]         CT head without contrast   Final Result by Soren Villar MD (03/23 1930)      No acute intracranial abnormality  Right-sided nasal fractures with swelling  Age indeterminate                    Workstation performed: [id number] Cultures:   Blood Culture:   Lab Results   Component Value Date    BLOODCX No Growth After 5 Days  12/05/2020    BLOODCX No Growth After 5 Days  12/05/2020     Urine Culture: No results found for: URINECX  Sputum Culture: No components found for: SPUTUMCX  Wound Culture: No results found for: WOUNDCULT    Last 24 Hours Medication List:   Current Facility-Administered Medications   Medication Dose Route Frequency Provider Last Rate    acetaminophen  650 mg Oral Q6H PRN Monika Gardner MD      calcium carbonate  1,000 mg Oral Daily PRN Joseph Herr PA-C      chlordiazePOXIDE  25 mg Oral Q12H [address] Monika Gardner MD      folic acid 1 mg, thiamine 100 mg in 0 9% sodium chloride 100 mL IVPB   Intravenous Daily Joseph Herr PA-C 0 mL/hr at 03/26/21 1121    ibuprofen  800 mg Oral BID after meals Monika Gardner MD      lactulose  20 g Oral BID TIMMY Solares      LORazepam  4 mg Intravenous Once Lashanda Issa DO      nicotine  1 patch Transdermal Daily Joseph Herr PA-C      ondansetron  4 mg Intravenous Q6H PRN Joseph Herr PA-C         Patient is at moderate risk for morbidity and mortality due to above mentioned illness and comorbidities

## 2021-03-29 NOTE — CASE MANAGEMENT
Patient is for anticiapated dc today/tomorrow  Deniz spoke with patient and confirmed dc plan for patient to go to [facility] at [employer]  Deniz then called [place] and spoke with Leela Massey initiated under NLA#[id number]  Clinical faxed to [phone]  Awaiting cb

## 2021-03-29 NOTE — CASE MANAGEMENT
CM received cb from [facility] at [employer] and [facility] at [employer] and [address] obtained  Patient can go to [place] [address] per [facility] transport requested in [place]  Patient and his nurse Anita Dress were made aware  Requested [facility] call Anita Dress with transport time

## 2021-03-29 NOTE — [place] UNIVERSAL TRANSFER FORM
[place] UNIVERS[place] TRANSFER FORM  (ALL ITEMS MUST BE COMPLETED)    1  TRANSFER FROM: Olinda S Lyndsey Contreras      TRANSFER TO: [facility]    2  DATE OF TRANSFER: 3/29/2021                        TIME OF TRANSFER: 1900    3  PATIENT NAME: MECHE Lovell      YOB: 1965                             GENDER: male    4  LANGUAGE:   English    5  PHYSICIAN NAME:  Monika Gardner MD                   PHONE: [phone]    6  CODE STATUS: Level 1 - Full Code        Out of Hospital DNR Attached: No    7  :                                      :  Extended Emergency Contact Information  Primary Emergency Contact: Lei Case  Mobile Phone: [phone]  Relation: Sister  Preferred language: English   needed? No           Health Care Representative/Proxy:  No           Legal Guardian:  No             NAME OF:           HEALTH CARE REPRESENTATIVE/PROXY:                                         OR           LEGAL GUARDIAN, IF NOT :                                               PHONE:  (Day)           (Night)                        (Cell)    8  REASON FOR TRANSFER: (Must include brief medical history and recent changes in physical function or cognition ) weakness, fall, etoh abuse            V/S: /66 (BP Location: Right arm)   Pulse 85   Temp 98 5 °F (36 9 °C) (Oral)   Resp 18   Ht 6' (1 829 m)   Wt 95 3 kg (210 lb)   SpO2 98%   BMI 28 48 kg/m²           PAIN: None    9  PRIMARY DIAGNOSIS: Alcohol withdrawal ([address] )      Secondary Diagnosis:         Pacemaker: No      Internal Defib: No          Mental Health Diagnosis (if Applicable):    10  RESTRAINTS: No     11  RESPIRATORY NEEDS: None    12  ISOLATION/PRECAUTION: None    13  ALLERGY: Patient has no known allergies  14  SENSORY:       Vision Good    15  SKIN CONDITION: No Wounds    16  DIET: Special (describe)low sodium    17  IV ACCESS: None    18   PERSONAL ITEMS SENT WITH PATIENT: Otherclothing, hat, mask, shoes    19  ATTACHED DOCUMENTS: MUST ATTACH CURRENT MEDICATION INFORMATION Face Sheet, MAR, Medication Reconciliation, Diagnostic Studies and Discharge Summary    20  AT RISK ALERTS:Falls        HARM TO: N/A    21  WEIGHT BEARING STATUS:         Left Leg: None        Right Leg: None    22  MENTAL STATUS:Alert    23  FUNCTION:        Walk: With Help        Transfer: With Help        Toilet: With Help        Feed: Self    24  IMMUNIZATIONS/SCREENING:     There is no immunization history on file for this patient  25  BOWEL: Continent    26  BLADDER: Continent    27   [facility]                  Title: rn        Unit: [facility]        Phone: [phone] [address] (if known):        Title:        Unit:         Phone:         FORM PREFILLED BY (if applicable)       Title:       Unit:        Phone:         Judge Lundberg BY Juan Mcclure RN      Title: kasey      Phone: [phone]

## 2021-03-29 NOTE — TELEPHONE ENCOUNTER
I called pt, however, received message that the person you are trying to reach is unavailable at this time  Will call pt again in a few days

## 2021-03-29 NOTE — CASE MANAGEMENT
Patient has a 730PM  to [place] tonight Maritza Scheuermann made aware as well as xavier at [facility]

## 2021-03-29 NOTE — CASE MANAGEMENT
Mary Parker at [phone] and confirmed she received prior clinical faxed and she did  Updated PT note from today faxed to Maddy at [facility] at [phone]

## 2021-03-30 NOTE — UTILIZATION REVIEW
Notification of Discharge  This is a Notification of Discharge from our facility [address]  Please be advised that this patient has been discharge from our facility  Below you will find the admission and discharge date and time including the patients disposition  PRESENTATION DATE: 3/23/2021  5:47 PM  OBS ADMISSION DATE:   IP ADMISSION DATE: 3/23/21 2057   DISCHARGE DATE: 3/29/2021  8:41 PM  DISPOSITION: Non [facility] SN[facility]/S[facility] Non [facility] SN[facility]/TC[facility]/SNU   Admission Orders listed below:  Admission Orders (From admission, onward)     Ordered        03/23/21 2058  Inpatient Admission  Once                   Please contact the UR Department if additional information is required to close this patient's authorization/case  Muriel Jesse  [facility] Utilization Review Department  Main: [phone] x carefully listen to the prompts  All voicemails are confidential   [email or web address]  Send all requests for admission clinical reviews, approved or denied determinations and any other requests to dedicated fax number below belonging to the campus where the patient is receiving treatment   List of dedicated fax numbers:  [address] DENIALS (Administrative/Medical Necessity) [phone]   [address] [place] [address] (Maternity/NICU/Pediatrics) [phone]   Orquidea Burns [phone]   Tiffanie Moore [phone]   Kylie Acevedo [phone]   [facility] [address] [place] [address] [phone]   [facility]  [phone]   [address] [facility] [address]  [address] [facility] [address] [facility] [phone]

## 2021-03-31 NOTE — TELEPHONE ENCOUNTER
Called pt again and received same message, the person you are trying to reach is unavailable right now  Will try again in one week

## 2021-04-07 NOTE — TELEPHONE ENCOUNTER
Called pt again, received same message the person you are trying to reach is not accepting calls at this time  Contact letter mailed to pt

## 2022-01-11 ENCOUNTER — HOSPITAL ENCOUNTER (EMERGENCY)
Facility: HOSPITAL | Age: 57
End: 2022-01-11
Attending: EMERGENCY MEDICINE | Admitting: EMERGENCY MEDICINE
Payer: COMMERCIAL

## 2022-01-11 ENCOUNTER — HOSPITAL ENCOUNTER (INPATIENT)
Facility: HOSPITAL | Age: 57
LOS: 4 days | Discharge: HOME | DRG: 772 | End: 2022-01-15
Attending: EMERGENCY MEDICINE | Admitting: EMERGENCY MEDICINE
Payer: COMMERCIAL

## 2022-01-11 VITALS
SYSTOLIC BLOOD PRESSURE: 134 MMHG | OXYGEN SATURATION: 96 % | TEMPERATURE: 96.1 F | RESPIRATION RATE: 25 BRPM | DIASTOLIC BLOOD PRESSURE: 83 MMHG | HEART RATE: 75 BPM

## 2022-01-11 DIAGNOSIS — F10.230 ALCOHOL WITHDRAWAL SYNDROME WITHOUT COMPLICATION (HCC): Primary | ICD-10-CM

## 2022-01-11 DIAGNOSIS — K70.30 ALCOHOLIC CIRRHOSIS OF LIVER WITHOUT ASCITES (HCC): Primary | ICD-10-CM

## 2022-01-11 DIAGNOSIS — F10.10 ALCOHOL ABUSE: Chronic | ICD-10-CM

## 2022-01-11 DIAGNOSIS — E83.42 HYPOMAGNESEMIA: ICD-10-CM

## 2022-01-11 DIAGNOSIS — K72.90 DECOMPENSATED HEPATIC CIRRHOSIS (HCC): ICD-10-CM

## 2022-01-11 DIAGNOSIS — K74.60 DECOMPENSATED HEPATIC CIRRHOSIS (HCC): ICD-10-CM

## 2022-01-11 DIAGNOSIS — G93.40 ENCEPHALOPATHY: ICD-10-CM

## 2022-01-11 PROBLEM — F10.20 ALCOHOL USE DISORDER, SEVERE, DEPENDENCE (HCC): Status: ACTIVE | Noted: 2020-12-05

## 2022-01-11 PROBLEM — U07.1 COVID-19: Status: ACTIVE | Noted: 2022-01-11

## 2022-01-11 PROBLEM — R94.31 PROLONGED Q-T INTERVAL ON ECG: Status: ACTIVE | Noted: 2022-01-11

## 2022-01-11 LAB
ALBUMIN SERPL BCP-MCNC: 2.7 G/DL (ref 3.5–5)
ALP SERPL-CCNC: 157 U/L (ref 46–116)
ALT SERPL W P-5'-P-CCNC: 51 U/L (ref 12–78)
AMPHETAMINES SERPL QL SCN: NEGATIVE
ANION GAP SERPL CALCULATED.3IONS-SCNC: 11 MMOL/L (ref 4–13)
APTT PPP: 37 SECONDS (ref 23–37)
AST SERPL W P-5'-P-CCNC: 162 U/L (ref 5–45)
ATRIAL RATE: 70 BPM
BARBITURATES UR QL: NEGATIVE
BASOPHILS # BLD AUTO: 0.03 THOUSANDS/ΜL (ref 0–0.1)
BASOPHILS NFR BLD AUTO: 1 % (ref 0–1)
BENZODIAZ UR QL: NEGATIVE
BILIRUB SERPL-MCNC: 3.89 MG/DL (ref 0.2–1)
BILIRUB UR QL STRIP: ABNORMAL
BUN SERPL-MCNC: 8 MG/DL (ref 5–25)
CALCIUM ALBUM COR SERPL-MCNC: 8.7 MG/DL (ref 8.3–10.1)
CALCIUM SERPL-MCNC: 7.7 MG/DL (ref 8.3–10.1)
CHLORIDE SERPL-SCNC: 100 MMOL/L (ref 100–108)
CLARITY UR: CLEAR
CO2 SERPL-SCNC: 23 MMOL/L (ref 21–32)
COCAINE UR QL: NEGATIVE
COLOR UR: ABNORMAL
CREAT SERPL-MCNC: 0.64 MG/DL (ref 0.6–1.3)
EOSINOPHIL # BLD AUTO: 0.04 THOUSAND/ΜL (ref 0–0.61)
EOSINOPHIL NFR BLD AUTO: 2 % (ref 0–6)
ERYTHROCYTE [DISTWIDTH] IN BLOOD BY AUTOMATED COUNT: 15.5 % (ref 11.6–15.1)
ETHANOL SERPL-MCNC: <3 MG/DL (ref 0–3)
FLUAV RNA RESP QL NAA+PROBE: NEGATIVE
FLUBV RNA RESP QL NAA+PROBE: NEGATIVE
GFR SERPL CREATININE-BSD FRML MDRD: 110 ML/MIN/1.73SQ M
GLUCOSE SERPL-MCNC: 112 MG/DL (ref 65–140)
GLUCOSE UR STRIP-MCNC: NEGATIVE MG/DL
HCT VFR BLD AUTO: 43.8 % (ref 36.5–49.3)
HGB BLD-MCNC: 15.1 G/DL (ref 12–17)
HGB UR QL STRIP.AUTO: NEGATIVE
IMM GRANULOCYTES # BLD AUTO: 0.01 THOUSAND/UL (ref 0–0.2)
IMM GRANULOCYTES NFR BLD AUTO: 0 % (ref 0–2)
INR PPP: 1.36 (ref 0.84–1.19)
KETONES UR STRIP-MCNC: ABNORMAL MG/DL
LEUKOCYTE ESTERASE UR QL STRIP: NEGATIVE
LIPASE SERPL-CCNC: 303 U/L (ref 73–393)
LYMPHOCYTES # BLD AUTO: 0.61 THOUSANDS/ΜL (ref 0.6–4.47)
LYMPHOCYTES NFR BLD AUTO: 24 % (ref 14–44)
MAGNESIUM SERPL-MCNC: 1.5 MG/DL (ref 1.6–2.6)
MCH RBC QN AUTO: 33.9 PG (ref 26.8–34.3)
MCHC RBC AUTO-ENTMCNC: 34.5 G/DL (ref 31.4–37.4)
MCV RBC AUTO: 98 FL (ref 82–98)
METHADONE UR QL: NEGATIVE
MONOCYTES # BLD AUTO: 0.25 THOUSAND/ΜL (ref 0.17–1.22)
MONOCYTES NFR BLD AUTO: 10 % (ref 4–12)
NEUTROPHILS # BLD AUTO: 1.62 THOUSANDS/ΜL (ref 1.85–7.62)
NEUTS SEG NFR BLD AUTO: 63 % (ref 43–75)
NITRITE UR QL STRIP: NEGATIVE
NRBC BLD AUTO-RTO: 0 /100 WBCS
OPIATES UR QL SCN: NEGATIVE
OXYCODONE+OXYMORPHONE UR QL SCN: NEGATIVE
P AXIS: 32 DEGREES
PCP UR QL: NEGATIVE
PH UR STRIP.AUTO: 7 [PH]
PLATELET # BLD AUTO: 25 THOUSANDS/UL (ref 149–390)
POTASSIUM SERPL-SCNC: 4.4 MMOL/L (ref 3.5–5.3)
PR INTERVAL: 166 MS
PROT SERPL-MCNC: 7.6 G/DL (ref 6.4–8.2)
PROT UR STRIP-MCNC: NEGATIVE MG/DL
PROTHROMBIN TIME: 16.5 SECONDS (ref 11.6–14.5)
QRS AXIS: 34 DEGREES
QRSD INTERVAL: 100 MS
QT INTERVAL: 460 MS
QTC INTERVAL: 496 MS
RBC # BLD AUTO: 4.46 MILLION/UL (ref 3.88–5.62)
RSV RNA RESP QL NAA+PROBE: NEGATIVE
SARS-COV-2 RNA RESP QL NAA+PROBE: POSITIVE
SODIUM SERPL-SCNC: 134 MMOL/L (ref 136–145)
SP GR UR STRIP.AUTO: 1.01 (ref 1–1.03)
T WAVE AXIS: 35 DEGREES
THC UR QL: POSITIVE
UROBILINOGEN UR QL STRIP.AUTO: 2 E.U./DL
VENTRICULAR RATE: 70 BPM
WBC # BLD AUTO: 2.56 THOUSAND/UL (ref 4.31–10.16)

## 2022-01-11 PROCEDURE — 36415 COLL VENOUS BLD VENIPUNCTURE: CPT | Performed by: EMERGENCY MEDICINE

## 2022-01-11 PROCEDURE — 99285 EMERGENCY DEPT VISIT HI MDM: CPT | Performed by: EMERGENCY MEDICINE

## 2022-01-11 PROCEDURE — 82077 ASSAY SPEC XCP UR&BREATH IA: CPT | Performed by: EMERGENCY MEDICINE

## 2022-01-11 PROCEDURE — 99285 EMERGENCY DEPT VISIT HI MDM: CPT

## 2022-01-11 PROCEDURE — 96361 HYDRATE IV INFUSION ADD-ON: CPT

## 2022-01-11 PROCEDURE — 96374 THER/PROPH/DIAG INJ IV PUSH: CPT

## 2022-01-11 PROCEDURE — 85610 PROTHROMBIN TIME: CPT | Performed by: EMERGENCY MEDICINE

## 2022-01-11 PROCEDURE — NC001 PR NO CHARGE: Performed by: PHYSICIAN ASSISTANT

## 2022-01-11 PROCEDURE — 83735 ASSAY OF MAGNESIUM: CPT | Performed by: EMERGENCY MEDICINE

## 2022-01-11 PROCEDURE — 0241U HB NFCT DS VIR RESP RNA 4 TRGT: CPT | Performed by: EMERGENCY MEDICINE

## 2022-01-11 PROCEDURE — 96375 TX/PRO/DX INJ NEW DRUG ADDON: CPT

## 2022-01-11 PROCEDURE — 96366 THER/PROPH/DIAG IV INF ADDON: CPT

## 2022-01-11 PROCEDURE — 96365 THER/PROPH/DIAG IV INF INIT: CPT

## 2022-01-11 PROCEDURE — 99291 CRITICAL CARE FIRST HOUR: CPT | Performed by: EMERGENCY MEDICINE

## 2022-01-11 PROCEDURE — 96376 TX/PRO/DX INJ SAME DRUG ADON: CPT

## 2022-01-11 PROCEDURE — 96372 THER/PROPH/DIAG INJ SC/IM: CPT

## 2022-01-11 PROCEDURE — 85730 THROMBOPLASTIN TIME PARTIAL: CPT | Performed by: EMERGENCY MEDICINE

## 2022-01-11 PROCEDURE — 81003 URINALYSIS AUTO W/O SCOPE: CPT | Performed by: EMERGENCY MEDICINE

## 2022-01-11 PROCEDURE — 83690 ASSAY OF LIPASE: CPT | Performed by: EMERGENCY MEDICINE

## 2022-01-11 PROCEDURE — 85025 COMPLETE CBC W/AUTO DIFF WBC: CPT | Performed by: EMERGENCY MEDICINE

## 2022-01-11 PROCEDURE — 80307 DRUG TEST PRSMV CHEM ANLYZR: CPT | Performed by: EMERGENCY MEDICINE

## 2022-01-11 PROCEDURE — 80053 COMPREHEN METABOLIC PANEL: CPT | Performed by: EMERGENCY MEDICINE

## 2022-01-11 PROCEDURE — 93010 ELECTROCARDIOGRAM REPORT: CPT | Performed by: INTERNAL MEDICINE

## 2022-01-11 PROCEDURE — 93005 ELECTROCARDIOGRAM TRACING: CPT

## 2022-01-11 PROCEDURE — HZ89ZZZ MEDICATION MANAGEMENT FOR SUBSTANCE ABUSE TREATMENT, OTHER REPLACEMENT MEDICATION: ICD-10-PCS | Performed by: EMERGENCY MEDICINE

## 2022-01-11 RX ORDER — THIAMINE HYDROCHLORIDE 100 MG/ML
100 INJECTION, SOLUTION INTRAMUSCULAR; INTRAVENOUS ONCE
Status: COMPLETED | OUTPATIENT
Start: 2022-01-11 | End: 2022-01-11

## 2022-01-11 RX ORDER — LORAZEPAM 2 MG/ML
2 INJECTION INTRAMUSCULAR ONCE
Status: COMPLETED | OUTPATIENT
Start: 2022-01-11 | End: 2022-01-11

## 2022-01-11 RX ORDER — PHENOBARBITAL SODIUM 130 MG/ML
260 INJECTION INTRAMUSCULAR ONCE
Status: COMPLETED | OUTPATIENT
Start: 2022-01-11 | End: 2022-01-11

## 2022-01-11 RX ORDER — PHENOBARBITAL SODIUM 130 MG/ML
130 INJECTION INTRAMUSCULAR ONCE
Status: COMPLETED | OUTPATIENT
Start: 2022-01-11 | End: 2022-01-11

## 2022-01-11 RX ORDER — SODIUM CHLORIDE 9 MG/ML
125 INJECTION, SOLUTION INTRAVENOUS CONTINUOUS
Status: DISCONTINUED | OUTPATIENT
Start: 2022-01-11 | End: 2022-01-11 | Stop reason: HOSPADM

## 2022-01-11 RX ORDER — ONDANSETRON 2 MG/ML
4 INJECTION INTRAMUSCULAR; INTRAVENOUS ONCE
Status: COMPLETED | OUTPATIENT
Start: 2022-01-11 | End: 2022-01-11

## 2022-01-11 RX ORDER — ONDANSETRON 2 MG/ML
4 INJECTION INTRAMUSCULAR; INTRAVENOUS EVERY 6 HOURS PRN
Status: DISCONTINUED | OUTPATIENT
Start: 2022-01-11 | End: 2022-01-15 | Stop reason: HOSPADM

## 2022-01-11 RX ORDER — PHENOBARBITAL SODIUM 130 MG/ML
130 INJECTION INTRAMUSCULAR ONCE
Status: DISCONTINUED | OUTPATIENT
Start: 2022-01-11 | End: 2022-01-11

## 2022-01-11 RX ORDER — SODIUM CHLORIDE 9 MG/ML
100 INJECTION, SOLUTION INTRAVENOUS CONTINUOUS
Status: DISCONTINUED | OUTPATIENT
Start: 2022-01-11 | End: 2022-01-13

## 2022-01-11 RX ORDER — MAGNESIUM HYDROXIDE/ALUMINUM HYDROXICE/SIMETHICONE 120; 1200; 1200 MG/30ML; MG/30ML; MG/30ML
30 SUSPENSION ORAL EVERY 6 HOURS PRN
Status: DISCONTINUED | OUTPATIENT
Start: 2022-01-11 | End: 2022-01-15 | Stop reason: HOSPADM

## 2022-01-11 RX ORDER — MAGNESIUM SULFATE HEPTAHYDRATE 40 MG/ML
2 INJECTION, SOLUTION INTRAVENOUS ONCE
Status: COMPLETED | OUTPATIENT
Start: 2022-01-11 | End: 2022-01-11

## 2022-01-11 RX ORDER — ACETAMINOPHEN 325 MG/1
650 TABLET ORAL EVERY 6 HOURS PRN
Status: DISCONTINUED | OUTPATIENT
Start: 2022-01-11 | End: 2022-01-15 | Stop reason: HOSPADM

## 2022-01-11 RX ORDER — TRAZODONE HYDROCHLORIDE 50 MG/1
50 TABLET ORAL
Status: DISCONTINUED | OUTPATIENT
Start: 2022-01-11 | End: 2022-01-15 | Stop reason: HOSPADM

## 2022-01-11 RX ORDER — FOLIC ACID 1 MG/1
1 TABLET ORAL DAILY
Status: DISCONTINUED | OUTPATIENT
Start: 2022-01-11 | End: 2022-01-15 | Stop reason: HOSPADM

## 2022-01-11 RX ORDER — LORAZEPAM 2 MG/ML
1 INJECTION INTRAMUSCULAR ONCE
Status: COMPLETED | OUTPATIENT
Start: 2022-01-11 | End: 2022-01-11

## 2022-01-11 RX ORDER — DOCUSATE SODIUM 100 MG/1
100 CAPSULE, LIQUID FILLED ORAL 2 TIMES DAILY PRN
Status: DISCONTINUED | OUTPATIENT
Start: 2022-01-11 | End: 2022-01-15 | Stop reason: HOSPADM

## 2022-01-11 RX ORDER — DIAZEPAM 5 MG/1
10 TABLET ORAL ONCE
Status: COMPLETED | OUTPATIENT
Start: 2022-01-11 | End: 2022-01-11

## 2022-01-11 RX ADMIN — SODIUM CHLORIDE 125 ML/HR: 0.9 INJECTION, SOLUTION INTRAVENOUS at 15:27

## 2022-01-11 RX ADMIN — ONDANSETRON 4 MG: 2 INJECTION INTRAMUSCULAR; INTRAVENOUS at 10:49

## 2022-01-11 RX ADMIN — LORAZEPAM 1 MG: 2 INJECTION INTRAMUSCULAR; INTRAVENOUS at 15:11

## 2022-01-11 RX ADMIN — THIAMINE HYDROCHLORIDE 500 MG: 100 INJECTION, SOLUTION INTRAMUSCULAR; INTRAVENOUS at 21:53

## 2022-01-11 RX ADMIN — THIAMINE HYDROCHLORIDE 100 MG: 100 INJECTION, SOLUTION INTRAMUSCULAR; INTRAVENOUS at 10:49

## 2022-01-11 RX ADMIN — PHENOBARBITAL SODIUM 130 MG: 130 INJECTION INTRAMUSCULAR at 22:16

## 2022-01-11 RX ADMIN — DIAZEPAM 10 MG: 5 TABLET ORAL at 13:17

## 2022-01-11 RX ADMIN — FOLIC ACID 1 MG: 1 TABLET ORAL at 17:31

## 2022-01-11 RX ADMIN — ACETAMINOPHEN 650 MG: 325 TABLET ORAL at 21:55

## 2022-01-11 RX ADMIN — SODIUM CHLORIDE 1000 ML: 0.9 INJECTION, SOLUTION INTRAVENOUS at 10:45

## 2022-01-11 RX ADMIN — LORAZEPAM 2 MG: 2 INJECTION INTRAMUSCULAR; INTRAVENOUS at 10:48

## 2022-01-11 RX ADMIN — PHENOBARBITAL SODIUM 130 MG: 130 INJECTION INTRAMUSCULAR at 20:11

## 2022-01-11 RX ADMIN — LORAZEPAM 2 MG: 2 INJECTION INTRAMUSCULAR; INTRAVENOUS at 11:32

## 2022-01-11 RX ADMIN — SODIUM CHLORIDE 125 ML/HR: 0.9 INJECTION, SOLUTION INTRAVENOUS at 17:31

## 2022-01-11 RX ADMIN — MAGNESIUM SULFATE HEPTAHYDRATE 2 G: 40 INJECTION, SOLUTION INTRAVENOUS at 13:17

## 2022-01-11 RX ADMIN — PHENOBARBITAL SODIUM 260 MG: 130 INJECTION INTRAMUSCULAR at 18:48

## 2022-01-11 RX ADMIN — Medication 650 MG: at 17:31

## 2022-01-11 NOTE — ED PROCEDURE NOTE
PROCEDURE  ECG 12 Lead Documentation Only    Date/Time: 1/11/2022 11:05 AM  Performed by: Jeremías Connolly DO  Authorized by: Jeremías Connolly DO     ECG reviewed by me, the ED Provider: yes    Patient location:  ED  Interpretation:     Interpretation: abnormal    Rate:     ECG rate:  70    ECG rate assessment: normal    Rhythm:     Rhythm: sinus rhythm    Ectopy:     Ectopy: none    ST segments:     ST segments:  Normal  T waves:     T waves: normal    Other findings:     Other findings: prolonged qTc interval           Jeremías Connolly DO  01/11/22 1105

## 2022-01-11 NOTE — Clinical Note
Case was discussed with Dr Hilary Carmona and the patient's admission status was agreed to be Admission Status: inpatient status to the service of Dr Hilary Carmona

## 2022-01-11 NOTE — ASSESSMENT & PLAN NOTE
· CBC 1/11/2022: platelets 25  · Likely 2/2 myelosuppression from chronic alcohol consumption  · No evidence of active bleeding at this time  · Continue to monitor symptoms  · Fall precautions  · Will initiate continual observation for safety   · Continue to monitor platelets, repeat CBC in AM and consider transfusion

## 2022-01-11 NOTE — ED NOTES
Dr Kellie Mcfadden is aware patient meets two SIRS criteria and has a platelet count of 25  Does not want a sepsis alert called at this time       Robert Sheehan RN  01/11/22 6266

## 2022-01-11 NOTE — ASSESSMENT & PLAN NOTE
· CMP 1/11/2022:  LFTs , ALT 51,     · Likely 2/2 chronic alcohol consumption / cirrhosis  · Continue to monitor, repeat CMP in AM

## 2022-01-11 NOTE — H&P
[facility]  H&P- Giuliano Abts 1965, 55 y o  male MRN: [id number]  Unit/Bed#: 5T DETOX 503-01 Encounter: [id number]  Primary Care Provider: Paradise Villanueva MD   Date and time admitted to hospital: 1/11/2022  4:51 PM      HISTORY & PHYSICAL [facility]  LEVEL 4 MEDICAL DETOX UNIT  Giuliano Abts 55 y o  male MRN: [id number]  Unit/Bed#: 5T DETOX 503-01 Encounter: [id number]      Reason for Admission/Principal Problem: Ethanol withdrawal, Ethanol use disorder  Admitting Provider: Daniel Duff PA-C  Attending Provider: Loni Masterson*   1/11/2022  4:51 PM    * Alcohol withdrawal, with unspecified complication [facility])  Assessment & Plan  Patient with h/o chronic alcohol use since age 26; endorses h/o withdrawal seizures  Last drink: consumed 1 beer morning of 1/11/2022  Ethanol 1/11/2022 11:44 am: <3  Follow SEWS protocol for medically-assisted alcohol withdrawal  · Patient received 5 mg ativan and 10 mg valium in ED PTA- hold further benzodiazepines at this time  · Initial SEWS 16- 650 mg phenobarbital administered    Alcohol use disorder, severe, dependence ([facility] [address] )  Assessment & Plan  Patient with h/o chronic alcohol use since age 26   · States recently sober x 5 months until relapse 2 months ago triggered by social stressors including loss of job and house  Reports he currently consumes 1-2 glasses of vodka daily (of note- reported to nursing 1 pint of vodka daily + beer)  · States last drink this morning- consumed 1 beer  Pertinent labs:  CBC 1/11/2022: hgb 15 1, MCV 98, WBCs 2 56, ANC 1 62  CMP 1/11/2022: Na 134, K+ 4 4  AG 11  LFTs , ALT 51,     Ethanol 1/11/2022 11:44 am: <3  Initiate IVFs, daily thiamine/folic acid supplementation  Follow SEWS protocol for medically-assisted alcohol withdrawal  Consult case management for assistance with rehab resources- expresses interest in inpatient rehab at this time    Thrombocytopenia [facility])  Assessment & Plan  · CBC 1/11/2022: platelets 25  · Likely 2/2 myelosuppression from chronic alcohol consumption  · No evidence of active bleeding at this time  · Continue to monitor symptoms  · Fall precautions  · Will initiate continual observation for safety   · Continue to monitor platelets, repeat CBC in AM and consider transfusion    Alcoholic cirrhosis of liver ([address] )  Assessment & Plan  · Likely 2/2 chronic alcohol consumption  · Previously admitted to [facility] [place] [id number] 3/23-3/29/2021- seen by GI during that admission   · MRI abdomen 3/25/2021: "Cirrhosis without evidence for hepatocellular carcinoma  Portal hypertension evidenced by recanalized periumbilical vein, splenomegaly, trace ascites and probable tiny esophageal varices "  · GI recommended outpatient follow-up and EGD on discharge- patient did not follow-up  · Discharged on lactulose 20 g BID, however patient reports he has been non-compliant   · CMP 1/11/2022: Na 134, Cr 0 64  LFTs- , ALT 51,   TBili 3 89  · INR 1 36  · Platelets 25  · Calculated MELD/MELD-Na score 15/19  · Encourage alcohol cessation  · Continue to monitor CMP  · Will check ammonia  · Recommend prompt follow-up with GI upon discharge    Transaminitis  Assessment & Plan  · CMP 1/11/2022:  LFTs , ALT 51,   · Likely 2/2 chronic alcohol consumption / cirrhosis  · Continue to monitor, repeat CMP in AM    COVID-19  Assessment & Plan  Patient was diagnosed COVID + on 1/11/2022  Unvaccinated   Patient is currently with mild symptoms including coughing and congestion (denies dyspnea, SOB) with O2 sats at 98% on room air  Currently following the mild pathway  Continue to monitor vitals, symptoms  Continue supportive care     Prolonged Q-T interval on ECG  Assessment & Plan  · EKG (1/11/2022): "Normal sinus rhythm  Prolonged QT- QT/QTc 460/496 ms  Abnormal ECG  When compared with ECG of 24-MAR-2021 06:49, Vent   rate has decreased BY  34 BPM " (Confirmed by Karla Horvath)  · Avoid QT-prolonging agents  · Continue to monitor on tele    Hypomagnesemia  Assessment & Plan  · Magnesium 1/11/2022: 1 5  · 2 g magnesium sulfate administered in ED PTA  · Continue to monitor- repeat magnesium in AM and replete as necessary     VTE Prophylaxis: Pharmacologic VTE Prophylaxis contraindicated due to thrombocytopenia (plts 25)  / sequential compression device   Code Status: level 1 full code  POLST: POLST form is not discussed and not completed at this time  Discussion with family: none at this time    Anticipated Length of Stay:  Patient will be admitted on an Inpatient basis with an anticipated length of stay of  >2  midnights  Justification for Hospital Stay: ongoing management of medically-assisted alcohol withdrawal     For any questions or concerns, please Tiger Text the advanced practitioner in the role of [facility]-DETOX-AP On Call      This patient qualifies for Level IV medically managed intensive inpatient services under the criteria set by the American Society of Addiction Medicine, including dimensions 1-3  The patient is in withdrawal (or is intoxicated with high risk of withdrawal), with severe and unstable medical and/or psychiatric (dual diagnosis) problems, requiring requires 24-hour medical and nursing care and the full resources of a [address] patient to medical detox unit and continue supportive care and stabilization of acute ethanol withdrawal per medical toxicology/detox treatment pathway  Monitor ethanol withdrawal severity via the Severity of Ethanol Withdrawal Scale (SEWS) Q4 hours and then hourly if/when SEWS > 6  Treat withdrawal per pathway and reassess Q30-60 minutes             Mild SEWS Score 1-6  Administer medications* (IV or PO; PO preferred):   If initial SEWS score: diazepam 10mg PO/IV x 1 AND phenobarbital 65 mg PO/IV x 1   If repeat SEWS score 1-6: phenobarbital 65 mg PO/IV q1 hour x 5 doses maximum   Reassessment:    SEWS q1 hour after each dose until SEWS 0 x 2 hours   VS q1 hours (until SEWS 0, then q4 hours)   Notify provider for bedside evaluation if 5-dose maximum is reached, RASS of -3 to -5, or SEWS score escalates to moderate or severe  Moderate SEWS Score 7-12  Administer medications* (IV):   If initial SEWS score: diazepam 10mg IV x 1 AND phenobarbital 260 mg IV x 1   If repeat SEWS score 7-12 or score escalated from mild: phenobarbital 130 mg IV q30 minutes x 5 doses maximum   Reassessment:   SEWS q30 minutes after each dose until SEWS < 7 (then hourly until SEWS 0 x 2 hours)   VS q30 minutes until SEWS < 7 (then hourly until SEWS 0, then q4 hours)   Notify provider for bedside evaluation if 5-dose maximum is reached, RASS of -3 to -5, or SEWS score escalates to severe  Severe SEWS Score ? 13  Administer medications* (IV):   If initial SEWS score: Diazepam 10 mg IV x 1 AND phenobarbital 650 mg IV piggyback x 1 over 15-30 minutes   If repeat SEWS score ? 13 or score escalated from mild or moderate: phenobarbital 130 mg IV q30 minutes x 5 doses maximum   Reassessment:   SEWS q30 minutes after each dose until SEWS < 7 (then hourly until SEWS 0 x 2 hours)    VS q30 minutes until SEWS < 7 (then hourly until SEWS 0, then q4 hours)   Notify provider for bedside evaluation if 5-dose maximum is reached or RASS of -3 to -5   *Hold medications and notify provider if CNS depression, respirations < 10/min, or RASS of -3 to -5           Medications to be administered adjunctively if more than 2 grams of phenobarbital is needed for stabilization of withdrawal; require attending approval     Dexmedetomidine infusion 0 1-1mcg/kg/hr IV infusion, titratable to reduced agitation (Goal: RASS -2)   Ketamine   o Acute agitated delirium: 1-2 mg/kg IV or 4-5 mg/kg IM  o Refractory withdrawal: 0 1-1mg/kg/hr IV infusion, titratable to reduced agitation (Goal: RASS -2)    Further evaluation, screening and treatment:  Evaluate complete metabolic panel, transaminases, INR, and lipase  Assess hepatic ultrasound for any sign of alcoholic liver disease or cirrhosis, and ultimately refer for further hepatic evaluation and care as/if indicated  Additional medications for ethanol associated malnutrition: Thiamine 100 mg IV daily, increase to 500 mg TID for signs/symptoms of Wernicke's Encephalopathy or Wernicke Korsakoff Syndrome   Folic acid 1 mg IV daily   Multivitamin PO daily      Will offer first monthly injection of Naltrexone 380 mg IM, once patient is stabilized, as it has been shown to assist in decreasing cravings for ethanol  Evaluate and treat for coexisting substance use, such as opioids and nicotine  Discuss risk factors for infectious disease, such as history of intravenous drug abuse, and offer hepatitis and HIV screening if indicated  Case management consultation to assist with coordination of subsequent treatment after discharge  Hx and PE limited by: n/a    HPI: Aaron Patel is a 55y o  year old male PMH alcohol use disorder complicated by alcoholic cirrhosis thrombocytopenia and history of alcohol withdrawal seizure who presents with acute alcohol withdrawal  States he was trying to stop drinking on his own but was afraid of having a seizure  Patient notes he was feeling unsteady and tremulous x 2 days, triggering him to present to ED today  Last consumed vodka Sunday evening 1/9/2022, consumed 1 beer this morning 1/11/2022  Notes that he was previously sober x 5 months until relapsing approximately 2 months ago triggered by social stressors including loss of job and house  Currently consuming 1-2 glasses of vodka daily, Reported to nursing he consumes 1 pint of vodka daily  Last drink 1 can of beer this morning  Confirms h/o cirrhosis, however has not had recent outpatient GI follow-up and is currently not taking any prescription medications  Patient tested positive for COVID in ED today  Reports his roommate is experiencing symptoms of COVID (was not tested to confirm if positive)  Patient is unvaccinated  Currently reporting cough (notes chronic intermittent), congestion; otherwise denies symptoms including chest pain, SOB, dyspnea, loss of sense of taste/smell  Notes past history of chewing tobacco use, quit 15-20 years ago  Preferred alcoholic beverage(s): vodka  Quantity and frequency of alcohol intake: 1-2 glasses of vodka daily x 2-3 months  **of note- reported to nursing 1 pint daily + beer  Use of any ethanol substitutes (toxic alcohols): no  Date/Time of last alcohol intake: this morning- consumed 1 can of beer  Current signs and symptoms of ethanol withdrawal: anxiety, tremor, diaphoresis and hallucinations    SEWS Total Score: 16 (1/11/2022  5:00 PM)    Ethanol Withdrawal History  Previous ethanol withdrawal? yes  Prior inpatient treatment for ethanol withdrawal? yes  Prior outpatient treatment for ethanol withdrawal? yes  History of seizures with prior ethanol withdrawal? yes  Prior treatment with naltrexone (Vivitrol)? no  Current treatment with naltrexone (Vivitrol)? no  Other current treatment for ethanol use disorder? no  Co-existing substance use? No- denies current use of cocaine, meth, heroin    Review of PDMP: yes     Social History     Substance and Sexual Activity   Alcohol Use Yes    Comment: every other day has beery and mixed drinks  last drink 12 hours ago     Social History     Substance and Sexual Activity   Drug Use Not Currently    Types: Marijuana     Social History     Tobacco Use   Smoking Status Never Smoker   Smokeless Tobacco Current User       Review of Systems   Constitutional: Negative for appetite change, chills and fever  HENT: Positive for congestion  Negative for ear pain, rhinorrhea, sneezing and sore throat  Eyes: Negative for pain and visual disturbance  Respiratory: Positive for cough  Negative for shortness of breath  Cardiovascular: Negative for chest pain and palpitations  Gastrointestinal: Positive for vomiting (1 episode of non-bloody emesis this morning)  Negative for abdominal pain, constipation, diarrhea and nausea         "lumps" in abdomen    Genitourinary: Negative for difficulty urinating, dysuria and hematuria  Musculoskeletal: Negative for arthralgias and back pain  Skin: Negative for color change and rash  Neurological: Negative for dizziness, seizures, syncope and headaches  Psychiatric/Behavioral: The patient is nervous/anxious  All other systems reviewed and are negative  Historical Information   Past Medical History:   Diagnosis Date    ETOH abuse      Past Surgical History:   Procedure Laterality Date    GALLBLADDER SURGERY       Family History   Problem Relation Age of Onset    Heart disease Mother     Heart disease Father      Social History   Marital Status: Single   Occupation: unemployed   Patient Pre-hospital Living Situation: living with friend (recently lost his house)  Patient Pre-hospital Level of Mobility: independent   Patient Pre-hospital Diet Restrictions: none    No Known Allergies    Prior to Admission medications    Medication Sig Start Date End Date Taking?  Authorizing Provider   acetaminophen (TYLENOL) 325 mg tablet Take 2 tablets (650 mg total) by mouth every 6 (six) hours as needed for mild pain 3/29/21   Beatrice Tejeda MD   calcium carbonate (TUMS) 500 mg chewable tablet Chew 2 tablets (1,000 mg total) daily as needed for indigestion or heartburn 3/29/21   Beatrice Tejeda MD   chlordiazePOXIDE (LIBRIUM) 25 mg capsule Take 1 capsule (25 mg total) by mouth every 12 (twelve) hours for 2 days 3/29/21 3/31/21  Beatrice Tejeda MD   folic acid (FOLVITE) 1 mg tablet Take 1 tablet (1 mg total) by mouth daily 12/9/20   Lashanda Issa DO   ibuprofen (MOTRIN) 800 mg tablet Take 1 tablet (800 mg total) by mouth 2 (two) times daily after meals for 2 days 3/29/21 3/31/21  Dennie Angry, MD   lactulose 20 g/30 mL Take 30 mL (20 g total) by mouth 2 (two) times a day 3/29/21   Dennie Angry, MD   Multiple Vitamin (multivitamin) tablet Take 1 tablet by mouth daily 12/8/20   Lashanda Issa DO   nicotine (NICODERM CQ) 14 mg/24hr TD 24 hr patch Place 1 patch on the skin daily  Patient not taking: Reported on 3/23/2021 12/9/20   Lashanda Issa DO   thiamine 100 MG tablet Take 1 tablet (100 mg total) by mouth daily 12/9/20   Lashanda Issa DO       Current Facility-Administered Medications   Medication Dose Route Frequency    acetaminophen (TYLENOL) tablet 650 mg  650 mg Oral Q6H PRN    aluminum-magnesium hydroxide-simethicone (MYLANTA) oral suspension 30 mL  30 mL Oral Q6H PRN    docusate sodium (COLACE) capsule 100 mg  100 mg Oral BID PRN    folic acid (FOLVITE) tablet 1 mg  1 mg Oral Daily    [START ON 1/12/2022] multivitamin-minerals (CENTRUM) tablet 1 tablet  1 tablet Oral Daily    ondansetron (ZOFRAN) injection 4 mg  4 mg Intravenous Q6H PRN    sodium chloride 0 9 % infusion  125 mL/hr Intravenous Continuous    thiamine (VITAMIN B1) 500 mg in sodium chloride 0 9 % 50 mL IVPB  500 mg Intravenous Q8H [facility]    traZODone (DESYREL) tablet 50 mg  50 mg Oral HS PRN       Continuous Infusions:sodium chloride, 125 mL/hr, Last Rate: 125 mL/hr (01/11/22 1731)             Objective       Intake/Output Summary (Last 24 hours) at 1/11/2022 1910  Last data filed at 1/11/2022 1819  Gross per 24 hour   Intake 120 ml   Output --   Net 120 ml       Invasive Devices:   Peripheral IV 01/11/22 Dorsal (posterior); Right Forearm (Active)       Peripheral IV 01/11/22 Right;Upper;Ventral (anterior) Arm (Active)       Vitals   Vitals:    01/11/22 1659 01/11/22 1819   BP: 148/86 152/100   TempSrc: Temporal Temporal   Pulse: 64 98   Resp: 20 22   Temp: 98 6 °F (37 °C) 97 8 °F (36 6 °C)       Physical Exam  Vitals reviewed     Constitutional:       General: He is not in acute distress  Appearance: Normal appearance  He is normal weight  He is not ill-appearing or diaphoretic  HENT:      Head: Normocephalic and atraumatic  Nose: Nose normal  No congestion  Mouth/Throat:      Mouth: Mucous membranes are moist       Pharynx: Oropharynx is clear  Eyes:      General: Scleral icterus present  Extraocular Movements: Extraocular movements intact  Right eye: No nystagmus  Left eye: No nystagmus  Conjunctiva/sclera: Conjunctivae normal       Pupils: Pupils are equal, round, and reactive to light  Cardiovascular:      Rate and Rhythm: Normal rate and regular rhythm  Pulses: Normal pulses  Heart sounds: Normal heart sounds  No murmur heard  No friction rub  No gallop  Pulmonary:      Effort: Pulmonary effort is normal  No respiratory distress  Breath sounds: Normal breath sounds  No wheezing, rhonchi or rales  Abdominal:      General: Abdomen is flat  Bowel sounds are normal  There is no distension  Palpations: Abdomen is soft  Tenderness: There is no abdominal tenderness  There is no guarding  Comments: Periumbilical varicosities suggestive of caput medusae 2/2 cirrhosis   Musculoskeletal:         General: No swelling  Normal range of motion  Cervical back: Normal range of motion  Right lower leg: No edema  Left lower leg: No edema  Skin:     General: Skin is warm and dry  Capillary Refill: Capillary refill takes less than 2 seconds  Neurological:      General: No focal deficit present  Mental Status: He is alert and oriented to person, place, and time  Mental status is at baseline  Cranial Nerves: No facial asymmetry  Sensory: No sensory deficit  Motor: Tremor (intention tremor b/l UEs) present  Coordination: Heel to Shin Test normal    Psychiatric:         Attention and Perception: Attention normal  He perceives visual hallucinations           Mood and Affect: Mood is anxious  Speech: Speech normal          Behavior: Behavior is cooperative  Judgment: Judgment is impulsive  Data:    EKG, Pathology, and Other Studies: I have personally reviewed pertinent reports  · EKG (1/11/2022): "Normal sinus rhythm  Prolonged QT- QT/QTc 460/496 ms  Abnormal ECG  When compared with ECG of 24-MAR-2021 06:49, Vent  rate has decreased BY  34 BPM " (Confirmed by Gabriela Levine)  · MRI abdomen 3/25/2021: "Cirrhosis without evidence for hepatocellular carcinoma  2 cm nodule in the right hepatic lobe on CT likely corresponds to a regenerative nodule  Periodic ultrasound surveillance may be warranted   Portal hypertension evidenced by recanalized periumbilical vein, splenomegaly, trace ascites and probable tiny esophageal varices "    Lab Results:  CBC ETOH     Lab Results   Component Value Date    WBC 2 56 (L) 01/11/2022    RBC 4 46 01/11/2022    HGB 15 1 01/11/2022    HCT 43 8 01/11/2022    MCV 98 01/11/2022    MCH 33 9 01/11/2022    MCHC 34 5 01/11/2022    RDW 15 5 (H) 01/11/2022    PLT 25 (LL) 01/11/2022    MPV 11 1 03/29/2021      Lab Results   Component Value Date    LACTICACID 1 4 12/05/2020      CMP UA         Component Value Date/Time    K 4 4 01/11/2022 1144     01/11/2022 1144    CO2 23 01/11/2022 1144    BUN 8 01/11/2022 1144    CREATININE 0 64 01/11/2022 1144         Component Value Date/Time    CALCIUM 7 7 (L) 01/11/2022 1144    ALKPHOS 157 (H) 01/11/2022 1144     (H) 01/11/2022 1144    ALT 51 01/11/2022 1144      Lab Results   Component Value Date    CLARITYU Clear 01/11/2022    COLORU Germaine 01/11/2022    SPECGRAV 1 010 01/11/2022    PHUR 7 0 01/11/2022    GLUCOSEU Negative 01/11/2022    KETONESU 15 (1+) (A) 01/11/2022    BLOODU Negative 01/11/2022    PROTEIN UA Negative 01/11/2022    NITRITE Negative 01/11/2022    BILIRUBINUR Interference- unable to analyze (A) 01/11/2022    UROBILINOGEN 2 0 (A) 01/11/2022    LEUKOCYTESUR Negative 01/11/2022 Liver Function Test: ASA     Lab Results   Component Value Date    TBILI 3 89 (H) 01/11/2022    BILIDIR 6 20 (H) 03/24/2021    ALKPHOS 157 (H) 01/11/2022     (H) 01/11/2022    ALT 51 01/11/2022    TP 7 6 01/11/2022    ALB 2 7 (L) 01/11/2022      Lab Results   Component Value Date    SALICYLATE <3 (L) 83/04/8058      Troponin APAP     Lab Results   Component Value Date    TROPONINI <0 02 12/05/2020      Lab Results   Component Value Date    ACTMNPHEN <2 (L) 03/23/2021      VBG HCG     No results found for: PHVEN, LAT9GAE, PO2VEN, IYD5OXU, BEVEN, Y0FXZASKC, I4PINSC   No results found for: HCGQUANT   ABG Urine Drug Screen     No results found for: PHART, YAG8QUI, PO2ART, GYI6ZMR, BEART, G6OWASVDV, O2HGB, SOURC, SAVANNA, VTAC, ACRATE, INSPIREDAIR, PEEP   Lab Results   Component Value Date    AMPMETHUR Negative 01/11/2022    BARBTUR Negative 01/11/2022    BDZUR Negative 01/11/2022    COCAINEUR Negative 01/11/2022    METHADONEUR Negative 01/11/2022    OPIATEUR Negative 01/11/2022    PCPUR Negative 01/11/2022    THCUR Positive (A) 01/11/2022    OXYCODONEUR Negative 01/11/2022      Lactate INR     Lab Results   Component Value Date    LACTICACID 1 4 12/05/2020      Lab Results   Component Value Date    INR 1 36 (H) 01/11/2022      PTT Protime     Lab Results   Component Value Date/Time    PTT 37 01/11/2022 11:44 AM        Lab Results   Component Value Date/Time    PROTIME 16 5 (H) 01/11/2022 11:44 AM              Imaging Studies: I have personally reviewed pertinent reports  Counseling / Coordination of Care  Total floor / unit time spent today 70 minutes  Greater than 50% of total time was spent with the patient and / or family counseling and / or coordination of care         Minutes of critical care time 28  -Critical care time was exclusive of separately billable procedures and teaching time    -Critical care was necessary to treat or prevent imminent or life-threatening deterioration of the following condition: CNS failure/compromise, toxidrome (ethanol withdrawal),  withdrawal  -Critical care time was spent personally by me on the following activities as well as the above as per the course and rest of chart: obtaining history from patient/surrogate, development of a treatment plan, discussions with referring provider(s), evaluation of patient's response to the treatment, examination of the patient, recommending treatments and interventions, re-evaluation of the patient's condition, review of old charts, ordering/interpreting laboratory studies, ordering/interpreting of radiographic studies  ** Please Note: This note has been constructed using a voice recognition system   **

## 2022-01-11 NOTE — EMTALA/ACUTE CARE TRANSFER
[address] [address]  Dept: [phone]      EMTALA TRANSFER CONSENT    NAME Yair King                                         1965                              MRN [id number]    I have been informed of my rights regarding examination, treatment, and transfer   by Dr Khushboo Powers DO    Benefits: Specialized equipment and/or services available at the receiving facility (Include comment)________________________    Risks: Potential for delay in receiving treatment,Potential deterioration of medical condition,Loss of IV,Increased discomfort during transfer,Possible worsening of condition or death during transfer      Consent for Transfer:  I acknowledge that my medical condition has been evaluated and explained to me by the emergency department physician or other qualified medical person and/or my attending physician, who has recommended that I be transferred to the service of  Accepting Physician: Dr Bernardo Frye at [facility] Name, [address] : [place]  The above potential benefits of such transfer, the potential risks associated with such transfer, and the probable risks of not being transferred have been explained to me, and I fully understand them  The doctor has explained that, in my case, the benefits of transfer outweigh the risks  I agree to be transferred  I authorize the performance of emergency medical procedures and treatments upon me in both transit and upon arrival at the receiving facility  Additionally, I authorize the release of any and all medical records to the receiving facility and request they be transported with me, if possible  I understand that the safest mode of transportation during a medical emergency is an ambulance and that the Hospital advocates the use of this mode of transport   Risks of traveling to the receiving facility by car, including absence of medical control, life sustaining equipment, such as oxygen, and medical personnel has been explained to me and I fully understand them  (TODD CORRECT BOX BELOW)  [  ]  I consent to the stated transfer and to be transported by ambulance/helicopter  [  ]  I consent to the stated transfer, but refuse transportation by ambulance and accept full responsibility for my transportation by car  I understand the risks of non-ambulance transfers and I exonerate the Hospital and its staff from any deterioration in my condition that results from this refusal     X___________________________________________    DATE  22  TIME________  Signature of patient or legally responsible individual signing on patient behalf           RELATIONSHIP TO PATIENT_________________________          Provider Certification    NAME Milly Bowen                                         1965                              MRN [id number]    A medical screening exam was performed on the above named patient  Based on the examination:    Condition Necessitating Transfer The primary encounter diagnosis was Alcohol withdrawal syndrome without complication ([facility] [address] )  A diagnosis of Hypomagnesemia was also pertinent to this visit      Patient Condition: The patient has been stabilized such that within reasonable medical probability, no material deterioration of the patient condition or the condition of the unborn child(rolo) is likely to result from the transfer    Reason for Transfer: Level of Care needed not available at this facility    Transfer Requirements:  [address]   · Space available and qualified personnel available for treatment as acknowledged by    · Agreed to accept transfer and to provide appropriate medical treatment as acknowledged by       Dr Brandie Baldwin  · Appropriate medical records of the examination and treatment of the patient are provided at the time of transfer   [address] _______  · Transfer will be performed by qualified personnel from    and appropriate transfer equipment as required, including the use of necessary and appropriate life support measures  Provider Certification: I have examined the patient and explained the following risks and benefits of being transferred/refusing transfer to the patient/family:  General risk, such as traffic hazards, adverse weather conditions, rough terrain or turbulence, possible failure of equipment (including vehicle or aircraft), or consequences of actions of persons outside the control of the transport personnel      Based on these reasonable risks and benefits to the patient and/or the unborn child(rolo), and based upon the information available at the time of the patients examination, I certify that the medical benefits reasonably to be expected from the provision of appropriate medical treatments at another medical facility outweigh the increasing risks, if any, to the individuals medical condition, and in the case of labor to the unborn child, from effecting the transfer      X____________________________________________ DATE 01/11/22        TIME_______      ORIGINAL - SEND TO MEDICAL RECORDS   COPY - SEND WITH PATIENT DURING TRANSFER

## 2022-01-11 NOTE — ASSESSMENT & PLAN NOTE
· Magnesium 1/11/2022: 1 5  · 2 g magnesium sulfate administered in ED PTA  · Continue to monitor- repeat magnesium in AM and replete as necessary

## 2022-01-11 NOTE — ASSESSMENT & PLAN NOTE
· Likely 2/2 chronic alcohol consumption  · Previously admitted to [facility] [place] [address] 3/23-3/29/2021- seen by GI during that admission   · MRI abdomen 3/25/2021: "Cirrhosis without evidence for hepatocellular carcinoma  Portal hypertension evidenced by recanalized periumbilical vein, splenomegaly, trace ascites and probable tiny esophageal varices "  · GI recommended outpatient follow-up and EGD on discharge- patient did not follow-up  · Discharged on lactulose 20 g BID, however patient reports he has been non-compliant   · CMP 1/11/2022: Na 134, Cr 0 64  LFTs- , ALT 51,    TBili 3 89  · INR 1 36  · Platelets 25  · Calculated MELD/MELD-Na score 15/19  · Encourage alcohol cessation  · Continue to monitor CMP  · Will check ammonia  · Recommend prompt follow-up with GI upon discharge

## 2022-01-11 NOTE — ASSESSMENT & PLAN NOTE
Patient was diagnosed COVID + on 1/11/2022  Unvaccinated   Patient is currently with mild symptoms including coughing and congestion (denies dyspnea, SOB) with O2 sats at 98% on room air  Currently following the mild pathway    Continue to monitor vitals, symptoms  Continue supportive care

## 2022-01-11 NOTE — ASSESSMENT & PLAN NOTE
Patient with h/o chronic alcohol use since age 26; endorses h/o withdrawal seizures  Last drink: consumed 1 beer morning of 1/11/2022  Ethanol 1/11/2022 11:44 am: <3  Follow SEWS protocol for medically-assisted alcohol withdrawal  · Patient received 5 mg ativan and 10 mg valium in ED PTA- hold further benzodiazepines at this time  · Initial SEWS 16- 650 mg phenobarbital administered

## 2022-01-11 NOTE — ASSESSMENT & PLAN NOTE
Patient with h/o chronic alcohol use since age 26   · States recently sober x 5 months until relapse 2 months ago triggered by social stressors including loss of job and house  Reports he currently consumes 1-2 glasses of vodka daily (of note- reported to nursing 1 pint of vodka daily + beer)  · States last drink this morning- consumed 1 beer  Pertinent labs:  CBC 1/11/2022: hgb 15 1, MCV 98, WBCs 2 56, ANC 1 62  CMP 1/11/2022: Na 134, K+ 4 4  AG 11  LFTs , ALT 51,     Ethanol 1/11/2022 11:44 am: <3  Initiate IVFs, daily thiamine/folic acid supplementation  [facility]  Consult case management for assistance with rehab resources- expresses interest in inpatient rehab at this time

## 2022-01-12 PROBLEM — D61.818 PANCYTOPENIA (HCC): Status: ACTIVE | Noted: 2022-01-12

## 2022-01-12 PROBLEM — E83.42 HYPOMAGNESEMIA: Status: RESOLVED | Noted: 2022-01-11 | Resolved: 2022-01-12

## 2022-01-12 LAB
ALBUMIN SERPL BCP-MCNC: 3.1 G/DL (ref 3–5.2)
ALP SERPL-CCNC: 159 U/L (ref 43–122)
ALT SERPL W P-5'-P-CCNC: 41 U/L
AMMONIA PLAS-SCNC: 57 UMOL/L (ref 9–33)
ANION GAP SERPL CALCULATED.3IONS-SCNC: 0 MMOL/L (ref 5–14)
AST SERPL W P-5'-P-CCNC: 147 U/L (ref 17–59)
BILIRUB SERPL-MCNC: 4.75 MG/DL
BUN SERPL-MCNC: 9 MG/DL (ref 5–25)
CALCIUM ALBUM COR SERPL-MCNC: 8.4 MG/DL (ref 8.3–10.1)
CALCIUM SERPL-MCNC: 7.7 MG/DL (ref 8.4–10.2)
CHLORIDE SERPL-SCNC: 107 MMOL/L (ref 97–108)
CO2 SERPL-SCNC: 27 MMOL/L (ref 22–30)
CREAT SERPL-MCNC: 0.64 MG/DL (ref 0.7–1.5)
ERYTHROCYTE [DISTWIDTH] IN BLOOD BY AUTOMATED COUNT: 14.9 %
GFR SERPL CREATININE-BSD FRML MDRD: 110 ML/MIN/1.73SQ M
GLUCOSE SERPL-MCNC: 91 MG/DL (ref 70–99)
HCT VFR BLD AUTO: 39.1 % (ref 41–53)
HGB BLD-MCNC: 13.7 G/DL (ref 13.5–17.5)
INR PPP: 1.48 (ref 0.84–1.19)
MAGNESIUM SERPL-MCNC: 1.8 MG/DL (ref 1.6–2.3)
MCH RBC QN AUTO: 35.3 PG (ref 26–34)
MCHC RBC AUTO-ENTMCNC: 34.9 G/DL (ref 31–36)
MCV RBC AUTO: 101 FL (ref 80–100)
PLATELET # BLD AUTO: 16 THOUSANDS/UL (ref 150–450)
PMV BLD AUTO: 9.7 FL (ref 8.9–12.7)
POTASSIUM SERPL-SCNC: 3.9 MMOL/L (ref 3.6–5)
PROT SERPL-MCNC: 7.7 G/DL (ref 5.9–8.4)
PROTHROMBIN TIME: 17.2 SECONDS (ref 11.6–14.5)
RBC # BLD AUTO: 3.88 MILLION/UL (ref 4.5–5.9)
SODIUM SERPL-SCNC: 134 MMOL/L (ref 137–147)
WBC # BLD AUTO: 2.2 THOUSAND/UL (ref 4.5–11)

## 2022-01-12 PROCEDURE — 82140 ASSAY OF AMMONIA: CPT | Performed by: EMERGENCY MEDICINE

## 2022-01-12 PROCEDURE — 83735 ASSAY OF MAGNESIUM: CPT

## 2022-01-12 PROCEDURE — 80053 COMPREHEN METABOLIC PANEL: CPT

## 2022-01-12 PROCEDURE — 99233 SBSQ HOSP IP/OBS HIGH 50: CPT | Performed by: EMERGENCY MEDICINE

## 2022-01-12 PROCEDURE — 85027 COMPLETE CBC AUTOMATED: CPT

## 2022-01-12 PROCEDURE — 85610 PROTHROMBIN TIME: CPT

## 2022-01-12 RX ORDER — PHENOBARBITAL 64.8 MG/1
64.8 TABLET ORAL ONCE
Status: COMPLETED | OUTPATIENT
Start: 2022-01-12 | End: 2022-01-12

## 2022-01-12 RX ORDER — PHENOBARBITAL SODIUM 130 MG/ML
130 INJECTION INTRAMUSCULAR ONCE
Status: COMPLETED | OUTPATIENT
Start: 2022-01-12 | End: 2022-01-12

## 2022-01-12 RX ORDER — LACTULOSE 20 G/30ML
20 SOLUTION ORAL 2 TIMES DAILY
Status: DISCONTINUED | OUTPATIENT
Start: 2022-01-12 | End: 2022-01-15 | Stop reason: HOSPADM

## 2022-01-12 RX ADMIN — PHENOBARBITAL 64.8 MG: 64.8 TABLET ORAL at 11:32

## 2022-01-12 RX ADMIN — THIAMINE HYDROCHLORIDE 500 MG: 100 INJECTION, SOLUTION INTRAMUSCULAR; INTRAVENOUS at 05:42

## 2022-01-12 RX ADMIN — SODIUM CHLORIDE 100 ML/HR: 0.9 INJECTION, SOLUTION INTRAVENOUS at 21:45

## 2022-01-12 RX ADMIN — TRAZODONE HYDROCHLORIDE 50 MG: 50 TABLET ORAL at 22:11

## 2022-01-12 RX ADMIN — PHENOBARBITAL 64.8 MG: 64.8 TABLET ORAL at 05:42

## 2022-01-12 RX ADMIN — PHENOBARBITAL SODIUM 130 MG: 130 INJECTION INTRAMUSCULAR at 08:27

## 2022-01-12 RX ADMIN — LACTULOSE 20 G: 10 SOLUTION ORAL at 17:05

## 2022-01-12 RX ADMIN — MULTIPLE VITAMINS W/ MINERALS TAB 1 TABLET: TAB ORAL at 08:19

## 2022-01-12 RX ADMIN — PHENOBARBITAL SODIUM 130 MG: 130 INJECTION INTRAMUSCULAR at 13:11

## 2022-01-12 RX ADMIN — SODIUM CHLORIDE 125 ML/HR: 0.9 INJECTION, SOLUTION INTRAVENOUS at 01:08

## 2022-01-12 RX ADMIN — THIAMINE HYDROCHLORIDE 500 MG: 100 INJECTION, SOLUTION INTRAMUSCULAR; INTRAVENOUS at 13:13

## 2022-01-12 RX ADMIN — PHENOBARBITAL 64.8 MG: 64.8 TABLET ORAL at 01:39

## 2022-01-12 RX ADMIN — FOLIC ACID 1 MG: 1 TABLET ORAL at 08:19

## 2022-01-12 RX ADMIN — THIAMINE HYDROCHLORIDE 500 MG: 100 INJECTION, SOLUTION INTRAMUSCULAR; INTRAVENOUS at 21:45

## 2022-01-12 RX ADMIN — PHENOBARBITAL 64.8 MG: 64.8 TABLET ORAL at 17:11

## 2022-01-12 RX ADMIN — PHENOBARBITAL SODIUM 130 MG: 130 INJECTION INTRAMUSCULAR at 22:19

## 2022-01-12 RX ADMIN — PHENOBARBITAL 64.8 MG: 64.8 TABLET ORAL at 09:46

## 2022-01-12 RX ADMIN — LACTULOSE 20 G: 10 SOLUTION ORAL at 10:07

## 2022-01-12 RX ADMIN — SODIUM CHLORIDE 125 ML/HR: 0.9 INJECTION, SOLUTION INTRAVENOUS at 09:46

## 2022-01-12 NOTE — CASE MANAGEMENT
Cm consulted with PA regarding pt ambulation issues  Pt was ordered PT to optimized pt's ability to ambulate to ensure pt can effectively continue aftercare treatment

## 2022-01-12 NOTE — ASSESSMENT & PLAN NOTE
· CBC 01/12/2022:  Hemoglobin 13 7, , WBCs 2 2, platelet 16  · Likely 2/2 myelosuppression from chronic alcohol consumption  · Also likely dilutional component  · Continue to monitor, repeat CBC in AM

## 2022-01-12 NOTE — PLAN OF CARE
Problem: Nutrition/Hydration-ADULT  Goal: Nutrient/Hydration intake appropriate for improving, restoring or maintaining nutritional needs  Description: Monitor and assess patient's nutrition/hydration status for malnutrition  Collaborate with interdisciplinary team and initiate plan and interventions as ordered  Monitor patient's weight and dietary intake as ordered or per policy  Utilize nutrition screening tool and intervene as necessary  Determine patient's food preferences and provide high-protein, high-caloric foods as appropriate       INTERVENTIONS:  - Monitor oral intake, urinary output, labs, and treatment plans  - Assess nutrition and hydration status and recommend course of action  - Evaluate amount of meals eaten  - Assist patient with eating if necessary   - Allow adequate time for meals  - Recommend/ encourage appropriate diets, oral nutritional supplements, and vitamin/mineral supplements  - Order, calculate, and assess calorie counts as needed  - Recommend, monitor, and adjust tube feedings and TPN/PPN based on assessed needs  - Assess need for intravenous fluids  - Provide specific nutrition/hydration education as appropriate  - Include patient/family/caregiver in decisions related to nutrition  Outcome: Progressing     Problem: MOBILITY - ADULT  Goal: Maintain or return to baseline ADL function  Description: INTERVENTIONS:  -  Assess patient's ability to carry out ADLs; assess patient's baseline for ADL function and identify physical deficits which impact ability to perform ADLs (bathing, care of mouth/teeth, toileting, grooming, dressing, etc )  - Assess/evaluate cause of self-care deficits   - Assess range of motion  - Assess patient's mobility; develop plan if impaired  - Assess patient's need for assistive devices and provide as appropriate  - Encourage maximum independence but intervene and supervise when necessary  - Involve family in performance of ADLs  - Assess for home care needs following discharge   - Consider OT consult to assist with ADL evaluation and planning for discharge  - Provide patient education as appropriate  Outcome: Progressing     Problem: SUBSTANCE USE/ABUSE  Goal: By discharge, will develop insight into their chemical dependency and sustain motivation to continue in recovery  Description: INTERVENTIONS:  - Attends all daily group sessions and scheduled AA groups  - Actively practices coping skills through participation in the therapeutic community and adherence to program rules  - Reviews and completes assignments from individual treatment plan  - Assist patient development of understanding of their personal cycle of addiction and relapse triggers  Outcome: Progressing     Problem: SAFETY ADULT  Goal: Maintain or return to baseline ADL function  Description: INTERVENTIONS:  -  Assess patient's ability to carry out ADLs; assess patient's baseline for ADL function and identify physical deficits which impact ability to perform ADLs (bathing, care of mouth/teeth, toileting, grooming, dressing, etc )  - Assess/evaluate cause of self-care deficits   - Assess range of motion  - Assess patient's mobility; develop plan if impaired  - Assess patient's need for assistive devices and provide as appropriate  - Encourage maximum independence but intervene and supervise when necessary  - Involve family in performance of ADLs  - Assess for home care needs following discharge   - Consider OT consult to assist with ADL evaluation and planning for discharge  - Provide patient education as appropriate  Outcome: Progressing    Problem: Knowledge Deficit  Goal: Patient/family/caregiver demonstrates understanding of disease process, treatment plan, medications, and discharge instructions  Description: Complete learning assessment and assess knowledge base    Interventions:  - Provide teaching at level of understanding  - Provide teaching via preferred learning methods  Outcome: Progressing

## 2022-01-12 NOTE — ASSESSMENT & PLAN NOTE
Patient was diagnosed COVID + on 1/11/2022   Unvaccinated   Patient is currently with mild symptoms including coughing and congestion (denies dyspnea, SOB) with O2 sats at 97% on room air  Afebrile  Currently following the mild pathway    Continue to monitor vitals, symptoms  Continue supportive care

## 2022-01-12 NOTE — ASSESSMENT & PLAN NOTE
· EKG (1/11/2022): "Normal sinus rhythm  Prolonged QT- QT/QTc 460/496 ms  Abnormal ECG  When compared with ECG of 24-MAR-2021 06:49, Vent   rate has decreased BY  34 BPM " (Confirmed by Cora Bill)  · Avoid QT-prolonging agents  · Continue to monitor on tele

## 2022-01-12 NOTE — ASSESSMENT & PLAN NOTE
Patient with h/o chronic alcohol use since age 26; endorses h/o withdrawal seizures  Last drink: consumed 1 beer morning of 1/11/2022  Ethanol 1/11/2022 11:44 am: <3  Continue to follow [facility] protocol for medically-assisted alcohol withdrawal  · Patient received 5 mg ativan and 10 mg valium in ED PTA  · Patient continues to exhibit notable intention tremor (improved compared to initial presentation)  · Patient has thus far received 1494 4 mg total of phenobarbital

## 2022-01-12 NOTE — PLAN OF CARE
Problem: Nutrition/Hydration-ADULT  Goal: Nutrient/Hydration intake appropriate for improving, restoring or maintaining nutritional needs  Description: Monitor and assess patient's nutrition/hydration status for malnutrition  Collaborate with interdisciplinary team and initiate plan and interventions as ordered  Monitor patient's weight and dietary intake as ordered or per policy  Utilize nutrition screening tool and intervene as necessary  Determine patient's food preferences and provide high-protein, high-caloric foods as appropriate       INTERVENTIONS:  - Monitor oral intake, urinary output, labs, and treatment plans  - Assess nutrition and hydration status and recommend course of action  - Evaluate amount of meals eaten  - Assist patient with eating if necessary   - Allow adequate time for meals  - Recommend/ encourage appropriate diets, oral nutritional supplements, and vitamin/mineral supplements  - Order, calculate, and assess calorie counts as needed  - Recommend, monitor, and adjust tube feedings and TPN/PPN based on assessed needs  - Assess need for intravenous fluids  - Provide specific nutrition/hydration education as appropriate  - Include patient/family/caregiver in decisions related to nutrition  Outcome: Progressing     Problem: MOBILITY - ADULT  Goal: Maintain or return to baseline ADL function  Description: INTERVENTIONS:  -  Assess patient's ability to carry out ADLs; assess patient's baseline for ADL function and identify physical deficits which impact ability to perform ADLs (bathing, care of mouth/teeth, toileting, grooming, dressing, etc )  - Assess/evaluate cause of self-care deficits   - Assess range of motion  - Assess patient's mobility; develop plan if impaired  - Assess patient's need for assistive devices and provide as appropriate  - Encourage maximum independence but intervene and supervise when necessary  - Involve family in performance of ADLs  - Assess for home care needs following discharge   - Consider OT consult to assist with ADL evaluation and planning for discharge  - Provide patient education as appropriate  Outcome: Progressing     Problem: SAFETY ADULT  Goal: Maintain or return to baseline ADL function  Description: INTERVENTIONS:  -  Assess patient's ability to carry out ADLs; assess patient's baseline for ADL function and identify physical deficits which impact ability to perform ADLs (bathing, care of mouth/teeth, toileting, grooming, dressing, etc )  - Assess/evaluate cause of self-care deficits   - Assess range of motion  - Assess patient's mobility; develop plan if impaired  - Assess patient's need for assistive devices and provide as appropriate  - Encourage maximum independence but intervene and supervise when necessary  - Involve family in performance of ADLs  - Assess for home care needs following discharge   - Consider OT consult to assist with ADL evaluation and planning for discharge  - Provide patient education as appropriate  Outcome: Progressing     Problem: MOBILITY - ADULT  Goal: Maintain or return to baseline ADL function  Description: INTERVENTIONS:  -  Assess patient's ability to carry out ADLs; assess patient's baseline for ADL function and identify physical deficits which impact ability to perform ADLs (bathing, care of mouth/teeth, toileting, grooming, dressing, etc )  - Assess/evaluate cause of self-care deficits   - Assess range of motion  - Assess patient's mobility; develop plan if impaired  - Assess patient's need for assistive devices and provide as appropriate  - Encourage maximum independence but intervene and supervise when necessary  - Involve family in performance of ADLs  - Assess for home care needs following discharge   - Consider OT consult to assist with ADL evaluation and planning for discharge  - Provide patient education as appropriate  Outcome: Progressing     Problem: SAFETY ADULT  Goal: Maintain or return to baseline ADL function  Description: INTERVENTIONS:  -  Assess patient's ability to carry out ADLs; assess patient's baseline for ADL function and identify physical deficits which impact ability to perform ADLs (bathing, care of mouth/teeth, toileting, grooming, dressing, etc )  - Assess/evaluate cause of self-care deficits   - Assess range of motion  - Assess patient's mobility; develop plan if impaired  - Assess patient's need for assistive devices and provide as appropriate  - Encourage maximum independence but intervene and supervise when necessary  - Involve family in performance of ADLs  - Assess for home care needs following discharge   - Consider OT consult to assist with ADL evaluation and planning for discharge  - Provide patient education as appropriate  Outcome: Progressing

## 2022-01-12 NOTE — ASSESSMENT & PLAN NOTE
· Likely 2/2 chronic alcohol consumption  · CMP 1/12/2022: Na 134, Cr 0 64   TBili 4 75  · LFTs:  --> 147, ALT 51--> 41, --> 159  · INR 1 48  · Platelets 16  · Calculated MELD/MELD-Na score 17/20  · Ammonia 57  · Restart lactulose- titrate to 2-3 stools per day   · Order RUQ U/S  · Encourage alcohol cessation  · Continue to monitor CMP  · Recommend prompt follow-up with GI upon discharge

## 2022-01-12 NOTE — UTILIZATION REVIEW
Initial Clinical Review    Pt initially presented to [facility] ED self from home  Pt was transferred by EMS to [address] [place] [address] for medical detox  [facility] ED Treatment:   Medication Administration from 01/11/2022 1009 to 01/12/2022 0847       Date/Time Order Dose Route Action     01/11/2022 1045 sodium chloride 0 9 % bolus 1,000 mL 1,000 mL Intravenous New Bag     01/11/2022 1049 ondansetron (ZOFRAN) injection 4 mg 4 mg Intravenous Given     01/11/2022 1048 LORazepam (ATIVAN) injection 2 mg 2 mg Intravenous Given     01/11/2022 1049 thiamine (VITAMIN B1) injection 100 mg 100 mg Intramuscular Given     01/11/2022 1132 LORazepam (ATIVAN) injection 2 mg 2 mg Intravenous Given     01/11/2022 1317 magnesium sulfate 2 g/50 mL IVPB (premix) 2 g 2 g Intravenous New Bag     01/11/2022 1317 diazepam (VALIUM) tablet 10 mg 10 mg Oral Given     01/11/2022 1511 LORazepam (ATIVAN) injection 1 mg 1 mg Intravenous Given     01/11/2022 1527 sodium chloride 0 9 % infusion 125 mL/hr Intravenous New Bag     Date/Time Temp Pulse Resp BP MAP (mmHg) SpO2   01/11/22 1200 -- 75 25 Abnormal  134/83 104 96 %   01/11/22 1020 96 1 °F (35 6 °C) Abnormal  87 20 158/89 -- 100 %         [facility] Admission: Date/Time/Statement:   Admission Orders (From admission, onward)     Ordered        01/11/22 1715  Inpatient Admission  Once                      Orders Placed This Encounter   Procedures    Inpatient Admission     Standing Status:   Standing     Number of Occurrences:   1     Order Specific Question:   Level of Care     Answer:   Med Surg [16]     Order Specific Question:   Estimated length of stay     Answer:   More than 2 Midnights     Order Specific Question:   Certification     Answer:   I certify that inpatient services are medically necessary for this patient for a duration of greater than two midnights   See H&P and MD Progress Notes for additional information about the patient's course of treatment  Chief Complaint   Patient presents with    Withdrawal - Alcohol     patient states he is withdrawing from ETOH - last drink 1 5 days ago   + tremors  States was exposed to covid several days ago  Initial Presentation: 55 y o  unvaccinated male who initially presented self from home to [address] [place] [address],[address] [facility], was then transferred by EMS to [address] [place] [address]  Inpatient admission for evaluation and treatment of alcohol withdrawal, alcohol use disorder, thrombocytopenia, alcoholic cirrhosis of liver, transaminitis, COVID-19 infection  PMHx: ETOH abuse, alcohol withdrawal seizures  Presented w/ acute alcohol withdrawal  Reports fear of seizure in setting of discontinuing alcohol on his own and history of alcohol withdrawal seizures  Has had inpatient and outpatient treatment for ETOH withdrawal previously  Relapsed approximately 2 months ago s/t social stressors  Currently consuming 1 pint of vodka daily; also drinks beer  Last vodka, 1/9 evening  Last beer, 1/11 morning  On exam, b/l UE tremors, diaphoresis, visual hallucinations, anxiety, impulsive, tachycardic, scleral icterus, periumbilical varicosities of caput medusae s/t cirrhosis,  SEWS 16  COVID-19 positive, but currently asymptomatic  Plan SEWS monitoring w/ phenobarbital management, IVF, high dose thiamine/folic acid supplementation, continual observation for pt safety, telemetry, Trend labs, replete electrolytes as needed  Date: 01/12/22  Day 2: Pt w/ ongoing shakiness that has minimally improved since arrival  Tolerating PO diet  SEWS 5  On exam, scleral icterus, abdominal distention, petechiae, notable tremor in b/l UE, anxious  Plan: continue SEWS monitoring w/ phenobarbital management, IVF, high dose thiamine, continual observation for safety  Trend labs, replete electrolytes as needed        Admission Vitals  01/11/22 1659   Temperature Pulse Respirations Blood Pressure SpO2   98 6 °F (37 °C) 64 20 148/86 99 %      Wt Readings from Last 1 Encounters:   01/11/22 83 9 kg (185 lb)     Additional Vital Signs:   Date/Time Temp Pulse Resp BP MAP (mmHg) SpO2 O2 Device   01/12/22 0800 -- 67 -- 123/80 -- -- --   01/12/22 0700 97 8 °F (36 6 °C) 75 18 132/89 103 96 % None (Room air)   01/12/22 0620 -- 77 -- -- -- -- --   01/12/22 0515 97 6 °F (36 4 °C) 71 -- 133/88 105 94 % None (Room air)   01/12/22 0330 -- 68 -- -- -- 95 % None (Room air)   01/12/22 0220 -- 62 -- -- -- 97 % None (Room air)   01/12/22 0123 -- 72 20 136/94 -- 96 % None (Room air)   01/12/22 0030 -- 78 -- -- -- 96 % None (Room air)   01/11/22 2330 -- 76 -- -- -- 94 % None (Room air)   01/11/22 2256 -- 76 -- -- -- 95 % None (Room air)   01/11/22 2210 98 4 °F (36 9 °C) 81 18 155/96 115 96 % None (Room air)   01/11/22 2045 -- 82 -- -- -- 96 % None (Room air)   01/11/22 2000 98 8 °F (37 1 °C) 75 18 135/77 96 97 % None (Room air)   01/11/22 1819 97 8 °F (36 6 °C) 98 22 152/100 -- 98 % None (Room air)     Severity of Ethanol Withdrawal Scale (SEWS):   01/12/22 1235 01/12/22 1200 01/12/22 1123 01/12/22 0941 01/12/22 0900 01/12/22 0820 01/12/22 0720   ANXIETY: Do you feel that something bad is about to happen to you right now? 3 3 3 3 3 3 0   NAUSEA and DRY HEAVES or VOMITING? 0 0 0 0 0 0 0   SWEATING: (includes moist palms, sweating now)? Score 0 or 2 2 2 0 0 0 0 0   TREMOR: with arms extended eyes closed?   2 2 2 2 2 2 0   AGITATION: Fidgety, restless, pacing? 0 0 0 0 0 3 0   DISORIENTATION: 0 0 0 0 0 0 0   HALLUCINATIONS: 0 0 0 0 0 0 0   VITAL SIGNS: ANY (Pulse >989, Diastolic BP >89, Temp >12 0) 0 0 0 0 0 0 0   SEWS Total Score 7 7 5 5 5 8 0   Barrett Agitation Sedation Scale (RASS) 0 0 0 +1 +1 +1 -2              01/12/22 0517 01/12/22 0336 01/12/22 0222 01/12/22 0124 01/12/22 0030 01/11/22 2330 01/11/22 2211   ANXIETY: Do you feel that something bad is about to happen to you right now?  0 0 0 0 0 0 3   NAUSEA and DRY HEAVES or VOMITING? 0 0 0 0 0 0 0   SWEATING: (includes moist palms, sweating now)? Score 0 or 2 0 0 0 0 0 0 0   TREMOR: with arms extended eyes closed? 2 0 0 0 0 0 2   AGITATION: Fidgety, restless, pacing? 0 0 0 0 0 0 0   DISORIENTATION: 1 0 0 1 0 0 1   HALLUCINATIONS: 0 0 0 0 0 0 3   VITAL SIGNS: ANY (Pulse >586, Diastolic BP >47, Temp >02 7) 0 0 0 3 0 0 3   SEWS Total Score 3 0 0 4 0 0 12   Barrett Agitation Sedation Scale (RASS) 0 -1 -1 0 -1 -1 +1              01/12/22 2045 01/12/22 2002 01/11/22 1820 01/11/22 1700      ANXIETY: Do you feel that something bad is about to happen to you right now?  0 3 3 3      NAUSEA and DRY HEAVES or VOMITING? 0 0 0 3      SWEATING: (includes moist palms, sweating now)?  Score 0 or 2 0 0 2 2      TREMOR: with arms extended eyes closed?  0 2 2 2      AGITATION: Fidgety, restless, pacing? 0 0 3 3      DISORIENTATION: 0 1 1 0      HALLUCINATIONS: 0 3 3 3      VITAL SIGNS: ANY (Pulse >293, Diastolic BP >13, Temp >12 5) 0 0 3 0      SEWS Total Score 0 9 17 16      Barrett Agitation Sedation Scale (RASS) -1 +1 +1 +1            Pertinent Labs/Diagnostic Test Results:   1/11 - EKG  Normal sinus rhythm  Prolonged QT    Results from last 7 days   Lab Units 01/11/22  1514   SARS-COV-2  Positive*     Results from last 7 days   Lab Units 01/12/22  0514 01/11/22  1036   WBC Thousand/uL 2 20* 2 56*   HEMOGLOBIN g/dL 13 7 15 1   HEMATOCRIT % 39 1* 43 8   PLATELETS Thousands/uL 16* 25*   NEUTROS ABS Thousands/µL  --  1 62*     Results from last 7 days   Lab Units 01/12/22  0515 01/11/22  1144   SODIUM mmol/L 134* 134*   POTASSIUM mmol/L 3 9 4 4   CHLORIDE mmol/L 107 100   CO2 mmol/L 27 23   ANION GAP mmol/L 0* 11   BUN mg/dL 9 8   CREATININE mg/dL 0 64* 0 64   EGFR ml/min/1 73sq m 110 110   CALCIUM mg/dL 7 7* 7 7*   MAGNESIUM mg/dL 1 8 1 5*     Results from last 7 days   Lab Units 01/12/22  0515 01/12/22  0514 01/11/22  1144   AST U/L 147*  --  162*   ALT U/L 41  --  51   ALK PHOS U/L 159*  --  157*   TOTAL PROTEIN g/dL 7 7  --  7 6 ALBUMIN g/dL 3 1  --  2 7*   TOTAL BILIRUBIN mg/dL 4 75*  --  3 89*   AMMONIA umol/L  --  57*  --      Results from last 7 days   Lab Units 01/12/22  0515 01/11/22  1144   GLUCOSE RANDOM mg/dL 91 112     Results from last 7 days   Lab Units 01/12/22  0514 01/11/22  1144   PROTIME seconds 17 2* 16 5*   INR  1 48* 1 36*   PTT seconds  --  37     Results from last 7 days   Lab Units 01/11/22  1144   LIPASE u/L 303     Results from last 7 days   Lab Units 01/11/22  1515   CLARITY UA  Clear   COLOR UA  Germaine   SPEC GRAV UA  1 010   PH UA  7 0   GLUCOSE UA mg/dl Negative   KETONES UA mg/dl 15 (1+)*   BLOOD UA  Negative   PROTEIN UA mg/dl Negative   NITRITE UA  Negative   BILIRUBIN UA  Interference- unable to analyze*   UROBILINOGEN UA E U /dl 2 0*   LEUKOCYTES UA  Negative     Results from last 7 days   Lab Units 01/11/22  1514   INFLUENZA A PCR  Negative   INFLUENZA B PCR  Negative   RSV PCR  Negative     Results from last 7 days   Lab Units 01/11/22  1515   AMPH/METH  Negative   BARBITURATE UR  Negative   BENZODIAZEPINE UR  Negative   COCAINE UR  Negative   METHADONE URINE  Negative   OPIATE UR  Negative   PCP UR  Negative   THC UR  Positive*     Results from last 7 days   Lab Units 01/11/22  1144   ETHANOL LVL mg/dL <3         Past Medical History:   Diagnosis Date    ETOH abuse      Present on Admission:   Alcohol withdrawal, with unspecified complication ([place] [address] )   Alcohol use disorder, severe, dependence (HCC)   Thrombocytopenia (HCC)   Transaminitis   Alcoholic cirrhosis of liver ([place] [address] )   COVID-19   Hypomagnesemia   Prolonged Q-T interval on ECG      Admitting Diagnosis: Alcohol withdrawal syndrome without complication ([place] [address] ) [X42 [id number]]  Age/Sex: 55 y o  male  Admission Orders:  Regular Diet  SCDs  Continual Observation for safety  Fall & Seizure precautions  Telemetry  SEWS monitoring  Continuous Pulse Ox      Scheduled Medications:  folic acid, 1 mg, Oral, Daily  lactulose, 20 g, Oral, BID  multivitamin-minerals, 1 tablet, Oral, Daily  thiamine, 500 mg, Intravenous, Q8H [address]    Continuous IV Infusions:  sodium chloride, 100 mL/hr, Intravenous, Continuous    PRN Meds:  acetaminophen, 650 mg, Oral, Q6H PRN; 1/11 x1  aluminum-magnesium hydroxide-simethicone, 30 mL, Oral, Q6H PRN  docusate sodium, 100 mg, Oral, BID PRN  ondansetron, 4 mg, Intravenous, Q6H PRN  PHENobarbital, 64 8 mg, Oral, 1/12 x4  PHENobarbital, 130 mg, Intravenous, 1/11 x2, 1/12 x2  PHENobarbital, 260 mg, Intravenous, 1/11 x1  PHENobarbital, 650 mg, Intravenous, 1/11 x1  traZODone, 50 mg, Oral, HS PRN        IP CONSULT TO CASE MANAGEMENT    Network Utilization Review Department  ATTENTION: Please call with any questions or concerns to [phone] and carefully listen to the prompts so that you are directed to the right person  All voicemails are confidential   Sudie Crigler all requests for admission clinical reviews, approved or denied determinations and any other requests to dedicated fax number below belonging to the campus where the patient is receiving treatment   List of dedicated fax numbers for the Facilities:  [address] DENIALS (Administrative/Medical Necessity) [phone]   [address] (Maternity/NICU/Pediatrics) [phone]   [address] [place] [address]  [address] [facility] [place] [address] [address] [place] [address] Trena [address] [facility] [place] [address] [phone]

## 2022-01-12 NOTE — PROGRESS NOTES
[facility]  Progress Note - Vicki Aguilar 1965, 55 y o  male MRN: [id number]  Unit/Bed#: 5T DETOX 503-01 Encounter: [id number]  Primary Care Provider: Lajoyce Sandhoff, MD   Date and time admitted to hospital: 1/11/2022  4:51 PM     Progress Note - Medical Toxicology    Vicki Aguilar 55 y o  male MRN: [id number]  Unit/Bed#: 5T DETOX 503-01 Encounter: [id number]  MEDICAL DETOX UNIT, LEVEL 4  Department of Medical Toxicology  Reason for Admission/Principal Problem:  Alcohol withdrawal, alcohol use disorder  Rounding Provider: Meggan Luz PA-C, Amie ELMORE*     * Alcohol withdrawal, with unspecified complication [facility])  Assessment & Plan  Patient with h/o chronic alcohol use since age 26; endorses h/o withdrawal seizures  Last drink: consumed 1 beer morning of 1/11/2022  Ethanol 1/11/2022 11:44 am: <3  Continue to follow SEWS protocol for medically-assisted alcohol withdrawal  · Patient received 5 mg ativan and 10 mg valium in ED PTA  · Patient continues to exhibit notable intention tremor (improved compared to initial presentation)  · Patient has thus far received 1494 4 mg total of phenobarbital     Alcohol use disorder, severe, dependence ([address] )  Assessment & Plan  Patient with h/o chronic alcohol use since age 26   Continue IVFs,  daily folic acid supplementation and multivitamin  · Continue high-dose thiamine  Continue to follow SEWS protocol for medically-assisted alcohol withdrawal  Continue consultation with case management for assistance with rehab resources- continues to express interest in inpatient rehab at this time    Thrombocytopenia [facility])  Assessment & Plan  · CBC 1/12/2022: platelets 16 (down from 25 yesterday, potentially dilutional)  · Likely 2/2 myelosuppression from chronic alcohol consumption  · No evidence of active bleeding at this time  · Continue to monitor symptoms  · Fall precautions  · Continue continual observation for safety · Continue to monitor platelets, repeat CBC in AM and consider transfusion if indicated     Alcoholic cirrhosis of liver [facility])  Assessment & Plan  · Likely 2/2 chronic alcohol consumption  · CMP 2022: Na 134, Cr 0 64  TBili 4 75  · LFTs:  --> 147, ALT 51--> 41, --> 159  · INR 1 48  · Platelets 16  · Calculated MELD/MELD-Na score 17/20  · Ammonia 57  · Restart lactulose- titrate to 2-3 stools per day   · Order RUQ U/S  · Encourage alcohol cessation  · Continue to monitor CMP  · Recommend prompt follow-up with GI upon discharge    Transaminitis  Assessment & Plan  · LFTs:  --> 147, ALT 51--> 41, --> 159  · Likely 2/2 chronic alcohol consumption / cirrhosis  · Continue to monitor, repeat CMP in AM    COVID-19  Assessment & Plan  Patient was diagnosed COVID + on 2022   Unvaccinated   Patient is currently with mild symptoms including coughing and congestion (denies dyspnea, SOB) with O2 sats at 97% on room air  Afebrile  Currently following the mild pathway  Continue to monitor vitals, symptoms  Continue supportive care     Pancytopenia [facility])  Assessment & Plan  · CBC 2022:  Hemoglobin 13 7, , WBCs 2 2, platelet 16  · Likely 2/2 myelosuppression from chronic alcohol consumption  · Also likely dilutional component  · Continue to monitor, repeat CBC in AM    Prolonged Q-T interval on ECG  Assessment & Plan  · EKG (2022): "Normal sinus rhythm  Prolonged QT- QT/QTc 460/496 ms  Abnormal ECG  When compared with ECG of 24-MAR-2021 06:49, Vent   rate has decreased BY  34 BPM " (Confirmed by Magali Apgar)  · Avoid QT-prolonging agents  · Continue to monitor on tele    Hypomagnesemia-resolved as of 2022  Assessment & Plan  · Magnesium improved to 1 8 today (up from 1 5 yesterday)  · resolved      VTE Pharmacologic Prophylaxis:   Pharmacologic: Pharmacologic VTE Prophylaxis contraindicated due to Thrombocytopenia  Mechanical VTE Prophylaxis in Place: no    Code Status: Level 1 - Full Code    Patient Centered Rounds: I have performed bedside rounds with nursing staff today  Discussions with Specialists or Other Care Team Provider: Dr Joel Pratt   Education and Discussions with Family / Patient:  Discussed current plan with patient, answered all questions to best of my ability    Time Spent for Care: 30 minutes  More than 50% of total time spent on counseling and coordination of care as described above  Current Length of Stay: 1 day(s)    Current Patient Status: Inpatient     Certification Statement: The patient will continue to require additional inpatient hospital stay due to Ongoing management of medically assisted alcohol withdrawal, right upper quadrant ultrasound pending Discharge Plan: to be determined, potentially to inpatient rehab once medically stable      Total time spent today 30 minutes  Greater than 50% of total time was spent with the patient and / or family counseling and / or coordination of care  A description of the counseling / coordination of care: alcohol withdrawal, cirrhosis, thrombocytopenia, fall precautions    Subjective:   Patient endorses ongoing shakiness this time, however notes improvement compared to when he initially presented  Patient also states he slept well last night  Appetite improving, tolerating PO fluids  Currently denies fever/chills, headache/lightheadedness/dizziness, chest pain, shortness of breath, abdominal pain, N/V/D/C, dysuria/hematuria, hallucinations      Objective:     Clinical Opiate Withdrawal Scale  Pulse: 71    SEWS Total Score: 5 (1/12/2022  9:41 AM)    Last 24 Hours Medication List:   Current Facility-Administered Medications   Medication Dose Route Frequency Provider Last Rate    acetaminophen  650 mg Oral Q6H PRN Agneschristian Capellan PA-C      aluminum-magnesium hydroxide-simethicone  30 mL Oral Q6H PRN Agnes Capellan PA-C      docusate sodium  100 mg Oral BID PRN Agnes Capellan PA-C      folic acid  1 mg Oral Daily Squire Sigrid, PA-C      lactulose  20 g Oral BID Nolviamelissa Luz PA-C      multivitamin-minerals  1 tablet Oral Daily Amie Toi Turner MD      ondansetron  4 mg Intravenous Q6H PRN Squire Brendat, PA-C      sodium chloride  100 mL/hr Intravenous Continuous Nolvia Defrancisco, PA-C 100 mL/hr (22 1001)    thiamine  500 mg Intravenous Counts include [facility] beds at the [facility] Squire Brendat, PA-C 500 mg (22 0542)    traZODone  50 mg Oral HS PRN Squire Brendat, PA-C           Vitals:   Temp (24hrs), Av 2 °F (36 8 °C), Min:97 6 °F (36 4 °C), Max:98 8 °F (37 1 °C)    Temp:  [97 6 °F (36 4 °C)-98 8 °F (37 1 °C)] 97 8 °F (36 6 °C)  HR:  [62-98] 71  Resp:  [18-25] 18  BP: (123-155)/() 136/84  SpO2:  [94 %-99 %] 97 %  Body mass index is 25 09 kg/m²  Input and Output Summary (last 24 hours): Intake/Output Summary (Last 24 hours) at 2022 1056  Last data filed at 2022 0946  Gross per 24 hour   Intake 1070 ml   Output --   Net 1070 ml       Physical Exam:   Physical Exam  Vitals and nursing note reviewed  Constitutional:       General: He is not in acute distress  Appearance: He is well-developed and normal weight  He is ill-appearing  He is not diaphoretic  HENT:      Head: Normocephalic and atraumatic  Mouth/Throat:      Mouth: Mucous membranes are moist       Pharynx: Oropharynx is clear  Eyes:      General: Scleral icterus (slight) present  Extraocular Movements: Extraocular movements intact  Right eye: No nystagmus  Left eye: No nystagmus  Conjunctiva/sclera: Conjunctivae normal       Pupils: Pupils are equal, round, and reactive to light  Cardiovascular:      Rate and Rhythm: Normal rate and regular rhythm  Pulses: Normal pulses  Heart sounds: Normal heart sounds  No murmur heard  No friction rub  No gallop  Pulmonary:      Effort: Pulmonary effort is normal  No respiratory distress        Breath sounds: Normal breath sounds  No wheezing, rhonchi or rales  Abdominal:      General: Bowel sounds are normal  There is distension  Palpations: Abdomen is soft  Tenderness: There is no abdominal tenderness  There is no guarding  Comments: Dilated superficial periumbilical veins (caput medusae)   Musculoskeletal:         General: Normal range of motion  Cervical back: Normal range of motion and neck supple  Right lower leg: No edema  Left lower leg: No edema  Skin:     General: Skin is warm and dry  Capillary Refill: Capillary refill takes less than 2 seconds  Findings: Petechiae present  Neurological:      General: No focal deficit present  Mental Status: He is alert and oriented to person, place, and time  Mental status is at baseline  Sensory: No sensory deficit  Motor: Tremor (notable intention tremor b/l UEs) present  Psychiatric:         Attention and Perception: Attention normal  He does not perceive auditory or visual hallucinations  Mood and Affect: Mood is anxious  Speech: Speech normal          Behavior: Behavior is cooperative  Additional Data:     Labs: keep all most recent labs as listed on admission templates   Results from last 7 days   Lab Units 01/12/22  0514 01/11/22  1036 01/11/22  1036   WBC Thousand/uL 2 20*   < > 2 56*   HEMOGLOBIN g/dL 13 7   < > 15 1   HEMATOCRIT % 39 1*   < > 43 8   PLATELETS Thousands/uL 16*   < > 25*   NEUTROS PCT %  --   --  63   LYMPHS PCT %  --   --  24   MONOS PCT %  --   --  10   EOS PCT %  --   --  2    < > = values in this interval not displayed        Results from last 7 days   Lab Units 01/12/22  0515   SODIUM mmol/L 134*   POTASSIUM mmol/L 3 9   CHLORIDE mmol/L 107   CO2 mmol/L 27   BUN mg/dL 9   CREATININE mg/dL 0 64*   ANION GAP mmol/L 0*   CALCIUM mg/dL 7 7*   ALBUMIN g/dL 3 1   TOTAL BILIRUBIN mg/dL 4 75*   ALK PHOS U/L 159*   ALT U/L 41   AST U/L 147*   GLUCOSE RANDOM mg/dL 91      Results from last 7 days   Lab Units 01/12/22  0514   INR  1 48*                         * I Have Reviewed All Lab Data Listed Above  * Additional Pertinent Lab Tests Reviewed: All Labs Within Last 24 Hours Reviewed      Imaging Studies: I have personally reviewed pertinent reports  Recent Cultures (last 7 days): Today, Patient Was Seen By: Wlof Melendrez PA-C    ** Please Note: Dictation voice to text software may have been used in the creation of this document   **

## 2022-01-12 NOTE — ASSESSMENT & PLAN NOTE
· CBC 1/12/2022: platelets 16 (down from 25 yesterday, potentially dilutional)  · Likely 2/2 myelosuppression from chronic alcohol consumption  · No evidence of active bleeding at this time  · Continue to monitor symptoms  · Fall precautions  · Continue continual observation for safety   · Continue to monitor platelets, repeat CBC in AM and consider transfusion if indicated

## 2022-01-12 NOTE — ASSESSMENT & PLAN NOTE
· LFTs:  --> 147, ALT 51--> 41, --> 159  · Likely 2/2 chronic alcohol consumption / cirrhosis  · Continue to monitor, repeat CMP in AM

## 2022-01-12 NOTE — ASSESSMENT & PLAN NOTE
· EKG (1/11/2022): "Normal sinus rhythm  Prolonged QT- QT/QTc 460/496 ms  Abnormal ECG  When compared with ECG of 24-MAR-2021 06:49, Vent   rate has decreased BY  34 BPM " (Confirmed by Hyun Garcia)  · Avoid QT-prolonging agents  · Continue to monitor on tele

## 2022-01-12 NOTE — ASSESSMENT & PLAN NOTE
Patient with h/o chronic alcohol use since age 26   Continue IVFs,  daily folic acid supplementation and multivitamin  · Continue high-dose thiamine  Continue to follow [facility] protocol for medically-assisted alcohol withdrawal  Continue consultation with case management for assistance with rehab resources- continues to express interest in inpatient rehab at this time

## 2022-01-13 ENCOUNTER — APPOINTMENT (INPATIENT)
Dept: ULTRASOUND IMAGING | Facility: HOSPITAL | Age: 57
DRG: 772 | End: 2022-01-13
Payer: COMMERCIAL

## 2022-01-13 ENCOUNTER — APPOINTMENT (INPATIENT)
Dept: CT IMAGING | Facility: HOSPITAL | Age: 57
DRG: 772 | End: 2022-01-13
Payer: COMMERCIAL

## 2022-01-13 PROBLEM — G93.40 ENCEPHALOPATHY: Status: ACTIVE | Noted: 2022-01-13

## 2022-01-13 LAB
ALBUMIN SERPL BCP-MCNC: 3.2 G/DL (ref 3–5.2)
ALP SERPL-CCNC: 135 U/L (ref 43–122)
ALT SERPL W P-5'-P-CCNC: 38 U/L
ANION GAP SERPL CALCULATED.3IONS-SCNC: 4 MMOL/L (ref 5–14)
AST SERPL W P-5'-P-CCNC: 138 U/L (ref 17–59)
ATRIAL RATE: 69 BPM
BILIRUB SERPL-MCNC: 5.16 MG/DL
BUN SERPL-MCNC: 10 MG/DL (ref 5–25)
CALCIUM ALBUM COR SERPL-MCNC: 8.1 MG/DL (ref 8.3–10.1)
CALCIUM SERPL-MCNC: 7.5 MG/DL (ref 8.4–10.2)
CHLORIDE SERPL-SCNC: 105 MMOL/L (ref 97–108)
CO2 SERPL-SCNC: 23 MMOL/L (ref 22–30)
CREAT SERPL-MCNC: 0.64 MG/DL (ref 0.7–1.5)
DME PARACHUTE DELIVERY DATE REQUESTED: NORMAL
DME PARACHUTE ITEM DESCRIPTION: NORMAL
DME PARACHUTE ORDER STATUS: NORMAL
DME PARACHUTE SUPPLIER NAME: NORMAL
DME PARACHUTE SUPPLIER PHONE: NORMAL
ERYTHROCYTE [DISTWIDTH] IN BLOOD BY AUTOMATED COUNT: 15.2 %
GFR SERPL CREATININE-BSD FRML MDRD: 110 ML/MIN/1.73SQ M
GLUCOSE SERPL-MCNC: 120 MG/DL (ref 70–99)
HCT VFR BLD AUTO: 36.5 % (ref 41–53)
HGB BLD-MCNC: 12.7 G/DL (ref 13.5–17.5)
INR PPP: 1.54 (ref 0.84–1.19)
MCH RBC QN AUTO: 35.4 PG (ref 26–34)
MCHC RBC AUTO-ENTMCNC: 34.8 G/DL (ref 31–36)
MCV RBC AUTO: 102 FL (ref 80–100)
P AXIS: 41 DEGREES
PLATELET # BLD AUTO: 25 THOUSANDS/UL (ref 150–450)
PMV BLD AUTO: 9.7 FL (ref 8.9–12.7)
POTASSIUM SERPL-SCNC: 5 MMOL/L (ref 3.6–5)
PR INTERVAL: 172 MS
PROT SERPL-MCNC: 7.7 G/DL (ref 5.9–8.4)
PROTHROMBIN TIME: 17.8 SECONDS (ref 11.6–14.5)
QRS AXIS: 50 DEGREES
QRSD INTERVAL: 94 MS
QT INTERVAL: 432 MS
QTC INTERVAL: 462 MS
RBC # BLD AUTO: 3.59 MILLION/UL (ref 4.5–5.9)
SODIUM SERPL-SCNC: 132 MMOL/L (ref 137–147)
T WAVE AXIS: 40 DEGREES
VENTRICULAR RATE: 69 BPM
WBC # BLD AUTO: 2.1 THOUSAND/UL (ref 4.5–11)

## 2022-01-13 PROCEDURE — 70450 CT HEAD/BRAIN W/O DYE: CPT

## 2022-01-13 PROCEDURE — 93005 ELECTROCARDIOGRAM TRACING: CPT

## 2022-01-13 PROCEDURE — 97116 GAIT TRAINING THERAPY: CPT

## 2022-01-13 PROCEDURE — 85027 COMPLETE CBC AUTOMATED: CPT | Performed by: PHYSICIAN ASSISTANT

## 2022-01-13 PROCEDURE — 80053 COMPREHEN METABOLIC PANEL: CPT | Performed by: PHYSICIAN ASSISTANT

## 2022-01-13 PROCEDURE — 99232 SBSQ HOSP IP/OBS MODERATE 35: CPT | Performed by: PHYSICIAN ASSISTANT

## 2022-01-13 PROCEDURE — 97163 PT EVAL HIGH COMPLEX 45 MIN: CPT

## 2022-01-13 PROCEDURE — 93010 ELECTROCARDIOGRAM REPORT: CPT | Performed by: INTERNAL MEDICINE

## 2022-01-13 PROCEDURE — 85610 PROTHROMBIN TIME: CPT | Performed by: PHYSICIAN ASSISTANT

## 2022-01-13 PROCEDURE — 76705 ECHO EXAM OF ABDOMEN: CPT

## 2022-01-13 PROCEDURE — G1004 CDSM NDSC: HCPCS

## 2022-01-13 RX ORDER — SODIUM CHLORIDE 9 MG/ML
125 INJECTION, SOLUTION INTRAVENOUS CONTINUOUS
Status: DISCONTINUED | OUTPATIENT
Start: 2022-01-13 | End: 2022-01-14

## 2022-01-13 RX ORDER — PHENOBARBITAL 64.8 MG/1
64.8 TABLET ORAL ONCE
Status: COMPLETED | OUTPATIENT
Start: 2022-01-13 | End: 2022-01-13

## 2022-01-13 RX ADMIN — THIAMINE HYDROCHLORIDE 500 MG: 100 INJECTION, SOLUTION INTRAMUSCULAR; INTRAVENOUS at 06:17

## 2022-01-13 RX ADMIN — SODIUM CHLORIDE 100 ML/HR: 0.9 INJECTION, SOLUTION INTRAVENOUS at 09:05

## 2022-01-13 RX ADMIN — MULTIPLE VITAMINS W/ MINERALS TAB 1 TABLET: TAB ORAL at 09:05

## 2022-01-13 RX ADMIN — LACTULOSE 20 G: 10 SOLUTION ORAL at 09:05

## 2022-01-13 RX ADMIN — PHENOBARBITAL 64.8 MG: 64.8 TABLET ORAL at 18:44

## 2022-01-13 RX ADMIN — FOLIC ACID 1 MG: 1 TABLET ORAL at 09:05

## 2022-01-13 RX ADMIN — THIAMINE HYDROCHLORIDE 500 MG: 100 INJECTION, SOLUTION INTRAMUSCULAR; INTRAVENOUS at 21:24

## 2022-01-13 RX ADMIN — THIAMINE HYDROCHLORIDE 500 MG: 100 INJECTION, SOLUTION INTRAMUSCULAR; INTRAVENOUS at 14:48

## 2022-01-13 RX ADMIN — LACTULOSE 20 G: 10 SOLUTION ORAL at 18:44

## 2022-01-13 RX ADMIN — SODIUM CHLORIDE 125 ML/HR: 9 INJECTION, SOLUTION INTRAVENOUS at 21:24

## 2022-01-13 NOTE — ASSESSMENT & PLAN NOTE
· CBC 01/13/2022:  Hemoglobin 12 7, , WBCs 2 1, platelet 25  · Likely 2/2 myelosuppression from chronic alcohol consumption  · Continue high-dose thiamine, daily folic acid supplementation  · Continue to monitor, repeat CBC in AM

## 2022-01-13 NOTE — ASSESSMENT & PLAN NOTE
· EKG (1/11/2022): "Normal sinus rhythm  Prolonged QT- QT/QTc 460/496 ms  Abnormal ECG  When compared with ECG of 24-MAR-2021 06:49, Vent   rate has decreased BY  34 BPM " (Confirmed by Sonu Tavarez)  · Avoid QT-prolonging agents  · Continue to monitor on tele

## 2022-01-13 NOTE — ASSESSMENT & PLAN NOTE
· CBC 1/13/2022: platelets 25 (up from 16 yesterday; 25 on admission)  · Likely 2/2 myelosuppression from chronic alcohol consumption  · No evidence of active bleeding at this time  · Continue to monitor symptoms  · Fall precautions  · Continue continual observation for safety   · Continue to monitor platelets, repeat CBC in AM and consider transfusion if indicated

## 2022-01-13 NOTE — ASSESSMENT & PLAN NOTE
Patient with h/o chronic alcohol use since age 26   Continue IVFs,  daily folic acid supplementation and multivitamin  · Continue high-dose thiamine  [facility] for medically-assisted alcohol withdrawal  Continue consultation with case management for assistance with rehab resources- inpatient options limited 2/2 COVID positive status  · Outpatient resources pending

## 2022-01-13 NOTE — PLAN OF CARE
Problem: Nutrition/Hydration-ADULT  Goal: Nutrient/Hydration intake appropriate for improving, restoring or maintaining nutritional needs  Description: Monitor and assess patient's nutrition/hydration status for malnutrition  Collaborate with interdisciplinary team and initiate plan and interventions as ordered  Monitor patient's weight and dietary intake as ordered or per policy  Utilize nutrition screening tool and intervene as necessary  Determine patient's food preferences and provide high-protein, high-caloric foods as appropriate       INTERVENTIONS:  - Monitor oral intake, urinary output, labs, and treatment plans  - Assess nutrition and hydration status and recommend course of action  - Evaluate amount of meals eaten  - Assist patient with eating if necessary   - Allow adequate time for meals  - Recommend/ encourage appropriate diets, oral nutritional supplements, and vitamin/mineral supplements  - Order, calculate, and assess calorie counts as needed  - Recommend, monitor, and adjust tube feedings and TPN/PPN based on assessed needs  - Assess need for intravenous fluids  - Provide specific nutrition/hydration education as appropriate  - Include patient/family/caregiver in decisions related to nutrition  Outcome: Progressing     Problem: MOBILITY - ADULT  Goal: Maintain or return to baseline ADL function  Description: INTERVENTIONS:  -  Assess patient's ability to carry out ADLs; assess patient's baseline for ADL function and identify physical deficits which impact ability to perform ADLs (bathing, care of mouth/teeth, toileting, grooming, dressing, etc )  - Assess/evaluate cause of self-care deficits   - Assess range of motion  - Assess patient's mobility; develop plan if impaired  - Assess patient's need for assistive devices and provide as appropriate  - Encourage maximum independence but intervene and supervise when necessary  - Involve family in performance of ADLs  - Assess for home care needs following discharge   - Consider OT consult to assist with ADL evaluation and planning for discharge  - Provide patient education as appropriate  Outcome: Progressing  Goal: Maintains/Returns to pre admission functional level  Description: INTERVENTIONS:  - Perform BMAT or MOVE assessment daily    - Set and communicate daily mobility goal to care team and patient/family/caregiver  - Collaborate with rehabilitation services on mobility goals if consulted  - Perform Range of Motion 3 times a day    - Out of bed for toileting  - Record patient progress and toleration of activity level   Outcome: Progressing     Problem: Potential for Falls  Goal: Patient will remain free of falls  Description: INTERVENTIONS:  - Educate patient/family on patient safety including physical limitations  - Instruct patient to call for assistance with activity   - Consult OT/PT to assist with strengthening/mobility   - Keep Call bell within reach  - Keep bed low and locked with side rails adjusted as appropriate  - Keep care items and personal belongings within reach  - Initiate and maintain comfort rounds  - Make Fall Risk Sign visible to staff  - Offer Toileting every 3 Hours, in advance of need  - Apply yellow socks and bracelet for high fall risk patients  - Consider moving patient to room near nurses station  Outcome: Progressing     Problem: SUBSTANCE USE/ABUSE  Goal: By discharge, will develop insight into their chemical dependency and sustain motivation to continue in recovery  Description: INTERVENTIONS:  - Attends all daily group sessions and scheduled AA groups  - Actively practices coping skills through participation in the therapeutic community and adherence to program rules  - Reviews and completes assignments from individual treatment plan  - Assist patient development of understanding of their personal cycle of addiction and relapse triggers  Outcome: Progressing  Goal: By discharge, patient will have ongoing treatment plan addressing chemical dependency  Description: INTERVENTIONS:  - Assist patient with resources and/or appointments for ongoing recovery based living  Outcome: Progressing     Problem: SAFETY ADULT  Goal: Maintain or return to baseline ADL function  Description: INTERVENTIONS:  -  Assess patient's ability to carry out ADLs; assess patient's baseline for ADL function and identify physical deficits which impact ability to perform ADLs (bathing, care of mouth/teeth, toileting, grooming, dressing, etc )  - Assess/evaluate cause of self-care deficits   - Assess range of motion  - Assess patient's mobility; develop plan if impaired  - Assess patient's need for assistive devices and provide as appropriate  - Encourage maximum independence but intervene and supervise when necessary  - Involve family in performance of ADLs  - Assess for home care needs following discharge   - Consider OT consult to assist with ADL evaluation and planning for discharge  - Provide patient education as appropriate  Outcome: Progressing  Goal: Maintains/Returns to pre admission functional level  Description: INTERVENTIONS:  - Perform BMAT or MOVE assessment daily    - Set and communicate daily mobility goal to care team and patient/family/caregiver     - Collaborate with rehabilitation services on mobility goals if consulted  - Out of bed for toileting  - Record patient progress and toleration of activity level   Outcome: Progressing  Goal: Patient will remain free of falls  Description: INTERVENTIONS:  - Educate patient/family on patient safety including physical limitations  - Instruct patient to call for assistance with activity   - Consult OT/PT to assist with strengthening/mobility   - Keep Call bell within reach  - Keep bed low and locked with side rails adjusted as appropriate  - Keep care items and personal belongings within reach  - Initiate and maintain comfort rounds  - Make Fall Risk Sign visible to staff  - Offer Toileting every 3 Hours, in advance of need  - Apply yellow socks and bracelet for high fall risk patients  - Consider moving patient to room near nurses station  Outcome: Progressing     Problem: DISCHARGE PLANNING  Goal: Discharge to home or other facility with appropriate resources  Description: INTERVENTIONS:  - Identify barriers to discharge w/patient and caregiver  - Arrange for needed discharge resources and transportation as appropriate  - Identify discharge learning needs (meds, wound care, etc )  - Arrange for interpretive services to assist at discharge as needed  - Refer to Case Management Department for coordinating discharge planning if the patient needs post-hospital services based on physician/advanced practitioner order or complex needs related to functional status, cognitive ability, or social support system  Outcome: Progressing     Problem: Knowledge Deficit  Goal: Patient/family/caregiver demonstrates understanding of disease process, treatment plan, medications, and discharge instructions  Description: Complete learning assessment and assess knowledge base    Interventions:  - Provide teaching at level of understanding  - Provide teaching via preferred learning methods  Outcome: Progressing

## 2022-01-13 NOTE — ASSESSMENT & PLAN NOTE
· Patient exhibiting intermittent confusion during admission  · Currently A&Ox4  · Denies recent falls  · Potentially 2/2 wernicke's vs hepatic encephalopathy vs brain bleed from previous fall  · PT consult pending   · STAT CT head  · Continue high-dose thiamine for now   · Continue lactulose

## 2022-01-13 NOTE — ASSESSMENT & PLAN NOTE
· LFTs elevated on admission, improving  ·  --> 147 --> 138  · ALT 51 --> 41 --> 38  ·  --> 159 --> 135  · Likely 2/2 chronic alcohol consumption / cirrhosis  · Continue to monitor, repeat CMP in AM

## 2022-01-13 NOTE — CASE MANAGEMENT
Cm consulted with nursing staff regarding pt  Nursing staff indicates pt presents with increase confusion  Cm contacted pt by room phone and cm attempted to discuss discharge planning  Pt presented as confused during this conversation  No discharge planning could be completed due to this confusion  Provider made aware

## 2022-01-13 NOTE — PLAN OF CARE
Problem: PHYSICAL THERAPY ADULT  Goal: Performs mobility at highest level of function for planned discharge setting  See evaluation for individualized goals  Description: Treatment/Interventions: ADL retraining,Functional transfer training,LE strengthening/ROM,Elevations,Therapeutic exercise,Endurance training,Equipment eval/education,Bed mobility,Gait training,Spoke to nursing,Spoke to case management  Equipment Recommended: Laura Dunham       See flowsheet documentation for full assessment, interventions and recommendations  Note: Prognosis: Fair  Problem List: Decreased strength,Decreased range of motion,Decreased endurance,Impaired balance,Decreased mobility,Decreased coordination,Impaired judgement,Decreased safety awareness  Assessment: Aliyah Zimmerman is a 55 y o  male admitted to [address] [place] [address] on 1/11/2022 for Alcohol withdrawal, with unspecified complication ([address] )  Pt  has a past medical history of ETOH abuse  PT was consulted and pt was seen on 1/13/2022 for mobility assessment and d/c planning  Pt presents supine in bed, agreeable to PT  Patient is AAOx4 and answers all questions appropriately, however may be a questionable historian as he has had intermittent periods of confusion reported by RN and CM  PTA, patient reports he was residing with his cousin and his cousin's wife in [facility] w/ FF to second floor which patient reports he ascends on hands and feet and descends bumping down on bottom for safety  Pt reports he was previously INDEP w/ ADL and mobility w/o AD (endorses furniture walking); receives help from cousin and cousin's wife for IADL tasks like cooking, cleaning, and transportation or pt reports he walks places  Patient also reports he doesn't like to go out in public that much as he feels embarrassed of his hand tremors  Upon assessment, patient demonstrates 4/5 BLE strength, intact sensation, and intact coordination however has intention tremors of all 4 extremities   Patient reports these tremors have been occurring for the past 8 months, he has not seen a doctor regarding this matter as he doesn't have a PCP, and reports he thought the tremors were from drinking  Patient reports when the tremors get bad, his whole body shakes and he has a fear of falling  Pt is currently functioning at a supervision assistance x1 level for bed mobility, CGA level for transfers and CGA level for ambulation with Rolling Walker  In static stance at [facility], patient demonstrates BUE tremors and shaking, however this dissipates with ambulation  Patient's gait pattern is initially unsteady however progressively becomes more steady with increased distance  He also demonstrates decreased bilateral heel strike and toe off during gait as well as slow, shuffling steps  No LOB with both ambulation trials  Patient reports he would like to take a shower  Educated patient that it would be safest if he uses a shower chair in the shower and has supervision from staff for safety  RN made aware of this recommendation  Also recommending use of RW with mobility with patient reports of understanding  The patient's AM-PAC Basic Mobility Inpatient Short Form Raw Score is 19  A Raw score of greater than 16 suggests the patient may benefit from discharge to home  Please also refer to the recommendation of the Physical Therapist for safe discharge planning  Barriers to Discharge: Inaccessible home environment,Decreased caregiver support        PT Discharge Recommendation: Home with home health rehabilitation (pending stair trial)          See flowsheet documentation for full assessment

## 2022-01-13 NOTE — PHYSICAL THERAPY NOTE
Physical Therapy Evaluation    Patient's Name: Marga Yu    Admitting Diagnosis  Alcohol withdrawal syndrome without complication ([facility] ) [[id number]]    Problem List  Patient Active Problem List   Diagnosis    Alcohol withdrawal seizure ([facility] )    Alcohol use disorder, severe, dependence ([facility] )    Transaminitis    Hyperbilirubinemia    Thrombocytopenia (HCC)    Tobacco abuse    Open wound of tongue due to bite    Alcohol withdrawal, with unspecified complication ([facility] )    Electrolyte abnormality    Closed fracture of nasal bone    Decompensated hepatic cirrhosis (HCC)    Liver mass    Right hip pain    Alcoholic cirrhosis of liver (HCC)    COVID-19    Prolonged Q-T interval on ECG    Pancytopenia ([facility] )    Encephalopathy       Past Medical History  Past Medical History:   Diagnosis Date    ETOH abuse        Past Surgical History  Past Surgical History:   Procedure Laterality Date    GALLBLADDER SURGERY         Recent Imaging  CT head wo contrast   Final Result by Randy Landis MD (01/13 1332)      No acute intracranial abnormality  Workstation performed: [id number]         US right upper quadrant   Final Result by Tam Finch MD (01/13 7879)      Cirrhosis  Trace ascites  No definite sonographically evident focal liver mass        Workstation performed: [id number]             Recent Vital Signs  Vitals:    01/12/22 2208 01/13/22 0516 01/13/22 0745 01/13/22 1140   BP: 134/94 130/86 136/83 134/84   BP Location: Left arm Left arm Left arm Left arm   Pulse: 74 66 64 72   Resp: 18 16 18 18   Temp: 99 2 °F (37 3 °C) 97 9 °F (36 6 °C) 97 8 °F (36 6 °C) 98 6 °F (37 °C)   TempSrc: Temporal Temporal Temporal Temporal   SpO2: 98% 98% 97% 96%   Weight:       Height:              01/13/22 1410   PT Last Visit   PT Visit Date 01/13/22   Note Type   Note type Evaluation   Pain Assessment   Pain Assessment Tool 0-10   Pain Score 4   Pain Location/Orientation Location: Abdomen  (Pt reports stomach discomfort)   Restrictions/Precautions   Weight Bearing Precautions Per Order No   Other Precautions Contact/isolation; Airborne/isolation;Multiple lines; Fall Risk   Home Living   Type of [address] Multi-level;Bed/bath upstairs   Bathroom Shower/Tub Walk-in shower   Bathroom Toilet Standard   Bathroom Accessibility Accessible via walker   Home Equipment Cane   Additional Comments Pt is a questionable historian; reports he lives with his cousin in [facility]  Prior Function   Level of [place] Independent with ADLs and functional mobility; Needs assistance with IADLs   Lives With Family  (cousin and cousin's wife)   Receives Help From Friend(s)   ADL Assistance Independent   IADLs Needs assistance   Falls in the last 6 months 1 to 4  (reports 1 fall d/t seizure)   General   Family/Caregiver Present No   Cognition   Arousal/Participation Alert   Orientation Level Oriented X4   Memory Within functional limits   Following Commands Follows one step commands without difficulty   RLE Assessment   RLE Assessment X   RLE Overall AROM   R Hip Flexion WFL   R Knee Flexion WFL   R Knee Extension ~20 deg extensor lag   R Ankle Dorsiflexion WFL   R Ankle Plantar Flexion WFL   Strength RLE   RLE Overall Strength 4/5   LLE Assessment   LLE Assessment X   LLE Overall AROM   L Hip Flexion WFL   L Knee Flexion WFL   L Knee Extension WFL   L Ankle Dorsiflexion WFL   L Ankle Plantar Flexion WFL   Strength LLE   LLE Overall Strength 4/5   Coordination   Movements are Fluid and Coordinated 0   Coordination and Movement Description intention tremor of BLE's and BUE's   Sensation WFL   Heel to Shin Intact   Rapid Alternating Movements Intact   Light Touch   RLE Light Touch Grossly intact   LLE Light Touch Grossly intact   Bed Mobility   Supine to Sit 5  Supervision   Additional items Assist x 1;Bedrails; Increased time required;Verbal cues   Sit to Supine 5  Supervision   Additional items Assist x 1;Bedrails; Increased time required;Verbal cues   Transfers   Sit to Stand 4  Minimal assistance  (CGA)   Additional items Assist x 1; Armrests; Increased time required;Verbal cues   Stand to Sit 4  Minimal assistance  (CGA)   Additional items Assist x 1; Armrests; Increased time required;Verbal cues   Additional Comments w/ RW   Ambulation/Elevation   Gait pattern Step through pattern;Decreased toe off;Decreased heel strike; Excessively slow; Short stride; Inconsistent melvi; Shuffling;Decreased foot clearance;Narrow BECK; Improper Weight shift   Gait Assistance 4  Minimal assist  (CGA)   Additional items Assist x 1;Verbal cues   Assistive Device Rolling walker   Distance 15 feet x 2    Stair Management Assistance Not tested   Balance   Static Sitting Fair +   Dynamic Sitting Fair +   Static Standing Fair   Dynamic Standing Fair -   Ambulatory Fair -   Endurance Deficit   Endurance Deficit Yes   Endurance Deficit Description decreased activity tolerance from baseline    Activity Tolerance   Activity Tolerance Patient limited by fatigue;Patient tolerated treatment well   Medical Staff Made Aware Spoke to CM   Nurse Made Aware Spoke to RN   Assessment   Prognosis Fair   Problem List Decreased strength;Decreased range of motion;Decreased endurance; Impaired balance;Decreased mobility; Decreased coordination; Impaired judgement;Decreased safety awareness   Assessment see evaluation note   Barriers to Discharge Inaccessible home environment;Decreased caregiver support   Goals   Patient Goals to return home    STG Expiration Date 01/23/22   Short Term Goal #1 see evaluation note   Plan   Treatment/Interventions ADL retraining;Functional transfer training;LE strengthening/ROM; Elevations; Therapeutic exercise; Endurance training;Equipment eval/education; Bed mobility;Gait training;Spoke to nursing;Spoke to case management   PT Frequency 2-3x/wk   Recommendation   PT Discharge Recommendation Home with home health rehabilitation  (pending stair trial)   Equipment Recommended [facility] Recommended Wheeled walker   AM-PAC Basic Mobility Inpatient   Turning in Bed Without Bedrails 4   Lying on Back to Sitting on Edge of Flat Bed 3   Moving Bed to Chair 3   Standing Up From Chair 3   Walk in Room 3   Climb 3-5 Stairs 3   Basic Mobility Inpatient Raw Score 19   Basic Mobility Standardized Score 42 48   Highest Level Of Mobility   [facility] Goal 6: Walk 10 steps or more   Additional Treatment Session   Start Time 1410   End Time 1445   Equipment Use RW   End of Consult   Patient Position at End of Consult Supine; All needs within reach   End of Consult Comments RN updated on patient status       ASSESSMENT                                                                                                                     Esperanza Summers is a 55 y o  male admitted to [facility] on 1/11/2022 for Alcohol withdrawal, with unspecified complication ([facility] )  Pt  has a past medical history of ETOH abuse  PT was consulted and pt was seen on 1/13/2022 for mobility assessment and d/c planning  Pt presents supine in bed, agreeable to PT  Patient is AAOx4 and answers all questions appropriately, however may be a questionable historian as he has had intermittent periods of confusion reported by RN and CM  PTA, patient reports he was residing with his cousin and his cousin's wife in [facility] w/ FF to second floor which patient reports he ascends on hands and feet and descends bumping down on bottom for safety  Pt reports he was previously INDEP w/ ADL and mobility w/o AD (endorses furniture walking); receives help from cousin and cousin's wife for IADL tasks like cooking, cleaning, and transportation or pt reports he walks places  Patient also reports he doesn't like to go out in public that much as he feels embarrassed of his hand tremors   Upon assessment, patient demonstrates 4/5 BLE strength, intact sensation, and intact coordination however has intention tremors of all 4 extremities  Patient reports these tremors have been occurring for the past 8 months, he has not seen a doctor regarding this matter as he doesn't have a PCP, and reports he thought the tremors were from drinking  Patient reports when the tremors get bad, his whole body shakes and he has a fear of falling  Pt is currently functioning at a supervision assistance x1 level for bed mobility, CGA level for transfers and CGA level for ambulation with Rolling Walker  In static stance at [facility], patient demonstrates BUE tremors and shaking, however this dissipates with ambulation  Patient's gait pattern is initially unsteady however progressively becomes more steady with increased distance  He also demonstrates decreased bilateral heel strike and toe off during gait as well as slow, shuffling steps  No LOB with both ambulation trials  Patient reports he would like to take a shower  Educated patient that it would be safest if he uses a shower chair in the shower and has supervision from staff for safety  RN made aware of this recommendation  Also recommending use of RW with mobility with patient reports of understanding  The patient's AM-[facility] Basic Mobility Inpatient Short Form Raw Score is 19  A Raw score of greater than 16 suggests the patient may benefit from discharge to home  Please also refer to the recommendation of the Physical Therapist for safe discharge planning  Goals                                                                                                                                    1) Bed mobility skills with modified independent assistance to facilitate safe return to previous living environment 2) Functional transfers with modified independent assistance to facilitate safe return to previous living environment  3) Ambulation with least restrictive AD modified independent assistance without LOB and stable vitals for safe ambulation home/ community distances   4) Stair training up/down flight 12 step/s with appropriate rail/s  and modified independent assistance for safe access to previous living environment  5) Improve balance grades to fair to reduce risk of falls  6) Improve LE strength grades by 1 to increase independence w/ transfers and gait  7) PT for ongoing pt and family education; DME needs and D/C planning to promote highest level of function in least restrictive environment  Physical Therapy Treatment                 (15 minutes)                                                                             Additional ambulation 15 feet around bed w/ RW at [facility] level   Education on recommendations of RW usage with mobility for safety       Recommendations                                                                                                              Pt will benefit from continued skilled IP PT to address the above mentioned impairments in order to maximize recovery and increase functional independence when completing mobility and ADLs  See flow sheet for goals and POC       DME:  rolling walker    Discharge Disposition:  Home with Home Physical Therapy pending stair trial      Tom Weinstein PT, DPT

## 2022-01-13 NOTE — ASSESSMENT & PLAN NOTE
Patient with h/o chronic alcohol use since age 26; endorses h/o withdrawal seizures  Last drink: consumed 1 beer morning of 1/11/2022  Ethanol 1/11/2022 11:44 am: <3  Continue to follow [facility] protocol for medically-assisted alcohol withdrawal  · Patient received 5 mg ativan and 10 mg valium in ED PTA  · Patient continues to exhibit notable intention tremor (improved compared to initial presentation)  · Patient has thus far received 1884 mg total of phenobarbital

## 2022-01-13 NOTE — PLAN OF CARE
Problem: Nutrition/Hydration-ADULT  Goal: Nutrient/Hydration intake appropriate for improving, restoring or maintaining nutritional needs  Description: Monitor and assess patient's nutrition/hydration status for malnutrition  Collaborate with interdisciplinary team and initiate plan and interventions as ordered  Monitor patient's weight and dietary intake as ordered or per policy  Utilize nutrition screening tool and intervene as necessary  Determine patient's food preferences and provide high-protein, high-caloric foods as appropriate       INTERVENTIONS:  - Monitor oral intake, urinary output, labs, and treatment plans  - Assess nutrition and hydration status and recommend course of action  - Evaluate amount of meals eaten  - Assist patient with eating if necessary   - Allow adequate time for meals  - Recommend/ encourage appropriate diets, oral nutritional supplements, and vitamin/mineral supplements  - Order, calculate, and assess calorie counts as needed  - Recommend, monitor, and adjust tube feedings and TPN/PPN based on assessed needs  - Assess need for intravenous fluids  - Provide specific nutrition/hydration education as appropriate  - Include patient/family/caregiver in decisions related to nutrition  Outcome: Progressing        Problem: SUBSTANCE USE/ABUSE  Goal: By discharge, will develop insight into their chemical dependency and sustain motivation to continue in recovery  Description: INTERVENTIONS:  - Attends all daily group sessions and scheduled AA groups  - Actively practices coping skills through participation in the therapeutic community and adherence to program rules  - Reviews and completes assignments from individual treatment plan  - Assist patient development of understanding of their personal cycle of addiction and relapse triggers  Outcome: Progressing  Goal: By discharge, patient will have ongoing treatment plan addressing chemical dependency  Description: INTERVENTIONS:  - Assist patient with resources and/or appointments for ongoing recovery based living  Outcome: Progressing       Problem: DISCHARGE PLANNING  Goal: Discharge to home or other facility with appropriate resources  Description: INTERVENTIONS:  - Identify barriers to discharge w/patient and caregiver  - Arrange for needed discharge resources and transportation as appropriate  - Identify discharge learning needs (meds, wound care, etc )  - Arrange for interpretive services to assist at discharge as needed  - Refer to Case Management Department for coordinating discharge planning if the patient needs post-hospital services based on physician/advanced practitioner order or complex needs related to functional status, cognitive ability, or social support system  Outcome: Progressing     Problem: Knowledge Deficit  Goal: Patient/family/caregiver demonstrates understanding of disease process, treatment plan, medications, and discharge instructions  Description: Complete learning assessment and assess knowledge base    Interventions:  - Provide teaching at level of understanding  - Provide teaching via preferred learning methods  Outcome: Progressing

## 2022-01-13 NOTE — ED PROVIDER NOTES
History  Chief Complaint   Patient presents with    Withdrawal - Alcohol     patient states he is withdrawing from ETOH - last drink 1 5 days ago   + tremors  States was exposed to covid several days ago  Patient presents for evaluation of alcohol withdrawal   Patient states his last alcohol drink was 1/2 days ago  Presents with tremors nausea and vomiting and feeling of anxiety  Patient states he has a history of prior seizures from alcohol withdrawal   When asked why stop drinking states that he is trying to detox himself is now looking for help      History provided by:  Patient   used: No    Withdrawal - Alcohol  Associated symptoms: nausea and vomiting    Associated symptoms: no abdominal pain, no headaches, no palpitations, no seizures, no shortness of breath and no weakness        Prior to Admission Medications   Prescriptions Last Dose Informant Patient Reported? Taking?    Multiple Vitamin (multivitamin) tablet   No No   Sig: Take 1 tablet by mouth daily   acetaminophen (TYLENOL) 325 mg tablet   No No   Sig: Take 2 tablets (650 mg total) by mouth every 6 (six) hours as needed for mild pain   calcium carbonate (TUMS) 500 mg chewable tablet   No No   Sig: Chew 2 tablets (1,000 mg total) daily as needed for indigestion or heartburn   chlordiazePOXIDE (LIBRIUM) 25 mg capsule   No No   Sig: Take 1 capsule (25 mg total) by mouth every 12 (twelve) hours for 2 days   folic acid (FOLVITE) 1 mg tablet   No No   Sig: Take 1 tablet (1 mg total) by mouth daily   ibuprofen (MOTRIN) 800 mg tablet   No No   Sig: Take 1 tablet (800 mg total) by mouth 2 (two) times daily after meals for 2 days   lactulose 20 g/30 mL   No No   Sig: Take 30 mL (20 g total) by mouth 2 (two) times a day   nicotine (NICODERM CQ) 14 mg/24hr TD 24 hr patch   No No   Sig: Place 1 patch on the skin daily   Patient not taking: Reported on 3/23/2021   thiamine 100 MG tablet   No No   Sig: Take 1 tablet (100 mg total) by mouth daily      Facility-Administered Medications: None       Past Medical History:   Diagnosis Date    ETOH abuse        Past Surgical History:   Procedure Laterality Date    GALLBLADDER SURGERY         Family History   Problem Relation Age of Onset    Heart disease Mother     Heart disease Father      I have reviewed and agree with the history as documented  E-Cigarette/Vaping    E-Cigarette Use Never User      E-Cigarette/Vaping Substances    Nicotine No     THC No     CBD No     Flavoring No     Other No     Unknown No      Social History     Tobacco Use    Smoking status: Never Smoker    Smokeless tobacco: Current User   Vaping Use    Vaping Use: Never used   Substance Use Topics    Alcohol use: Yes     Comment: every other day has beery and mixed drinks  last drink 12 hours ago    Drug use: Not Currently     Types: Marijuana       Review of Systems   Constitutional: Negative for chills and fever  HENT: Negative for ear pain and sore throat  Eyes: Negative for pain and visual disturbance  Respiratory: Negative for cough and shortness of breath  Cardiovascular: Negative for chest pain and palpitations  Gastrointestinal: Positive for nausea and vomiting  Negative for abdominal pain  Genitourinary: Negative for dysuria and hematuria  Musculoskeletal: Negative for arthralgias and back pain  Skin: Negative for color change and rash  Neurological: Positive for tremors  Negative for seizures, syncope, weakness, numbness and headaches  Psychiatric/Behavioral: The patient is nervous/anxious  All other systems reviewed and are negative  Physical Exam  Physical Exam  Vitals and nursing note reviewed  Constitutional:       General: He is in acute distress  Appearance: He is ill-appearing  HENT:      Head: Atraumatic  Right Ear: External ear normal       Left Ear: External ear normal       Nose: Nose normal       Mouth/Throat:      Mouth: Mucous membranes are dry  Pharynx: Oropharynx is clear  Eyes:      General: No scleral icterus  Conjunctiva/sclera: Conjunctivae normal    Cardiovascular:      Rate and Rhythm: Regular rhythm  Tachycardia present  Pulses: Normal pulses  Pulmonary:      Effort: Pulmonary effort is normal  No respiratory distress  Breath sounds: Normal breath sounds  Abdominal:      General: Abdomen is flat  Bowel sounds are normal  There is no distension  Palpations: Abdomen is soft  Tenderness: There is no abdominal tenderness  There is no guarding or rebound  Musculoskeletal:         General: No deformity  Normal range of motion  Skin:     Capillary Refill: Capillary refill takes less than 2 seconds  Findings: No rash  Neurological:      General: No focal deficit present  Mental Status: He is alert and oriented to person, place, and time  Cranial Nerves: No cranial nerve deficit  Sensory: No sensory deficit  Motor: No weakness        Comments: Tremors         Vital Signs  ED Triage Vitals [01/11/22 1020]   Temperature Pulse Respirations Blood Pressure SpO2   (!) 96 1 °F (35 6 °C) 87 20 158/89 100 %      Temp src Heart Rate Source Patient Position - Orthostatic VS BP Location FiO2 (%)   -- Monitor -- -- --      Pain Score       --           Vitals:    01/11/22 1020 01/11/22 1200   BP: 158/89 134/83   Pulse: 87 75         Visual Acuity      ED Medications  Medications   sodium chloride 0 9 % bolus 1,000 mL (1,000 mL Intravenous New Bag 1/11/22 1045)   ondansetron (ZOFRAN) injection 4 mg (4 mg Intravenous Given 1/11/22 1049)   LORazepam (ATIVAN) injection 2 mg (2 mg Intravenous Given 1/11/22 1048)   thiamine (VITAMIN B1) injection 100 mg (100 mg Intramuscular Given 1/11/22 1049)   LORazepam (ATIVAN) injection 2 mg (2 mg Intravenous Given 1/11/22 1132)   magnesium sulfate 2 g/50 mL IVPB (premix) 2 g (0 g Intravenous Stopped 1/11/22 1528)   diazepam (VALIUM) tablet 10 mg (10 mg Oral Given 1/11/22 1317)   LORazepam (ATIVAN) injection 1 mg (1 mg Intravenous Given 1/11/22 1511)       Diagnostic Studies  Results Reviewed     Procedure Component Value Units Date/Time    COVID/FLU/RSV - 2 hour TAT [[id number]]  (Abnormal) Collected: 01/11/22 1514    Lab Status: Final result Specimen: Nares from Nose Updated: 01/11/22 1601     SARS-CoV-2 Positive     INFLUENZA A PCR Negative     INFLUENZA B PCR Negative     RSV PCR Negative    Narrative:      FOR PEDIATRIC PATIENTS - copy/paste COVID Guidelines URL to browser: [email or web address]  [email or web address]    SARS-CoV-2 assay is a Nucleic Acid Amplification assay intended for the  qualitative detection of nucleic acid from SARS-CoV-2 in nasopharyngeal  swabs  Results are for the presumptive identification of SARS-CoV-2 RNA  Positive results are indicative of infection with SARS-CoV-2, the virus  causing COVID-19, but do not rule out bacterial infection or co-infection  with other viruses  Laboratories within the [place] and its  territories are required to report all positive results to the appropriate  public health authorities  Negative results do not preclude SARS-CoV-2  infection and should not be used as the sole basis for treatment or other  patient management decisions  Negative results must be combined with  clinical observations, patient history, and epidemiological information  This test has not been FDA cleared or approved  This test has been authorized by FDA under an Emergency Use Authorization  (EUA)  This test is only authorized for the duration of time the  declaration that circumstances exist justifying the authorization of the  emergency use of an in vitro diagnostic tests for detection of SARS-CoV-2  virus and/or diagnosis of COVID-19 infection under section 564(b)(1) of  the Act, 21 U  S C  356YBD-4(B)(5), unless the authorization is terminated  or revoked sooner   The test has been validated but independent review by FDA  and CLIA is pending  Test performed using [employer] GeneXpert: This RT-PCR assay targets N2,  a region unique to SARS-CoV-2  A conserved region in the E-gene was chosen  for pan-Sarbecovirus detection which includes SARS-CoV-2  Rapid drug screen, urine [[id number]]  (Abnormal) Collected: 01/11/22 1515    Lab Status: Final result Specimen: Urine, Other Updated: 01/11/22 1537     Amph/Meth UR Negative     Barbiturate Ur Negative     Benzodiazepine Urine Negative     Cocaine Urine Negative     Methadone Urine Negative     Opiate Urine Negative     PCP Ur Negative     THC Urine Positive     Oxycodone Urine Negative    Narrative:      Presumptive report  If requested, specimen will be sent to reference lab for confirmation  FOR MEDICAL PURPOSES ONLY  IF CONFIRMATION NEEDED PLEASE CONTACT THE LAB WITHIN 5 DAYS      Drug Screen Cutoff Levels:  AMPHETAMINE/METHAMPHETAMINES  1000 ng/mL  BARBITURATES     200 ng/mL  BENZODIAZEPINES     200 ng/mL  COCAINE      300 ng/mL  METHADONE      300 ng/mL  OPIATES      300 ng/mL  PHENCYCLIDINE     25 ng/mL  THC       50 ng/mL  OXYCODONE      100 ng/mL    UA (URINE) with reflex to Scope [[id number]]  (Abnormal) Collected: 01/11/22 1515    Lab Status: Final result Specimen: Urine, Other Updated: 01/11/22 1523     Color, UA Germaine     Clarity, UA Clear     Specific Gravity, UA 1 010     pH, UA 7 0     Leukocytes, UA Negative     Nitrite, UA Negative     Protein, UA Negative mg/dl      Glucose, UA Negative mg/dl      Ketones, UA 15 (1+) mg/dl      Urobilinogen, UA 2 0 E U /dl      Bilirubin, UA Interference- unable to analyze     Blood, UA Negative    Ethanol [[id number]]  (Normal) Collected: 01/11/22 1144    Lab Status: Final result Specimen: Blood from Arm, Right Updated: 01/11/22 1222     Ethanol Lvl <3 mg/dL     Comprehensive metabolic panel [[id number]]  (Abnormal) Collected: 01/11/22 1144    Lab Status: Final result Specimen: Blood from Arm, Right Updated: 01/11/22 1219 Sodium 134 mmol/L      Potassium 4 4 mmol/L      Chloride 100 mmol/L      CO2 23 mmol/L      ANION GAP 11 mmol/L      BUN 8 mg/dL      Creatinine 0 64 mg/dL      Glucose 112 mg/dL      Calcium 7 7 mg/dL      Corrected Calcium 8 7 mg/dL       U/L      ALT 51 U/L      Alkaline Phosphatase 157 U/L      Total Protein 7 6 g/dL      Albumin 2 7 g/dL      Total Bilirubin 3 89 mg/dL      eGFR 110 ml/min/1 73sq m     Narrative:      [place] guidelines for Chronic Kidney Disease (CKD):     Stage 1 with normal or high GFR (GFR > 90 mL/min/1 73 square meters)    Stage 2 Mild CKD (GFR = 60-89 mL/min/1 73 square meters)    Stage 3A Moderate CKD (GFR = 45-59 mL/min/1 73 square meters)    Stage 3B Moderate CKD (GFR = 30-44 mL/min/1 73 square meters)    Stage 4 Severe CKD (GFR = 15-29 mL/min/1 73 square meters)    Stage 5 End Stage CKD (GFR <15 mL/min/1 73 square meters)  Note: GFR calculation is accurate only with a steady state creatinine    Lipase [[id number]]  (Normal) Collected: 01/11/22 1144    Lab Status: Final result Specimen: Blood from Arm, Right Updated: 01/11/22 1219     Lipase 303 u/L     Magnesium [[id number]]  (Abnormal) Collected: 01/11/22 1144    Lab Status: Final result Specimen: Blood from Arm, Right Updated: 01/11/22 1219     Magnesium 1 5 mg/dL     Protime-INR [[id number]]  (Abnormal) Collected: 01/11/22 1144    Lab Status: Final result Specimen: Blood from Arm, Right Updated: 01/11/22 1208     Protime 16 5 seconds      INR 1 36    APTT [[id number]]  (Normal) Collected: 01/11/22 1144    Lab Status: Final result Specimen: Blood from Arm, Right Updated: 01/11/22 1208     PTT 37 seconds     CBC and differential [[id number]]  (Abnormal) Collected: 01/11/22 1036    Lab Status: Final result Specimen: Blood from Arm, Right Updated: 01/11/22 1111     WBC 2 56 Thousand/uL      RBC 4 46 Million/uL      Hemoglobin 15 1 g/dL      Hematocrit 43 8 %      MCV 98 fL      MCH 33 9 pg MCHC 34 5 g/dL      RDW 15 5 %      Platelets 25 Thousands/uL      nRBC 0 /100 WBCs      Neutrophils Relative 63 %      Immat GRANS % 0 %      Lymphocytes Relative 24 %      Monocytes Relative 10 %      Eosinophils Relative 2 %      Basophils Relative 1 %      Neutrophils Absolute 1 62 Thousands/µL      Immature Grans Absolute 0 01 Thousand/uL      Lymphocytes Absolute 0 61 Thousands/µL      Monocytes Absolute 0 25 Thousand/µL      Eosinophils Absolute 0 04 Thousand/µL      Basophils Absolute 0 03 Thousands/µL     Narrative:      plts dec on smear rev                 No orders to display              Procedures  Procedures         ED Course  ED Course as of 01/13/22 1211   Tue Jan 11, 2022   1236 CIWA 12   1516 CORRECTED CALCIUM: 8 7                                             MDM  Number of Diagnoses or Management Options  Alcohol withdrawal syndrome without complication ([facility] [address] )  Hypomagnesemia  Diagnosis management comments: Pulse ox 96% on room air indicating adequate oxygenation  Patient accepted for detox unit at [facility]   COVID testing ordered for admission purposes         Amount and/or Complexity of Data Reviewed  Clinical lab tests: ordered and reviewed  Decide to obtain previous medical records or to obtain history from someone other than the patient: yes  Review and summarize past medical records: yes  Discuss the patient with other providers: yes    Patient Progress  Patient progress: stable      Disposition  Final diagnoses:   Alcohol withdrawal syndrome without complication ([facility] [address] )   Hypomagnesemia     Time reflects when diagnosis was documented in both MDM as applicable and the Disposition within this note     Time User Action Codes Description Comment    1/11/2022 12:38 PM Juanpablo Garner [F10 [id number]] Alcohol withdrawal syndrome without complication ([facility] [address] )     2/23/3279 12:38 PM Harrison Chan [[id number]] Hypomagnesemia       ED Disposition     ED Disposition Condition Date/Time Comment Transfer to Another [address] Jan 11, 2022  2:47 PM [facility] Detox          MD Documentation      Most Recent Value   Patient Condition The patient has been stabilized such that within reasonable medical probability, no material deterioration of the patient condition or the condition of the unborn child(rolo) is likely to result from the transfer   Reason for Transfer Level of Care needed not available at this facility   Benefits of Transfer Specialized equipment and/or services available at the receiving facility (Include comment)________________________   Risks of Transfer Potential for delay in receiving treatment, Potential deterioration of medical condition, Loss of IV, Increased discomfort during transfer, Possible worsening of condition or death during transfer   Accepting Physician Dr Cari Sutton Name, Magali Sabillon   Sending MD Dr Jeffery Kaur   Provider Certification General risk, such as traffic hazards, adverse weather conditions, rough terrain or turbulence, possible failure of equipment (including vehicle or aircraft), or consequences of actions of persons outside the control of the transport personnel      RN Documentation      Most [address] [facility] [address] Name, [address] [facility]      Follow-up Information    None         Discharge Medication List as of 1/11/2022  4:07 PM      CONTINUE these medications which have NOT CHANGED    Details   acetaminophen (TYLENOL) 325 mg tablet Take 2 tablets (650 mg total) by mouth every 6 (six) hours as needed for mild pain, Starting Mon 3/29/2021, No Print      calcium carbonate (TUMS) 500 mg chewable tablet Chew 2 tablets (1,000 mg total) daily as needed for indigestion or heartburn, Starting Mon 3/29/2021, No Print      chlordiazePOXIDE (LIBRIUM) 25 mg capsule Take 1 capsule (25 mg total) by mouth every 12 (twelve) hours for 2 days, Starting Mon 3/29/2021, Until Wed 3/31/2021, No Print      folic acid (FOLVITE) 1 mg tablet Take 1 tablet (1 mg total) by mouth daily, Starting Wed 12/9/2020, Normal      ibuprofen (MOTRIN) 800 mg tablet Take 1 tablet (800 mg total) by mouth 2 (two) times daily after meals for 2 days, Starting Mon 3/29/2021, Until Wed 3/31/2021, No Print      lactulose 20 g/30 mL Take 30 mL (20 g total) by mouth 2 (two) times a day, Starting Mon 3/29/2021, No Print      Multiple Vitamin (multivitamin) tablet Take 1 tablet by mouth daily, Starting Tue 12/8/2020, Normal      nicotine (NICODERM CQ) 14 mg/24hr TD 24 hr patch Place 1 patch on the skin daily, Starting Wed 12/9/2020, Normal      thiamine 100 MG tablet Take 1 tablet (100 mg total) by mouth daily, Starting Wed 12/9/2020, Normal             No discharge procedures on file      PDMP Review     None          ED Provider  Electronically Signed by           Elvira Willard DO  01/13/22 8226

## 2022-01-13 NOTE — PROGRESS NOTES
[facility]  Progress Note - Laura Backers 1965, 55 y o  male MRN: [id number]  Unit/Bed#: 5T DETOX 503-01 Encounter: [id number]  Primary Care Provider: Kristen Henriquez MD   Date and time admitted to hospital: 1/11/2022  4:51 PM    Progress Note - Medical Toxicology    Laura Garrison 55 y o  male MRN: [id number]  Unit/Bed#: 5T DETOX 503-01 Encounter: [id number]  MEDICAL DETOX UNIT, LEVEL 4  Department of Medical Toxicology  Reason for Admission/Principal Problem: alcohol withdrawal, alcohol use disorder  Rounding Provider: Fahad Luz PA-C, Amie ELMORE*     * Alcohol withdrawal, with unspecified complication [facility])  Assessment & Plan  Patient with h/o chronic alcohol use since age 26; endorses h/o withdrawal seizures  Last drink: consumed 1 beer morning of 1/11/2022  Ethanol 1/11/2022 11:44 am: <3  Continue to follow SEWS protocol for medically-assisted alcohol withdrawal  · Patient received 5 mg ativan and 10 mg valium in ED PTA  · Patient continues to exhibit notable intention tremor (improved compared to initial presentation)  · Patient has thus far received 1884 mg total of phenobarbital     Alcohol use disorder, severe, dependence ([facility] [address] )  Assessment & Plan  Patient with h/o chronic alcohol use since age 26   Continue IVFs,  daily folic acid supplementation and multivitamin  · Continue high-dose thiamine  Continue to follow SEWS protocol for medically-assisted alcohol withdrawal  Continue consultation with case management for assistance with rehab resources- inpatient options limited 2/2 COVID positive status  · Outpatient resources pending       Thrombocytopenia ([facility] [address] )  Assessment & Plan  · CBC 1/13/2022: platelets 25 (up from 16 yesterday; 25 on admission)  · Likely 2/2 myelosuppression from chronic alcohol consumption  · No evidence of active bleeding at this time  · Continue to monitor symptoms  · Fall precautions  · Continue continual observation for safety · Continue to monitor platelets, repeat CBC in AM and consider transfusion if indicated     Encephalopathy  Assessment & Plan  · Patient exhibiting intermittent confusion during admission  · Currently A&Ox4  · Denies recent falls  · Potentially 2/2 wernicke's vs hepatic encephalopathy vs brain bleed from previous fall  · PT consult pending   · STAT CT head  · Continue high-dose thiamine for now   · Continue lactulose    Alcoholic cirrhosis of liver (HCC)  Assessment & Plan  · Likely 2/2 chronic alcohol consumption  · CMP 1/13/2022: Na 132, Cr 0 64  TBili 5 16  · LFTs:  --> 138, ALT 51--> 38, --> 135  · INR 1 54  · Platelets 25  · Calculated MELD/MELD-Na score 17/22  · Ammonia 1/12/2022: 57  · Continue lactulose- titrate to 2-3 stools per day   · RUQ U/S 1/13/2022: Cirrhosis  Trace ascites  · Encourage alcohol cessation  · Continue to monitor CMP  · Recommend prompt follow-up with GI upon discharge    Transaminitis  Assessment & Plan  · LFTs elevated on admission, improving  ·  --> 147 --> 138  · ALT 51 --> 41 --> 38  ·  --> 159 --> 135  · Likely 2/2 chronic alcohol consumption / cirrhosis  · Continue to monitor, repeat CMP in AM    COVID-19  Assessment & Plan  Patient was diagnosed COVID + on 1/11/2022   Unvaccinated   Patient is currently with mild symptoms including coughing and congestion (denies dyspnea, SOB) with O2 sats at 96% on room air  Afebrile  Currently following the mild pathway  Continue to monitor vitals, symptoms  Continue supportive care     Pancytopenia [facility])  Assessment & Plan  · CBC 01/13/2022:  Hemoglobin 12 7, , WBCs 2 1, platelet 25  · Likely 2/2 myelosuppression from chronic alcohol consumption  · Continue high-dose thiamine, daily folic acid supplementation  · Continue to monitor, repeat CBC in AM    Prolonged Q-T interval on ECG  Assessment & Plan  · EKG (1/11/2022): "Normal sinus rhythm  Prolonged QT- QT/QTc 460/496 ms  Abnormal ECG   When compared with ECG of 24-MAR-2021 06:49, Vent  rate has decreased BY  34 BPM " (Confirmed by Belgica Luz)  · Avoid QT-prolonging agents  · Continue to monitor on tele    VTE Pharmacologic Prophylaxis:   Pharmacologic: Pharmacologic VTE Prophylaxis contraindicated due to thrombocytopenia  Mechanical VTE Prophylaxis in Place: no    Code Status: Level 1 - Full Code    Patient Centered Rounds: I have performed bedside rounds with nursing staff today  Discussions with Specialists or Other Care Team Provider: Dr Maria A Klein   Education and Discussions with Family / Patient: discussed current plan with patient, answered all questions to best of my ability    Time Spent for Care: 30 minutes  More than 50% of total time spent on counseling and coordination of care as described above  Current Length of Stay: 2 day(s)    Current Patient Status: Inpatient     Certification Statement: The patient will continue to require additional inpatient hospital stay due to ongoing management of medically-assisted alcohol withdrawa, pending head CT, pending PT consult   Discharge Plan: potential discharge in 24-48 hrs to outpatient resources  Continue dispo planning with case management  Inpatient options limited 2/2 COVID positive status       Total time spent today 30 minutes  Greater than 50% of total time was spent with the patient and / or family counseling and / or coordination of care  A description of the counseling / coordination of care: withdrawal management, cirrhosis, thrombocytopenia    Subjective:   Patient notes he slept well overnight  States he remains tremulous however notes significant improvement compared to initial presentation  States he still feels as if he is experiencing withdrawal, again reports notable improvement of symptoms  Reports adequate appetite  Notes episode of loose stool this morning  Notes mild cough and congestion   Denies fevers/chills, headaches, lightheadedness/dizziness, chest pain, palpitations, SOB, dyspnea, abdominal pain, N/V/D/C  States he is motivated to stop drinking for his son  Objective:     Clinical Opiate Withdrawal Scale  Pulse: 72    SEWS Total Score: 0 (2022 10:00 AM)      Last 24 Hours Medication List:   Current Facility-Administered Medications   Medication Dose Route Frequency Provider Last Rate    acetaminophen  650 mg Oral Q6H PRN Veneda Anchors, PA-C      aluminum-magnesium hydroxide-simethicone  30 mL Oral Q6H PRN Veneda Anchors, PA-C      docusate sodium  100 mg Oral BID PRN Veneda Anchors, PA-C      folic acid  1 mg Oral Daily Desirae G [place], PA-C      lactulose  20 g Oral BID Nolvia Defrancisco, PA-C      multivitamin-minerals  1 tablet Oral Daily Amie Stein MD      ondansetron  4 mg Intravenous Q6H PRN Veneda Anchors, PA-C      sodium chloride  125 mL/hr Intravenous Continuous Nolvia Defrancisco, PA-C 125 mL/hr (22 1142)    thiamine  500 mg Intravenous [facility] Veneda Anchors, PA-C 500 mg (22 0617)    traZODone  50 mg Oral HS PRN Veneda Anchors, PA-C           Vitals:   Temp (24hrs), Av 3 °F (36 8 °C), Min:97 8 °F (36 6 °C), Max:99 2 °F (37 3 °C)    Temp:  [97 8 °F (36 6 °C)-99 2 °F (37 3 °C)] 98 6 °F (37 °C)  HR:  [64-80] 72  Resp:  [16-18] 18  BP: (130-139)/(78-94) 134/84  SpO2:  [96 %-99 %] 96 %  Body mass index is 25 09 kg/m²  Input and Output Summary (last 24 hours): Intake/Output Summary (Last 24 hours) at 2022 1310  Last data filed at 2022 0905  Gross per 24 hour   Intake 2210 ml   Output --   Net 2210 ml       Physical Exam:   Physical Exam  Vitals and nursing note reviewed  Constitutional:       Appearance: He is well-developed and normal weight  He is ill-appearing  He is not diaphoretic  HENT:      Head: Normocephalic and atraumatic  Mouth/Throat:      Mouth: Mucous membranes are moist       Pharynx: Oropharynx is clear  Eyes:      General: Scleral icterus present        Extraocular Movements: Extraocular movements intact  Right eye: No nystagmus  Left eye: No nystagmus  Conjunctiva/sclera: Conjunctivae normal       Pupils: Pupils are equal, round, and reactive to light  Cardiovascular:      Rate and Rhythm: Normal rate and regular rhythm  Pulses: Normal pulses  Heart sounds: Normal heart sounds  No murmur heard  No friction rub  No gallop  Pulmonary:      Effort: Pulmonary effort is normal  No respiratory distress  Breath sounds: No wheezing, rhonchi or rales  Abdominal:      General: Bowel sounds are normal  There is distension  Palpations: Abdomen is soft  Tenderness: There is no abdominal tenderness  There is no right CVA tenderness or guarding  Comments: Dilated superficial periumbilical veins noted (caput medusae)    Musculoskeletal:         General: Normal range of motion  Cervical back: Normal range of motion and neck supple  Right lower leg: No edema  Left lower leg: No edema  Skin:     General: Skin is warm and dry  Capillary Refill: Capillary refill takes less than 2 seconds  Neurological:      General: No focal deficit present  Mental Status: He is alert and oriented to person, place, and time  Mental status is at baseline  Motor: Tremor (intention tremor b/l UEs ) present  Psychiatric:         Attention and Perception: Attention normal          Mood and Affect: Mood normal          Speech: Speech normal          Additional Data:     Labs: keep all most recent labs as listed on admission templates   Results from last 7 days   Lab Units 01/13/22  0550 01/12/22  0514 01/11/22  1036   WBC Thousand/uL 2 10*   < > 2 56*   HEMOGLOBIN g/dL 12 7*   < > 15 1   HEMATOCRIT % 36 5*   < > 43 8   PLATELETS Thousands/uL 25*   < > 25*   NEUTROS PCT %  --   --  63   LYMPHS PCT %  --   --  24   MONOS PCT %  --   --  10   EOS PCT %  --   --  2    < > = values in this interval not displayed        Results from last 7 days   Lab Units 01/13/22  0542   SODIUM mmol/L 132*   POTASSIUM mmol/L 5 0   CHLORIDE mmol/L 105   CO2 mmol/L 23   BUN mg/dL 10   CREATININE mg/dL 0 64*   ANION GAP mmol/L 4*   CALCIUM mg/dL 7 5*   ALBUMIN g/dL 3 2   TOTAL BILIRUBIN mg/dL 5 16*   ALK PHOS U/L 135*   ALT U/L 38   AST U/L 138*   GLUCOSE RANDOM mg/dL 120*      Results from last 7 days   Lab Units 01/13/22  0807   INR  1 54*                         * I Have Reviewed All Lab Data Listed Above  * Additional Pertinent Lab Tests Reviewed: All Labs Within Last 24 Hours Reviewed      Imaging Studies: I have personally reviewed pertinent reports  CT head pending       Recent Cultures (last 7 days): Today, Patient Was Seen By: Brandon Godinez PA-C    ** Please Note: Dictation voice to text software may have been used in the creation of this document   **

## 2022-01-13 NOTE — CASE MANAGEMENT
Cm consulted with OT regarding pt's need  OT reports pt is requesting to return home and would benefit at this time from a walker and [facility]ons  Cm agreed to order pt a walker and further discuss discharge with pt regarding HHC  Cm completed parachute order for wheeled walker

## 2022-01-13 NOTE — ASSESSMENT & PLAN NOTE
Patient was diagnosed COVID + on 1/11/2022   Unvaccinated   Patient is currently with mild symptoms including coughing and congestion (denies dyspnea, SOB) with O2 sats at 96% on room air  Afebrile  Currently following the mild pathway    Continue to monitor vitals, symptoms  Continue supportive care

## 2022-01-13 NOTE — ASSESSMENT & PLAN NOTE
· Likely 2/2 chronic alcohol consumption  · CMP 1/13/2022: Na 132, Cr 0 64  TBili 5 16  · LFTs:  --> 138, ALT 51--> 38, --> 135  · INR 1 54  · Platelets 25  · Calculated MELD/MELD-Na score 17/22  · Ammonia 1/12/2022: 57  · Continue lactulose- titrate to 2-3 stools per day   · RUQ U/S 1/13/2022: Cirrhosis  Trace ascites    · Encourage alcohol cessation  · Continue to monitor CMP  · Recommend prompt follow-up with GI upon discharge

## 2022-01-14 ENCOUNTER — APPOINTMENT (INPATIENT)
Dept: MRI IMAGING | Facility: HOSPITAL | Age: 57
DRG: 772 | End: 2022-01-14
Payer: COMMERCIAL

## 2022-01-14 PROBLEM — R94.31 PROLONGED Q-T INTERVAL ON ECG: Status: RESOLVED | Noted: 2022-01-11 | Resolved: 2022-01-14

## 2022-01-14 LAB
ALBUMIN SERPL BCP-MCNC: 3.1 G/DL (ref 3–5.2)
ALP SERPL-CCNC: 147 U/L (ref 43–122)
ALT SERPL W P-5'-P-CCNC: 40 U/L
AMMONIA PLAS-SCNC: 21 UMOL/L (ref 9–33)
ANION GAP SERPL CALCULATED.3IONS-SCNC: 7 MMOL/L (ref 5–14)
AST SERPL W P-5'-P-CCNC: 109 U/L (ref 17–59)
BILIRUB SERPL-MCNC: 4.91 MG/DL
BUN SERPL-MCNC: 9 MG/DL (ref 5–25)
CALCIUM ALBUM COR SERPL-MCNC: 8.5 MG/DL (ref 8.3–10.1)
CALCIUM SERPL-MCNC: 7.8 MG/DL (ref 8.4–10.2)
CHLORIDE SERPL-SCNC: 102 MMOL/L (ref 97–108)
CO2 SERPL-SCNC: 24 MMOL/L (ref 22–30)
CREAT SERPL-MCNC: 0.66 MG/DL (ref 0.7–1.5)
ERYTHROCYTE [DISTWIDTH] IN BLOOD BY AUTOMATED COUNT: 14.4 %
ERYTHROCYTE [DISTWIDTH] IN BLOOD BY AUTOMATED COUNT: 14.7 %
GFR SERPL CREATININE-BSD FRML MDRD: 108 ML/MIN/1.73SQ M
GLUCOSE SERPL-MCNC: 86 MG/DL (ref 70–99)
HCT VFR BLD AUTO: 37.8 % (ref 41–53)
HCT VFR BLD AUTO: 38.3 % (ref 41–53)
HGB BLD-MCNC: 13 G/DL (ref 13.5–17.5)
HGB BLD-MCNC: 13.3 G/DL (ref 13.5–17.5)
INR PPP: 1.52 (ref 0.84–1.19)
MCH RBC QN AUTO: 34.8 PG (ref 26–34)
MCH RBC QN AUTO: 35 PG (ref 26–34)
MCHC RBC AUTO-ENTMCNC: 34.3 G/DL (ref 31–36)
MCHC RBC AUTO-ENTMCNC: 34.7 G/DL (ref 31–36)
MCV RBC AUTO: 101 FL (ref 80–100)
MCV RBC AUTO: 102 FL (ref 80–100)
PLATELET # BLD AUTO: 19 THOUSANDS/UL (ref 150–450)
PLATELET # BLD AUTO: 26 THOUSANDS/UL (ref 150–450)
PMV BLD AUTO: 10.1 FL (ref 8.9–12.7)
PMV BLD AUTO: 9.6 FL (ref 8.9–12.7)
POTASSIUM SERPL-SCNC: 3.6 MMOL/L (ref 3.6–5)
PROT SERPL-MCNC: 7.4 G/DL (ref 5.9–8.4)
PROTHROMBIN TIME: 17.6 SECONDS (ref 11.6–14.5)
RBC # BLD AUTO: 3.72 MILLION/UL (ref 4.5–5.9)
RBC # BLD AUTO: 3.8 MILLION/UL (ref 4.5–5.9)
SODIUM SERPL-SCNC: 133 MMOL/L (ref 137–147)
WBC # BLD AUTO: 2.3 THOUSAND/UL (ref 4.5–11)
WBC # BLD AUTO: 2.5 THOUSAND/UL (ref 4.5–11)

## 2022-01-14 PROCEDURE — 85610 PROTHROMBIN TIME: CPT | Performed by: EMERGENCY MEDICINE

## 2022-01-14 PROCEDURE — 70551 MRI BRAIN STEM W/O DYE: CPT

## 2022-01-14 PROCEDURE — 85027 COMPLETE CBC AUTOMATED: CPT | Performed by: EMERGENCY MEDICINE

## 2022-01-14 PROCEDURE — 85027 COMPLETE CBC AUTOMATED: CPT | Performed by: PHYSICIAN ASSISTANT

## 2022-01-14 PROCEDURE — 99232 SBSQ HOSP IP/OBS MODERATE 35: CPT | Performed by: PHYSICIAN ASSISTANT

## 2022-01-14 PROCEDURE — 82140 ASSAY OF AMMONIA: CPT | Performed by: PHYSICIAN ASSISTANT

## 2022-01-14 PROCEDURE — 80053 COMPREHEN METABOLIC PANEL: CPT | Performed by: EMERGENCY MEDICINE

## 2022-01-14 PROCEDURE — NC001 PR NO CHARGE: Performed by: PHYSICIAN ASSISTANT

## 2022-01-14 PROCEDURE — G1004 CDSM NDSC: HCPCS

## 2022-01-14 RX ORDER — DIPHENHYDRAMINE HCL 25 MG
25 TABLET ORAL ONCE
Status: COMPLETED | OUTPATIENT
Start: 2022-01-14 | End: 2022-01-14

## 2022-01-14 RX ORDER — PHENOBARBITAL 64.8 MG/1
64.8 TABLET ORAL ONCE
Status: COMPLETED | OUTPATIENT
Start: 2022-01-14 | End: 2022-01-14

## 2022-01-14 RX ADMIN — THIAMINE HYDROCHLORIDE 500 MG: 100 INJECTION, SOLUTION INTRAMUSCULAR; INTRAVENOUS at 05:29

## 2022-01-14 RX ADMIN — THIAMINE HYDROCHLORIDE 500 MG: 100 INJECTION, SOLUTION INTRAMUSCULAR; INTRAVENOUS at 21:27

## 2022-01-14 RX ADMIN — LACTULOSE 20 G: 10 SOLUTION ORAL at 17:36

## 2022-01-14 RX ADMIN — PHENOBARBITAL 64.8 MG: 64.8 TABLET ORAL at 13:14

## 2022-01-14 RX ADMIN — LACTULOSE 20 G: 10 SOLUTION ORAL at 08:29

## 2022-01-14 RX ADMIN — MULTIPLE VITAMINS W/ MINERALS TAB 1 TABLET: TAB ORAL at 08:29

## 2022-01-14 RX ADMIN — THIAMINE HYDROCHLORIDE 500 MG: 100 INJECTION, SOLUTION INTRAMUSCULAR; INTRAVENOUS at 13:14

## 2022-01-14 RX ADMIN — TRAZODONE HYDROCHLORIDE 50 MG: 50 TABLET ORAL at 23:15

## 2022-01-14 RX ADMIN — DIPHENHYDRAMINE HCL 25 MG: 25 TABLET ORAL at 01:02

## 2022-01-14 RX ADMIN — TRAZODONE HYDROCHLORIDE 50 MG: 50 TABLET ORAL at 00:41

## 2022-01-14 RX ADMIN — FOLIC ACID 1 MG: 1 TABLET ORAL at 08:29

## 2022-01-14 NOTE — ASSESSMENT & PLAN NOTE
Patient with h/o chronic alcohol use since age 26; endorses h/o withdrawal seizures  Last drink: consumed 1 beer morning of 1/11/2022  Ethanol 1/11/2022 11:44 am: <3   [facility] for [facility]   · Patient received 5 mg ativan and 10 mg valium in ED PTA  · Tremor notably improved compared to initial presentation  · Patient has thus far received 1948 8 mg total of phenobarbital

## 2022-01-14 NOTE — CASE MANAGEMENT
Cm received a return call from Callum Russ, [address] [place] [address] homeless hotline at [phone], who indicates that pt will need to contact 211 from [place]  Callum Russ states that pt is required to apply for general assistance through [employer] to receive assistance  Cm was informed that her office is closed after today until Tuesday and pt would need to contact 211 for possible assistance if discharge occurs over the weekend  Callum Russ agreed to return call this writer to provide 80 1-800 number for contact in [place]  Cm spoke with pt who states he is not homeless and has a room at [facility]  Pt states he does not want assistance with housing and can return to his cousin's home  Cm consulted with medical staff who indicates pt most likely will not be discharged today  Cm consulted with PT who states pt will receive another PT eval today but the referral still is for [facility]  A walker will be delivered to pt's room today

## 2022-01-14 NOTE — PLAN OF CARE
Problem: Nutrition/Hydration-ADULT  Goal: Nutrient/Hydration intake appropriate for improving, restoring or maintaining nutritional needs  Description: Monitor and assess patient's nutrition/hydration status for malnutrition  Collaborate with interdisciplinary team and initiate plan and interventions as ordered  Monitor patient's weight and dietary intake as ordered or per policy  Utilize nutrition screening tool and intervene as necessary  Determine patient's food preferences and provide high-protein, high-caloric foods as appropriate       INTERVENTIONS:  - Monitor oral intake, urinary output, labs, and treatment plans  - Assess nutrition and hydration status and recommend course of action  - Evaluate amount of meals eaten  - Assist patient with eating if necessary   - Allow adequate time for meals  - Recommend/ encourage appropriate diets, oral nutritional supplements, and vitamin/mineral supplements  - Order, calculate, and assess calorie counts as needed  - Recommend, monitor, and adjust tube feedings and TPN/PPN based on assessed needs  - Assess need for intravenous fluids  - Provide specific nutrition/hydration education as appropriate  - Include patient/family/caregiver in decisions related to nutrition  Outcome: Progressing     Problem: SUBSTANCE USE/ABUSE  Goal: By discharge, will develop insight into their chemical dependency and sustain motivation to continue in recovery  Description: INTERVENTIONS:  - Attends all daily group sessions and scheduled AA groups  - Actively practices coping skills through participation in the therapeutic community and adherence to program rules  - Reviews and completes assignments from individual treatment plan  - Assist patient development of understanding of their personal cycle of addiction and relapse triggers  Outcome: Progressing  Goal: By discharge, patient will have ongoing treatment plan addressing chemical dependency  Description: INTERVENTIONS:  - Assist patient with resources and/or appointments for ongoing recovery based living  Outcome: Progressing

## 2022-01-14 NOTE — ASSESSMENT & PLAN NOTE
· Likely 2/2 chronic alcohol consumption  · CMP 1/14/2022: Na 133, Cr 0 66  TBili 4 91 (5 16 yesterday)  · LFTs:  --> 109, ALT 51--> 40, --> 147  · INR 1 52  · Platelets 19  · Calculated MELD/MELD-Na score 17/21  · Ammonia 1/12/2022: 57  Recheck ammonia today   · Continue lactulose- titrate to 2-3 stools per day   · RUQ U/S 1/13/2022: Cirrhosis  Trace ascites    · Encourage alcohol cessation  · Continue to monitor CMP  · Recommend prompt follow-up with GI upon discharge

## 2022-01-14 NOTE — ASSESSMENT & PLAN NOTE
Patient with h/o chronic alcohol use since age 26   Continue IVFs, daily folic acid supplementation and multivitamin  · Continue high-dose thiamine  [facility] for medically-[facility]  Continue consultation with case management for assistance with disposition planning- final dispo TBD

## 2022-01-14 NOTE — CASE MANAGEMENT
Cm received a return call from [email or web address], providing number for [address]  This number to call from [place] is [phone] or [phone]

## 2022-01-14 NOTE — ASSESSMENT & PLAN NOTE
· CBC 01/13/2022:  Hemoglobin 13 0, , WBCs 2 3, platelet 19  · Likely 2/2 myelosuppression from chronic alcohol consumption  · Continue high-dose thiamine, daily folic acid supplementation  · Continue to monitor, repeat CBC in AM

## 2022-01-14 NOTE — CASE MANAGEMENT
Cm reached out to pt's sister, Kota Dueñas, and discussed pt's discharge options  Alicia Smith states that pt had not resided with her in over 1 year and that he cannot return to her home due to his alcohol use  Alicia Smith states pt had been staying with various friends and relatives and at times staying in a tent outdoors  Alicia Smith stated that pt is currently homeless  Cm attempted to reach the [facility] homeless hotline at [phone] to attempt to gather information regarding possible options for pt  There was no response and a message was left requesting a return call  CM attempted to reach pt by room phone due to covid + diagnosis to discuss other options  There was no response by room phone

## 2022-01-14 NOTE — ASSESSMENT & PLAN NOTE
Health Call Center    Phone Message    May a detailed message be left on voicemail: yes    Reason for Call: Other: The pt is asking that a set of lab orders be faxed to Attn: Cori at [place] [facility]. The pt did not have the fax #.      Action Taken: Message routed to:  [facility]     · CBC 1/14/2022: platelets 19 (down from 25 yesterday; 25 on admission)  · Likely 2/2 myelosuppression from chronic alcohol consumption  · No evidence of active bleeding at this time  · Continue to monitor symptoms  · Fall precautions  · Continue continual observation for safety   · Continue to monitor platelets, repeat CBC in AM and consider transfusion if indicated

## 2022-01-14 NOTE — PROGRESS NOTES
[facility]  Progress Note - Betsy Marx 1965, 55 y o  male MRN: [id number]  Unit/Bed#: 5T DETOX 503-01 Encounter: [id number]  Primary Care Provider: No Lee MD   Date and time admitted to hospital: 1/11/2022  4:51 PM  Progress Note - Medical Toxicology    Betsy Marx 55 y o  male MRN: [id number]  Unit/Bed#: 5T DETOX 503-01 Encounter: [id number]    MEDICAL DETOX UNIT, LEVEL 4  Department of Medical Toxicology  Reason for Admission/Principal Problem: alcohol withdrawal, alcohol use disorder  Rounding Provider: Jalil Luz PA-C, Jose Maria Joseph, DO       * Alcohol withdrawal, with unspecified complication [facility])  Assessment & Plan  Patient with h/o chronic alcohol use since age 26; endorses h/o withdrawal seizures  Last drink: consumed 1 beer morning of 1/11/2022  Ethanol 1/11/2022 11:44 am: <3   Continue to follow SEWS protocol for now for medically-assisted alcohol withdrawal   · Patient received 5 mg ativan and 10 mg valium in ED PTA  · Tremor notably improved compared to initial presentation  · Patient has thus far received 1948 8 mg total of phenobarbital     Alcohol use disorder, severe, dependence ([facility] )  Assessment & Plan  Patient with h/o chronic alcohol use since age 26   Continue IVFs, daily folic acid supplementation and multivitamin  · Continue high-dose thiamine  Continue to follow SEWS protocol for now for medically-assisted alcohol withdrawal  Continue consultation with case management for assistance with disposition planning- final dispo TBD    Thrombocytopenia ([facility] )  Assessment & Plan  · CBC 1/14/2022: platelets 19 (down from 25 yesterday; 25 on admission)  · Likely 2/2 myelosuppression from chronic alcohol consumption  · No evidence of active bleeding at this time  · Continue to monitor symptoms  · Fall precautions  · Continue continual observation for safety   · Continue to monitor platelets, repeat CBC in AM and consider transfusion if indicated Encephalopathy  Assessment & Plan  · Patient continues to exhibit intermittent confusion throughout admission  · Currently A&Ox4   · Wide-based gait, no asterixis  · Denies recent falls  · Potentially 2/2 wernicke's vs hepatic encephalopathy  · PT consulted: recommended ambulation with rolling walker   · CT head 1/13/2022: "no acute intracranial abnormality"  · Order MRI brain   · Recheck ammonia level   · Continue high-dose thiamine   · Continue lactulose    Alcoholic cirrhosis of liver (HCC)  Assessment & Plan  · Likely 2/2 chronic alcohol consumption  · CMP 1/14/2022: Na 133, Cr 0 66  TBili 4 91 (5 16 yesterday)  · LFTs:  --> 109, ALT 51--> 40, --> 147  · INR 1 52  · Platelets 19  · Calculated MELD/MELD-Na score 17/21  · Ammonia 1/12/2022: 57  Recheck ammonia today   · Continue lactulose- titrate to 2-3 stools per day   · RUQ U/S 1/13/2022: Cirrhosis  Trace ascites  · Encourage alcohol cessation  · Continue to monitor CMP  · Recommend prompt follow-up with GI upon discharge    Transaminitis  Assessment & Plan  · LFTs elevated on admission, improving  ·  --> 109  · ALT 51--> 40  · --> 147  · Likely 2/2 chronic alcohol consumption / cirrhosis  · Continue to monitor, repeat CMP in AM    COVID-19  Assessment & Plan  Patient was diagnosed COVID + on 1/11/2022  · Unvaccinated   Patient is currently with mild symptoms including coughing and congestion (denies dyspnea, SOB) with O2 sats at 98% on room air  Afebrile  Currently following the mild pathway    Continue to monitor vitals, symptoms  Continue supportive care     Pancytopenia [facility])  Assessment & Plan  · CBC 01/13/2022:  Hemoglobin 13 0, , WBCs 2 3, platelet 19  · Likely 2/2 myelosuppression from chronic alcohol consumption  · Continue high-dose thiamine, daily folic acid supplementation  · Continue to monitor, repeat CBC in AM    Prolonged Q-T interval on ECG-resolved as of 1/14/2022  Assessment & Plan  · EKG (1/11/2022): revealed Prolonged QT- QT/QTc 460/496 ms  · EKG 1/13/2022: normal QT- QT/QTc 432/462 ms  · resolved      VTE Pharmacologic Prophylaxis:   Pharmacologic: Pharmacologic VTE Prophylaxis contraindicated due to thrombocytopenia  Mechanical VTE Prophylaxis in Place: no    Code Status: Level 1 - Full Code    Patient Centered Rounds: I have performed bedside rounds with nursing staff today  Discussions with Specialists or Other Care Team Provider: Dr Trino Olsen   Education and Discussions with Family / Patient: discussed current plan with patient, answered all questions to best of my ability    Time Spent for Care: 30 minutes  More than 50% of total time spent on counseling and coordination of care as described above  Current Length of Stay: 3 day(s)    Current Patient Status: Inpatient     Certification Statement: The patient will continue to require additional inpatient hospital stay due to monitoring withdrawal symptoms  brain MRI pending, monitoring of platelets   Discharge Plan: pending medical stabilization; final disposition TBD       Total time spent today 30 minutes  Greater than 50% of total time was spent with the patient and / or family counseling and / or coordination of care  A description of the counseling / coordination of care: alcohol withdrawal, encephalopathy, thrombocytopenia    Subjective:   Patient reports further improvement of tremor this morning, states he feels withdrawal symptoms are well-controlled at this time  States he slept well, endorses adequate appetite  Continues to reports intermittent cough/congestion  Reports 1 soft BM this AM  Denies headache, lightheadedness/dizziness, chest pain, SOB, abdominal pain, N/V/C      Objective:     Clinical Opiate Withdrawal Scale  Pulse: 85    SEWS Total Score: 0 (1/14/2022  1:13 AM)      Last 24 Hours Medication List:   Current Facility-Administered Medications   Medication Dose Route Frequency Provider Last Rate    acetaminophen  650 mg Oral Q6H PRN Sunday Ruiz PA-C      aluminum-magnesium hydroxide-simethicone  30 mL Oral Q6H PRN Sunday Ruiz PA-C      docusate sodium  100 mg Oral BID PRN Sunday Ruiz PA-C      folic acid  1 mg Oral Daily Sunday Ruiz PA-C      lactulose  20 g Oral BID Nolvia Luz PA-C      multivitamin-minerals  1 tablet Oral Daily Amie Mcnulty MD      ondansetron  4 mg Intravenous Q6H PRN Sunday Ruiz PA-C      sodium chloride  125 mL/hr Intravenous Continuous Nolvia LUCIE Luz 125 mL/hr (22)    thiamine  500 mg Intravenous [facility] Sunday Ruiz PA-C 500 mg (22)    traZODone  50 mg Oral HS PRN Sunday Ruiz PA-C           Vitals:   Temp (24hrs), Av 5 °F (36 9 °C), Min:98 4 °F (36 9 °C), Max:98 6 °F (37 °C)    Temp:  [98 4 °F (36 9 °C)-98 6 °F (37 °C)] 98 4 °F (36 9 °C)  HR:  [74-85] 85  Resp:  [18] 18  BP: (116-133)/(73-93) 116/73  SpO2:  [98 %-100 %] 98 %  Body mass index is 25 09 kg/m²  Input and Output Summary (last 24 hours): Intake/Output Summary (Last 24 hours) at 2022 1209  Last data filed at 2022 0838  Gross per 24 hour   Intake 260 ml   Output 650 ml   Net -390 ml       Physical Exam:   Physical Exam  Vitals and nursing note reviewed  Constitutional:       General: He is not in acute distress  Appearance: He is well-developed and normal weight  He is ill-appearing  He is not diaphoretic  HENT:      Head: Normocephalic and atraumatic  Mouth/Throat:      Mouth: Mucous membranes are moist       Pharynx: Oropharynx is clear  Eyes:      General: Scleral icterus present  Extraocular Movements: Extraocular movements intact  Right eye: No nystagmus  Left eye: No nystagmus  Conjunctiva/sclera: Conjunctivae normal       Pupils: Pupils are equal, round, and reactive to light  Cardiovascular:      Rate and Rhythm: Normal rate and regular rhythm  Pulses: Normal pulses  Heart sounds: Normal heart sounds  No murmur heard  No friction rub  No gallop  Pulmonary:      Effort: Pulmonary effort is normal  No respiratory distress  Breath sounds: Normal breath sounds  No wheezing, rhonchi or rales  Abdominal:      General: Bowel sounds are normal  There is distension  Palpations: Abdomen is soft  Tenderness: There is no abdominal tenderness  There is no guarding  Comments: Dilated superficial periumbilical veins (caput medusae)   Musculoskeletal:         General: No swelling  Normal range of motion  Cervical back: Normal range of motion and neck supple  Right lower leg: No edema  Skin:     General: Skin is warm and dry  Capillary Refill: Capillary refill takes less than 2 seconds  Neurological:      General: No focal deficit present  Mental Status: He is alert and oriented to person, place, and time  Mental status is at baseline  Motor: Tremor (slight tremor b/l UEs (improved); no asterixis) present  Gait: Gait abnormal (wide-based gait)  Psychiatric:         Attention and Perception: Attention normal          Mood and Affect: Mood normal          Speech: Speech normal          Behavior: Behavior is cooperative  Comments: Intermittent confusion       Additional Data:     Labs: keep all most recent labs as listed on admission templates   Results from last 7 days   Lab Units 01/14/22  0433 01/12/22  0514 01/11/22  1036   WBC Thousand/uL 2 30*   < > 2 56*   HEMOGLOBIN g/dL 13 0*   < > 15 1   HEMATOCRIT % 37 8*   < > 43 8   PLATELETS Thousands/uL 19*   < > 25*   NEUTROS PCT %  --   --  63   LYMPHS PCT %  --   --  24   MONOS PCT %  --   --  10   EOS PCT %  --   --  2    < > = values in this interval not displayed        Results from last 7 days   Lab Units 01/14/22 0433   SODIUM mmol/L 133*   POTASSIUM mmol/L 3 6   CHLORIDE mmol/L 102   CO2 mmol/L 24   BUN mg/dL 9   CREATININE mg/dL 0 66*   ANION GAP mmol/L 7   CALCIUM mg/dL 7 8*   ALBUMIN g/dL 3 1   TOTAL BILIRUBIN mg/dL 4 91*   ALK PHOS U/L 147*   ALT U/L 40   AST U/L 109*   GLUCOSE RANDOM mg/dL 86      Results from last 7 days   Lab Units 01/14/22  0433   INR  1 52*                     * I Have Reviewed All Lab Data Listed Above  * Additional Pertinent Lab Tests Reviewed: All Labs Within Last 24 Hours Reviewed      Imaging Studies: I have personally reviewed pertinent reports  Recent Cultures (last 7 days): Today, Patient Was Seen By: Isaias Maxwell PA-C    ** Please Note: Dictation voice to text software may have been used in the creation of this document   **

## 2022-01-14 NOTE — ASSESSMENT & PLAN NOTE
Patient was diagnosed COVID + on 1/11/2022  · Unvaccinated   Patient is currently with mild symptoms including coughing and congestion (denies dyspnea, SOB) with O2 sats at 98% on room air  Afebrile  Currently following the mild pathway    Continue to monitor vitals, symptoms  Continue supportive care

## 2022-01-14 NOTE — ASSESSMENT & PLAN NOTE
· EKG (1/11/2022): revealed Prolonged QT- QT/QTc 460/496 ms     · EKG 1/13/2022: normal QT- QT/QTc 432/462 ms  · resolved

## 2022-01-14 NOTE — ASSESSMENT & PLAN NOTE
· LFTs elevated on admission, improving  ·  --> 109  · ALT 51--> 40  · --> 147  · Likely 2/2 chronic alcohol consumption / cirrhosis  · Continue to monitor, repeat CMP in AM

## 2022-01-14 NOTE — ASSESSMENT & PLAN NOTE
· Patient continues to exhibit intermittent confusion throughout admission  · Currently A&Ox4   · Wide-based gait, no asterixis  · Denies recent falls  · Potentially 2/2 wernicke's vs hepatic encephalopathy  · PT consulted: recommended ambulation with rolling walker   · CT head 1/13/2022: "no acute intracranial abnormality"  · Order MRI brain   · Recheck ammonia level   · Continue high-dose thiamine   · Continue lactulose

## 2022-01-14 NOTE — PLAN OF CARE
Problem: Nutrition/Hydration-ADULT  Goal: Nutrient/Hydration intake appropriate for improving, restoring or maintaining nutritional needs  Description: Monitor and assess patient's nutrition/hydration status for malnutrition  Collaborate with interdisciplinary team and initiate plan and interventions as ordered  Monitor patient's weight and dietary intake as ordered or per policy  Utilize nutrition screening tool and intervene as necessary  Determine patient's food preferences and provide high-protein, high-caloric foods as appropriate       INTERVENTIONS:  - Monitor oral intake, urinary output, labs, and treatment plans  - Assess nutrition and hydration status and recommend course of action  - Evaluate amount of meals eaten  - Assist patient with eating if necessary   - Allow adequate time for meals  - Recommend/ encourage appropriate diets, oral nutritional supplements, and vitamin/mineral supplements  - Order, calculate, and assess calorie counts as needed  - Recommend, monitor, and adjust tube feedings and TPN/PPN based on assessed needs  - Assess need for intravenous fluids  - Provide specific nutrition/hydration education as appropriate  - Include patient/family/caregiver in decisions related to nutrition  Outcome: Progressing     Problem: MOBILITY - ADULT  Goal: Maintain or return to baseline ADL function  Description: INTERVENTIONS:  -  Assess patient's ability to carry out ADLs; assess patient's baseline for ADL function and identify physical deficits which impact ability to perform ADLs (bathing, care of mouth/teeth, toileting, grooming, dressing, etc )  - Assess/evaluate cause of self-care deficits   - Assess range of motion  - Assess patient's mobility; develop plan if impaired  - Assess patient's need for assistive devices and provide as appropriate  - Encourage maximum independence but intervene and supervise when necessary  - Involve family in performance of ADLs  - Assess for home care needs following discharge   - Consider OT consult to assist with ADL evaluation and planning for discharge  - Provide patient education as appropriate  Outcome: Progressing  Goal: Maintains/Returns to pre admission functional level  Description: INTERVENTIONS:  - Perform BMAT or MOVE assessment daily    - Set and communicate daily mobility goal to care team and patient/family/caregiver     - Collaborate with rehabilitation services on mobility goals if consulted  -- Out of bed for toileting  - Record patient progress and toleration of activity level   Outcome: Progressing     Problem: Potential for Falls  Goal: Patient will remain free of falls  Description: INTERVENTIONS:  - Educate patient/family on patient safety including physical limitations  - Instruct patient to call for assistance with activity   - Consult OT/PT to assist with strengthening/mobility   - Keep Call bell within reach  - Keep bed low and locked with side rails adjusted as appropriate  - Keep care items and personal belongings within reach  - Initiate and maintain comfort rounds  - Make Fall Risk Sign visible to staff  - - Apply yellow socks and bracelet for high fall risk patients  - Consider moving patient to room near nurses station  Outcome: Progressing     Problem: SUBSTANCE USE/ABUSE  Goal: By discharge, will develop insight into their chemical dependency and sustain motivation to continue in recovery  Description: INTERVENTIONS:  - Attends all daily group sessions and scheduled AA groups  - Actively practices coping skills through participation in the therapeutic community and adherence to program rules  - Reviews and completes assignments from individual treatment plan  - Assist patient development of understanding of their personal cycle of addiction and relapse triggers  Outcome: Progressing  Goal: By discharge, patient will have ongoing treatment plan addressing chemical dependency  Description: INTERVENTIONS:  - Assist patient with resources and/or appointments for ongoing recovery based living  Outcome: Progressing     Problem: SAFETY ADULT  Goal: Maintain or return to baseline ADL function  Description: INTERVENTIONS:  -  Assess patient's ability to carry out ADLs; assess patient's baseline for ADL function and identify physical deficits which impact ability to perform ADLs (bathing, care of mouth/teeth, toileting, grooming, dressing, etc )  - Assess/evaluate cause of self-care deficits   - Assess range of motion  - Assess patient's mobility; develop plan if impaired  - Assess patient's need for assistive devices and provide as appropriate  - Encourage maximum independence but intervene and supervise when necessary  - Involve family in performance of ADLs  - Assess for home care needs following discharge   - Consider OT consult to assist with ADL evaluation and planning for discharge  - Provide patient education as appropriate  Outcome: Progressing  Goal: Maintains/Returns to pre admission functional level  Description: INTERVENTIONS:  - Perform BMAT or MOVE assessment daily    - Set and communicate daily mobility goal to care team and patient/family/caregiver     - Collaborate with rehabilitation services on mobility goals if consulted  -- Out of bed for toileting  - Record patient progress and toleration of activity level   Outcome: Progressing  Goal: Patient will remain free of falls  Description: INTERVENTIONS:  - Educate patient/family on patient safety including physical limitations  - Instruct patient to call for assistance with activity   - Consult OT/PT to assist with strengthening/mobility   - Keep Call bell within reach  - Keep bed low and locked with side rails adjusted as appropriate  - Keep care items and personal belongings within reach  - Initiate and maintain comfort rounds  - Make Fall Risk Sign visible to staff  - Apply yellow socks and bracelet for high fall risk patients  - Consider moving patient to room near nurses station  Outcome: Progressing     Problem: DISCHARGE PLANNING  Goal: Discharge to home or other facility with appropriate resources  Description: INTERVENTIONS:  - Identify barriers to discharge w/patient and caregiver  - Arrange for needed discharge resources and transportation as appropriate  - Identify discharge learning needs (meds, wound care, etc )  - Arrange for interpretive services to assist at discharge as needed  - Refer to Case Management Department for coordinating discharge planning if the patient needs post-hospital services based on physician/advanced practitioner order or complex needs related to functional status, cognitive ability, or social support system  Outcome: Progressing     Problem: Knowledge Deficit  Goal: Patient/family/caregiver demonstrates understanding of disease process, treatment plan, medications, and discharge instructions  Description: Complete learning assessment and assess knowledge base    Interventions:  - Provide teaching at level of understanding  - Provide teaching via preferred learning methods  Outcome: Progressing

## 2022-01-14 NOTE — QUICK NOTE
Notified by nursing of patient verbalizing desire to leave  Patient has exhibited intermittent confusion throughout admission including repeat questioning regarding medical diagnoses  Patient requires ongoing admission for further medical management of encephalopathy and severe thrombocytopenia, along with pending GI consult for cirrhosis  Brain MRI this morning revealed findings suggestive of hepatic vs wernicke's encephalopathy  Requires ongoing high-dose thiamine for possible wernicke's  Additionally, ataxic gait places patient at risk for fall; given significant thrombocytopenia, patient is at severe risk of bleeding if fall occurs  Will recheck platelets on CBC now and consider possible transfusion    Continue fall precautions, 1:1 observation for safety

## 2022-01-15 VITALS
RESPIRATION RATE: 18 BRPM | HEIGHT: 72 IN | WEIGHT: 185 LBS | TEMPERATURE: 97.5 F | OXYGEN SATURATION: 98 % | BODY MASS INDEX: 25.06 KG/M2 | DIASTOLIC BLOOD PRESSURE: 76 MMHG | HEART RATE: 68 BPM | SYSTOLIC BLOOD PRESSURE: 122 MMHG

## 2022-01-15 PROBLEM — G93.40 ENCEPHALOPATHY: Status: RESOLVED | Noted: 2022-01-13 | Resolved: 2022-01-15

## 2022-01-15 PROBLEM — F10.239 ALCOHOL WITHDRAWAL, WITH UNSPECIFIED COMPLICATION (HCC): Status: RESOLVED | Noted: 2021-03-23 | Resolved: 2022-01-15

## 2022-01-15 PROBLEM — F10.939 ALCOHOL WITHDRAWAL, WITH UNSPECIFIED COMPLICATION (HCC): Status: RESOLVED | Noted: 2021-03-23 | Resolved: 2022-01-15

## 2022-01-15 LAB
ALBUMIN SERPL BCP-MCNC: 2.8 G/DL (ref 3–5.2)
ALP SERPL-CCNC: 142 U/L (ref 43–122)
ALT SERPL W P-5'-P-CCNC: 37 U/L
ANION GAP SERPL CALCULATED.3IONS-SCNC: 7 MMOL/L (ref 5–14)
AST SERPL W P-5'-P-CCNC: 98 U/L (ref 17–59)
BILIRUB SERPL-MCNC: 3.44 MG/DL
BUN SERPL-MCNC: 11 MG/DL (ref 5–25)
CALCIUM ALBUM COR SERPL-MCNC: 8.9 MG/DL (ref 8.3–10.1)
CALCIUM SERPL-MCNC: 7.9 MG/DL (ref 8.4–10.2)
CHLORIDE SERPL-SCNC: 103 MMOL/L (ref 97–108)
CO2 SERPL-SCNC: 24 MMOL/L (ref 22–30)
CREAT SERPL-MCNC: 0.66 MG/DL (ref 0.7–1.5)
ERYTHROCYTE [DISTWIDTH] IN BLOOD BY AUTOMATED COUNT: 14.9 %
GFR SERPL CREATININE-BSD FRML MDRD: 108 ML/MIN/1.73SQ M
GLUCOSE SERPL-MCNC: 93 MG/DL (ref 70–99)
HCT VFR BLD AUTO: 36.2 % (ref 41–53)
HGB BLD-MCNC: 12.4 G/DL (ref 13.5–17.5)
MCH RBC QN AUTO: 35 PG (ref 26–34)
MCHC RBC AUTO-ENTMCNC: 34.4 G/DL (ref 31–36)
MCV RBC AUTO: 102 FL (ref 80–100)
PLATELET # BLD AUTO: 23 THOUSANDS/UL (ref 150–450)
PMV BLD AUTO: 10.2 FL (ref 8.9–12.7)
POTASSIUM SERPL-SCNC: 3.6 MMOL/L (ref 3.6–5)
PROT SERPL-MCNC: 7 G/DL (ref 5.9–8.4)
RBC # BLD AUTO: 3.55 MILLION/UL (ref 4.5–5.9)
SODIUM SERPL-SCNC: 134 MMOL/L (ref 137–147)
WBC # BLD AUTO: 2.5 THOUSAND/UL (ref 4.5–11)

## 2022-01-15 PROCEDURE — 80053 COMPREHEN METABOLIC PANEL: CPT | Performed by: PHYSICIAN ASSISTANT

## 2022-01-15 PROCEDURE — 99239 HOSP IP/OBS DSCHRG MGMT >30: CPT | Performed by: EMERGENCY MEDICINE

## 2022-01-15 PROCEDURE — 85027 COMPLETE CBC AUTOMATED: CPT | Performed by: PHYSICIAN ASSISTANT

## 2022-01-15 RX ORDER — LANOLIN ALCOHOL/MO/W.PET/CERES
100 CREAM (GRAM) TOPICAL DAILY
Qty: 30 TABLET | Refills: 0 | Status: SHIPPED | OUTPATIENT
Start: 2022-01-15

## 2022-01-15 RX ORDER — LACTULOSE 20 G/30ML
20 SOLUTION ORAL 2 TIMES DAILY
Qty: 1800 ML | Refills: 0 | Status: SHIPPED | OUTPATIENT
Start: 2022-01-15 | End: 2022-04-18

## 2022-01-15 RX ORDER — FOLIC ACID 1 MG/1
1 TABLET ORAL DAILY
Qty: 30 TABLET | Refills: 0 | Status: SHIPPED | OUTPATIENT
Start: 2022-01-15

## 2022-01-15 RX ADMIN — FOLIC ACID 1 MG: 1 TABLET ORAL at 08:38

## 2022-01-15 RX ADMIN — THIAMINE HYDROCHLORIDE 500 MG: 100 INJECTION, SOLUTION INTRAMUSCULAR; INTRAVENOUS at 06:20

## 2022-01-15 RX ADMIN — LACTULOSE 20 G: 10 SOLUTION ORAL at 08:38

## 2022-01-15 RX ADMIN — MULTIPLE VITAMINS W/ MINERALS TAB 1 TABLET: TAB ORAL at 08:38

## 2022-01-15 NOTE — ASSESSMENT & PLAN NOTE
· Significant thrombocytopenia, stable but not really improving  Presume secondary to myelosuppression and cirrhosis  · Platelet count this morning is 23,000/ uL  · No evidence of active bleeding  · Discussed with patient that I would recommend continued observation and treatment but his is adamant about going home    I explained that any fall/ injury could lead to bleeding and he understands  · Will continue thiamine and folate outpatient  · Discussed importance of alcohol cessation  · GI follow-up

## 2022-01-15 NOTE — ASSESSMENT & PLAN NOTE
Patient was diagnosed COVID + on 1/11/2022  Mild symptoms only, normal O2 sat on room air  Isolation until 1/16 or resolution of symptoms, then mask for 5 days

## 2022-01-15 NOTE — CASE MANAGEMENT
Case Management Discharge Planning Note    Patient name Sonja Aldridge  Location 5T DETOX 503/5T DETOX 50* MRN [id number]  : 1965 Date 1/15/2022       Current Admission Date: 2022  Current Admission Diagnosis:Alcohol use disorder, severe, dependence ([facility] )   Patient Active Problem List    Diagnosis Date Noted    Pancytopenia ([place] [address] )     Alcoholic cirrhosis of liver ([facility] ) 2022    COVID-19 2022    Right hip pain 2021    Decompensated hepatic cirrhosis ([facility] ) 2021    Liver mass 2021    Closed fracture of nasal bone 2021    Open wound of tongue due to bite 2021    Electrolyte abnormality 2021    Tobacco abuse 2020    Alcohol withdrawal seizure ([place] [address] ) 2020    Alcohol use disorder, severe, dependence ([place] [address] ) 2020    Transaminitis 2020    Hyperbilirubinemia 2020    Thrombocytopenia ([place] [address] ) 2020      LOS (days): 4  Geometric Mean LOS (GMLOS) (days):   Days to GMLOS:     OBJECTIVE:  Risk of Unplanned Readmission Score: 15         Current admission status: Inpatient   Preferred Pharmacy:   [address] ([address] [facility], [facility] ([address] [place] [address])  [address] [address] [place] [address] ([address] [place] [address])  [place] [address]  Phone: [phone] Fax: [phone]    Primary Care Provider: Marielena Gregg MD    Primary Insurance: [address] [place] [address]  Secondary Insurance:     DISCHARGE DETAILS: CM discussed discharge plan via telephone due to covid precautions  Pt stated that he was agreeable to establishing follow up care at [facility] and [address] [place] [address] [facility] [place]  CM sent a message to both providers via in basket and provided them with pt's contact information  Pt requested that CM provide them with his cousin's telephone number as pt does not have a working cell phone   CM placed information for providers on pt's AVS  CM was informed by medical team that pt could benefit from home health care; CM placed referral through allscripts and encouraged pt to follow up with his PCP regarding home health care services  Pt denied any desire to engage in outpatient substance use treatment  Pt verified his pharmacy as [facility] in [place]  Pt requested that CM contact his cousin, Audieroderick Mariscal to pick him up; CM contacted Audie Spoon who will transport pt home       Discharge planning discussed with[de-identified] Patient  Freedom of Choice: Yes                   Contacts  Patient Contacts: Audie Spoon  (cousin)  Relationship to Patient[de-identified] Family  Contact Method: Phone  Phone Number: [phone]  Reason/Outcome: Continuity of [facility] requested[de-identified] [address] (placed referral in allscripts)  [facility] Name[de-identified] Other  [address] Provider[de-identified] PCP  Home Health Services Needed[de-identified] Gait/ADL Training  Homebound Criteria Met[de-identified] Uses an Assist Device (i e  cane, walker, etc)         Other Referral/Resources/Interventions Provided:  Referrals Provided[de-identified] Crisis Hotline,IOP (inpatient substance use treatment)    Would you like to participate in our [address] service program?  : No - Declined                                              Family notified[de-identified] Audie Spoon (cousin)

## 2022-01-15 NOTE — DISCHARGE INSTRUCTIONS
Prescriptions have been sent to your pharmacy for lactulose, thiamine, and folic acid  It is extremely important that you take all of these as prescribed  Without doing so, you might again become confused which could lead to problems including serious injury or death  It is also very important that you follow-up with gastroenterology and primary care  Referrals have been set up by case management  You should be receive a call from both of them, but phone numbers of also been provided on your discharge instructions and I would recommend you call their offices if you have not heard from them by the end of next week  We are also setting up a home health visit to check up and make sure things are going okay, and to prevent further problems  They should be calling you to arrange a visit  Your platelet count is very low and so you are at high risk for bleeding problems  You need to seek medical care right away if you have any bleeding or if you sustain any injury  With as low as your platelet count is, any injury could lead to internal bleeding  You should also seek medical care right away if you have headache, dizziness, weakness or numbness in arms or legs, nausea or vomiting, chest pain or difficulty breathing, bruising, fatigue, generalized weakness, feelings of confusion, or if you have any other concerns  You can take Benadryl as needed for itching, use as indicated on the bottle  Alcohol Dependence   WHAT YOU NEED TO KNOW:   Alcohol dependence is the need to drink alcohol often to function in your daily life  DISCHARGE INSTRUCTIONS:   Call your local emergency number (910 in the [address] [facility]) if:   · You have a seizure  Call your doctor if:   · Your heart is beating faster than normal     · You have hallucinations  · You cannot remember what happens while you are drinking  · You are anxious and have nausea  · Your hands are shaky and you are sweating heavily      · You have questions or concerns about your condition or care  Alcohol dependence  includes at least 3 of the following during the past 12 months:  · You keep drinking alcohol even if you know it increases your risk for health problems  Health problems include liver problems, stomach ulcers, high blood pressure, and stroke  · You develop a tolerance for alcohol  Tolerance means the amount of alcohol you usually drink no longer causes the effects you desire  You need to drink even more alcohol to get the same effect  · You have physical or mental withdrawal symptoms after not drinking for a short period  The same amount of alcohol is needed to relieve or prevent withdrawal symptoms  You may also have to drink to stop tremors (shakes) or to cure a hangover  · You crave alcohol  You have a desire to drink more often and to drink larger amounts of alcohol  · You have problems managing alcohol use  You are not able to control your drinking habits  You keep going back to drinking even after you quit  · You spend less time doing more important things  You spend much of your time drinking alcohol or being with people who also drink  You have trouble with social or daily activities at school, work, or home  You often choose events or activities that will include drinking  Do not try to stop drinking on your own: Your healthcare provider will help you withdraw from alcohol safely   He or she may need to admit you to the hospital  You may also need any of the following treatments:  · Medicines to decrease your craving for alcohol    · Support groups such as Alcoholics Anonymous     · Psychiatrist or psychologist for therapy     · Admission to an inpatient facility for treatment for severe dependence    For support and more information:   · Alcoholics Anonymous  Web Address: http://www Zingaya/    · Substance Abuse and [address] [place]  Web Address: https://chavira com/    Follow up with your healthcare provider as directed:  Write down your questions so you remember to ask them during your visits  © Copyright [employer] 2021 Information is for End User's use only and may not be sold, redistributed or otherwise used for commercial purposes  All illustrations and images included in CareNotes® are the copyrighted property of A [employer] , [employer]  or Jailene Robbins   The above information is an  only  It is not intended as medical advice for individual conditions or treatments  Talk to your doctor, nurse or pharmacist before following any medical regimen to see if it is safe and effective for you  Cirrhosis   WHAT YOU NEED TO KNOW:   Cirrhosis is long-term scarring of the liver  The liver makes enzymes and bile that help digest food and gives your body energy  It also removes harmful material from your body, such as alcohol and other chemicals  Cirrhosis is caused by repeated damage to your liver over time  Scar tissue starts to replace healthy liver tissue  The scar tissue prevents the liver from working properly  DISCHARGE INSTRUCTIONS:   Seek care immediately if:   · You have pain during a bowel movement and it is black or contains blood  · You have a fast heart rate and fast breathing  · You are dizzy or confused  · You have severe pain in your abdomen  · You have trouble breathing  · Your vomit looks like it has coffee grinds or blood in it  Contact your healthcare provider if:   · You have a fever  · You have red or itchy skin  · You are in pain and feel weak  · You have questions or concerns about your condition or care  Medicines: You may need any of the following:  · Antiviral medicine  may be needed if your cirrhosis is caused by hepatitis  Antiviral medicine may prevent or decrease swelling and damage to your liver      · Blood pressure medicine  is used to treat high blood pressure in the portal vein (the vein that goes to your liver)  · Diuretics  decrease extra fluid that collects in a part of your body, such as your legs and abdomen  Diuretics can also decrease your blood pressure  You will urinate more often when you take this medicine  · Antibiotics  help prevent or treat a bacterial infection  · Take your medicine as directed  Contact your healthcare provider if you think your medicine is not helping or if you have side effects  Tell him or her if you are allergic to any medicine  Keep a list of the medicines, vitamins, and herbs you take  Include the amounts, and when and why you take them  Bring the list or the pill bottles to follow-up visits  Carry your medicine list with you in case of an emergency  Do not drink alcohol:  Alcohol will cause more damage to your liver  Manage cirrhosis:   · Do not smoke  Nicotine and other chemicals in cigarettes and cigars can cause blood vessel and lung damage  Ask your healthcare provider for information if you currently smoke and need help to quit  E-cigarettes or smokeless tobacco still contain nicotine  Talk to your healthcare provider before you use these products  · Eat a variety of healthy foods  Healthy foods include fruits, vegetables, whole-grain breads, low-fat dairy products, beans, lean meat, and fish  Ask if you need to be on a special diet  · Reach or maintain a healthy weight  You may develop fatty liver disease if you are overweight  Ask your healthcare provider for a healthy weight for you  He can help you create a safe weight loss plan if you are overweight  · Limit sodium (salt)  You may need to decrease the amount of sodium you eat if you have swelling caused by fluid buildup  Sodium is found in table salt and salty foods such as canned foods, frozen foods, and potato chips  · Drink liquids as directed  Ask how much liquid to drink each day and which liquids are best for you   For most people, good liquids to drink are water, juice, and milk  Liquids can help your liver work better  · Ask about vaccines  You may have a hard time fighting infection because of cirrhosis  Vaccines help protect you against viruses that can cause diseases such as the flu or hepatitis  Viral hepatitis is caused by a virus that leads to inflammation of the liver  You may need a hepatitis A or B vaccine  You may also need a pneumonia vaccine  Always get a flu vaccine each year as soon as it becomes available  · Ask about medicines  Some medicines can harm your liver  Acetaminophen is an example  Talk to your healthcare provider about all your medicines  Do not take any over-the-counter medicine or herbal supplements until your healthcare provider says it is okay  Follow up with your doctor as directed:  Write down your questions so you remember to ask them during your visits  © Copyright [employer] 2021 Information is for End User's use only and may not be sold, redistributed or otherwise used for commercial purposes  All illustrations and images included in CareNotes® are the copyrighted property of A D A M , Inc  or [facility] Gisselle Robbins   The above information is an  only  It is not intended as medical advice for individual conditions or treatments  Talk to your doctor, nurse or pharmacist before following any medical regimen to see if it is safe and effective for you

## 2022-01-15 NOTE — NURSING NOTE
Patient walked out of his room without a mask  Appeared confused  He stated "I woke up and was cold  I was looking for more blankets " Returned to bed  Extra covers provided  Vitals taken  WNL  No pain  Alert, oriented at this time  Itching diminished with previous moisturizer

## 2022-01-15 NOTE — ASSESSMENT & PLAN NOTE
· I am prescribing one month of lactulose  · Case management arranging follow-up with GI  · Discussed important of alcohol cessation
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
## 2022-01-15 NOTE — NURSING NOTE
Patient standing in room while Thiamine IV infusing  Removed his gown  Stated his skin still itching, and he was attempting to fix his covers  Patient appeared slightly agitated  Refused more cream to ease the itching  Patient asking if all patient's in this unit have a tv monitor (referring to Virtual one to one)  He was not happy having a monitor watching his every move

## 2022-01-15 NOTE — NURSING NOTE
Patient Alert, oriented  Cooperative  Spoke at start of shift about living with his cousin in [place]  Patient stated "I took myself to [facility] when I kept sneezing and my eye turned red "     At 0120, patient awake after Trazodone  Stated he was unable to sleep due to itching all over  Noticed he was scratching his arms, chest, legs, shoulders, lower back for a while  Redness noticed to skin where he scratched  No rash present  Moisturizer applied to ease the itchiness  Gown changed  Patient resting, drinking fluids  Stable

## 2022-01-15 NOTE — ASSESSMENT & PLAN NOTE
Alcohol withdrawal fully treated as above  Does not want inpatient treatment  Discussed importance of alcohol cessation

## 2022-01-15 NOTE — ASSESSMENT & PLAN NOTE
· Received approximately 2 grams of phenobarbital for treatment of alcohol withdrawal  · Withdrawal fully resolved at this time with no expectation of further withdrawal

## 2022-01-15 NOTE — ASSESSMENT & PLAN NOTE
· Presented with confusion  · MRI did show findings which could be consistent with hepatic encephalopathy and/or Wernicke encephalopathy  · Was treated with high dose thiamine and restarted on lactulose  · This morning mental status is back to normal, able to answer questions and converse appropriately    Able to ambulate independently with normal gait  · Did recommend to the patient it would be best to stay for continued observation, but he is adamant about going home  · Does not currently have PCP or GI specialist  · I will prescribe one month of lactulose which patient states he can  at [facility] in [place]  · Case management arranged referrals for PCP and GI  · Home care referral for preventative care

## 2022-01-15 NOTE — CASE MANAGEMENT
CM received phone call from pt's cousin, Estrellita Colon ([phone]) who stated pt has been calling him requesting him to pick him up from the hospital  Pt gave verbal consent for CM to speak with Estrellita Colon; CM informed Estrellita Colon that pt has not been cleared for discharge and CM will reach out to arrange transportation once a discharge date/time has been determined  CM relayed this to pt who stated "I'm gonna leave, I'll give you an hour and then I'm out of here"; CM encouraged pt to speak with the doctor before leaving; pt was agreeable to this

## 2022-01-17 ENCOUNTER — TELEPHONE (OUTPATIENT)
Dept: FAMILY MEDICINE CLINIC | Facility: CLINIC | Age: 57
End: 2022-01-17

## 2022-01-17 ENCOUNTER — TELEPHONE (OUTPATIENT)
Dept: GASTROENTEROLOGY | Facility: CLINIC | Age: 57
End: 2022-01-17

## 2022-01-17 NOTE — UTILIZATION REVIEW
Notification of Discharge   This is a Notification of Discharge from our facility [address]  Please be advised that this patient has been discharge from our facility  Below you will find the admission and discharge date and time including the patients disposition  UTILIZATION REVIEW CONTACT:  Casie Tejada  Utilization   Network Utilization Review Department  Phone: [phone] x carefully listen to the prompts  All voicemails are confidential   Email: [email or web address]     PHYSICIAN ADVISORY SERVICES:  FOR [id number] REVIEW - MEDICAL NECESSITY DENIAL  Phone: [phone]  Fax: [phone]  Email: [email or web address]     PRESENTATION DATE: 1/11/2022  4:51 PM  OBERVATION ADMISSION DATE:   INPATIENT ADMISSION DATE: 1/11/22  4:51 PM   DISCHARGE DATE: 1/15/2022 12:27 PM  DISPOSITION: Home/Self Care Home/Self Care      IMPORTANT INFORMATION:  Send all requests for admission clinical reviews, approved or denied determinations and any other requests to dedicated fax number below belonging to the campus where the patient is receiving treatment   List of dedicated fax numbers:  [address] DENIALS (Administrative/Medical Necessity) [phone]   [address] (Maternity/NICU/Pediatrics) [phone]   [facility] [phone]   [facility] [phone]   [facility] [phone]   [address] [phone]   [facility]  [phone]   [address] [facility] [address]  [address] [facility] 1000 [facility] [phone]

## 2022-01-17 NOTE — TELEPHONE ENCOUNTER
----- Message from Christian Sargent [facility] sent at 1/15/2022 10:33 AM EST -----  Regarding: Appointment Request  Hello,    I need to make a referral for a patient who we are discharging from [address] [place] [address] today and needs to establish care with a primary care provider  He can be reached directly at [phone] to set up an appointment  Thanks so much!     Zulay Wilson,

## 2022-01-17 NOTE — TELEPHONE ENCOUNTER
----- Message from MALCOLM R[facility] sent at 1/17/2022  8:03 AM EST -----  Regarding: FW: Referral    ----- Message -----  [facility]  Sent: 1/15/2022  10:38 AM EST  To: Gastroenterology Sheron Lopez, #  Subject: Referral                                         Hello,    I need to make a referral for a patient who we are discharging from [facility] today and needs follow up care for GI issues  He can be reached directly at [phone] to set up an appointment  Thanks so much!     Shai Noguera,

## 2022-01-17 NOTE — TELEPHONE ENCOUNTER
Called # in message from [address], no  set up  Spoke to [place]'s sister and gave her our # for him to call  She sates he may check in with her every couple of weeks      I will try calling the # in below message again on 1/18/22

## 2022-01-18 NOTE — TELEPHONE ENCOUNTER
Please see notes  The # that patient provided goes right to VM and the VM has not been set up  I had a conversation with his sister and she will provide him our # when and if she sees him

## 2022-04-18 ENCOUNTER — HOSPITAL ENCOUNTER (INPATIENT)
Facility: HOSPITAL | Age: 57
LOS: 4 days | Discharge: HOME | End: 2022-04-22
Attending: EMERGENCY MEDICINE | Admitting: EMERGENCY MEDICINE
Payer: COMMERCIAL

## 2022-04-18 ENCOUNTER — HOSPITAL ENCOUNTER (EMERGENCY)
Facility: HOSPITAL | Age: 57
End: 2022-04-18
Attending: GENERAL PRACTICE
Payer: COMMERCIAL

## 2022-04-18 VITALS
HEART RATE: 78 BPM | OXYGEN SATURATION: 96 % | RESPIRATION RATE: 20 BRPM | SYSTOLIC BLOOD PRESSURE: 137 MMHG | TEMPERATURE: 96.9 F | DIASTOLIC BLOOD PRESSURE: 77 MMHG

## 2022-04-18 DIAGNOSIS — E87.6 HYPOKALEMIA: ICD-10-CM

## 2022-04-18 DIAGNOSIS — F10.239 ALCOHOL WITHDRAWAL (HCC): Primary | ICD-10-CM

## 2022-04-18 DIAGNOSIS — D69.6 THROMBOCYTOPENIA (HCC): ICD-10-CM

## 2022-04-18 DIAGNOSIS — F41.9 ANXIETY: Primary | ICD-10-CM

## 2022-04-18 DIAGNOSIS — E83.42 HYPOMAGNESEMIA: ICD-10-CM

## 2022-04-18 LAB
ALBUMIN SERPL BCP-MCNC: 2.9 G/DL (ref 3.5–5)
ALBUMIN SERPL BCP-MCNC: 3 G/DL (ref 3.5–5)
ALP SERPL-CCNC: 114 U/L (ref 46–116)
ALP SERPL-CCNC: 116 U/L (ref 46–116)
ALT SERPL W P-5'-P-CCNC: 51 U/L (ref 12–78)
ALT SERPL W P-5'-P-CCNC: 51 U/L (ref 12–78)
AMPHETAMINES SERPL QL SCN: NEGATIVE
ANION GAP SERPL CALCULATED.3IONS-SCNC: 12 MMOL/L (ref 4–13)
AST SERPL W P-5'-P-CCNC: 127 U/L (ref 5–45)
AST SERPL W P-5'-P-CCNC: 134 U/L (ref 5–45)
BARBITURATES UR QL: NEGATIVE
BASOPHILS # BLD AUTO: 0.03 THOUSANDS/ΜL (ref 0–0.1)
BASOPHILS NFR BLD AUTO: 1 % (ref 0–1)
BENZODIAZ UR QL: NEGATIVE
BILIRUB DIRECT SERPL-MCNC: 2.43 MG/DL (ref 0–0.2)
BILIRUB SERPL-MCNC: 4.26 MG/DL (ref 0.2–1)
BILIRUB SERPL-MCNC: 4.39 MG/DL (ref 0.2–1)
BUN SERPL-MCNC: 7 MG/DL (ref 5–25)
CALCIUM ALBUM COR SERPL-MCNC: 9.2 MG/DL (ref 8.3–10.1)
CALCIUM SERPL-MCNC: 8.4 MG/DL (ref 8.3–10.1)
CHLORIDE SERPL-SCNC: 100 MMOL/L (ref 100–108)
CO2 SERPL-SCNC: 24 MMOL/L (ref 21–32)
COCAINE UR QL: POSITIVE
CREAT SERPL-MCNC: 0.6 MG/DL (ref 0.6–1.3)
EOSINOPHIL # BLD AUTO: 0.06 THOUSAND/ΜL (ref 0–0.61)
EOSINOPHIL NFR BLD AUTO: 2 % (ref 0–6)
ERYTHROCYTE [DISTWIDTH] IN BLOOD BY AUTOMATED COUNT: 16.3 % (ref 11.6–15.1)
ETHANOL SERPL-MCNC: 15 MG/DL (ref 0–3)
FLUAV RNA RESP QL NAA+PROBE: NEGATIVE
FLUBV RNA RESP QL NAA+PROBE: NEGATIVE
GFR SERPL CREATININE-BSD FRML MDRD: 112 ML/MIN/1.73SQ M
GLUCOSE SERPL-MCNC: 124 MG/DL (ref 65–140)
HCT VFR BLD AUTO: 37.3 % (ref 36.5–49.3)
HGB BLD-MCNC: 12.6 G/DL (ref 12–17)
IMM GRANULOCYTES # BLD AUTO: 0.01 THOUSAND/UL (ref 0–0.2)
IMM GRANULOCYTES NFR BLD AUTO: 0 % (ref 0–2)
INR PPP: 1.42 (ref 0.84–1.19)
LIPASE SERPL-CCNC: 302 U/L (ref 73–393)
LYMPHOCYTES # BLD AUTO: 0.49 THOUSANDS/ΜL (ref 0.6–4.47)
LYMPHOCYTES NFR BLD AUTO: 17 % (ref 14–44)
MAGNESIUM SERPL-MCNC: 1.3 MG/DL (ref 1.6–2.6)
MCH RBC QN AUTO: 33.6 PG (ref 26.8–34.3)
MCHC RBC AUTO-ENTMCNC: 33.8 G/DL (ref 31.4–37.4)
MCV RBC AUTO: 100 FL (ref 82–98)
METHADONE UR QL: NEGATIVE
MONOCYTES # BLD AUTO: 0.26 THOUSAND/ΜL (ref 0.17–1.22)
MONOCYTES NFR BLD AUTO: 9 % (ref 4–12)
NEUTROPHILS # BLD AUTO: 2.03 THOUSANDS/ΜL (ref 1.85–7.62)
NEUTS SEG NFR BLD AUTO: 71 % (ref 43–75)
NRBC BLD AUTO-RTO: 0 /100 WBCS
OPIATES UR QL SCN: NEGATIVE
OXYCODONE+OXYMORPHONE UR QL SCN: NEGATIVE
PCP UR QL: NEGATIVE
PLATELET # BLD AUTO: 28 THOUSANDS/UL (ref 149–390)
PMV BLD AUTO: 11.4 FL (ref 8.9–12.7)
POTASSIUM SERPL-SCNC: 3.4 MMOL/L (ref 3.5–5.3)
PROT SERPL-MCNC: 7.5 G/DL (ref 6.4–8.2)
PROT SERPL-MCNC: 7.5 G/DL (ref 6.4–8.2)
PROTHROMBIN TIME: 17 SECONDS (ref 11.6–14.5)
RBC # BLD AUTO: 3.75 MILLION/UL (ref 3.88–5.62)
RSV RNA RESP QL NAA+PROBE: NEGATIVE
SARS-COV-2 RNA RESP QL NAA+PROBE: NEGATIVE
SODIUM SERPL-SCNC: 136 MMOL/L (ref 136–145)
THC UR QL: POSITIVE
WBC # BLD AUTO: 2.88 THOUSAND/UL (ref 4.31–10.16)

## 2022-04-18 PROCEDURE — 0241U HB NFCT DS VIR RESP RNA 4 TRGT: CPT | Performed by: GENERAL PRACTICE

## 2022-04-18 PROCEDURE — 80076 HEPATIC FUNCTION PANEL: CPT

## 2022-04-18 PROCEDURE — 96365 THER/PROPH/DIAG IV INF INIT: CPT

## 2022-04-18 PROCEDURE — 82077 ASSAY SPEC XCP UR&BREATH IA: CPT | Performed by: GENERAL PRACTICE

## 2022-04-18 PROCEDURE — 36415 COLL VENOUS BLD VENIPUNCTURE: CPT | Performed by: GENERAL PRACTICE

## 2022-04-18 PROCEDURE — 80307 DRUG TEST PRSMV CHEM ANLYZR: CPT | Performed by: GENERAL PRACTICE

## 2022-04-18 PROCEDURE — 99291 CRITICAL CARE FIRST HOUR: CPT

## 2022-04-18 PROCEDURE — 83735 ASSAY OF MAGNESIUM: CPT | Performed by: GENERAL PRACTICE

## 2022-04-18 PROCEDURE — 96368 THER/DIAG CONCURRENT INF: CPT

## 2022-04-18 PROCEDURE — 85610 PROTHROMBIN TIME: CPT | Performed by: GENERAL PRACTICE

## 2022-04-18 PROCEDURE — 96375 TX/PRO/DX INJ NEW DRUG ADDON: CPT

## 2022-04-18 PROCEDURE — 99285 EMERGENCY DEPT VISIT HI MDM: CPT | Performed by: GENERAL PRACTICE

## 2022-04-18 PROCEDURE — 83690 ASSAY OF LIPASE: CPT | Performed by: GENERAL PRACTICE

## 2022-04-18 PROCEDURE — 96366 THER/PROPH/DIAG IV INF ADDON: CPT

## 2022-04-18 PROCEDURE — 85025 COMPLETE CBC W/AUTO DIFF WBC: CPT | Performed by: GENERAL PRACTICE

## 2022-04-18 PROCEDURE — 93005 ELECTROCARDIOGRAM TRACING: CPT

## 2022-04-18 PROCEDURE — 99285 EMERGENCY DEPT VISIT HI MDM: CPT

## 2022-04-18 PROCEDURE — 80053 COMPREHEN METABOLIC PANEL: CPT | Performed by: GENERAL PRACTICE

## 2022-04-18 RX ORDER — LORAZEPAM 2 MG/ML
4 INJECTION INTRAMUSCULAR ONCE
Status: COMPLETED | OUTPATIENT
Start: 2022-04-18 | End: 2022-04-18

## 2022-04-18 RX ORDER — POTASSIUM CHLORIDE 14.9 MG/ML
20 INJECTION INTRAVENOUS ONCE
Status: COMPLETED | OUTPATIENT
Start: 2022-04-18 | End: 2022-04-18

## 2022-04-18 RX ORDER — LACTULOSE 20 G/30ML
20 SOLUTION ORAL 2 TIMES DAILY
Status: DISCONTINUED | OUTPATIENT
Start: 2022-04-18 | End: 2022-04-22 | Stop reason: HOSPADM

## 2022-04-18 RX ORDER — SODIUM CHLORIDE 9 MG/ML
125 INJECTION, SOLUTION INTRAVENOUS CONTINUOUS
Status: DISCONTINUED | OUTPATIENT
Start: 2022-04-18 | End: 2022-04-21

## 2022-04-18 RX ORDER — ONDANSETRON 2 MG/ML
4 INJECTION INTRAMUSCULAR; INTRAVENOUS EVERY 6 HOURS PRN
Status: DISCONTINUED | OUTPATIENT
Start: 2022-04-18 | End: 2022-04-18

## 2022-04-18 RX ORDER — MAGNESIUM SULFATE HEPTAHYDRATE 40 MG/ML
4 INJECTION, SOLUTION INTRAVENOUS ONCE
Status: DISCONTINUED | OUTPATIENT
Start: 2022-04-18 | End: 2022-04-18 | Stop reason: HOSPADM

## 2022-04-18 RX ORDER — PHENOBARBITAL SODIUM 65 MG/ML
130 INJECTION INTRAMUSCULAR ONCE
Status: COMPLETED | OUTPATIENT
Start: 2022-04-18 | End: 2022-04-18

## 2022-04-18 RX ORDER — TRAZODONE HYDROCHLORIDE 50 MG/1
50 TABLET ORAL
Status: DISCONTINUED | OUTPATIENT
Start: 2022-04-18 | End: 2022-04-18

## 2022-04-18 RX ORDER — ACETAMINOPHEN 325 MG/1
650 TABLET ORAL EVERY 6 HOURS PRN
Status: DISCONTINUED | OUTPATIENT
Start: 2022-04-18 | End: 2022-04-22 | Stop reason: HOSPADM

## 2022-04-18 RX ORDER — DIAZEPAM 5 MG/ML
10 INJECTION, SOLUTION INTRAMUSCULAR; INTRAVENOUS ONCE
Status: DISCONTINUED | OUTPATIENT
Start: 2022-04-18 | End: 2022-04-18

## 2022-04-18 RX ADMIN — SODIUM CHLORIDE 125 ML/HR: 0.9 INJECTION, SOLUTION INTRAVENOUS at 20:20

## 2022-04-18 RX ADMIN — POTASSIUM CHLORIDE 20 MEQ: 14.9 INJECTION, SOLUTION INTRAVENOUS at 17:19

## 2022-04-18 RX ADMIN — LACTULOSE 20 G: 10 SOLUTION ORAL at 20:57

## 2022-04-18 RX ADMIN — PHENOBARBITAL SODIUM 520 MG: 65 INJECTION INTRAMUSCULAR; INTRAVENOUS at 21:17

## 2022-04-18 RX ADMIN — PHENOBARBITAL SODIUM 130 MG: 65 INJECTION INTRAMUSCULAR; INTRAVENOUS at 20:19

## 2022-04-18 RX ADMIN — LORAZEPAM 4 MG: 2 INJECTION INTRAMUSCULAR; INTRAVENOUS at 16:17

## 2022-04-18 RX ADMIN — MAGNESIUM SULFATE HEPTAHYDRATE 4 G: 40 INJECTION, SOLUTION INTRAVENOUS at 17:18

## 2022-04-18 NOTE — EMTALA/ACUTE CARE TRANSFER
[address] [address]  Dept: [phone]      EMTALA TRANSFER CONSENT    NAME Lamonte Garvey                                         1965                              MRN [id number]    I have been informed of my rights regarding examination, treatment, and transfer   by Dr Casey Way MD    Benefits: Specialized equipment and/or services available at the receiving facility (Include comment)________________________ (medical detox)    Risks: Potential for delay in receiving treatment,Potential deterioration of medical condition,Loss of IV,Increased discomfort during transfer,Possible worsening of condition or death during transfer      Consent for Transfer:  I acknowledge that my medical condition has been evaluated and explained to me by the emergency department physician or other qualified medical person and/or my attending physician, who has recommended that I be transferred to the service of  Accepting Physician: Dr Guear López at [address]  Name, [address] : [facility]  The above potential benefits of such transfer, the potential risks associated with such transfer, and the probable risks of not being transferred have been explained to me, and I fully understand them  The doctor has explained that, in my case, the benefits of transfer outweigh the risks  I agree to be transferred  I authorize the performance of emergency medical procedures and treatments upon me in both transit and upon arrival at the receiving facility  Additionally, I authorize the release of any and all medical records to the receiving facility and request they be transported with me, if possible  I understand that the safest mode of transportation during a medical emergency is an ambulance and that the Hospital advocates the use of this mode of transport   Risks of traveling to the receiving facility by car, including absence of medical control, life sustaining equipment, such as oxygen, and medical personnel has been explained to me and I fully understand them  (TODD CORRECT BOX BELOW)  [  ]  I consent to the stated transfer and to be transported by ambulance/helicopter  [  ]  I consent to the stated transfer, but refuse transportation by ambulance and accept full responsibility for my transportation by car  I understand the risks of non-ambulance transfers and I exonerate the Hospital and its staff from any deterioration in my condition that results from this refusal     X___________________________________________    DATE  22  TIME________  Signature of patient or legally responsible individual signing on patient behalf           RELATIONSHIP TO PATIENT_________________________          Provider Certification    NAME Cornell Grande                                         1965                              MRN [id number]    A medical screening exam was performed on the above named patient  Based on the examination:    Condition Necessitating Transfer The primary encounter diagnosis was Alcohol withdrawal ([facility] [address] )  Diagnoses of Hypomagnesemia, Hypokalemia, and Thrombocytopenia ([facility] [address] ) were also pertinent to this visit      Patient Condition: The patient has been stabilized such that within reasonable medical probability, no material deterioration of the patient condition or the condition of the unborn child(rolo) is likely to result from the transfer    Reason for Transfer: Level of Care needed not available at this facility    Transfer Requirements:  [address]   · Space available and qualified personnel available for treatment as acknowledged by    · Agreed to accept transfer and to provide appropriate medical treatment as acknowledged by       Dr Glenda Metz  · Appropriate medical records of the examination and treatment of the patient are provided at the time of transfer   [address] _______  · Transfer will be performed by qualified personnel from    and appropriate transfer equipment as required, including the use of necessary and appropriate life support measures  Provider Certification: I have examined the patient and explained the following risks and benefits of being transferred/refusing transfer to the patient/family:  General risk, such as traffic hazards, adverse weather conditions, rough terrain or turbulence, possible failure of equipment (including vehicle or aircraft), or consequences of actions of persons outside the control of the transport personnel,Unanticipated needs of medical equipment and personnel during transport,Risk of worsening condition,The possibility of a transport vehicle being unavailable      Based on these reasonable risks and benefits to the patient and/or the unborn child(rolo), and based upon the information available at the time of the patients examination, I certify that the medical benefits reasonably to be expected from the provision of appropriate medical treatments at another medical facility outweigh the increasing risks, if any, to the individuals medical condition, and in the case of labor to the unborn child, from effecting the transfer      X____________________________________________ DATE 04/18/22        TIME_______      ORIGINAL - SEND TO MEDICAL RECORDS   COPY - SEND WITH PATIENT DURING TRANSFER

## 2022-04-18 NOTE — ED PROVIDER NOTES
History  Chief Complaint   Patient presents with    Withdrawal - Alcohol     Pt states he has been drinking every day for years, trying to quit  Last drink was yesterday morning, states he drinks shots and beers, varies on the day      Patient is a 20-year-old male with a past medical history of alcohol abuse who presents to the emergency room in acute alcohol withdrawal, requesting help  Patient was in [place] detox in January of this year and reports staying sober for approximately 2-3 weeks after discharge, however depression and life stressors who drove him to start drinking again  He reports last drinking yesterday  Today he has noted significant shaking and he is worried about seizures  He does have a history of alcohol withdrawal seizures  He also reports visual hallucinations      Withdrawal - Alcohol  Associated symptoms: no abdominal pain, no headaches, no nausea, no palpitations and no vomiting        Prior to Admission Medications   Prescriptions Last Dose Informant Patient Reported? Taking?    Multiple Vitamin (multivitamin) tablet   No No   Sig: Take 1 tablet by mouth daily   acetaminophen (TYLENOL) 325 mg tablet   No No   Sig: Take 2 tablets (650 mg total) by mouth every 6 (six) hours as needed for mild pain   calcium carbonate (TUMS) 500 mg chewable tablet   No No   Sig: Chew 2 tablets (1,000 mg total) daily as needed for indigestion or heartburn   folic acid (FOLVITE) 1 mg tablet   No No   Sig: Take 1 tablet (1 mg total) by mouth daily   lactulose 20 g/30 mL   No No   Sig: Take 30 mL (20 g total) by mouth 2 (two) times a day   thiamine 100 MG tablet   No No   Sig: Take 1 tablet (100 mg total) by mouth daily      Facility-Administered Medications: None       Past Medical History:   Diagnosis Date    ETOH abuse        Past Surgical History:   Procedure Laterality Date    GALLBLADDER SURGERY         Family History   Problem Relation Age of Onset    Heart disease Mother     Heart disease Father      I have reviewed and agree with the history as documented  E-Cigarette/Vaping    E-Cigarette Use Never User      E-Cigarette/Vaping Substances    Nicotine No     THC No     CBD No     Flavoring No     Other No     Unknown No      Social History     Tobacco Use    Smoking status: Never Smoker    Smokeless tobacco: Current User   Vaping Use    Vaping Use: Never used   Substance Use Topics    Alcohol use: Yes     Comment: every other day has beery and mixed drinks  last drink 12 hours ago    Drug use: Not Currently     Types: Marijuana       Review of Systems   Constitutional: Negative for chills and fatigue  HENT: Negative for congestion and rhinorrhea  Eyes: Negative for redness and visual disturbance  Respiratory: Negative for cough and wheezing  Cardiovascular: Negative for chest pain and palpitations  Gastrointestinal: Negative for abdominal pain, constipation, diarrhea, nausea and vomiting  Endocrine: Negative for polydipsia and polyuria  Genitourinary: Negative for dysuria and hematuria  Musculoskeletal: Negative for arthralgias and myalgias  Neurological: Positive for tremors  Negative for light-headedness and headaches  Hematological: Negative for adenopathy  Does not bruise/bleed easily  Psychiatric/Behavioral: Negative for dysphoric mood  The patient is not nervous/anxious  All other systems reviewed and are negative  Physical Exam  Physical Exam  Constitutional:       General: He is in acute distress  Appearance: Normal appearance  He is toxic-appearing (Acute alcohol withdrawal)  He is not ill-appearing  HENT:      Head: Normocephalic and atraumatic  Mouth/Throat:      Mouth: Mucous membranes are moist       Pharynx: Oropharynx is clear  Comments: Significant tremor and fasciculations of tongue  Eyes:      General: No scleral icterus       Conjunctiva/sclera: Conjunctivae normal    Cardiovascular:      Rate and Rhythm: Normal rate and regular rhythm  Pulses: Normal pulses  Heart sounds: No murmur heard  No friction rub  No gallop  Pulmonary:      Effort: Pulmonary effort is normal  No respiratory distress  Breath sounds: Normal breath sounds  No wheezing, rhonchi or rales  Abdominal:      Palpations: Abdomen is soft  Tenderness: There is no abdominal tenderness  Comments: Abdominal wall varices noted   Musculoskeletal:         General: No swelling or tenderness  Normal range of motion  Cervical back: Normal range of motion and neck supple  Comments: Upper extremity tremor bilaterally   Skin:     General: Skin is warm and dry  Capillary Refill: Capillary refill takes less than 2 seconds  Neurological:      General: No focal deficit present  Mental Status: He is alert and oriented to person, place, and time  Mental status is at baseline  Psychiatric:         Mood and Affect: Mood is anxious  Speech: Speech is rapid and pressured  Behavior: Behavior normal  Behavior is cooperative  Thought Content: Thought content is not paranoid or delusional  Thought content does not include homicidal or suicidal plan           Judgment: Judgment normal          Vital Signs  ED Triage Vitals   Temperature Pulse Respirations Blood Pressure SpO2   04/18/22 1518 04/18/22 1518 04/18/22 1518 04/18/22 1518 04/18/22 1518   (!) 96 9 °F (36 1 °C) 91 20 (!) 172/89 97 %      Temp Source Heart Rate Source Patient Position - Orthostatic VS BP Location FiO2 (%)   04/18/22 1518 04/18/22 1545 04/18/22 1518 04/18/22 1518 --   Tympanic Monitor Sitting Right arm       Pain Score       04/18/22 1518       No Pain           Vitals:    04/18/22 1745 04/18/22 1800 04/18/22 1815 04/18/22 1830   BP: 131/78 158/91 134/83 137/77   Pulse: 72 88 82 78   Patient Position - Orthostatic VS: Lying Lying Lying Lying         Visual Acuity      ED Medications  Medications   LORazepam (ATIVAN) injection 4 mg (4 mg Intravenous Given 4/18/22 1617)   potassium chloride 20 mEq IVPB (premix) (20 mEq Intravenous New Bag 4/18/22 1719)       Diagnostic Studies  Results Reviewed     Procedure Component Value Units Date/Time    Rapid drug screen, urine [[id number]] Collected: 04/18/22 1855    Lab Status: In process Specimen: Urine, Clean Catch Updated: 04/18/22 1857    COVID/FLU/RSV - 2 hour TAT [[id number]]  (Normal) Collected: 04/18/22 1635    Lab Status: Final result Specimen: Nares from Nose Updated: 04/18/22 1723     SARS-CoV-2 Negative     INFLUENZA A PCR Negative     INFLUENZA B PCR Negative     RSV PCR Negative    Narrative:      FOR PEDIATRIC PATIENTS - copy/paste COVID Guidelines URL to browser: [email or web address]  [email or web address]    SARS-CoV-2 assay is a Nucleic Acid Amplification assay intended for the  qualitative detection of nucleic acid from SARS-CoV-2 in nasopharyngeal  swabs  Results are for the presumptive identification of SARS-CoV-2 RNA  Positive results are indicative of infection with SARS-CoV-2, the virus  causing COVID-19, but do not rule out bacterial infection or co-infection  with other viruses  Laboratories within the [place] and its  territories are required to report all positive results to the appropriate  public health authorities  Negative results do not preclude SARS-CoV-2  infection and should not be used as the sole basis for treatment or other  patient management decisions  Negative results must be combined with  clinical observations, patient history, and epidemiological information  This test has not been FDA cleared or approved  This test has been authorized by FDA under an Emergency Use Authorization  (EUA)   This test is only authorized for the duration of time the  declaration that circumstances exist justifying the authorization of the  emergency use of an in vitro diagnostic tests for detection of SARS-CoV-2  virus and/or diagnosis of COVID-19 infection under section 564(b)(1) of  the Act, 21 U  S C  338LDF-5(G)(9), unless the authorization is terminated  or revoked sooner  The test has been validated but independent review by FDA  and CLIA is pending  Test performed using [employer] Gene[employer]pert: This RT-PCR assay targets N2,  a region unique to SARS-CoV-2  A conserved region in the E-gene was chosen  for pan-Sarbecovirus detection which includes SARS-CoV-2      Protime-INR [[id number]]  (Abnormal) Collected: 04/18/22 1615    Lab Status: Final result Specimen: Blood from Arm, Right Updated: 04/18/22 1651     Protime 17 0 seconds      INR 1 42    CBC and differential [[id number]]  (Abnormal) Collected: 04/18/22 1615    Lab Status: Final result Specimen: Blood from Arm, Right Updated: 04/18/22 1650     WBC 2 88 Thousand/uL      RBC 3 75 Million/uL      Hemoglobin 12 6 g/dL      Hematocrit 37 3 %       fL      MCH 33 6 pg      MCHC 33 8 g/dL      RDW 16 3 %      MPV 11 4 fL      Platelets 28 Thousands/uL      nRBC 0 /100 WBCs      Neutrophils Relative 71 %      Immat GRANS % 0 %      Lymphocytes Relative 17 %      Monocytes Relative 9 %      Eosinophils Relative 2 %      Basophils Relative 1 %      Neutrophils Absolute 2 03 Thousands/µL      Immature Grans Absolute 0 01 Thousand/uL      Lymphocytes Absolute 0 49 Thousands/µL      Monocytes Absolute 0 26 Thousand/µL      Eosinophils Absolute 0 06 Thousand/µL      Basophils Absolute 0 03 Thousands/µL     Comprehensive metabolic panel [[id number]]  (Abnormal) Collected: 04/18/22 1615    Lab Status: Final result Specimen: Blood from Arm, Right Updated: 04/18/22 1648     Sodium 136 mmol/L      Potassium 3 4 mmol/L      Chloride 100 mmol/L      CO2 24 mmol/L      ANION GAP 12 mmol/L      BUN 7 mg/dL      Creatinine 0 60 mg/dL      Glucose 124 mg/dL      Calcium 8 4 mg/dL      Corrected Calcium 9 2 mg/dL       U/L      ALT 51 U/L      Alkaline Phosphatase 116 U/L      Total Protein 7 5 g/dL Albumin 3 0 g/dL      Total Bilirubin 4 39 mg/dL      eGFR 112 ml/min/1 73sq m     Narrative:      [place] guidelines for Chronic Kidney Disease (CKD):     Stage 1 with normal or high GFR (GFR > 90 mL/min/1 73 square meters)    Stage 2 Mild CKD (GFR = 60-89 mL/min/1 73 square meters)    Stage 3A Moderate CKD (GFR = 45-59 mL/min/1 73 square meters)    Stage 3B Moderate CKD (GFR = 30-44 mL/min/1 73 square meters)    Stage 4 Severe CKD (GFR = 15-29 mL/min/1 73 square meters)    Stage 5 End Stage CKD (GFR <15 mL/min/1 73 square meters)  Note: GFR calculation is accurate only with a steady state creatinine    Lipase [[id number]]  (Normal) Collected: 04/18/22 1615    Lab Status: Final result Specimen: Blood from Arm, Right Updated: 04/18/22 1648     Lipase 302 u/L     Magnesium [[id number]]  (Abnormal) Collected: 04/18/22 1615    Lab Status: Final result Specimen: Blood from Arm, Right Updated: 04/18/22 1648     Magnesium 1 3 mg/dL     Ethanol level [[id number]]  (Abnormal) Collected: 04/18/22 1615    Lab Status: Final result Specimen: Blood from Arm, Right Updated: 04/18/22 1644     Ethanol Lvl 15 mg/dL                  No orders to display              Procedures  ECG 12 Lead Documentation Only    Date/Time: 4/18/2022 5:24 PM  Performed by: Jarrod Melendrez MD  Authorized by: Jarrod Melendrez MD     Indications / Diagnosis:  Acute alcohol withdrawal  ECG reviewed by me, the ED Provider: yes    Patient location:  ED  Interpretation:     Interpretation: abnormal    Rate:     ECG rate:  73    ECG rate assessment: normal    Rhythm:     Rhythm: sinus rhythm    Ectopy:     Ectopy: none    Conduction:     Conduction: normal    Other findings:     Other findings: prolonged qTc interval    Comments:                   ED Course  ED Course as of 04/18/22 1943 Mon Apr 18, 2022   1652 Mg 1 3 with   4g Mg IVPB ordered   1652 K 3 4  20meq Kcl IVPB ordered   1653 Discussed case with Dr Debbi Nelson at [address] [facility]  She is reviewing case  1 Dr Guera López accepted patient  Transfer to [facility] initiated  SBIRT 22yo+      Most Recent Value   SBIRT (24 yo +)    In order to provide better care to our patients, we are screening all of our patients for alcohol and drug use  Would it be okay to ask you these screening questions? No Filed at: 04/18/2022 1551                    MDM    Disposition  Final diagnoses:   Alcohol withdrawal ([facility] [address] )   Hypomagnesemia   Hypokalemia   Thrombocytopenia ([facility] [address] )     Time reflects when diagnosis was documented in both MDM as applicable and the Disposition within this note     Time User Action Codes Description Comment    4/18/2022  5:00 PM Klaudia Bertrand [F10 239] Alcohol withdrawal ([facility] [address] )     4/18/2022  5:00 PM Klaudia Bertrand [E83 42] Hypomagnesemia     4/18/2022  5:00 PM Geanie Fergusson Add [E87 6] Hypokalemia     4/18/2022  5:00 PM Geanie Fergusson Add [D69 6] Thrombocytopenia [facility])       ED Disposition     ED Disposition Condition Date/Time Comment    Transfer to Another Facility-In Network  Mon Apr 18, 2022  5:00 PM Lamonte Garvey should be transferred out to [facility]          MD Documentation      Most Recent Value   Patient Condition The patient has been stabilized such that within reasonable medical probability, no material deterioration of the patient condition or the condition of the unborn child(rolo) is likely to result from the transfer   Reason for Transfer Level of Care needed not available at this facility   Benefits of Transfer Specialized equipment and/or services available at the receiving facility (Include comment)________________________  [facility]]   Risks of Transfer Potential for delay in receiving treatment, Potential deterioration of medical condition, Loss of IV, Increased discomfort during transfer, Possible worsening of condition or death during transfer   Accepting Physician Dr Osmani Munoz Name, [address] [facility] Leslie Garcia [place]   Nancy Curran   Provider Certification General risk, such as traffic hazards, adverse weather conditions, rough terrain or turbulence, possible failure of equipment (including vehicle or aircraft), or consequences of actions of persons outside the control of the transport personnel, Unanticipated needs of medical equipment and personnel during transport, Risk of worsening condition, The possibility of a transport vehicle being unavailable      RN Documentation      Most [address] [address] Name, [address] [place] [address]   Bed Assignment 507   Report Given to Tashia Noriega RN      Follow-up Information    None         Discharge Medication List as of 4/18/2022  7:06 PM      CONTINUE these medications which have NOT CHANGED    Details   acetaminophen (TYLENOL) 325 mg tablet Take 2 tablets (650 mg total) by mouth every 6 (six) hours as needed for mild pain, Starting Mon 3/29/2021, No Print      calcium carbonate (TUMS) 500 mg chewable tablet Chew 2 tablets (1,000 mg total) daily as needed for indigestion or heartburn, Starting Mon 3/29/2021, No Print      folic acid (FOLVITE) 1 mg tablet Take 1 tablet (1 mg total) by mouth daily, Starting Sat 1/15/2022, Normal      lactulose 20 g/30 mL Take 30 mL (20 g total) by mouth 2 (two) times a day, Starting Sat 1/15/2022, Until Mon 2/14/2022, Normal      Multiple Vitamin (multivitamin) tablet Take 1 tablet by mouth daily, Starting Tue 12/8/2020, Normal      thiamine 100 MG tablet Take 1 tablet (100 mg total) by mouth daily, Starting Sat 1/15/2022, Normal             No discharge procedures on file      PDMP Review     None          ED Provider  Electronically Signed by           Yusef Ren MD  04/18/22 4637

## 2022-04-18 NOTE — ED NOTES
Pt unable to urinate at this time  Urinal hung at bedside  Pt directed to push call bell if he urinates       Viktoria Porter RN  04/18/22 5570

## 2022-04-18 NOTE — ED NOTES
PES briefly spoke with patient who is interested in going to a detox @ 15:50  PES advised ER Attending who wants to do labs before notifying [facility] because the last time patient presented - needed to be admitted medically  Patient to be medically admitted

## 2022-04-19 ENCOUNTER — APPOINTMENT (INPATIENT)
Dept: CT IMAGING | Facility: HOSPITAL | Age: 57
End: 2022-04-19
Payer: COMMERCIAL

## 2022-04-19 PROBLEM — F10.939 ALCOHOL WITHDRAWAL SYNDROME WITH COMPLICATION (HCC): Status: ACTIVE | Noted: 2022-04-19

## 2022-04-19 PROBLEM — F12.90 MARIJUANA USE: Status: ACTIVE | Noted: 2022-04-19

## 2022-04-19 PROBLEM — F10.239 ALCOHOL WITHDRAWAL SYNDROME WITH COMPLICATION (HCC): Status: ACTIVE | Noted: 2022-04-19

## 2022-04-19 PROBLEM — R59.0 ANTERIOR CERVICAL LYMPHADENOPATHY: Status: ACTIVE | Noted: 2022-04-19

## 2022-04-19 LAB
ALBUMIN SERPL BCP-MCNC: 3.2 G/DL (ref 3–5.2)
ALP SERPL-CCNC: 109 U/L (ref 43–122)
ALT SERPL W P-5'-P-CCNC: 42 U/L
AMMONIA PLAS-SCNC: 57 UMOL/L (ref 9–33)
ANION GAP SERPL CALCULATED.3IONS-SCNC: 4 MMOL/L (ref 5–14)
AST SERPL W P-5'-P-CCNC: 115 U/L (ref 17–59)
ATRIAL RATE: 73 BPM
BILIRUB SERPL-MCNC: 4.9 MG/DL
BILIRUB UR QL STRIP: NEGATIVE
BUN SERPL-MCNC: 10 MG/DL (ref 5–25)
CALCIUM ALBUM COR SERPL-MCNC: 8.4 MG/DL (ref 8.3–10.1)
CALCIUM SERPL-MCNC: 7.8 MG/DL (ref 8.4–10.2)
CHLORIDE SERPL-SCNC: 104 MMOL/L (ref 97–108)
CLARITY UR: CLEAR
CO2 SERPL-SCNC: 27 MMOL/L (ref 22–30)
COLOR UR: ABNORMAL
CREAT SERPL-MCNC: 0.55 MG/DL (ref 0.7–1.5)
ERYTHROCYTE [DISTWIDTH] IN BLOOD BY AUTOMATED COUNT: 16.7 % (ref 11.6–15.1)
GFR SERPL CREATININE-BSD FRML MDRD: 116 ML/MIN/1.73SQ M
GLUCOSE SERPL-MCNC: 111 MG/DL (ref 70–99)
GLUCOSE UR STRIP-MCNC: NEGATIVE MG/DL
HCT VFR BLD AUTO: 35.6 % (ref 36.5–49.3)
HGB BLD-MCNC: 12.1 G/DL (ref 12–17)
HGB UR QL STRIP.AUTO: NEGATIVE
KETONES UR STRIP-MCNC: NEGATIVE MG/DL
LEUKOCYTE ESTERASE UR QL STRIP: NEGATIVE
MAGNESIUM SERPL-MCNC: 1.8 MG/DL (ref 1.6–2.3)
MCH RBC QN AUTO: 33.8 PG (ref 26.8–34.3)
MCHC RBC AUTO-ENTMCNC: 34 G/DL (ref 31.4–37.4)
MCV RBC AUTO: 99 FL (ref 82–98)
NITRITE UR QL STRIP: NEGATIVE
P AXIS: 9 DEGREES
PH UR STRIP.AUTO: 7 [PH]
PLATELET # BLD AUTO: 30 THOUSANDS/UL (ref 149–390)
PMV BLD AUTO: 12.6 FL (ref 8.9–12.7)
POTASSIUM SERPL-SCNC: 3.6 MMOL/L (ref 3.6–5)
PR INTERVAL: 154 MS
PROT SERPL-MCNC: 7.5 G/DL (ref 5.9–8.4)
PROT UR STRIP-MCNC: NEGATIVE MG/DL
QRS AXIS: 36 DEGREES
QRSD INTERVAL: 96 MS
QT INTERVAL: 454 MS
QTC INTERVAL: 500 MS
RBC # BLD AUTO: 3.58 MILLION/UL (ref 3.88–5.62)
SODIUM SERPL-SCNC: 135 MMOL/L (ref 137–147)
SP GR UR STRIP.AUTO: 1.01 (ref 1–1.04)
T WAVE AXIS: 24 DEGREES
UROBILINOGEN UA: 4 MG/DL
VENTRICULAR RATE: 73 BPM
WBC # BLD AUTO: 2.23 THOUSAND/UL (ref 4.31–10.16)

## 2022-04-19 PROCEDURE — NC001 PR NO CHARGE: Performed by: EMERGENCY MEDICINE

## 2022-04-19 PROCEDURE — 80053 COMPREHEN METABOLIC PANEL: CPT

## 2022-04-19 PROCEDURE — G1004 CDSM NDSC: HCPCS

## 2022-04-19 PROCEDURE — 85027 COMPLETE CBC AUTOMATED: CPT

## 2022-04-19 PROCEDURE — 70491 CT SOFT TISSUE NECK W/DYE: CPT

## 2022-04-19 PROCEDURE — 82140 ASSAY OF AMMONIA: CPT

## 2022-04-19 PROCEDURE — 83735 ASSAY OF MAGNESIUM: CPT

## 2022-04-19 PROCEDURE — 93010 ELECTROCARDIOGRAM REPORT: CPT | Performed by: INTERNAL MEDICINE

## 2022-04-19 PROCEDURE — 93005 ELECTROCARDIOGRAM TRACING: CPT

## 2022-04-19 PROCEDURE — 74177 CT ABD & PELVIS W/CONTRAST: CPT

## 2022-04-19 PROCEDURE — 99233 SBSQ HOSP IP/OBS HIGH 50: CPT | Performed by: EMERGENCY MEDICINE

## 2022-04-19 RX ORDER — PHENOBARBITAL SODIUM 65 MG/ML
130 INJECTION INTRAMUSCULAR ONCE
Status: COMPLETED | OUTPATIENT
Start: 2022-04-19 | End: 2022-04-19

## 2022-04-19 RX ORDER — PHENOBARBITAL SODIUM 130 MG/ML
130 INJECTION INTRAMUSCULAR ONCE
Status: COMPLETED | OUTPATIENT
Start: 2022-04-19 | End: 2022-04-19

## 2022-04-19 RX ADMIN — PHENOBARBITAL SODIUM 130 MG: 130 INJECTION INTRAMUSCULAR; INTRAVENOUS at 20:15

## 2022-04-19 RX ADMIN — IOHEXOL 85 ML: 350 INJECTION, SOLUTION INTRAVENOUS at 13:42

## 2022-04-19 RX ADMIN — SODIUM CHLORIDE 125 ML/HR: 0.9 INJECTION, SOLUTION INTRAVENOUS at 17:41

## 2022-04-19 RX ADMIN — SODIUM CHLORIDE 125 ML/HR: 0.9 INJECTION, SOLUTION INTRAVENOUS at 07:22

## 2022-04-19 RX ADMIN — LACTULOSE 20 G: 10 SOLUTION ORAL at 17:12

## 2022-04-19 RX ADMIN — PHENOBARBITAL SODIUM 130 MG: 65 INJECTION INTRAMUSCULAR; INTRAVENOUS at 03:28

## 2022-04-19 RX ADMIN — MULTIPLE VITAMINS W/ MINERALS TAB 1 TABLET: TAB ORAL at 08:08

## 2022-04-19 RX ADMIN — FOLIC ACID: 5 INJECTION, SOLUTION INTRAMUSCULAR; INTRAVENOUS; SUBCUTANEOUS at 08:08

## 2022-04-19 RX ADMIN — PHENOBARBITAL SODIUM 130 MG: 65 INJECTION INTRAMUSCULAR; INTRAVENOUS at 07:32

## 2022-04-19 RX ADMIN — LACTULOSE 20 G: 10 SOLUTION ORAL at 07:32

## 2022-04-19 RX ADMIN — PHENOBARBITAL SODIUM 130 MG: 65 INJECTION INTRAMUSCULAR; INTRAVENOUS at 08:35

## 2022-04-19 RX ADMIN — IOHEXOL 85 ML: 350 INJECTION, SOLUTION INTRAVENOUS at 00:39

## 2022-04-19 RX ADMIN — PHENOBARBITAL SODIUM 130 MG: 65 INJECTION INTRAMUSCULAR; INTRAVENOUS at 00:55

## 2022-04-19 RX ADMIN — PHENOBARBITAL SODIUM 130 MG: 130 INJECTION INTRAMUSCULAR; INTRAVENOUS at 16:05

## 2022-04-19 RX ADMIN — PHENOBARBITAL SODIUM 130 MG: 65 INJECTION INTRAMUSCULAR; INTRAVENOUS at 05:21

## 2022-04-19 NOTE — H&P
[facility]  H&P- Conchita Ardon 1965, 65 y o  male MRN: [id number]  Unit/Bed#: 5T DETOX 486-49 Encounter: [id number]  Primary Care Provider: Rocky Hart MD   Date and time admitted to hospital: 4/18/2022  7:34 PM  HISTORY & PHYSICAL [facility]  LEVEL 4 MEDICAL DETOX UNIT  Conchita Ardon 65 y o  male MRN: [id number]  Unit/Bed#: 5T DETOX 507-01 Encounter: [id number]      Reason for Admission/Principal Problem: Ethanol withdrawal, Ethanol use disorder  Admitting Provider: Rebecca Cardona PA-C  Attending Provider: Caesar Medellin MD   4/18/2022  7:34 PM        * Alcohol withdrawal syndrome with complication [facility])  Assessment & Plan  · Patient with a history of chronic alcohol use  · Current withdrawal signs/symptoms include anxiety, tremors, and intermittent visual hallucinations  · Serum EtOH 15 in the ED (4/18/22, 1615)  · Received Ativan 4 mg in the ED  · Initiate SEWS protocol for medical management of alcohol withdrawal  · Received 650 mg phenobarbital for initial SEWS score of 11 (in split doses due to a technical error)  · Continuous pulse ox and telemetry monitoring    Alcohol use disorder, severe, dependence ([facility] )  Assessment & Plan  · Patient with a history of chronic alcohol use  · Previous admission to [address] [place] [address] unit from 01/11-01/15/22  · Pt reports he relapsed 2-3 weeks after discharge due to losing his job and his house  · History of withdrawal seizures and DTs  · Initiate IV thiamine/folic acid supplementation, MVI  · Consult case management for assistance with rehab resources  · Pt reports an interest in pursuing inpatient rehabilitation after discharge    Alcoholic cirrhosis of liver ([facility] )  Assessment & Plan  · Pt has alcoholic cirrhosis of the liver, likely 2/2 chronic alcohol use  · During last detox admission, RUQ U/S 1/13/22 revealed - cirrhosis, trace ascites, no focal liver mass  · Pt was discharged from the detox unit on lactulose 20 g BID,   · Pt reports he has not had OP GI f/u and has not been taking his lactulose x3 weeks  · +Abdominal varices with RUQ and epigastric TTP on exam  · Pt reports these varices are chronic but have been growing larger and are now causing 6/10 aching abdominal pain that has been constant for a few months  · Exam also significant for scleral icterus  · Significant labs:  · CMP 4/18/22: , ALT 51, , Total bili 4 39, Direct bili 2 43  · PT 17 0, INR 1 42  · Platelets 28  · CT A/P 4/19/22: "Cirrhosis with signs of portal hypertension as evidenced by recanalized umbilical vein "  · Restart lactulose  · Encourage alcohol cessation  · Continue monitoring CMP  · Check ammonia in the AM  · Recommend OP GI follow-up upon discharge    Hyperbilirubinemia  Assessment & Plan  · Total bilirubin 4 39, direct bilirubin 2 43 on admission  · In the setting of alcoholic cirrhosis  · +scleral icterus on exam  · Treatment for alcoholic cirrhosis as above  · Continue monitoring    Hypokalemia  Assessment & Plan  · Mild hypokalemia with K 3 4 on admission  · Mag 1 3   · Repleted with KCl 20 mEq IV in the ED  · Pt also received repletion with IV magnesium sulfate 4 g PTA  · Continue monitoring and replete electrolytes as needed      Hypomagnesemia  Assessment & Plan  · Magnesium 1 3 on admission  · Repleted with IV magnesium sulfate 4 g PTA  · Continue monitoring and replete electrolytes as needed    Pancytopenia (HCC)  Assessment & Plan  · CBC on admission revealed: Hgb 12 6, WBC 2 88, PTL 28  · Likely 2/2 chronic myelosuppression due to alcohol use and cirrhosis  · Initiate IV thiamine and folic acid  · Continue monitoring CBC    Prolonged Q-T interval on ECG  Assessment & Plan  · QTc 524 on EKG in the ED  · Likely 2/2 acute alcohol withdrawal  · Telemetry monitoring  · Repeat EKG in the AM  · Avoid QT prolonging agents    Marijuana use  Assessment & Plan  · Patient reports occasional marijuana use  · Last used 4-5 days ago  · UDS + for THC and cocaine  · Pt denies other substance use, including cocaine, heroin, and methamphetamine  · Possible contaminant of cocaine in marijuana  · Encourage cessation    Anterior cervical lymphadenopathy  Assessment & Plan  · Pt reports anterior cervical lymphadenopathy  · Non-tender, palpable anterior cervical LAD on exam  · In the setting of chronic alcohol use and h/o chewing tobacco use  · CT soft tissue neck ordered             VTE Prophylaxis: Pharmacologic VTE Prophylaxis contraindicated due to thrombocytopenia with PTL 28  / sequential compression device   Code Status: full code      Anticipated Length of Stay:  Patient will be admitted on an Inpatient basis with an anticipated length of stay of  At least 2  midnights  Justification for Hospital Stay: alcohol withdrawal, alcohol use disorder, alcoholic cirrhosis    For any questions or concerns, please Tiger Text the advanced practitioner in the role of [facility]-DETOX-AP On Call      This patient qualifies for Level IV medically managed intensive inpatient services under the criteria set by the American Society of Addiction Medicine, including dimensions 1-3  The patient is in withdrawal (or is intoxicated with high risk of withdrawal), with severe and unstable medical and/or psychiatric (dual diagnosis) problems, requiring requires 24-hour medical and nursing care and the full resources of a [address] patient to medical detox unit and continue supportive care and stabilization of acute ethanol withdrawal per medical toxicology/detox treatment pathway  Monitor ethanol withdrawal severity via the Severity of Ethanol Withdrawal Scale (SEWS) Q4 hours and then hourly if/when SEWS > 6  Treat withdrawal per pathway and reassess Q30-60 minutes             Mild SEWS Score 1-6  Administer medications* (IV or PO; PO preferred):   If initial SEWS score: diazepam 10mg PO/IV x 1 AND phenobarbital 65 mg PO/IV x 1   If repeat SEWS score 1-6: phenobarbital 65 mg PO/IV q1 hour x 5 doses maximum   Reassessment:    SEWS q1 hour after each dose until SEWS 0 x 2 hours   VS q1 hours (until SEWS 0, then q4 hours)   Notify provider for bedside evaluation if 5-dose maximum is reached, RASS of -3 to -5, or SEWS score escalates to moderate or severe  Moderate SEWS Score 7-12  Administer medications* (IV):   If initial SEWS score: diazepam 10mg IV x 1 AND phenobarbital 260 mg IV x 1   If repeat SEWS score 7-12 or score escalated from mild: phenobarbital 130 mg IV q30 minutes x 5 doses maximum   Reassessment:   SEWS q30 minutes after each dose until SEWS < 7 (then hourly until SEWS 0 x 2 hours)   VS q30 minutes until SEWS < 7 (then hourly until SEWS 0, then q4 hours)   Notify provider for bedside evaluation if 5-dose maximum is reached, RASS of -3 to -5, or SEWS score escalates to severe  Severe SEWS Score ? 13  Administer medications* (IV):   If initial SEWS score: Diazepam 10 mg IV x 1 AND phenobarbital 650 mg IV piggyback x 1 over 15-30 minutes   If repeat SEWS score ? 13 or score escalated from mild or moderate: phenobarbital 130 mg IV q30 minutes x 5 doses maximum   Reassessment:   SEWS q30 minutes after each dose until SEWS < 7 (then hourly until SEWS 0 x 2 hours)    VS q30 minutes until SEWS < 7 (then hourly until SEWS 0, then q4 hours)   Notify provider for bedside evaluation if 5-dose maximum is reached or RASS of -3 to -5   *Hold medications and notify provider if CNS depression, respirations < 10/min, or RASS of -3 to -5           Medications to be administered adjunctively if more than 2 grams of phenobarbital is needed for stabilization of withdrawal; require attending approval     Dexmedetomidine infusion 0 1-1mcg/kg/hr IV infusion, titratable to reduced agitation (Goal: RASS -2)   Ketamine   o Acute agitated delirium: 1-2 mg/kg IV or 4-5 mg/kg IM  o Refractory withdrawal: 0 1-1mg/kg/hr IV infusion, titratable to reduced agitation (Goal: RASS -2)    Further evaluation, screening and treatment:  Evaluate complete metabolic panel, transaminases, INR, and lipase  Assess hepatic ultrasound for any sign of alcoholic liver disease or cirrhosis, and ultimately refer for further hepatic evaluation and care as/if indicated  Additional medications for ethanol associated malnutrition: Thiamine 100 mg IV daily, increase to 500 mg TID for signs/symptoms of Wernicke's Encephalopathy or Wernicke Korsakoff Syndrome   Folic acid 1 mg IV daily   Multivitamin PO daily      Will offer first monthly injection of Naltrexone 380 mg IM, once patient is stabilized, as it has been shown to assist in decreasing cravings for ethanol  Evaluate and treat for coexisting substance use, such as opioids and nicotine  Discuss risk factors for infectious disease, such as history of intravenous drug abuse, and offer hepatitis and HIV screening if indicated  Case management consultation to assist with coordination of subsequent treatment after discharge  Hx and PE limited by: none    HPI: Maureen Porter is a 65y o  year old male with a history of AUD, alcoholic cirrhosis, and chronic thrombocytopenia who presents with alcohol withdrawal requesting detoxification  Patient was previously admitted to the [facility] medical detox unit from 1/11-1/15/22 and reports that he maintained his sobriety for approximately 2-3 weeks after discharged but relapsed due to ongoing social stressors, namely loss of his job and house  He initially presented to [facility] ED with significant tremors and concern for seizures, as he has a history of alcohol withdrawal seizures  He endorses current withdrawal sx of anxiety, tremors, and intermittent visual hallucinations of spots in his vision earlier today   Pt denies current visual hallucinations when seen but states he was having hallucinations when he presented to the ED  He also reports intermittent palpitations which are associated with worsening of his tremors  He denies any CP, SOB, N/V, diaphoresis  Pt has a history of alcoholic cirrhosis with periumbilical varices, and reports that his varices have been increasing in size and producing constant 6/10 abdominal pain for a few months  His pain is worse with activity and occasionally better with lying flat  He states that the pain has been constant since onset  He notes associated abdominal distension, anorexia, early satiety, and urinary symptoms from the varices "pressing on his bladder" including increased frequency, nocturia, urgency, and urinary hesitancy  He denies dysuria, hematuria, or fevers  He does not follow with OP GI and notes that he has not been taking his lactulose that he was prescribed at d/c from the detox unit for about 3 weeks  He also notes current dizziness with standing which is not present at rest and BLE weakness, feeling like his legs are going to give out  He reports 6 months of intermittent parasthesias in his R foot with walking, and a sensation that "feels like there is a rolled up sock under the ball of my right foot "    Preferred alcoholic beverage(s): vodka, beer  Quantity and frequency of alcohol intake: 3 glasses of vodka (about 8 oz) and 2 standard beers daily  Use of any ethanol substitutes (toxic alcohols): no  Date/Time of last alcohol intake: yesterday AM  Current signs and symptoms of ethanol withdrawal: anxiety, tremor and visual hallucinations    SEWS Total Score: 7 (4/19/2022  3:23 AM)          Ethanol Withdrawal History  Previous ethanol withdrawal? yes  Prior inpatient treatment for ethanol withdrawal? yes  Prior outpatient treatment for ethanol withdrawal? yes  History of seizures with prior ethanol withdrawal? yes  Prior treatment with naltrexone (Vivitrol)?  no  Current treatment with naltrexone (Vivitrol)? no  Other current treatment for ethanol use disorder? no  Co-existing substance use? Yes; pt reports occasional marijuana use, last used 4-5 days ago  Pt denies use of other substances including cocaine, heroin, and methamphetamine  Review of PDMP: no     Social History     Substance and Sexual Activity   Alcohol Use Yes    Comment: every other day has beery and mixed drinks  last drink 12 hours ago     Social History     Substance and Sexual Activity   Drug Use Not Currently    Types: Marijuana     Social History     Tobacco Use   Smoking Status Never Smoker   Smokeless Tobacco Current User       Review of Systems   Constitutional: Positive for appetite change (anorexia, with early satiety) and chills (intermittent, for a few weeks)  Negative for diaphoresis and fever  Positive for hot flashes - intermittent, for a few weeks   HENT: Positive for postnasal drip  Negative for congestion, rhinorrhea and sore throat  Eyes: Negative for visual disturbance  Respiratory: Negative for cough and shortness of breath  Cardiovascular: Positive for palpitations (intermittent, associated with tremors)  Negative for chest pain  Gastrointestinal: Positive for abdominal distention and abdominal pain (over periumbilical abdominal varices)  Negative for blood in stool, diarrhea, nausea and vomiting  Genitourinary: Positive for difficulty urinating (hesitancy), frequency and urgency  Negative for dysuria and hematuria  Positive for nocturia   Musculoskeletal: Negative for arthralgias and myalgias  Neurological: Positive for dizziness (upon standing), tremors, seizures, weakness (BLE) and numbness (intermittent with walking)  Negative for headaches  Hematological: Positive for adenopathy (anterior cervical)  Psychiatric/Behavioral: Positive for hallucinations (visual, pt dennies at present but reports seeing spots in his vision earlier today)  Negative for suicidal ideas  The patient is nervous/anxious          Historical Information   Past Medical History:   Diagnosis Date    ETOH abuse      Past Surgical History:   Procedure Laterality Date    GALLBLADDER SURGERY       Family History   Problem Relation Age of Onset    Heart disease Mother     Heart disease Father      Social History   Marital Status: Single   Occupation: unemployed  Patient Pre-hospital Living Situation: lives with his sister  Patient Pre-hospital Level of Mobility: independent  Patient Pre-hospital Diet Restrictions: none    No Known Allergies    Prior to Admission medications    Medication Sig Start Date End Date Taking?  Authorizing Provider   folic acid (FOLVITE) 1 mg tablet Take 1 tablet (1 mg total) by mouth daily 1/15/22  Yes Aron Garcia MD   lactulose 20 g/30 mL Take 30 mL (20 g total) by mouth 2 (two) times a day 1/15/22 4/18/22 Yes Aron Garcia MD   Multiple Vitamin (multivitamin) tablet Take 1 tablet by mouth daily 12/8/20  Yes Daphne Willis DO   thiamine 100 MG tablet Take 1 tablet (100 mg total) by mouth daily 1/15/22  Yes Aron Garcia MD   acetaminophen (TYLENOL) 325 mg tablet Take 2 tablets (650 mg total) by mouth every 6 (six) hours as needed for mild pain  Patient not taking: Reported on 4/18/2022  3/29/21   Padmini Lara MD   calcium carbonate (TUMS) 500 mg chewable tablet Chew 2 tablets (1,000 mg total) daily as needed for indigestion or heartburn  Patient not taking: Reported on 4/18/2022  3/29/21   Padmini Lara MD       Current Facility-Administered Medications   Medication Dose Route Frequency    acetaminophen (TYLENOL) tablet 650 mg  650 mg Oral O1G PRN    folic acid 1 mg, thiamine (VITAMIN B1) 100 mg in sodium chloride 0 9 % 100 mL IV piggyback   Intravenous Daily    lactulose oral solution 20 g  20 g Oral BID    multivitamin-minerals (CENTRUM) tablet 1 tablet  1 tablet Oral Daily    sodium chloride 0 9 % infusion  125 mL/hr Intravenous Continuous       Continuous Infusions:sodium chloride, 125 mL/hr, Last Rate: 125 mL/hr (04/18/22 2020)             Objective       Intake/Output Summary (Last 24 hours) at 4/19/2022 0510  Last data filed at 4/18/2022 2020  Gross per 24 hour   Intake 950 ml   Output --   Net 950 ml       Invasive Devices:   Peripheral IV 04/18/22 Right Forearm (Active)   Site Assessment WDL 04/18/22 1605   Dressing Type Transparent 04/18/22 1605   Line Status Blood return noted; Flushed;Saline locked 04/18/22 1605   Dressing Status Clean;Dry; Intact 04/18/22 1605       Vitals   Vitals:    04/18/22 1949 04/18/22 2127 04/19/22 0046 04/19/22 0322   BP: 137/82 135/86 144/79 137/85   TempSrc: Temporal Temporal Temporal Temporal   Pulse: 74 72 71 88   Resp: 20 18 18 18   Patient Position - Orthostatic VS: Lying Lying Lying Lying   Temp: 99 1 °F (37 3 °C) 99 °F (37 2 °C) 98 4 °F (36 9 °C) 97 9 °F (36 6 °C)       Physical Exam  Vitals reviewed  Constitutional:       General: He is not in acute distress  Appearance: He is not ill-appearing or diaphoretic  HENT:      Head: Normocephalic and atraumatic  Nose: Nose normal       Mouth/Throat:      Mouth: Mucous membranes are moist       Comments: Tongue fasciculations present  Eyes:      General: Scleral icterus present  Extraocular Movements: Extraocular movements intact  Right eye: No nystagmus  Left eye: No nystagmus  Pupils: Pupils are equal, round, and reactive to light  Neck:      Comments: Non-tender, palpable anterior cervical LAD  Cardiovascular:      Rate and Rhythm: Normal rate and regular rhythm  Pulses:           Dorsalis pedis pulses are 1+ on the right side and 1+ on the left side  Posterior tibial pulses are 2+ on the right side and 2+ on the left side  Heart sounds: Normal heart sounds  No murmur heard  No friction rub  No gallop  Pulmonary:      Effort: Pulmonary effort is normal  No respiratory distress  Breath sounds: Normal breath sounds  No wheezing, rhonchi or rales     Abdominal:      General: Bowel sounds are normal  There is distension  Palpations: Abdomen is soft  Tenderness: There is abdominal tenderness in the right upper quadrant and epigastric area  Comments: Periumbilical varices of caput medusae 2/2 cirrhosis   Musculoskeletal:         General: No swelling  Cervical back: Neck supple  Right lower leg: No edema  Left lower leg: No edema  Lymphadenopathy:      Cervical: Cervical adenopathy present  Skin:     General: Skin is warm and dry  Capillary Refill: Capillary refill takes less than 2 seconds  Neurological:      General: No focal deficit present  Mental Status: He is alert and oriented to person, place, and time  Cranial Nerves: No facial asymmetry  Sensory: No sensory deficit (BLE)  Motor: Tremor (BUE intention tremor, also present at rest) present  No weakness  Coordination: Finger-Nose-Finger Test normal    Psychiatric:         Attention and Perception: Attention normal  He does not perceive auditory or visual hallucinations  Mood and Affect: Mood is anxious  Behavior: Behavior is cooperative  Thought Content: Thought content does not include homicidal or suicidal ideation  Data:    EKG, Pathology, and Other Studies: I have personally reviewed pertinent reports      EKG: NSR at 73 bpm with prolonged QTc at 524 - as reported by Annelise Parker MD at [facility]    Lab Results:  CBC ETOH     Lab Results   Component Value Date    WBC 2 88 (L) 04/18/2022    RBC 3 75 (L) 04/18/2022    HGB 12 6 04/18/2022    HCT 37 3 04/18/2022     (H) 04/18/2022    MCH 33 6 04/18/2022    MCHC 33 8 04/18/2022    RDW 16 3 (H) 04/18/2022    PLT 28 (LL) 04/18/2022    MPV 11 4 04/18/2022      Lab Results   Component Value Date    LACTICACID 1 4 12/05/2020      CMP UA         Component Value Date/Time    K 3 4 (L) 04/18/2022 1615     04/18/2022 1615    CO2 24 04/18/2022 1615    BUN 7 04/18/2022 1615    CREATININE 0 60 04/18/2022 1615         Component Value Date/Time    CALCIUM 8 4 04/18/2022 1615    ALKPHOS 116 04/18/2022 1615    ALKPHOS 114 04/18/2022 1615     (H) 04/18/2022 1615     (H) 04/18/2022 1615    ALT 51 04/18/2022 1615    ALT 51 04/18/2022 1615      Lab Results   Component Value Date    CLARITYU Clear 01/11/2022    COLORU Germaine 01/11/2022    SPECGRAV 1 010 01/11/2022    PHUR 7 0 01/11/2022    GLUCOSEU Negative 01/11/2022    KETONESU 15 (1+) (A) 01/11/2022    BLOODU Negative 01/11/2022    PROTEIN UA Negative 01/11/2022    NITRITE Negative 01/11/2022    BILIRUBINUR Interference- unable to analyze (A) 01/11/2022    UROBILINOGEN 2 0 (A) 01/11/2022    LEUKOCYTESUR Negative 01/11/2022        Liver Function Test: ASA     Lab Results   Component Value Date    TBILI 4 39 (H) 04/18/2022    TBILI 4 26 (H) 04/18/2022    BILIDIR 2 43 (H) 04/18/2022    ALKPHOS 116 04/18/2022    ALKPHOS 114 04/18/2022     (H) 04/18/2022     (H) 04/18/2022    ALT 51 04/18/2022    ALT 51 04/18/2022    TP 7 5 04/18/2022    TP 7 5 04/18/2022    ALB 3 0 (L) 04/18/2022    ALB 2 9 (L) 04/18/2022      Lab Results   Component Value Date    SALICYLATE <3 (L) 57/75/2931      Troponin APAP     Lab Results   Component Value Date    TROPONINI <0 02 12/05/2020      Lab Results   Component Value Date    ACTMNPHEN <2 (L) 03/23/2021      VBG HCG     No results found for: PHVEN, IXP7VHY, PO2VEN, BID2TDT, BEVEN, Q4WCWQBRI, T8RTMBR   No results found for: HCGQUANT   ABG Urine Drug Screen     No results found for: PHART, ZXV6POC, PO2ART, JVC2RUD, BEART, X3BFEKOMP, O2HGB, SOURC, SAVANNA, VTAC, ACRATE, INSPIREDAIR, PEEP   Lab Results   Component Value Date    AMPMETHUR Negative 04/18/2022    BARBTUR Negative 04/18/2022    BDZUR Negative 04/18/2022    COCAINEUR Positive (A) 04/18/2022    METHADONEUR Negative 04/18/2022    OPIATEUR Negative 04/18/2022    PCPUR Negative 04/18/2022    THCUR Positive (A) 04/18/2022    OXYCODONEUR Negative 04/18/2022 Lactate INR     Lab Results   Component Value Date    LACTICACID 1 4 12/05/2020      Lab Results   Component Value Date    INR 1 42 (H) 04/18/2022      PTT Protime     Lab Results   Component Value Date/Time    PTT 37 01/11/2022 11:44 AM        Lab Results   Component Value Date/Time    PROTIME 17 0 (H) 04/18/2022 04:15 PM              Imaging Studies: I have personally reviewed pertinent reports  Counseling / Coordination of Care  Total floor / unit time spent today 50 minutes  Greater than 50% of total time was spent with the patient and / or family counseling and / or coordination of care  Minutes of critical care time 28  -Critical care time was exclusive of separately billable procedures and teaching time    -Critical care was necessary to treat or prevent imminent or life-threatening deterioration of the following condition: CNS failure/compromise, toxidrome (ethanol withdrawal),  toxidrome, withdrawal and hepatic failure  -Critical care time was spent personally by me on the following activities as well as the above as per the course and rest of chart: obtaining history from patient/surrogate, development of a treatment plan, discussions with referring provider(s), evaluation of patient's response to the treatment, examination of the patient, performing treatments and interventions, re-evaluation of the patient's condition, review of old charts, ordering/interpreting laboratory studies, ordering/interpreting of radiographic studies  ** Please Note: This note has been constructed using a voice recognition system   **

## 2022-04-19 NOTE — PLAN OF CARE
Problem: SUBSTANCE USE/ABUSE  Goal: By discharge, will develop insight into their chemical dependency and sustain motivation to continue in recovery  Description: INTERVENTIONS:  - Attends all daily group sessions and scheduled AA groups  - Actively practices coping skills through participation in the therapeutic community and adherence to program rules  - Reviews and completes assignments from individual treatment plan  - Assist patient development of understanding of their personal cycle of addiction and relapse triggers  Outcome: Progressing  Goal: By discharge, patient will have ongoing treatment plan addressing chemical dependency  Description: INTERVENTIONS:  - Assist patient with resources and/or appointments for ongoing recovery based living  Outcome: Progressing     Problem: Potential for Falls  Goal: Patient will remain free of falls  Description: INTERVENTIONS:  - Educate patient/family on patient safety including physical limitations  - Instruct patient to call for assistance with activity   - Consult OT/PT to assist with strengthening/mobility   - Keep Call bell within reach  - Keep bed low and locked with side rails adjusted as appropriate  - Keep care items and personal belongings within reach  - Initiate and maintain comfort rounds  - Make Fall Risk Sign visible to staff  - Offer Toileting every 2 Hours, in advance of need  - Apply yellow socks and bracelet for high fall risk patients  - Consider moving patient to room near nurses station  Outcome: Progressing

## 2022-04-19 NOTE — ASSESSMENT & PLAN NOTE
· Patient with a history of chronic alcohol use  · Previous admission to [address] [place] [address] unit from 01/11-01/15/22  · Pt reports he relapsed 2-3 weeks after discharge due to losing his job and his house  · History of withdrawal seizures and DTs  · Initiate IV thiamine/folic acid supplementation, MVI  · Consult case management for assistance with rehab resources  · Pt reports an interest in pursuing inpatient rehabilitation after discharge

## 2022-04-19 NOTE — UTILIZATION REVIEW
Initial Clinical Review    Pt initially presented to [place] [address] ED  Pt was transferred by EMS to [address] [place] [address] for its Level IV medically managed intensive inpatient detox unit, not available at [facility]  Admission: Date/Time/Statement:   Admission Orders (From admission, onward)     Ordered        04/18/22 1951  Inpatient Admission  Once                      Orders Placed This Encounter   Procedures    Inpatient Admission     Standing Status:   Standing     Number of Occurrences:   1     Order Specific Question:   Level of Care     Answer:   Med Surg [16]     Order Specific Question:   Estimated length of stay     Answer:   More than 2 Midnights     Order Specific Question:   Certification     Answer:   I certify that inpatient services are medically necessary for this patient for a duration of greater than two midnights  See H&P and MD Progress Notes for additional information about the patient's course of treatment  Initial Presentation: 65 y o  male who initially presented to [place] [address], was then transferred by EMS to [address] [place] [address] as a direct admission to medical detox  Inpatient admission for evaluation and treatment of alcohol withdrawal syndrome  PMHx: AUD, alcoholic cirrhosis, chronic thrombocytopenia  Presented w/ request for detox from alcohol  Serum ETOH: 15  Received 4 mg IV Ativan in ED  Reports 8 oz vodka and 2 beers daily, last drink on 04/17 morning  Reports 2-3 weeks of sobriety w/ relapse s/t social stressors  Has prior inpatient & outpatient treatment for withdrawal  Reports hx of withdrawal seizures  On exam, anxiety, tremors, tongue fasciculations, scleral icterus, abdominal distention, RUQ and epigastric tenderness, periumbilical varices, cervical adenopathy  Endorses visual hallucinations  UDS positive THC and cocaine, pt denies substance use other than marijuana intermittently   SEWS 11  Plan: SEWS monitoring w/ phenobarbital management, thiamine/folic acid supplement, IVF, telemetry, continuous pulse ox, resume home meds, trend labs, replete electrolytes as needed  Date: 04/19/22       Day 2: Pt reports feeling improved, notes good appetite  On exam, abdominal tenderness in periumbilical area, palpable periumbilical varices, cervical adenopathy, sensory deficit in b/l LE, depressed mood  SEWS 0  Plan: continue SEWS monitoring w/ phenobarbital management, thiamine/folic acid supplement, IVF, telemetry, continuous pulse ox, continue home meds, trend labs, replete electrolytes as needed  ED Triage Vitals [04/18/22 1949]   Temperature Pulse Respirations Blood Pressure SpO2   99 1 °F (37 3 °C) 74 20 137/82 96 %      Temp Source Heart Rate Source Patient Position - Orthostatic VS BP Location FiO2 (%)   Temporal Monitor Lying Left arm --      Pain Score       6          Wt Readings from Last 1 Encounters:   04/18/22 87 7 kg (193 lb 6 4 oz)     Vital Signs:   Date/Time Temp Pulse Resp BP MAP (mmHg) SpO2 O2 Device   04/19/22 1100 98 9 °F (37 2 °C) 75 18 132/84 100 97 % None (Room air)   04/19/22 0700 98 2 °F (36 8 °C) 72 18 123/77 -- 96 % None (Room air)   04/19/22 0513 98 8 °F (37 1 °C) 69 18 125/77 -- 93 % None (Room air)   04/19/22 0322 97 9 °F (36 6 °C) 88 18 137/85 -- 98 % None (Room air)   04/19/22 0046 98 4 °F (36 9 °C) 71 18 144/79 -- 97 % None (Room air)   04/18/22 2127 99 °F (37 2 °C) 72 18 135/86 -- 96 % None (Room air)   04/18/22 1949 99 1 °F (37 3 °C) 74 20 137/82 -- 96 % None (Room air)       Severity of Ethanol Withdrawal Scale (SEWS):   04/19/22 0927 04/19/22 0820 04/19/22 0729 04/19/22 0626 04/19/22 0514 04/19/22 0323 04/19/22 0047 04/18/22 2006   ANXIETY: Do you feel that something bad is about to happen to you right now? 0  -BH 3  -BH 3  -BH 0  -TO 3  -TO 3 3 3   NAUSEA and DRY HEAVES or VOMITING? 0  -BH 0  -BH 0  -BH 0  -TO 0  -TO 0 0 0   SWEATING: (includes moist palms, sweating now)?  Score 0 or 2 0  -BH 0  -BH 0  -BH 0  -TO 2  -TO 2 2 0   TREMOR: with arms extended eyes closed? 0  -BH 2  -BH 2  -BH 0  -TO 2  -TO 2 2 2   AGITATION: Fidgety, restless, pacing? 0  -BH 0  -BH 3  -BH 0  -TO 0  -TO 0 0 3   DISORIENTATION: 0  -BH 0  -BH 0  -BH 0  -TO 0  -TO 0 0 0   HALLUCINATIONS: 0  -BH 3  -BH 3  -BH 0  -TO 0  -TO 0 0 3   VITAL SIGNS: ANY (Pulse >607, Diastolic BP >18, Temp >13 7) 0  -BH 0  -BH 0  -BH 0  -TO 0  -TO 0 0 0   SEWS Total Score 0  -BH 8  -BH 11  -BH 0  -TO 7  -TO 7 7 11   Barrett Agitation Sedation Scale (RASS) -2  -BH 0  -BH +1  -BH -1  -TO 0  -TO 0 0 +1           Pertinent Labs/Diagnostic Test Results:   CT abdomen pelvis w contrast   Final Result by Cameron Worrell MD (04/19 3616)      No evidence of acute intra-abdominal or pelvic pathology      Cirrhosis with signs of portal hypertension as evidenced by recanalized umbilical vein             Results from last 7 days   Lab Units 04/18/22  1635   SARS-COV-2  Negative     Results from last 7 days   Lab Units 04/19/22  0528 04/18/22  1615   WBC Thousand/uL 2 23* 2 88*   HEMOGLOBIN g/dL 12 1 12 6   HEMATOCRIT % 35 6* 37 3   PLATELETS Thousands/uL 30* 28*   NEUTROS ABS Thousands/µL  --  2 03         Results from last 7 days   Lab Units 04/19/22  0529 04/18/22  1615   SODIUM mmol/L 135* 136   POTASSIUM mmol/L 3 6 3 4*   CHLORIDE mmol/L 104 100   CO2 mmol/L 27 24   ANION GAP mmol/L 4* 12   BUN mg/dL 10 7   CREATININE mg/dL 0 55* 0 60   EGFR ml/min/1 73sq m 116 112   CALCIUM mg/dL 7 8* 8 4   MAGNESIUM mg/dL 1 8 1 3*     Results from last 7 days   Lab Units 04/19/22  0529 04/18/22  1615   AST U/L 115* 134*  127*   ALT U/L 42 51  51   ALK PHOS U/L 109 114  116   TOTAL PROTEIN g/dL 7 5 7 5  7 5   ALBUMIN g/dL 3 2 2 9*  3 0*   TOTAL BILIRUBIN mg/dL 4 90* 4 26*  4 39*   BILIRUBIN DIRECT mg/dL  --  2 43*   AMMONIA umol/L 57*  --          Results from last 7 days   Lab Units 04/19/22  0529 04/18/22  1615   GLUCOSE RANDOM mg/dL 111* 124     Results from last 7 days   Lab Units 04/18/22  1615   PROTIME seconds 17 0*   INR  1 42*     Results from last 7 days   Lab Units 04/18/22  1615   LIPASE u/L 302     Results from last 7 days   Lab Units 04/19/22  0727   CLARITY UA  Clear   COLOR UA  Germaine*   SPEC GRAV UA  1 015   PH UA  7 0   GLUCOSE UA mg/dl Negative   KETONES UA mg/dl Negative   BLOOD UA  Negative   PROTEIN UA mg/dl Negative   NITRITE UA  Negative   BILIRUBIN UA  Negative   UROBILINOGEN UA mg/dL 4 0*   LEUKOCYTES UA  Negative     Results from last 7 days   Lab Units 04/18/22  1635   INFLUENZA A PCR  Negative   INFLUENZA B PCR  Negative   RSV PCR  Negative         Results from last 7 days   Lab Units 04/18/22  1855   AMPH/METH  Negative   BARBITURATE UR  Negative   BENZODIAZEPINE UR  Negative   COCAINE UR  Positive*   METHADONE URINE  Negative   OPIATE UR  Negative   PCP UR  Negative   THC UR  Positive*     Results from last 7 days   Lab Units 04/18/22  1615   ETHANOL LVL mg/dL 15*         Past Medical History:   Diagnosis Date    ETOH abuse      Present on Admission:   [facility]   Prolonged Q-T interval on ECG   Alcohol withdrawal syndrome with complication (HCC)   Alcoholic cirrhosis of liver (HCC)   Hypomagnesemia   Hyperbilirubinemia   Pancytopenia (HCC)   Hypokalemia   Marijuana use   Anterior cervical lymphadenopathy      Admitting Diagnosis: Alcohol withdrawal ([facility] ) [F10 239]  Age/Sex: 65 y o  male  Admission Orders:  Regular Diet  SCDs  Fall & Seizure Precautions  SEWS monitoring  Telemetry & Continuous Pulse Ox      Scheduled Medications:  folic acid 1 mg, thiamine 100 mg in 0 9% sodium chloride 100 mL IVPB, , Intravenous, Daily  lactulose, 20 g, Oral, BID  multivitamin-minerals, 1 tablet, Oral, Daily    Continuous IV Infusions:  sodium chloride, 125 mL/hr, Intravenous, Continuous    PRN Meds:  acetaminophen, 650 mg, Oral, Q6H PRN  phenobarbital, 520 mg, Intravenous; 4/18 x1  phenobarbital, 130 mg, Intravenous; 4/18 x1, 4/19 x5        IP CONSULT TO CASE MANAGEMENT    Network Utilization Review Department  ATTENTION: Please call with any questions or concerns to [phone] and carefully listen to the prompts so that you are directed to the right person  All voicemails are confidential   Natasha Raquel all requests for admission clinical reviews, approved or denied determinations and any other requests to dedicated fax number below belonging to the campus where the patient is receiving treatment   List of dedicated fax numbers for the Facilities:  [address] DENIALS (Administrative/Medical Necessity) [phone]   [address] (Maternity/NICU/Pediatrics) [phone]   [address] [place] [address]  [id number] [place] [address] [address] [facility] [place] [address] [phone]

## 2022-04-19 NOTE — ASSESSMENT & PLAN NOTE
· Patient reports occasional marijuana use  · Last used 4-5 days ago  · UDS + for THC and cocaine  · Pt denies other substance use, including cocaine, heroin, and methamphetamine  · Possible contaminant of cocaine in marijuana  · Encourage cessation

## 2022-04-19 NOTE — ASSESSMENT & PLAN NOTE
· Pt reports anterior cervical lymphadenopathy  · Non-tender, palpable anterior cervical LAD on exam  · In the setting of chronic alcohol use and h/o chewing tobacco use  · CT soft tissue neck ordered

## 2022-04-19 NOTE — ASSESSMENT & PLAN NOTE
· QTc 524 on EKG in the ED  · Likely 2/2 acute alcohol withdrawal  · Telemetry monitoring  · Repeat EKG in the AM  · Avoid QT prolonging agents

## 2022-04-19 NOTE — ASSESSMENT & PLAN NOTE
· Patient with a history of chronic alcohol use  · Previous admission to [address] [place] [address] unit from 01/11-01/15/22  · Pt reports he relapsed 2-3 weeks after discharge due to losing his job and his house  · History of withdrawal seizures and DTs  · Initiate IV thiamine/folic acid supplementation, MVI  · Consult case management for assistance with rehab resources  · Pt reports an interest in pursuing inpatient rehabilitation after discharge

## 2022-04-19 NOTE — PROGRESS NOTES
04/19/22 1309   Referral Data   Referral Source Patient   Referral Reason Drug/Alcohol [facility]   Readmission Root Cause   30 Day Readmission No   Patient Information   Mental Status Alert   Primary Caregiver Self   Support System Immediate family; Extended family   Hoahaoism/Cultural Requests n/a   Legal Information   Legal Issues pt denies   Health Care Proxy Appointed No   Activities of Daily Living Prior to Admission   Functional Status Independent   Assistive Device No device needed   Living Arrangement Lives with someone   Ambulation Independent   Access to Firearms   Access to Firearms No  (pt denies)   Income [address] Unemployed   Means of Transportation   Means of Transport to App: Family transport     Pt is a 55year old single male who was admitted to the detox unit for alcohol withdrawal  Pt had self presented at the [facility] ED requesting detox due to concerns regarding possible seizures  Pt had previously been on the unit 1/2022  Pt's name, date of birth, home address, and telephone number were verified  Pt was informed of case management role and the purpose of the completion of intake with case management  Pt corrected his address in the database  Pt presented as cooperative during intake  Pt reports he relapsed 3 weeks after discharge from the detox and has been drinking about 3 months  Pt report he had been consuming about a pint of  1/2 vodka daily along with some beers daily  Pt reports last use 4/17/2022 of 4 beers and first use at age 16  Pt reports he smoke marijuana about once monthly with last use 4/12/2022 and first use at age 26  Pt reports occasional cocaine use with last use 4/17/2022 and first use at age 25  Pt reports his longest period of abstinence was three weeks  Pt reports previous inpatient treatment, about 1 year ago, previous outpatient, and previous 12 step meeting attendance   Pt reports current withdrawal of current tremors and ambulatory dysfunction  Pt reports historical withdrawal symptoms of sweats, vomiting, nausea, Dt's, hallucinations, and seizures  Pt reports a family history of substance use issues, father alcohol  AUDIT: no score  PAWSS: 8  UDS: +THC, +cocaine  Ethanol Lvl in ED: 15     Pt denied any mental health diagnosis or history  Pt denied SI or HI, denied current AH/VH  Pt denied any history of abuse or trauma  Pt denied access to firearms  Pt denied any family history of mental illness  Pt has current medical condition of Alcoholic cirrhosis of liver ([facility] )  Pt reports he currently does not have a PCP and the PCP listed on his insurance is too far from his home  Pt was provided with a referral for [facility] during his last stay but did not follow through  Pt has medical insurance of [employer] [address] ASIM [place] signed  Pt has preferred pharmacy as [address]  Pt denied current legal issues but reports history of 2 DUI's  Pt reports he is currently unemployed as a   Pt reports he has a high school diploma  Pt states he does not have a license or car and relies on walking or public transportation for linkage to appointments  Pt denies  service and denied any cultural and religous request     Pt reports he is currently residing with a relative  Pt stated he did not want his sister contacted because she does not know he is here  Pt states he does not want anyone told he is here because he doesn't want "anyone to worry"/     Pt and Cm completed relapse prevention plan  Pt and Cm signed plan and pt received a copy of this plan  Pt indicates that he is interested in inpatient treatment at this time and signed ASIM's for both [facility] and [facility] for possible referral for inpatient treatment at this time   Pt clinical presentation indicates with some  some struggles regarding his ability to recognize the severity of his use and expresses that much of his use is due to his loneliness and feelings of worthlessness due to loss of job and home  Pt appears to rely on family and can benefit from increasing this connection to other sober supports, such as 12 step group  Pt can benefit from continued detox and referral to further treatment after detox  Pt's goals for pt during his stay at detox will be continuing to receive medical care and developing a structured, comprehesive aftercare plan  Pt presents in the contemplation stage of change at this time

## 2022-04-19 NOTE — PLAN OF CARE
Problem: SUBSTANCE USE/ABUSE  Goal: By discharge, will develop insight into their chemical dependency and sustain motivation to continue in recovery  Description: INTERVENTIONS:  - Attends all daily group sessions and scheduled AA groups  - Actively practices coping skills through participation in the therapeutic community and adherence to program rules  - Reviews and completes assignments from individual treatment plan  - Assist patient development of understanding of their personal cycle of addiction and relapse triggers  4/19/2022 0847 by Zeus Wells RN  Outcome: Progressing    Goal: By discharge, patient will have ongoing treatment plan addressing chemical dependency  Description: INTERVENTIONS:  - Assist patient with resources and/or appointments for ongoing recovery based living  4/19/2022 0847 by Zeus Wells RN  Outcome: Progressing       Problem: Potential for Falls  Goal: Patient will remain free of falls  Description: INTERVENTIONS:  - Educate patient/family on patient safety including physical limitations  - Instruct patient to call for assistance with activity   - Consult OT/PT to assist with strengthening/mobility   - Keep Call bell within reach  - Keep bed low and locked with side rails adjusted as appropriate  - Keep care items and personal belongings within reach  - Initiate and maintain comfort rounds  - Obtain necessary fall risk management equipment  - Apply yellow socks and bracelet for high fall risk patients  - Consider moving patient to room near nurses station  4/19/2022 0847 by Zeus Wells RN  Outcome: Progressing     Problem: MOBILITY - ADULT  Goal: Maintain or return to baseline ADL function  Description: INTERVENTIONS:  -  Assess patient's ability to carry out ADLs; assess patient's baseline for ADL function and identify physical deficits which impact ability to perform ADLs (bathing, care of mouth/teeth, toileting, grooming, dressing, etc )  - Assess/evaluate cause of self-care deficits   - Assess range of motion  - Assess patient's mobility; develop plan if impaired  - Assess patient's need for assistive devices and provide as appropriate  - Encourage maximum independence but intervene and supervise when necessary  - Consider OT consult to assist with ADL evaluation and planning for discharge  - Provide patient education as appropriate  4/19/2022 0847 by Afsaneh Yen RN  Outcome: Progressing    Goal: Maintains/Returns to pre admission functional level  Description: INTERVENTIONS:  - Perform BMAT or MOVE assessment daily    - Set and communicate daily mobility goal to care team and patient/family/caregiver     - Collaborate with rehabilitation services on mobility goals if consulted  - Ambulate patient 4 times a day  - Out of bed for toileting  - Record patient progress and toleration of activity level   4/19/2022 0847 by Afsaneh Yen RN  Outcome: Progressing

## 2022-04-19 NOTE — ASSESSMENT & PLAN NOTE
· Mild hypokalemia with K 3 4 on admission  · Mag 1 3   · Repleted with KCl 20 mEq IV in the ED  · Pt also received repletion with IV magnesium sulfate 4 g PTA  · Continue monitoring and replete electrolytes as needed

## 2022-04-19 NOTE — ASSESSMENT & PLAN NOTE
· Patient with a history of chronic alcohol use  · Current withdrawal signs/symptoms include anxiety, tremors, and intermittent visual hallucinations  · Serum EtOH 15 in the ED (4/18/22, 1615)  · Received Ativan 4 mg in the ED  · Initiate SEWS protocol for medical management of alcohol withdrawal  · Received 650 mg phenobarbital for initial SEWS score of 11 (in split doses due to a technical error)  · Continuous pulse ox and telemetry monitoring

## 2022-04-19 NOTE — ASSESSMENT & PLAN NOTE
· Patient with a history of chronic alcohol use  · Previous admission to [address] [place] [address] unit from 01/11-01/15/22  · Pt reports he relapsed 2-3 weeks after discharge due to losing his job and his house  · History of withdrawal seizures and DTs  · Initiate IV thiamine/folic acid supplementation, MVI  · Consult case management for assistance with rehab resources  · Pt reports an interest in pursuing inpatient rehabilitation after discharge

## 2022-04-19 NOTE — ASSESSMENT & PLAN NOTE
· Magnesium 1 3 on admission  · Repleted with IV magnesium sulfate 4 g PTA  · Continue monitoring and replete electrolytes as needed

## 2022-04-19 NOTE — PROGRESS NOTES
[facility]  Progress Note - Conchita Ardon 1965, 65 y o  male MRN: [id number]  Unit/Bed#: 5T DETOX 961-87 Encounter: [id number]  Primary Care Provider: Rocky Hart MD   Date and time admitted to hospital: 4/18/2022  7:34 PM    No new Assessment & Plan notes have been filed under this hospital service since the last note was generated  Service: Medical Toxicology        VTE Pharmacologic Prophylaxis:   Pharmacologic: Pharmacologic VTE Prophylaxis contraindicated due to pancytopenia  Mechanical VTE Prophylaxis in Place: yes    Code Status: Level 1 - Full Code    Patient Centered Rounds: I have performed bedside rounds with nursing staff today  Discussions with Specialists or Other Care Team Provider: N/A       Time Spent for Care: 20 minutes  More than 50% of total time spent on counseling and coordination of care as described above  Current Length of Stay: 1 day(s)    Current Patient Status: Inpatient     Certification Statement: The patient will continue to require additional inpatient hospital stay due to alcohol withdrawl Discharge Plan:  Continue inpatient treatment  Subjective:   Patient endorses feeling better this morning, no complaints of tremors, anxiety, better appetite    Eating breakfast this morning on exam     Objective:     Clinical Opiate Withdrawal Scale  Pulse: 72    SEWS Total Score: 8 (4/19/2022  8:20 AM)        Last 24 Hours Medication List:   Current Facility-Administered Medications   Medication Dose Route Frequency Provider Last Rate    acetaminophen  650 mg Oral Q6H PRN Jerrica Baltazar PA-C      folic acid 1 mg, thiamine 100 mg in 0 9% sodium chloride 100 mL IVPB   Intravenous Daily Jerrica Baltazar PA-C      lactulose  20 g Oral BID Paresh Lui      multivitamin-minerals  1 tablet Oral Daily Paresh Lui      PHENobarbital  130 mg Intravenous Once Jignesh Benavidez DO      sodium chloride  125 mL/hr Intravenous Continuous Jerrica Christa Bey PA-C 125 mL/hr (22 9641)         Vitals:   Temp (24hrs), Av 3 °F (36 8 °C), Min:96 9 °F (36 1 °C), Max:99 1 °F (37 3 °C)    Temp:  [96 9 °F (36 1 °C)-99 1 °F (37 3 °C)] 98 2 °F (36 8 °C)  HR:  [69-91] 72  Resp:  [17-20] 18  BP: (123-172)/(77-91) 123/77  SpO2:  [93 %-98 %] 96 %  Body mass index is 26 23 kg/m²  Input and Output Summary (last 24 hours): Intake/Output Summary (Last 24 hours) at 2022 0832  Last data filed at 2022  Gross per 24 hour   Intake 950 ml   Output --   Net 950 ml       Physical Exam:   Physical Exam  Constitutional:       Appearance: He is not ill-appearing or diaphoretic  Comments: Patient awake, alert, sitting upright in bed eating breakfast     HENT:      Head: Normocephalic and atraumatic  Nose: Nose normal       Mouth/Throat:      Mouth: Mucous membranes are moist       Pharynx: Oropharynx is clear  Eyes:      Conjunctiva/sclera: Conjunctivae normal       Pupils: Pupils are equal, round, and reactive to light  Cardiovascular:      Rate and Rhythm: Normal rate and regular rhythm  Pulses: Normal pulses  Pulmonary:      Effort: Pulmonary effort is normal  No respiratory distress  Breath sounds: No wheezing  Abdominal:      Tenderness: There is abdominal tenderness in the periumbilical area  There is no right CVA tenderness or left CVA tenderness  Comments: Large, palpable periumbilical varices, caput medusae present  Tender to palpation  Musculoskeletal:      Right lower leg: No edema  Left lower leg: No edema  Lymphadenopathy:      Cervical: Cervical adenopathy present  Skin:     General: Skin is warm and dry  Capillary Refill: Capillary refill takes less than 2 seconds  Coloration: Skin is not jaundiced  Neurological:      Mental Status: He is alert and oriented to person, place, and time  Sensory: Sensory deficit (lower extremities  ) present  Motor: No tremor or abnormal muscle tone  Psychiatric:         Mood and Affect: Mood is depressed  Behavior: Behavior is cooperative  Additional Data:     Labs:   Results from last 7 days   Lab Units 04/19/22  0528 04/18/22  1615 04/18/22  1615   WBC Thousand/uL 2 23*   < > 2 88*   HEMOGLOBIN g/dL 12 1   < > 12 6   HEMATOCRIT % 35 6*   < > 37 3   PLATELETS Thousands/uL 30*   < > 28*   NEUTROS PCT %  --   --  71   LYMPHS PCT %  --   --  17   MONOS PCT %  --   --  9   EOS PCT %  --   --  2    < > = values in this interval not displayed  Results from last 7 days   Lab Units 04/19/22  0529   SODIUM mmol/L 135*   POTASSIUM mmol/L 3 6   CHLORIDE mmol/L 104   CO2 mmol/L 27   BUN mg/dL 10   CREATININE mg/dL 0 55*   ANION GAP mmol/L 4*   CALCIUM mg/dL 7 8*   ALBUMIN g/dL 3 2   TOTAL BILIRUBIN mg/dL 4 90*   ALK PHOS U/L 109   ALT U/L 42   AST U/L 115*   GLUCOSE RANDOM mg/dL 111*      Results from last 7 days   Lab Units 04/18/22  1615   INR  1 42*                         * I Have Reviewed All Lab Data Listed Above  * Additional Pertinent Lab Tests Reviewed: Geovanna 67 Admission Reviewed      Imaging Studies: I have personally reviewed pertinent reports  Recent Cultures (last 7 days): Today, Patient Was Seen By: Mao Paez MD    ** Please Note: Dictation voice to text software may have been used in the creation of this document   **

## 2022-04-19 NOTE — ASSESSMENT & PLAN NOTE
· CBC on admission revealed: Hgb 12 6, WBC 2 88, PTL 28  · Likely 2/2 chronic myelosuppression due to alcohol use and cirrhosis  · Initiate IV thiamine and folic acid  · Continue monitoring CBC

## 2022-04-19 NOTE — ASSESSMENT & PLAN NOTE
· Total bilirubin 4 39, direct bilirubin 2 43 on admission  · In the setting of alcoholic cirrhosis  · +scleral icterus on exam  · Treatment for alcoholic cirrhosis as above  · Continue monitoring

## 2022-04-19 NOTE — ASSESSMENT & PLAN NOTE
· CBC on admission revealed: Hgb 12 6, WBC 2 88, PTL 28  · Likely 2/2 chronic myelosuppression due to alcohol use and cirrhosis  · Initiate IV thiamine and folic acid  · Hold pharmacologic DVT ppx 2/2 PTL <50 - apply SCDs jeronimo  · Continue monitoring CBC

## 2022-04-19 NOTE — ASSESSMENT & PLAN NOTE
· Pt has alcoholic cirrhosis of the liver, likely 2/2 chronic alcohol use  · During last detox admission, RUQ U/S 1/13/22 revealed - cirrhosis, trace ascites, no focal liver mass  · Pt was discharged from the detox unit on lactulose 20 g BID,   · Pt reports he has not had OP GI f/u and has not been taking his lactulose x3 weeks  · +Abdominal varices with RUQ and epigastric TTP on exam  · Pt reports these varices are chronic but have been growing larger and are now causing 6/10 aching abdominal pain that has been constant for a few months  · Exam also significant for scleral icterus  · Significant labs:  · CMP 4/18/22: , ALT 51, , Total bili 4 39, Direct bili 2 43  · PT 17 0, INR 1 42  · Platelets 28  · CT A/P 4/19/22: "Cirrhosis with signs of portal hypertension as evidenced by recanalized umbilical vein "  · Restart lactulose  · Encourage alcohol cessation  · Continue monitoring CMP  · Check ammonia in the AM  · Recommend OP GI follow-up upon discharge

## 2022-04-20 LAB
ALBUMIN SERPL BCP-MCNC: 3.3 G/DL (ref 3–5.2)
ALP SERPL-CCNC: 104 U/L (ref 43–122)
ALT SERPL W P-5'-P-CCNC: 42 U/L
ANION GAP SERPL CALCULATED.3IONS-SCNC: 5 MMOL/L (ref 5–14)
AST SERPL W P-5'-P-CCNC: 99 U/L (ref 17–59)
ATRIAL RATE: 73 BPM
BILIRUB SERPL-MCNC: 5.86 MG/DL
BUN SERPL-MCNC: 10 MG/DL (ref 5–25)
CALCIUM ALBUM COR SERPL-MCNC: 8.9 MG/DL (ref 8.3–10.1)
CALCIUM SERPL-MCNC: 8.3 MG/DL (ref 8.4–10.2)
CHLORIDE SERPL-SCNC: 104 MMOL/L (ref 97–108)
CO2 SERPL-SCNC: 24 MMOL/L (ref 22–30)
CREAT SERPL-MCNC: 0.6 MG/DL (ref 0.7–1.5)
ERYTHROCYTE [DISTWIDTH] IN BLOOD BY AUTOMATED COUNT: 16.8 % (ref 11.6–15.1)
GFR SERPL CREATININE-BSD FRML MDRD: 112 ML/MIN/1.73SQ M
GLUCOSE SERPL-MCNC: 99 MG/DL (ref 70–99)
HCT VFR BLD AUTO: 37 % (ref 36.5–49.3)
HGB BLD-MCNC: 12.9 G/DL (ref 12–17)
MAGNESIUM SERPL-MCNC: 1.7 MG/DL (ref 1.6–2.3)
MCH RBC QN AUTO: 34.9 PG (ref 26.8–34.3)
MCHC RBC AUTO-ENTMCNC: 34.9 G/DL (ref 31.4–37.4)
MCV RBC AUTO: 100 FL (ref 82–98)
P AXIS: 16 DEGREES
PLATELET # BLD AUTO: 32 THOUSANDS/UL (ref 149–390)
PMV BLD AUTO: 12.2 FL (ref 8.9–12.7)
POTASSIUM SERPL-SCNC: 3.9 MMOL/L (ref 3.6–5)
PR INTERVAL: 162 MS
PROT SERPL-MCNC: 7.9 G/DL (ref 5.9–8.4)
QRS AXIS: 40 DEGREES
QRSD INTERVAL: 94 MS
QT INTERVAL: 476 MS
QTC INTERVAL: 524 MS
RBC # BLD AUTO: 3.7 MILLION/UL (ref 3.88–5.62)
SODIUM SERPL-SCNC: 133 MMOL/L (ref 137–147)
T WAVE AXIS: 31 DEGREES
VENTRICULAR RATE: 73 BPM
WBC # BLD AUTO: 2.85 THOUSAND/UL (ref 4.31–10.16)

## 2022-04-20 PROCEDURE — 80053 COMPREHEN METABOLIC PANEL: CPT | Performed by: PHYSICIAN ASSISTANT

## 2022-04-20 PROCEDURE — 93010 ELECTROCARDIOGRAM REPORT: CPT | Performed by: INTERNAL MEDICINE

## 2022-04-20 PROCEDURE — 85027 COMPLETE CBC AUTOMATED: CPT | Performed by: PHYSICIAN ASSISTANT

## 2022-04-20 PROCEDURE — 97166 OT EVAL MOD COMPLEX 45 MIN: CPT

## 2022-04-20 PROCEDURE — 83735 ASSAY OF MAGNESIUM: CPT | Performed by: PHYSICIAN ASSISTANT

## 2022-04-20 PROCEDURE — 99232 SBSQ HOSP IP/OBS MODERATE 35: CPT | Performed by: EMERGENCY MEDICINE

## 2022-04-20 RX ORDER — TRAZODONE HYDROCHLORIDE 50 MG/1
50 TABLET ORAL
Status: DISCONTINUED | OUTPATIENT
Start: 2022-04-20 | End: 2022-04-22 | Stop reason: HOSPADM

## 2022-04-20 RX ORDER — HYDROXYZINE 50 MG/1
50 TABLET, FILM COATED ORAL EVERY 6 HOURS PRN
Status: DISCONTINUED | OUTPATIENT
Start: 2022-04-20 | End: 2022-04-22 | Stop reason: HOSPADM

## 2022-04-20 RX ORDER — PHENOBARBITAL 64.8 MG/1
64.8 TABLET ORAL ONCE
Status: COMPLETED | OUTPATIENT
Start: 2022-04-20 | End: 2022-04-20

## 2022-04-20 RX ORDER — PHENOBARBITAL SODIUM 65 MG/ML
65 INJECTION INTRAMUSCULAR ONCE
Status: COMPLETED | OUTPATIENT
Start: 2022-04-20 | End: 2022-04-20

## 2022-04-20 RX ORDER — FOLIC ACID 1 MG/1
1 TABLET ORAL DAILY
Status: DISCONTINUED | OUTPATIENT
Start: 2022-04-21 | End: 2022-04-22 | Stop reason: HOSPADM

## 2022-04-20 RX ORDER — PHENOBARBITAL SODIUM 130 MG/ML
130 INJECTION INTRAMUSCULAR ONCE
Status: COMPLETED | OUTPATIENT
Start: 2022-04-20 | End: 2022-04-20

## 2022-04-20 RX ADMIN — MULTIPLE VITAMINS W/ MINERALS TAB 1 TABLET: TAB ORAL at 08:44

## 2022-04-20 RX ADMIN — THIAMINE HYDROCHLORIDE 500 MG: 100 INJECTION, SOLUTION INTRAMUSCULAR; INTRAVENOUS at 21:09

## 2022-04-20 RX ADMIN — LACTULOSE 20 G: 10 SOLUTION ORAL at 18:17

## 2022-04-20 RX ADMIN — PHENOBARBITAL 64.8 MG: 64.8 TABLET ORAL at 12:29

## 2022-04-20 RX ADMIN — PHENOBARBITAL SODIUM 65 MG: 65 INJECTION INTRAMUSCULAR; INTRAVENOUS at 21:10

## 2022-04-20 RX ADMIN — THIAMINE HYDROCHLORIDE 500 MG: 100 INJECTION, SOLUTION INTRAMUSCULAR; INTRAVENOUS at 18:17

## 2022-04-20 RX ADMIN — LACTULOSE 20 G: 10 SOLUTION ORAL at 08:44

## 2022-04-20 RX ADMIN — TRAZODONE HYDROCHLORIDE 50 MG: 50 TABLET ORAL at 21:10

## 2022-04-20 RX ADMIN — PHENOBARBITAL SODIUM 130 MG: 130 INJECTION INTRAMUSCULAR; INTRAVENOUS at 08:44

## 2022-04-20 RX ADMIN — PHENOBARBITAL 64.8 MG: 64.8 TABLET ORAL at 14:38

## 2022-04-20 RX ADMIN — PHENOBARBITAL 64.8 MG: 64.8 TABLET ORAL at 10:19

## 2022-04-20 RX ADMIN — FOLIC ACID: 5 INJECTION, SOLUTION INTRAMUSCULAR; INTRAVENOUS; SUBCUTANEOUS at 08:44

## 2022-04-20 RX ADMIN — SODIUM CHLORIDE 125 ML/HR: 0.9 INJECTION, SOLUTION INTRAVENOUS at 21:13

## 2022-04-20 NOTE — PROGRESS NOTES
[facility]  Progress Note - Loer Learn 1965, 65 y o  male MRN: [id number]  Unit/Bed#: 5T DETOX 626-25 Encounter: [id number]  Primary Care Provider: Claudia Robertson MD   Date and time admitted to hospital: 4/18/2022  7:34 PM    Anterior cervical lymphadenopathy  Assessment & Plan  · Pt reports anterior cervical lymphadenopathy  · Non-tender, palpable anterior cervical LAD on exam  · In the setting of chronic alcohol use and h/o chewing tobacco use  · CT soft tissue neck:  No enlarged adenopathy, no soft tissue mass  Marijuana use  Assessment & Plan  · Patient reports occasional marijuana use  · Last used 4-5 days ago  · UDS + for THC and cocaine  · Pt denies other substance use, including cocaine, heroin, and methamphetamine  · Possible contaminant of cocaine in marijuana  · Encourage cessation    Alcohol withdrawal syndrome with complication [facility])  Assessment & Plan  · Patient with a history of chronic alcohol use  · Current withdrawal signs/symptoms include anxiety, tremors, and intermittent visual hallucinations  · Serum EtOH 15 in the ED (4/18/22, 1615)  · Received Ativan 4 mg in the ED  · Continue SEWS protocol for medical management of alcohol withdrawal  · Received 650 mg phenobarbital for initial SEWS score of 11 (in split doses due to a technical error)  · Now received total of a 1690 mg of phenobarb  · Last dose at 8:44 a m  This a m  · Continuous pulse ox and telemetry monitoring    Pancytopenia (HCC)  Assessment & Plan  · CBC on admission revealed: Hgb 12 6, WBC 2 88, PTL 28  · Platelets 32, hemoglobin 12 9, WBC 2 85  · Likely 2/2 chronic myelosuppression due to alcohol use and cirrhosis  · Continue IV thiamine and folic acid  · Hold pharmacologic DVT ppx 2/2 PTL <50 - apply SCDs jeronimo  · Continue monitoring CBC    Prolonged Q-T interval on ECG  Assessment & Plan  · QTc 524 on EKG in the ED  · Repeat EKG 4/19 a m  With QTC of 500     · Likely 2/2 acute alcohol withdrawal  · Telemetry monitoring  · Avoid QT prolonging agents    Hypomagnesemia  Assessment & Plan  · Magnesium 1 3 on admission  · Repleted with IV magnesium sulfate 4 g PTA  · Now 1 7  · Continue monitoring and replete electrolytes as needed    Hypokalemia  Assessment & Plan  · Mild hypokalemia with K 3 4 on admission  · Now improved to 3 9  · Mag 1   Seven  · Repleted with KCl 20 mEq IV in the ED  · Pt also received repletion with IV magnesium sulfate 4 g PTA  · Continue monitoring and replete electrolytes as needed      Hyperbilirubinemia  Assessment & Plan  · Total bilirubin 5 86, increased from 4 9, and 4 2   · In the setting of alcoholic cirrhosis  · Mild scleral icterus on exam  · Treatment for alcoholic cirrhosis as above  · Continue monitoring    Alcohol use disorder, severe, dependence ([facility] [address] )  Assessment & Plan  · Patient with a history of chronic alcohol use  · Previous admission to [address] [place] [address] Detox unit from 01/11-01/15/22  · Pt reports he relapsed 2-3 weeks after discharge due to losing his job and his house  · History of withdrawal seizures and DTs  · Initiate IV thiamine/folic acid supplementation, MVI  · Consult case management for assistance with rehab resources  · Pt reports an interest in pursuing inpatient rehabilitation after discharge    * [facility]  Assessment & Plan  · Pt has alcoholic cirrhosis of the liver, likely 2/2 chronic alcohol use  · During last detox admission, RUQ U/S 1/13/22 revealed - cirrhosis, trace ascites, no focal liver mass  · Pt was discharged from the detox unit on lactulose 20 g BID,   · Pt reports he has not had OP GI f/u and has not been taking his lactulose x3 weeks  · +Abdominal varices with RUQ and epigastric TTP on exam  · Pt reports these varices are chronic but have been growing larger and are now causing 6/10 aching abdominal pain that has been constant for a few months  · Exam also significant for scleral icterus  · Significant labs:  · CMP 4/18/22: , ALT 51, , Total bili 4 39, Direct bili 2 43  · PT 17 0, INR 1 42  · Platelets 28  · CT A/P 4/19/22: "Cirrhosis with signs of portal hypertension as evidenced by recanalized umbilical vein "  · Restart lactulose  · Encourage alcohol cessation  · Continue monitoring CMP  · Check ammonia in the AM  · Recommend OP GI follow-up upon discharge          VTE Pharmacologic Prophylaxis:   Pharmacologic: Pharmacologic VTE Prophylaxis contraindicated due to Thrombocytopenia  Mechanical VTE Prophylaxis in Place: yes    Code Status: Level 1 - Full Code    Patient Centered Rounds: I have performed bedside rounds with nursing staff today  Discussions with Specialists or Other Care Team Provider:  Case management  Education and Discussions with Family / Patient:  Yes    Time Spent for Care: 20 minutes  More than 50% of total time spent on counseling and coordination of care as described above  Current Length of Stay: 2 day(s)    Current Patient Status: Inpatient     Certification Statement: The patient will continue to require additional inpatient hospital stay due to Continued withdrawal symptoms Discharge Plan:  Inpatient rehab        Subjective:   Patient resting on the couch, mood improved however continues to complain of anxiety, tremors, fatigue      Objective:     Clinical Opiate Withdrawal Scale  Pulse: 86    SEWS Total Score: 8 (4/20/2022  8:36 AM)        Last 24 Hours Medication List:   Current Facility-Administered Medications   Medication Dose Route Frequency Provider Last Rate    acetaminophen  650 mg Oral Q6H PRN Jerrica Mayo PA-C      folic acid 1 mg, thiamine 100 mg in 0 9% sodium chloride 100 mL IVPB   Intravenous Daily Jerrica Mayo PA-C      lactulose  20 g Oral BID Jerrica Mayo PA-C      multivitamin-minerals  1 tablet Oral Daily Paresh Huang      sodium chloride  125 mL/hr Intravenous Continuous Vaughn Kinds Alpa Shanks PA-C Stopped (22 0305)         Vitals:   Temp (24hrs), Av 9 °F (37 2 °C), Min:98 6 °F (37 °C), Max:99 3 °F (37 4 °C)    Temp:  [98 6 °F (37 °C)-99 3 °F (37 4 °C)] 98 6 °F (37 °C)  HR:  [66-86] 86  Resp:  [16-18] 16  BP: (101-146)/(56-93) 146/93  SpO2:  [96 %-99 %] 97 %  Body mass index is 26 23 kg/m²  Input and Output Summary (last 24 hours): Intake/Output Summary (Last 24 hours) at 2022 0858  Last data filed at 2022 1700  Gross per 24 hour   Intake 480 ml   Output --   Net 480 ml       Physical Exam:   Physical Exam  Vitals and nursing note reviewed  Constitutional:       General: He is not in acute distress  Appearance: He is well-developed  He is not ill-appearing, toxic-appearing or diaphoretic  HENT:      Head: Normocephalic and atraumatic  Nose: Nose normal       Mouth/Throat:      Mouth: Mucous membranes are moist       Pharynx: Oropharynx is clear  Eyes:      General: Scleral icterus (Mild) present  Extraocular Movements: Extraocular movements intact  Conjunctiva/sclera: Conjunctivae normal    Cardiovascular:      Rate and Rhythm: Normal rate and regular rhythm  Heart sounds: No murmur heard  Pulmonary:      Effort: Pulmonary effort is normal  No respiratory distress  Breath sounds: Normal breath sounds  No wheezing  Chest:      Chest wall: No tenderness  Abdominal:      Palpations: Abdomen is soft  Tenderness: There is abdominal tenderness (Left lower quadrant, periumbilical,)  Comments: Capput medusa present, periumbilical varices  Musculoskeletal:      Cervical back: Neck supple  Right lower leg: No edema  Left lower leg: No edema  Skin:     General: Skin is warm and dry  Capillary Refill: Capillary refill takes less than 2 seconds  Neurological:      Mental Status: He is alert and oriented to person, place, and time  GCS: GCS eye subscore is 4  GCS verbal subscore is 5   GCS motor subscore is 6       Motor: Tremor present  Gait: Gait abnormal       Comments: Mild hand tremoring, much improved from yesterday  Tongue fasciculation present, abnormal, unsteady gait  Psychiatric:         Mood and Affect: Mood is depressed  Speech: Speech normal          Additional Data:     Labs:   Results from last 7 days   Lab Units 04/20/22  0506 04/19/22  0528 04/18/22  1615   WBC Thousand/uL 2 85*   < > 2 88*   HEMOGLOBIN g/dL 12 9   < > 12 6   HEMATOCRIT % 37 0   < > 37 3   PLATELETS Thousands/uL 32*   < > 28*   NEUTROS PCT %  --   --  71   LYMPHS PCT %  --   --  17   MONOS PCT %  --   --  9   EOS PCT %  --   --  2    < > = values in this interval not displayed  Results from last 7 days   Lab Units 04/20/22  0506   SODIUM mmol/L 133*   POTASSIUM mmol/L 3 9   CHLORIDE mmol/L 104   CO2 mmol/L 24   BUN mg/dL 10   CREATININE mg/dL 0 60*   ANION GAP mmol/L 5   CALCIUM mg/dL 8 3*   ALBUMIN g/dL 3 3   TOTAL BILIRUBIN mg/dL 5 86*   ALK PHOS U/L 104   ALT U/L 42   AST U/L 99*   GLUCOSE RANDOM mg/dL 99      Results from last 7 days   Lab Units 04/18/22  1615   INR  1 42*                         * I Have Reviewed All Lab Data Listed Above  * Additional Pertinent Lab Tests Reviewed: Geovanna 67 Admission Reviewed      Imaging Studies: I have personally reviewed pertinent reports  Recent Cultures (last 7 days): Today, Patient Was Seen By: Kevon Armenta MD    ** Please Note: Dictation voice to text software may have been used in the creation of this document   **

## 2022-04-20 NOTE — ASSESSMENT & PLAN NOTE
· Pt reports anterior cervical lymphadenopathy  · Non-tender, palpable anterior cervical LAD on exam  · In the setting of chronic alcohol use and h/o chewing tobacco use  · CT soft tissue neck:  No enlarged adenopathy, no soft tissue mass

## 2022-04-20 NOTE — ASSESSMENT & PLAN NOTE
· Magnesium 1 3 on admission  · Repleted with IV magnesium sulfate 4 g PTA  · Now 1 7  · Continue monitoring and replete electrolytes as needed

## 2022-04-20 NOTE — ASSESSMENT & PLAN NOTE
· Total bilirubin 5 86, increased from 4 9, and 4 2   · In the setting of alcoholic cirrhosis  · Mild scleral icterus on exam  · Treatment for alcoholic cirrhosis as above  · Continue monitoring

## 2022-04-20 NOTE — ASSESSMENT & PLAN NOTE
· Mild hypokalemia with K 3 4 on admission  · Now improved to 3 9  · Mag 1   Seven  · Repleted with KCl 20 mEq IV in the ED  · Pt also received repletion with IV magnesium sulfate 4 g PTA  · Continue monitoring and replete electrolytes as needed

## 2022-04-20 NOTE — PLAN OF CARE
Problem: SUBSTANCE USE/ABUSE  Goal: By discharge, will develop insight into their chemical dependency and sustain motivation to continue in recovery  Description: INTERVENTIONS:  - Attends all daily group sessions and scheduled AA groups  - Actively practices coping skills through participation in the therapeutic community and adherence to program rules  - Reviews and completes assignments from individual treatment plan  - Assist patient development of understanding of their personal cycle of addiction and relapse triggers  Outcome: Progressing  Goal: By discharge, patient will have ongoing treatment plan addressing chemical dependency  Description: INTERVENTIONS:  - Assist patient with resources and/or appointments for ongoing recovery based living  Outcome: Progressing     Problem: MOBILITY - ADULT  Goal: Maintain or return to baseline ADL function  Description: INTERVENTIONS:  -  Assess patient's ability to carry out ADLs; assess patient's baseline for ADL function and identify physical deficits which impact ability to perform ADLs (bathing, care of mouth/teeth, toileting, grooming, dressing, etc )  - Assess/evaluate cause of self-care deficits   - Assess range of motion  - Assess patient's mobility; develop plan if impaired  - Assess patient's need for assistive devices and provide as appropriate  - Encourage maximum independence but intervene and supervise when necessary  - Involve family in performance of ADLs  - Assess for home care needs following discharge   - Consider OT consult to assist with ADL evaluation and planning for discharge  - Provide patient education as appropriate  Outcome: Progressing     Problem: SAFETY ADULT  Goal: Patient will remain free of falls  Description: INTERVENTIONS:  - Educate patient/family on patient safety including physical limitations  - Instruct patient to call for assistance with activity   - Consult OT/PT to assist with strengthening/mobility   - Keep Call bell within reach  - Keep bed low and locked with side rails adjusted as appropriate  - Keep care items and personal belongings within reach  - Initiate and maintain comfort rounds  - Make Fall Risk Sign visible to staff  - Offer Toileting every 2 Hours, in advance of need  - Apply yellow socks and bracelet for high fall risk patients  - Consider moving patient to room near nurses station  Outcome: Progressing  Goal: Maintain or return to baseline ADL function  Description: INTERVENTIONS:  -  Assess patient's ability to carry out ADLs; assess patient's baseline for ADL function and identify physical deficits which impact ability to perform ADLs (bathing, care of mouth/teeth, toileting, grooming, dressing, etc )  - Assess/evaluate cause of self-care deficits   - Assess range of motion  - Assess patient's mobility; develop plan if impaired  - Assess patient's need for assistive devices and provide as appropriate  - Encourage maximum independence but intervene and supervise when necessary  - Involve family in performance of ADLs  - Assess for home care needs following discharge   - Consider OT consult to assist with ADL evaluation and planning for discharge  - Provide patient education as appropriate  Outcome: Progressing

## 2022-04-20 NOTE — ASSESSMENT & PLAN NOTE
· CBC on admission revealed: Hgb 12 6, WBC 2 88, PTL 28  · Platelets 32, hemoglobin 12 9, WBC 2 85    · Likely 2/2 chronic myelosuppression due to alcohol use and cirrhosis  · Continue IV thiamine and folic acid  · Hold pharmacologic DVT ppx 2/2 PTL <50 - apply SCDs jeronimo  · Continue monitoring CBC

## 2022-04-20 NOTE — CASE MANAGEMENT
Cm met with pt who indicated he had some questions about rehab placement  Cm attempted to clarify this with pt and pt began stating he wanted a physical rehab placement and not a substance use treatment placement  As cm was discussing this, OT entered to complete assessment  Cm will await OT assessment to determine pt's needs

## 2022-04-20 NOTE — ASSESSMENT & PLAN NOTE
· Patient with a history of chronic alcohol use  · Current withdrawal signs/symptoms include anxiety, tremors, and intermittent visual hallucinations  · Serum EtOH 15 in the ED (4/18/22, 1615)  · Received Ativan 4 mg in the ED  · Continue SEWS protocol for medical management of alcohol withdrawal  · Received 650 mg phenobarbital for initial SEWS score of 11 (in split doses due to a technical error)  · Now received total of a 1690 mg of phenobarb  · Last dose at 8:44 a m  This a m    · Continuous pulse ox and telemetry monitoring

## 2022-04-20 NOTE — ASSESSMENT & PLAN NOTE
· QTc 524 on EKG in the ED  · Repeat EKG 4/19 a m  With QTC of 500     · Likely 2/2 acute alcohol withdrawal  · Telemetry monitoring  · Avoid QT prolonging agents

## 2022-04-20 NOTE — OCCUPATIONAL THERAPY NOTE
Occupational Therapy Evaluation     Patient Name: Renan NGUYEN Date: 4/20/2022  Problem List  Principal Problem:    Alcoholic cirrhosis of liver (HCC)  Active Problems:    Alcohol use disorder, severe, dependence (HCC)    Hyperbilirubinemia    Hypokalemia    Hypomagnesemia    Prolonged Q-T interval on ECG    Pancytopenia (HCC)    Alcohol withdrawal syndrome with complication (HCC)    Marijuana use    Anterior cervical lymphadenopathy    Past Medical History  Past Medical History:   Diagnosis Date    ETOH abuse      Past Surgical History  Past Surgical History:   Procedure Laterality Date    GALLBLADDER SURGERY               04/20/22 1436   OT Last Visit   OT Visit Date 04/20/22   Note Type   Note type Evaluation   Pain Assessment   Pain Assessment Tool 0-10   Home Living   Type of Home House   Home Layout Two level;Stairs to enter with rails;Bed/bath upstairs   Bathroom Toilet Standard   Prior Function   Level of [place] Independent with ADLs and functional mobility   Lives With Family   ADL Assistance Independent   IADLs Independent   ADL   Grooming Assistance 7  Independent   UB Bathing Assistance 7  Independent   LB Bathing Assistance 7  Independent   UB Dressing Assistance 7  Independent   LB Dressing Assistance 7  [address] to Stand 7  Independent   Stand to Sit 7  Independent   Toilet transfer 7  Independent   Functional Mobility   Functional Mobility 7  Independent   Balance   Static Sitting Good   Dynamic Sitting Good   Static Standing Fair +   Dynamic Standing Fair +   Ambulatory Fair +   Activity Tolerance   Activity Tolerance Patient tolerated treatment well   RUE Assessment   RUE Assessment WFL   LUE Assessment   LUE Assessment WFL   Cognition   Overall Cognitive Status WFL   Arousal/Participation Alert; Cooperative   Attention Within functional limits   Orientation Level Oriented X4   Memory Within functional limits Following Commands Follows all commands and directions without difficulty   Assessment   Assessment   Pt is a 65 y o  male seen for OT evaluation s/p admit to [facility] on 8/12/2799 w/ Alcoholic cirrhosis of liver ([facility] [facility] )  See above for extensive list of comorbidities affecting Pt's functional performance at time of assessment  Personal factors affecting Pt at time of IE include:limited insight into deficits, compliance and health management   Prior to admission, Pt was living with family  Per chart, Pt with impaired gait and balance upon arrival to unit  Upon evaluation: Pt able to complete transfers, ambulation, bathroom mobility independently, ADLs independently  Pt does endorse feeling "weak, " however strength equal 4+/5, attribute to general deconditioning  Pt continues with limited insight into ETOH use/poor coping strategies  Pt would benefit from further treatment at inpatient substance abuse center  From an occupational therapy standpoint, Pt appears to be at his functional baseline, no further acute OT needs at this time        Plan   OT Frequency Eval only   Recommendation   OT Discharge Recommendation No rehabilitation needs   AM-PAC Daily Activity Inpatient   Lower Body Dressing 4   Bathing 4   Toileting 4   Upper Body Dressing 4   Grooming 4   Eating 4   Daily Activity Raw Score 24   Daily Activity Standardized Score (Calc for Raw Score >=11) 57 54

## 2022-04-20 NOTE — ASSESSMENT & PLAN NOTE
· Patient with a history of chronic alcohol use  · Previous admission to [address] [place] [address] unit from 01/11-01/15/22  · Pt reports he relapsed 2-3 weeks after discharge due to losing his job and his house  · History of withdrawal seizures and DTs  · Initiate IV thiamine/folic acid supplementation, MVI  · Consult case management for assistance with rehab resources  · Pt reports an interest in pursuing inpatient rehabilitation after discharge

## 2022-04-21 PROBLEM — E87.6 HYPOKALEMIA: Status: RESOLVED | Noted: 2020-12-05 | Resolved: 2022-04-21

## 2022-04-21 PROBLEM — E83.42 HYPOMAGNESEMIA: Status: RESOLVED | Noted: 2022-01-11 | Resolved: 2022-04-21

## 2022-04-21 LAB
ALBUMIN SERPL BCP-MCNC: 3 G/DL (ref 3–5.2)
ALP SERPL-CCNC: 98 U/L (ref 43–122)
ALT SERPL W P-5'-P-CCNC: 33 U/L
ANION GAP SERPL CALCULATED.3IONS-SCNC: 4 MMOL/L (ref 5–14)
AST SERPL W P-5'-P-CCNC: 72 U/L (ref 17–59)
BILIRUB SERPL-MCNC: 4.68 MG/DL
BUN SERPL-MCNC: 12 MG/DL (ref 5–25)
CALCIUM ALBUM COR SERPL-MCNC: 8.8 MG/DL (ref 8.3–10.1)
CALCIUM SERPL-MCNC: 8 MG/DL (ref 8.4–10.2)
CHLORIDE SERPL-SCNC: 105 MMOL/L (ref 97–108)
CO2 SERPL-SCNC: 24 MMOL/L (ref 22–30)
CREAT SERPL-MCNC: 0.65 MG/DL (ref 0.7–1.5)
ERYTHROCYTE [DISTWIDTH] IN BLOOD BY AUTOMATED COUNT: 17 % (ref 11.6–15.1)
GFR SERPL CREATININE-BSD FRML MDRD: 108 ML/MIN/1.73SQ M
GLUCOSE SERPL-MCNC: 85 MG/DL (ref 70–99)
HCT VFR BLD AUTO: 35.5 % (ref 36.5–49.3)
HGB BLD-MCNC: 11.9 G/DL (ref 12–17)
MAGNESIUM SERPL-MCNC: 1.7 MG/DL (ref 1.6–2.3)
MCH RBC QN AUTO: 33.7 PG (ref 26.8–34.3)
MCHC RBC AUTO-ENTMCNC: 33.5 G/DL (ref 31.4–37.4)
MCV RBC AUTO: 101 FL (ref 82–98)
PLATELET # BLD AUTO: 35 THOUSANDS/UL (ref 149–390)
PMV BLD AUTO: 12 FL (ref 8.9–12.7)
POTASSIUM SERPL-SCNC: 3.8 MMOL/L (ref 3.6–5)
PROT SERPL-MCNC: 7.1 G/DL (ref 5.9–8.4)
RBC # BLD AUTO: 3.53 MILLION/UL (ref 3.88–5.62)
SODIUM SERPL-SCNC: 133 MMOL/L (ref 137–147)
WBC # BLD AUTO: 2.55 THOUSAND/UL (ref 4.31–10.16)

## 2022-04-21 PROCEDURE — 99232 SBSQ HOSP IP/OBS MODERATE 35: CPT | Performed by: PHYSICIAN ASSISTANT

## 2022-04-21 PROCEDURE — 83735 ASSAY OF MAGNESIUM: CPT | Performed by: NURSE PRACTITIONER

## 2022-04-21 PROCEDURE — 97161 PT EVAL LOW COMPLEX 20 MIN: CPT

## 2022-04-21 PROCEDURE — 85027 COMPLETE CBC AUTOMATED: CPT | Performed by: NURSE PRACTITIONER

## 2022-04-21 PROCEDURE — NC001 PR NO CHARGE: Performed by: PHYSICIAN ASSISTANT

## 2022-04-21 PROCEDURE — 80053 COMPREHEN METABOLIC PANEL: CPT | Performed by: NURSE PRACTITIONER

## 2022-04-21 RX ORDER — DIAZEPAM 5 MG/ML
10 INJECTION, SOLUTION INTRAMUSCULAR; INTRAVENOUS ONCE
Status: DISCONTINUED | OUTPATIENT
Start: 2022-04-21 | End: 2022-04-21

## 2022-04-21 RX ADMIN — TRAZODONE HYDROCHLORIDE 50 MG: 50 TABLET ORAL at 21:22

## 2022-04-21 RX ADMIN — LACTULOSE 20 G: 10 SOLUTION ORAL at 08:39

## 2022-04-21 RX ADMIN — THIAMINE HYDROCHLORIDE 500 MG: 100 INJECTION, SOLUTION INTRAMUSCULAR; INTRAVENOUS at 08:39

## 2022-04-21 RX ADMIN — HYDROXYZINE HYDROCHLORIDE 50 MG: 50 TABLET, FILM COATED ORAL at 14:51

## 2022-04-21 RX ADMIN — THIAMINE HYDROCHLORIDE 500 MG: 100 INJECTION, SOLUTION INTRAMUSCULAR; INTRAVENOUS at 20:56

## 2022-04-21 RX ADMIN — MULTIPLE VITAMINS W/ MINERALS TAB 1 TABLET: TAB ORAL at 08:39

## 2022-04-21 RX ADMIN — LACTULOSE 20 G: 10 SOLUTION ORAL at 17:01

## 2022-04-21 RX ADMIN — THIAMINE HYDROCHLORIDE 500 MG: 100 INJECTION, SOLUTION INTRAMUSCULAR; INTRAVENOUS at 16:46

## 2022-04-21 RX ADMIN — FOLIC ACID 1 MG: 1 TABLET ORAL at 08:39

## 2022-04-21 RX ADMIN — HYDROXYZINE HYDROCHLORIDE 50 MG: 50 TABLET, FILM COATED ORAL at 00:03

## 2022-04-21 RX ADMIN — HYDROXYZINE HYDROCHLORIDE 50 MG: 50 TABLET, FILM COATED ORAL at 21:22

## 2022-04-21 NOTE — ASSESSMENT & PLAN NOTE
· Patient with a history of chronic alcohol use; h/o DTs and withdrawal seizures  · Serum EtOH 15 in the ED (4/18/22, 1615)  · Received Ativan 4 mg in the ED  · SEWS protocol with symptom-triggered phenobarbital followed for medical management of alcohol withdrawal  · Received 1949 4 mg total phenobarbital; last dose 4/20/2022 2110 pm  · Currently appears stable off phenobarbital- mild intention tremor b/l UEs; VSS  · Continue to monitor vitals, symptoms to ensure complete resolution of withdrawal

## 2022-04-21 NOTE — PROGRESS NOTES
[facility]  Progress Note - Dinesh Dye 1965, 65 y o  male MRN: [id number]  Unit/Bed#: 5T DETOX 817-53 Encounter: [id number]  Primary Care Provider: Nazario Abdalla MD   Date and time admitted to hospital: 4/18/2022  7:34 PM    Progress Note - Medical Toxicology    Dinesh Dye 65 y o  male MRN: [id number]  Unit/Bed#: 5T DETOX 507-01 Encounter: [id number]  [address] [place] [address] LEVEL 4  Department of Medical Toxicology  Reason for Admission/Principal Problem: alcohol withdrawal   Rounding Provider: Aly Hoyt PA-C, Rachel Mendieta MD     * Alcohol withdrawal syndrome with complication [facility])  Assessment & Plan  · Patient with a history of chronic alcohol use; h/o DTs and withdrawal seizures  · Serum EtOH 15 in the ED (4/18/22, 1615)  · Received Ativan 4 mg in the ED  · SEWS protocol with symptom-triggered phenobarbital followed for medical management of alcohol withdrawal  · Received 1949 4 mg total phenobarbital; last dose 4/20/2022 2110 pm  · Currently appears stable off phenobarbital- mild intention tremor b/l UEs; VSS  · Continue to monitor vitals, symptoms to ensure complete resolution of withdrawal    Alcohol use disorder, severe, dependence ([facility] [address] )  Assessment & Plan  · Patient with a history of chronic alcohol use  · Previous admission to [address] [place] [address] unit from 01/11-01/15/22  · Pt reports he relapsed 2-3 weeks after discharge due to losing his job and his house  · Continue thiamine/folic acid supplementation, MVI  · Consult case management for assistance with rehab resources- Pt reports now no longer interested in inpatient rehab on discharge (possibly outpatient)    Alcoholic cirrhosis of liver ([facility] [address] )  Assessment & Plan  · H/o alcoholic cirrhosis  · Pt was discharged from the detox unit on lactulose 20 g BID,   · Pt reports he has not had OP GI f/u and has not been taking his lactulose x3 weeks  · CT abd/pelvis 4/19/2022: "Cirrhosis with signs of portal hypertension as evidenced by recanalized umbilical vein "  · CMP this AM revealed LFTs down-trending, total bilirubin 4 68  · On exam: caput medusae present, jaundice and scleral icterus   · Continue lactulose  · Encourage alcohol cessation  · Continue monitoring CMP  · Recommend prompt OP GI follow-up upon discharge    Pancytopenia (HCC)  Assessment & Plan  · CBC this AM revealed stable hgb and platelets  · Macrocytic anemia- hgb 11 9,   · Platelets 35  · Leukopenia- WBCs 2 55   · Likely 2/2 chronic myelosuppression due to alcohol use and cirrhosis  · Continue thiamine and folic acid  · Hold pharmacologic DVT ppx 2/2 PTL <50   · Continue SCDs   · Continue monitoring CBC    Anterior cervical lymphadenopathy  Assessment & Plan  · Pt reports anterior cervical lymphadenopathy on admission  · Non-tender, palpable anterior cervical LAD on exam  · In the setting of chronic alcohol use and h/o chewing tobacco use  · CT soft tissue neck:  No enlarged adenopathy, no soft tissue mass  · Recommend outpatient follow-up PCP    Marijuana use  Assessment & Plan  · Patient reports occasional marijuana use  · Last used 4-5 days PTA  · UDS + for THC and cocaine  · Pt denies other substance use, including cocaine, heroin, and methamphetamine (Possible contaminant of cocaine in marijuana)  · Encourage cessation    Prolonged Q-T interval on ECG  Assessment & Plan  · QTc 524 on EKG in the ED  · Repeat EKG 4/19 a m  revealed improved QTC of 500     · Likely 2/2 electrolyte disturbances- electrolytes replenished as above  · Avoid QT prolonging agents    Hypomagnesemia-resolved as of 4/21/2022  Assessment & Plan  · Magnesium 1 3 on admission  · Stable at 1 7 this AM  · Resolved  · Routine monitoring of electrolytes, supplement as indicated    Hypokalemia-resolved as of 4/21/2022  Assessment & Plan  · Mild hypokalemia with K 3 4 on admission  · Now improved to 3 8; Mg 1 7  · Resolved  · Routine monitoring of electrolytes, supplement as indicated      VTE Pharmacologic Prophylaxis:   Pharmacologic: Pharmacologic VTE Prophylaxis contraindicated due to thrombocytopenia  Mechanical VTE Prophylaxis in Place: no    Code Status: Level 1 - Full Code    Patient Centered Rounds: I have performed bedside rounds with nursing staff today  Discussions with Specialists or Other Care Team Provider: Dr Kika Thrasher, [place]   Education and Discussions with Family / Patient: I personally did not discuss patient with family at this time  Discussed current plan with patient, answered all questions to best of my ability  Time Spent for Care: 30 minutes  More than 50% of total time spent on counseling and coordination of care as described above  Current Length of Stay: 3 day(s)    Current Patient Status: Inpatient     Certification Statement: The patient will continue to require additional inpatient hospital stay due to ongoing monitoring of alcohol withdrawal, monitoring platelets    Discharge Plan: discharge home once medically stable- anticipate in next 24-48 hrs      Total time spent today 30 minutes  Greater than 50% of total time was spent with the patient and / or family counseling and / or coordination of care  A description of the counseling / coordination of care: alcohol withdrawal, thrombocytopenia, cirrhosis    Subjective:   Patient seen and examined bedside today  Patient reports he is feeling well overall, noting he is getting bored and trying to keep active in his room  Denies acute complaints  Reports he slept well and has adequate appetite/hydration  Currently denies headaches, lightheadedness/dizziness, coughing/sneezing/congestion/rhinorrhea, chest pain, SOB/dyspnea, abdominal pain, N/V/C, dysuria/hematuria, hallucinations   States he is now no longer interested in pursuing inpatient rehab as he will be starting a new job soon and "needs to make money "     Objective:     Clinical Opiate Withdrawal Scale  Pulse: 73    SEWS Total Score: 0 (2022  7:39 AM)        Last 24 Hours Medication List:   Current Facility-Administered Medications   Medication Dose Route Frequency Provider Last Rate    acetaminophen  650 mg Oral Q6H PRN Jerrica Tiwari PA-C      folic acid  1 mg Oral Daily Vena Novel, DO      hydrOXYzine HCL  50 mg Oral Q6H PRN Honey Bella, CRNP      lactulose  20 g Oral BID Jerrica Tiwari PA-C      multivitamin-minerals  1 tablet Oral Daily Paresh Ray      thiamine  500 mg Intravenous TID Vena Novel,  mg (22 1646)    traZODone  50 mg Oral HS Tiara TIMMY Melara           Vitals:   Temp (24hrs), Av 1 °F (36 7 °C), Min:97 6 °F (36 4 °C), Max:98 7 °F (37 1 °C)    Temp:  [97 6 °F (36 4 °C)-98 7 °F (37 1 °C)] 98 3 °F (36 8 °C)  HR:  [60-76] 73  Resp:  [18] 18  BP: (114-135)/(66-81) 135/79  SpO2:  [94 %-100 %] 99 %  Body mass index is 26 23 kg/m²  Input and Output Summary (last 24 hours): Intake/Output Summary (Last 24 hours) at 2022 1656  Last data filed at 2022 1926  Gross per 24 hour   Intake 480 ml   Output --   Net 480 ml       Physical Exam:   Physical Exam  Vitals and nursing note reviewed  Constitutional:       General: He is not in acute distress  Appearance: Normal appearance  He is well-developed and normal weight  He is not ill-appearing or diaphoretic  HENT:      Head: Normocephalic and atraumatic  Nose: Nose normal  No congestion  Mouth/Throat:      Mouth: Mucous membranes are moist       Pharynx: Oropharynx is clear  Eyes:      General: Scleral icterus present  Extraocular Movements: Extraocular movements intact  Right eye: No nystagmus  Left eye: No nystagmus  Conjunctiva/sclera: Conjunctivae normal       Pupils: Pupils are equal, round, and reactive to light  Cardiovascular:      Rate and Rhythm: Normal rate and regular rhythm  Pulses: Normal pulses  Heart sounds: Normal heart sounds   No murmur heard  No friction rub  No gallop  Pulmonary:      Effort: Pulmonary effort is normal  No respiratory distress  Breath sounds: Normal breath sounds  No wheezing, rhonchi or rales  Abdominal:      General: Abdomen is flat  Bowel sounds are normal  There is no distension  Palpations: Abdomen is soft  Tenderness: There is no abdominal tenderness  There is no guarding  Comments: Esvin hopkins present    Musculoskeletal:         General: Normal range of motion  Cervical back: Normal range of motion and neck supple  Right lower leg: No edema  Left lower leg: No edema  Skin:     General: Skin is warm and dry  Capillary Refill: Capillary refill takes less than 2 seconds  Coloration: Skin is jaundiced  Neurological:      General: No focal deficit present  Mental Status: He is alert and oriented to person, place, and time  Mental status is at baseline  Motor: Tremor (mild intention tremor b/l UEs) present  Psychiatric:         Attention and Perception: Attention normal          Mood and Affect: Mood normal          Speech: Speech normal          Behavior: Behavior is cooperative  Additional Data:     Labs: keep all most recent labs as listed on admission templates   Results from last 7 days   Lab Units 04/21/22  0554 04/19/22  0528 04/18/22  1615   WBC Thousand/uL 2 55*   < > 2 88*   HEMOGLOBIN g/dL 11 9*   < > 12 6   HEMATOCRIT % 35 5*   < > 37 3   PLATELETS Thousands/uL 35*   < > 28*   NEUTROS PCT %  --   --  71   LYMPHS PCT %  --   --  17   MONOS PCT %  --   --  9   EOS PCT %  --   --  2    < > = values in this interval not displayed        Results from last 7 days   Lab Units 04/21/22  0554   SODIUM mmol/L 133*   POTASSIUM mmol/L 3 8   CHLORIDE mmol/L 105   CO2 mmol/L 24   BUN mg/dL 12   CREATININE mg/dL 0 65*   ANION GAP mmol/L 4*   CALCIUM mg/dL 8 0*   ALBUMIN g/dL 3 0   TOTAL BILIRUBIN mg/dL 4 68*   ALK PHOS U/L 98   ALT U/L 33   AST U/L 72*   GLUCOSE RANDOM mg/dL 85      Results from last 7 days   Lab Units 04/18/22  1615   INR  1 42*           * I Have Reviewed All Lab Data Listed Above  * Additional Pertinent Lab Tests Reviewed: All Labs Within Last 24 Hours Reviewed      Imaging Studies: I have personally reviewed pertinent reports  Recent Cultures (last 7 days): Today, Patient Was Seen By: Constance Weinberg PA-C    ** Please Note: Dictation voice to text software may have been used in the creation of this document   **

## 2022-04-21 NOTE — ASSESSMENT & PLAN NOTE
· H/o alcoholic cirrhosis  · Pt was discharged from the detox unit on lactulose 20 g BID,   · Pt reports he has not had OP GI f/u and has not been taking his lactulose x3 weeks  · CT abd/pelvis 4/19/2022: "Cirrhosis with signs of portal hypertension as evidenced by recanalized umbilical vein "  · CMP this AM revealed LFTs down-trending, total bilirubin 4 68  · On exam: caput medusae present, jaundice and scleral icterus   · Continue lactulose  · Encourage alcohol cessation  · Continue monitoring CMP  · Recommend prompt OP GI follow-up upon discharge

## 2022-04-21 NOTE — CASE MANAGEMENT
Cm consulted with medical staff and informed pt continue to require medical treatment and no plan for discharge is present

## 2022-04-21 NOTE — ASSESSMENT & PLAN NOTE
· Mild hypokalemia with K 3 4 on admission  · Now improved to 3 8; Mg 1 7  · Resolved  · Routine monitoring of electrolytes, supplement as indicated

## 2022-04-21 NOTE — PHYSICAL THERAPY NOTE
Physical Therapy Evaluation    Patient's Name: Nayan Palma    Admitting Diagnosis  Alcohol withdrawal ([facility] ) [F10 239]    Problem List  Patient Active Problem List   Diagnosis    Alcohol withdrawal seizure ([facility] )    Alcohol use disorder, severe, dependence ([facility] )    Transaminitis    Hyperbilirubinemia    Hypokalemia    [facility]    Tobacco abuse    Open wound of tongue due to bite    Electrolyte abnormality    Closed fracture of nasal bone    Decompensated hepatic cirrhosis (HCC)    Liver mass    Right hip pain    [facility]    COVID-19    Hypomagnesemia    Prolonged Q-T interval on ECG    Pancytopenia (HCC)    Alcohol withdrawal syndrome with complication ([place] [address] )    Marijuana use    Anterior cervical lymphadenopathy       Past Medical History  Past Medical History:   Diagnosis Date    ETOH abuse        Past Surgical History  Past Surgical History:   Procedure Laterality Date    GALLBLADDER SURGERY         Recent Imaging  CT soft tissue neck w contrast   Final Result by Kevin Presley DO (04/19 0967)      No enlarged or pathologic adenopathy  No soft tissue mass  Workstation performed: [id number]         CT abdomen pelvis w contrast   Final Result by Lula Ontiveros MD (04/19 2069)      No evidence of acute intra-abdominal or pelvic pathology      Cirrhosis with signs of portal hypertension as evidenced by recanalized umbilical vein              Workstation performed: [id number]             Recent Vital Signs  Vitals:    04/21/22 0500 04/21/22 0723 04/21/22 1111 04/21/22 1525   BP: 124/76 116/69 114/66 135/79   BP Location: Left arm Right arm Left arm Right arm   Pulse: 71 73 64 73   Resp: 18 18  18   Temp: 97 6 °F (36 4 °C) 97 7 °F (36 5 °C)  98 3 °F (36 8 °C)   TempSrc: Temporal Temporal  Temporal   SpO2: 97% 100% 98% 99%   Weight:       Height:            04/21/22 1350   PT Last Visit   PT Visit Date 04/21/22   Note Type   Note type Evaluation   Pain Assessment   Pain Assessment Tool 0-10   Pain Score No Pain   Home Living   Type of [address] Two level;Stairs to enter with rails   Prior Function   Level of [place] Independent with ADLs and functional mobility   Lives With Family   ADL Assistance Independent   IADLs Independent   Cognition   Overall Cognitive Status WFL   Attention Within functional limits   Orientation Level Oriented X4   Memory Within functional limits   Following Commands Follows all commands and directions without difficulty   RLE Assessment   RLE Assessment WFL   LLE Assessment   LLE Assessment WFL   Coordination   Movements are Fluid and Coordinated 1   Sensation WFL   Light Touch   RLE Light Touch Impaired   RLE Light Touch Comments decreased distally but overall intact to light touch   LLE Light Touch Impaired   LLE Light Touch Comments decreased distally but overall intact to light touch   Transfers   Sit to Stand 7  Independent   Stand to Sit 7  Independent   Ambulation/Elevation   Gait pattern Short stride;Decreased foot clearance   Gait Assistance 7  Independent   Distance 200ft   Balance   Static Sitting Good   Dynamic Sitting Good   Static Standing Fair +   Dynamic Standing Fair +   Ambulatory Fair +   Endurance Deficit   Endurance Deficit Yes   Endurance Deficit Description reduced from baselnie   Activity Tolerance   Activity Tolerance Patient tolerated treatment well   Medical Staff Made Aware spoke to CM   Nurse Made Aware spoke to RN   Assessment   Prognosis Good   Problem List Decreased endurance; Impaired balance; Impaired sensation   Barriers to Discharge Inaccessible home environment;Decreased caregiver support   Goals   Patient Goals go home   Recommendation   PT Discharge Recommendation No rehabilitation needs   AM-PAC Basic Mobility Inpatient   Turning in Bed Without Bedrails 4   Lying on Back to Sitting on Edge of Flat Bed 4   Moving Bed to Chair 4   Standing Up From Chair 4   Walk in Room 4   Climb 3-5 Stairs 4   Basic Mobility Inpatient Raw Score 24   Basic Mobility Standardized Score 57 68   Highest Level Of Mobility   [facility] Goal 8: Walk 250 feet or more   End of Consult   Patient Position at End of Consult Bedside chair; All needs within reach         ASSESSMENT                                                                                                                     Maureen Porter is a 65 y o  male admitted to [facility] on 4/18/2022 for Alcohol withdrawal syndrome with complication ([address] )  Pt  has a past medical history of ETOH abuse    PT was consulted and pt was seen on 4/21/2022 for mobility assessment and d/c planning  Pt presents seated in room alert and agreeable to evaluation  Impairments limiting pt at this time include impaired balance, decreased endurance and decreased sensation  Pt is currently functioning at a independent level for bed mobility, independent level for transfers, independent level for ambulation with no assistive device  The patient's AM-PAC Basic Mobility Inpatient Short Form Raw Score is 24  A Raw score of greater than 16 suggests the patient may benefit from discharge to home  Please also refer to the recommendation of the Physical Therapist for safe discharge planning  Recommendations                                                                                                              No inpt rehab needs at this time      DME: None    Discharge Disposition:  Home with no needs      Heidi Kendrick PT, DPT

## 2022-04-21 NOTE — ASSESSMENT & PLAN NOTE
· Magnesium 1 3 on admission  · Stable at 1 7 this AM  · Resolved  · Routine monitoring of electrolytes, supplement as indicated

## 2022-04-21 NOTE — ASSESSMENT & PLAN NOTE
· Patient with a history of chronic alcohol use  · Previous admission to [address] [place] [address] unit from 01/11-01/15/22  · Pt reports he relapsed 2-3 weeks after discharge due to losing his job and his house  · Continue thiamine/folic acid supplementation, MVI  · Consult case management for assistance with rehab resources- Pt reports now no longer interested in inpatient rehab on discharge (possibly outpatient)

## 2022-04-21 NOTE — ASSESSMENT & PLAN NOTE
· CBC this AM revealed stable hgb and platelets  · Macrocytic anemia- hgb 11 9,   · Platelets 35  · Leukopenia- WBCs 2 55   · Likely 2/2 chronic myelosuppression due to alcohol use and cirrhosis  · Continue thiamine and folic acid  · Hold pharmacologic DVT ppx 2/2 PTL <50   · Continue SCDs   · Continue monitoring CBC

## 2022-04-21 NOTE — PLAN OF CARE
Problem: Potential for Falls  Goal: Patient will remain free of falls  Description: INTERVENTIONS:  - Educate patient/family on patient safety including physical limitations  - Instruct patient to call for assistance with activity   - Consult OT/PT to assist with strengthening/mobility   - Keep Call bell within reach  - Keep bed low and locked with side rails adjusted as appropriate  - Keep care items and personal belongings within reach  - Initiate and maintain comfort rounds  - Make Fall Risk Sign visible to staff  - Offer Toileting every 2 Hours, in advance of need  - Apply yellow socks and bracelet for high fall risk patients  - Consider moving patient to room near nurses station  Outcome: Progressing     Problem: SUBSTANCE USE/ABUSE  Goal: By discharge, will develop insight into their chemical dependency and sustain motivation to continue in recovery  Description: INTERVENTIONS:  - Attends all daily group sessions and scheduled AA groups  - Actively practices coping skills through participation in the therapeutic community and adherence to program rules  - Reviews and completes assignments from individual treatment plan  - Assist patient development of understanding of their personal cycle of addiction and relapse triggers  Outcome: Progressing  Goal: By discharge, patient will have ongoing treatment plan addressing chemical dependency  Description: INTERVENTIONS:  - Assist patient with resources and/or appointments for ongoing recovery based living  Outcome: Progressing     Problem: PAIN - ADULT  Goal: Verbalizes/displays adequate comfort level or baseline comfort level  Description: Interventions:  - Encourage patient to monitor pain and request assistance  - Assess pain using appropriate pain scale  - Administer analgesics based on type and severity of pain and evaluate response  - Implement non-pharmacological measures as appropriate and evaluate response  - Consider cultural and social influences on pain and pain management  - Notify physician/advanced practitioner if interventions unsuccessful or patient reports new pain  Outcome: Progressing     Problem: INFECTION - ADULT  Goal: Absence or prevention of progression during hospitalization  Description: INTERVENTIONS:  - Assess and monitor for signs and symptoms of infection  - Monitor lab/diagnostic results  - Monitor all insertion sites, i e  indwelling lines, tubes, and drains  - Monitor endotracheal if appropriate and nasal secretions for changes in amount and color  - [place] appropriate cooling/warming therapies per order  - Administer medications as ordered  - Instruct and encourage patient and family to use good hand hygiene technique  - Identify and instruct in appropriate isolation precautions for identified infection/condition  Outcome: Progressing  Goal: Absence of fever/infection during neutropenic period  Description: INTERVENTIONS:  - Monitor WBC    Outcome: Progressing     Problem: SAFETY ADULT  Goal: Patient will remain free of falls  Description: INTERVENTIONS:  - Educate patient/family on patient safety including physical limitations  - Instruct patient to call for assistance with activity   - Consult OT/PT to assist with strengthening/mobility   - Keep Call bell within reach  - Keep bed low and locked with side rails adjusted as appropriate  - Keep care items and personal belongings within reach  - Initiate and maintain comfort rounds  - Make Fall Risk Sign visible to staff  - Offer Toileting every 2 Hours, in advance of need  - Initiate/Maintain bed alarm  - Apply yellow socks and bracelet for high fall risk patients  - Consider moving patient to room near nurses station  Outcome: Progressing  Goal: Maintain or return to baseline ADL function  Description: INTERVENTIONS:  -  Assess patient's ability to carry out ADLs; assess patient's baseline for ADL function and identify physical deficits which impact ability to perform ADLs (bathing, care of mouth/teeth, toileting, grooming, dressing, etc )  - Assess/evaluate cause of self-care deficits   - Assess range of motion  - Assess patient's mobility; develop plan if impaired  - Assess patient's need for assistive devices and provide as appropriate  - Encourage maximum independence but intervene and supervise when necessary  - Involve family in performance of ADLs  - Assess for home care needs following discharge   - Consider OT consult to assist with ADL evaluation and planning for discharge  - Provide patient education as appropriate  Outcome: Progressing  Goal: Maintains/Returns to pre admission functional level  Description: INTERVENTIONS:  - Perform BMAT or MOVE assessment daily    - Set and communicate daily mobility goal to care team and patient/family/caregiver  - Collaborate with rehabilitation services on mobility goals if consulted  - Perform Range of Motion 3 times a day  - Reposition patient every 2 hours    - Dangle patient 3 times a day  - Stand patient 3 times a day  - Ambulate patient 3 times a day  - Out of bed to chair 3 times a day   - Out of bed for meals 3 times a day  - Out of bed for toileting  - Record patient progress and toleration of activity level   Outcome: Progressing     Problem: DISCHARGE PLANNING  Goal: Discharge to home or other facility with appropriate resources  Description: INTERVENTIONS:  - Identify barriers to discharge w/patient and caregiver  - Arrange for needed discharge resources and transportation as appropriate  - Identify discharge learning needs (meds, wound care, etc )  - Arrange for interpretive services to assist at discharge as needed  - Refer to Case Management Department for coordinating discharge planning if the patient needs post-hospital services based on physician/advanced practitioner order or complex needs related to functional status, cognitive ability, or social support system  Outcome: Progressing     Problem: Knowledge Deficit  Goal: Patient/family/caregiver demonstrates understanding of disease process, treatment plan, medications, and discharge instructions  Description: Complete learning assessment and assess knowledge base  Interventions:  - Provide teaching at level of understanding  - Provide teaching via preferred learning methods  Outcome: Progressing     Problem: MOBILITY - ADULT  Goal: Maintain or return to baseline ADL function  Description: INTERVENTIONS:  -  Assess patient's ability to carry out ADLs; assess patient's baseline for ADL function and identify physical deficits which impact ability to perform ADLs (bathing, care of mouth/teeth, toileting, grooming, dressing, etc )  - Assess/evaluate cause of self-care deficits   - Assess range of motion  - Assess patient's mobility; develop plan if impaired  - Assess patient's need for assistive devices and provide as appropriate  - Encourage maximum independence but intervene and supervise when necessary  - Involve family in performance of ADLs  - Assess for home care needs following discharge   - Consider OT consult to assist with ADL evaluation and planning for discharge  - Provide patient education as appropriate  Outcome: Progressing  Goal: Maintains/Returns to pre admission functional level  Description: INTERVENTIONS:  - Perform BMAT or MOVE assessment daily    - Set and communicate daily mobility goal to care team and patient/family/caregiver  - Collaborate with rehabilitation services on mobility goals if consulted  - Perform Range of Motion 3 times a day  - Reposition patient every 2 hours    - Dangle patient 3 times a day  - Stand patient 3 times a day  - Ambulate patient 3 times a day  - Out of bed to chair 3 times a day   - Out of bed for meals 3 times a day  - Out of bed for toileting  - Record patient progress and toleration of activity level   Outcome: Progressing

## 2022-04-21 NOTE — ASSESSMENT & PLAN NOTE
· Pt reports anterior cervical lymphadenopathy on admission  · Non-tender, palpable anterior cervical LAD on exam  · In the setting of chronic alcohol use and h/o chewing tobacco use  · CT soft tissue neck:  No enlarged adenopathy, no soft tissue mass    · Recommend outpatient follow-up PCP

## 2022-04-21 NOTE — ASSESSMENT & PLAN NOTE
· Patient reports occasional marijuana use  · Last used 4-5 days PTA  · UDS + for THC and cocaine  · Pt denies other substance use, including cocaine, heroin, and methamphetamine (Possible contaminant of cocaine in marijuana)  · Encourage cessation

## 2022-04-22 VITALS
DIASTOLIC BLOOD PRESSURE: 72 MMHG | HEIGHT: 72 IN | OXYGEN SATURATION: 98 % | TEMPERATURE: 97.5 F | HEART RATE: 60 BPM | SYSTOLIC BLOOD PRESSURE: 116 MMHG | WEIGHT: 193.4 LBS | RESPIRATION RATE: 18 BRPM | BODY MASS INDEX: 26.19 KG/M2

## 2022-04-22 PROBLEM — F10.939 ALCOHOL WITHDRAWAL SYNDROME WITH COMPLICATION (HCC): Status: RESOLVED | Noted: 2022-04-19 | Resolved: 2022-04-22

## 2022-04-22 PROBLEM — R94.31 PROLONGED Q-T INTERVAL ON ECG: Status: RESOLVED | Noted: 2022-01-11 | Resolved: 2022-04-22

## 2022-04-22 PROBLEM — F10.239 ALCOHOL WITHDRAWAL SYNDROME WITH COMPLICATION (HCC): Status: RESOLVED | Noted: 2022-04-19 | Resolved: 2022-04-22

## 2022-04-22 LAB
ALBUMIN SERPL BCP-MCNC: 3 G/DL (ref 3–5.2)
ALP SERPL-CCNC: 96 U/L (ref 43–122)
ALT SERPL W P-5'-P-CCNC: 33 U/L
ANION GAP SERPL CALCULATED.3IONS-SCNC: 5 MMOL/L (ref 5–14)
AST SERPL W P-5'-P-CCNC: 73 U/L (ref 17–59)
BILIRUB SERPL-MCNC: 4.53 MG/DL
BUN SERPL-MCNC: 12 MG/DL (ref 5–25)
CALCIUM ALBUM COR SERPL-MCNC: 8.8 MG/DL (ref 8.3–10.1)
CALCIUM SERPL-MCNC: 8 MG/DL (ref 8.4–10.2)
CHLORIDE SERPL-SCNC: 105 MMOL/L (ref 97–108)
CO2 SERPL-SCNC: 24 MMOL/L (ref 22–30)
CREAT SERPL-MCNC: 0.67 MG/DL (ref 0.7–1.5)
ERYTHROCYTE [DISTWIDTH] IN BLOOD BY AUTOMATED COUNT: 17.3 % (ref 11.6–15.1)
GFR SERPL CREATININE-BSD FRML MDRD: 107 ML/MIN/1.73SQ M
GLUCOSE SERPL-MCNC: 76 MG/DL (ref 70–99)
HCT VFR BLD AUTO: 34.8 % (ref 36.5–49.3)
HGB BLD-MCNC: 11.7 G/DL (ref 12–17)
MAGNESIUM SERPL-MCNC: 1.8 MG/DL (ref 1.6–2.3)
MCH RBC QN AUTO: 34.8 PG (ref 26.8–34.3)
MCHC RBC AUTO-ENTMCNC: 33.6 G/DL (ref 31.4–37.4)
MCV RBC AUTO: 104 FL (ref 82–98)
PLATELET # BLD AUTO: 29 THOUSANDS/UL (ref 149–390)
PMV BLD AUTO: 11.3 FL (ref 8.9–12.7)
POTASSIUM SERPL-SCNC: 3.9 MMOL/L (ref 3.6–5)
PROT SERPL-MCNC: 7.1 G/DL (ref 5.9–8.4)
RBC # BLD AUTO: 3.36 MILLION/UL (ref 3.88–5.62)
SODIUM SERPL-SCNC: 134 MMOL/L (ref 137–147)
WBC # BLD AUTO: 2.28 THOUSAND/UL (ref 4.31–10.16)

## 2022-04-22 PROCEDURE — 99239 HOSP IP/OBS DSCHRG MGMT >30: CPT | Performed by: PHYSICIAN ASSISTANT

## 2022-04-22 PROCEDURE — 80053 COMPREHEN METABOLIC PANEL: CPT | Performed by: PHYSICIAN ASSISTANT

## 2022-04-22 PROCEDURE — 83735 ASSAY OF MAGNESIUM: CPT | Performed by: PHYSICIAN ASSISTANT

## 2022-04-22 PROCEDURE — 85027 COMPLETE CBC AUTOMATED: CPT | Performed by: PHYSICIAN ASSISTANT

## 2022-04-22 RX ORDER — LANOLIN ALCOHOL/MO/W.PET/CERES
500 CREAM (GRAM) TOPICAL 3 TIMES DAILY
Status: DISCONTINUED | OUTPATIENT
Start: 2022-04-22 | End: 2022-04-22 | Stop reason: HOSPADM

## 2022-04-22 RX ORDER — HYDROXYZINE 50 MG/1
50 TABLET, FILM COATED ORAL EVERY 6 HOURS PRN
Qty: 30 TABLET | Refills: 0 | Status: SHIPPED | OUTPATIENT
Start: 2022-04-22

## 2022-04-22 RX ADMIN — FOLIC ACID 1 MG: 1 TABLET ORAL at 08:47

## 2022-04-22 RX ADMIN — THIAMINE HCL TAB 100 MG 500 MG: 100 TAB at 09:48

## 2022-04-22 RX ADMIN — MULTIPLE VITAMINS W/ MINERALS TAB 1 TABLET: TAB ORAL at 08:47

## 2022-04-22 RX ADMIN — LACTULOSE 20 G: 10 SOLUTION ORAL at 08:46

## 2022-04-22 NOTE — ASSESSMENT & PLAN NOTE
· QTc 524 on EKG in the ED  · Repeat EKG 4/19 a m  revealed improved QTC of 500     · Likely 2/2 electrolyte disturbances- electrolytes replenished as above  · Avoid QT prolonging agents

## 2022-04-22 NOTE — ASSESSMENT & PLAN NOTE
· H/o alcoholic cirrhosis  · Pt was discharged from the detox unit on lactulose 20 g BID,   · Pt reports he has not had OP GI f/u and has not been taking his lactulose x3 weeks  · CT abd/pelvis 4/19/2022: "Cirrhosis with signs of portal hypertension as evidenced by recanalized umbilical vein "  · CMP this AM revealed LFTs down-trending, total bilirubin 4 68  · On exam: caput medusae present, jaundice and scleral icterus   · Continue lactulose  · Encourage alcohol cessation  · Recommend prompt outpatient follow-up GI follow-up upon discharge

## 2022-04-22 NOTE — DISCHARGE SUMMARY
MEDICAL DETOX UNIT, LEVEL 4  Department of Medical Toxicology  Reason for Admission/Principal Problem:  ETOH withdrawal  Admitting provider: TIMMY Gomez MD   4/18/2022  7:34 PM       Discharging Physician / Practitioner: TIMMY Gomez  PCP: Satinder Mauricio MD  Admission Date:   Admission Orders (From admission, onward)     Ordered        04/18/22 1951  Inpatient Admission  Once                      Discharge Date: 04/22/22    Medical Problems             Resolved Problems  Date Reviewed: 1/11/2022          Resolved    Hypokalemia 4/21/2022     Resolved by  Robert Luz PA-C    Hypomagnesemia 4/21/2022     Resolved by  Robert Luz PA-C                No new Assessment & Plan notes have been filed under this hospital service since the last note was generated  Service: Medical Toxicology      Consultations During Hospital Stay:  ·  Case management     Procedures Performed:   · None    Significant Findings / Test Results:   · Pancytopenia  · Hypomagnesemia - resolved  · Hypokalemia - resolved    Incidental Findings:   · None     Test Results Pending at Discharge (will require follow up): · None     Outpatient Tests Requested:  · None    Complications:  None    Reason for Admission:  ETOH withdrawal    Hospital Course:     Alisia Barnett is a 65 y o  male patient with past medical history of alcohol use disorder, alcoholic cirrhosis, and chronic thrombocytopenia who originally presented to the hospital on 4/18/2022 requesting assistance with alcohol detoxification  He initially presented to [address] [place] [address] Emergency Department with significant tremors and concern for seizures  He endorsed symptoms of anxiety, tremors and intermittent in visual disturbances    After evaluation in emergency department patient was subsequently transferred to the medical detox unit for medical management of alcohol withdrawal   Upon arrival to the unit SEWS protocol was initiated and patient received initial dose of 650 mg phenobarbital   Due to patient's current history of alcoholic cirrhosis CT abdomen pelvis was obtained and revealed cirrhosis with signs of portal hypertension as evidence by recanalized umbilical vein  Lactulose was restarted and LFTs were monitored throughout the remainder of his hospitalization  Patient reported nontender anterior cervical lymphadenopathy, and CT soft tissue of the neck was ordered for further evaluation revealing no enlarged or pathologic adenopathy  On 04/21/22 physical therapy was consulted evaluation of ambulatory dysfunction, recommendations included no inpatient rehabilitation needs  On 4/22/22 Cole protocol was discontinued and patient had received a total of 1950 mg of phenobarbital   Alcohol withdrawal have been adequately managed, and patient no longer exhibiting symptoms  Please see above list of diagnoses and related plan for additional information  Condition at Discharge: good     Discharge Day Visit / Exam:     Subjective: Velna Newland Vitals: Blood Pressure: 134/63 (04/21/22 1908)  Pulse: 65 (04/21/22 1908)  Temperature: 98 3 °F (36 8 °C) (04/21/22 1908)  Temp Source: Temporal (04/21/22 1908)  Respirations: 18 (04/21/22 1908)  Height: 6' (182 9 cm) (04/18/22 1949)  Weight - Scale: 87 7 kg (193 lb 6 4 oz) (04/18/22 1949)  SpO2: 97 % (04/21/22 1908)  Exam:   Physical Exam  (   Be Sure to Include Physical Exam: Delete this entire line when you have entered your exam)  Discussion with Family:  Discussed with patient not with family    Discharge instructions/Information to patient and family:   See after visit summary for information provided to patient and family  Provisions for Follow-Up Care:  See after visit summary for information related to follow-up care and any pertinent home health orders        Disposition:     Home    For Discharges to [facility] SNF:   · Not Applicable to this Patient - Not Applicable to this Patient    Planned Readmission: NA     Discharge Statement:  I spent 35 minutes discharging the patient  This time was spent on the day of discharge  I had direct contact with the patient on the day of discharge  Greater than 50% of the total time was spent examining patient, answering all patient questions, arranging and discussing plan of care with patient as well as directly providing post-discharge instructions  Additional time then spent on discharge activities  Discharge Medications:  See after visit summary for reconciled discharge medications provided to patient and family        ** Please Note: This note has been constructed using a voice recognition system **

## 2022-04-22 NOTE — ASSESSMENT & PLAN NOTE
· Chronic and stable  · Total bilirubin 5 86, increased from 4 9, and 4 2   · In the setting of alcoholic cirrhosis  · Mild scleral icterus on exam  · Outpatient follow-up with PCP and GI for continued monitoring

## 2022-04-22 NOTE — ASSESSMENT & PLAN NOTE
· Patient with a history of chronic alcohol use  · Previous admission to [address] [place] [address] unit from 01/11-01/15/22  · Pt reports he relapsed 2-3 weeks after discharge due to losing his job and his house  · Treated with thiamine/folic acid supplementation, MVI  ·  saw patient and he does not want inpatient or outpatient rehab resources  Admits will seek help if he needs it

## 2022-04-22 NOTE — CASE MANAGEMENT
Case Management Discharge Planning Note    Patient name Lamonte Garvey  Location 5T DETOX 507/5T DETOX 50* MRN [id number]  : 1965 Date 2022       Current Admission Date: 2022  Current Admission Diagnosis:Alcohol withdrawal syndrome with complication [facility])   Patient Active Problem List    Diagnosis Date Noted    Alcohol withdrawal syndrome with complication ([facility] )     Marijuana use 2022    Anterior cervical lymphadenopathy 2022    [facility]ancytopenia ([facility] )     Alcoholic cirrhosis of liver ([facility] ) 2022    COVID-19 2022    Prolonged Q-T interval on ECG 2022    Right hip pain 2021    Decompensated hepatic cirrhosis ([facility] ) 2021    Liver mass 2021    Closed fracture of nasal bone 2021    Open wound of tongue due to bite 2021    Electrolyte abnormality 2021    Tobacco abuse 2020    Alcohol withdrawal seizure ([facility] ) 2020    Alcohol use disorder, severe, dependence ([facility] ) 2020    Transaminitis 2020    Hyperbilirubinemia 2020    Thrombocytopenia ([facility] ) 2020      LOS (days): 4  Geometric Mean LOS (GMLOS) (days):   Days to GMLOS:     OBJECTIVE:  Risk of Unplanned Readmission Score: 19         Current admission status: Inpatient   Preferred Pharmacy:   [address] ([address] [facility], [facility] ([address] [address])  [address] [address] [place] [address] [phone])  [place] [address]  Phone: [phone] Fax: [phone]    Primary Care Provider: Loco Hutton MD    Primary Insurance: [address] [place] [address] [facility]  Secondary Insurance:     DISCHARGE DETAILS: Cm met with pt after cm was informed by medical staff that pt had arranged discharge transportation by family home today at [address] acute [facility] met with pt and pt stated he no longer wanted inpatient treatment and did not want any scheduled appointments to outpatient care   Pt stated he wanted to return to work and the gym and did not feel he needed anything else to avoid relapse  Pt stated he would not attend any scheduled appointments  Pt stated family would provide transportation  Cm asked if pt would like cm to also communicate with family  Pt stated this was not necessary   Pt will be discharged home with information for assistance placed on AVS     Discharge planning discussed with[de-identified] patient  Freedom of Choice: Yes                                  Other Referral/Resources/Interventions Provided:  Referrals Provided[de-identified] Support Group,Therapist,IOP,Crisis Hotline    Would you like to participate in our [facility] service program?  : No - Declined                                              Family notified[de-identified] declined to sign ASIM

## 2022-04-22 NOTE — UTILIZATION REVIEW
Continued Stay Review    Date: 4/21/22                          Current Patient Class: IP  Current Level of Care: Med/Surg    HPI:57 y o  male initially admitted on 4/18/22 for alcohol withdrawal syndrome  Assessment/Plan: Pt reports feeling improved overall with adequate appetite and PO intake  Initially was interested in inpatient rehab, but no longer wishes to pursue that course of treatment  On exam, scleral icterus, caput medusae, jaundiced, tremors b/l UE  Plan: appears stable off phenobarbital but with continued tremors will continue to monitor vitals closely to ensure complete resolution of withdrawal, continue high-dose IV thiamine, folic acid, continue lactulose, hold pharmacologic DVT ppx s/t thrombocytopenia, SCDs, encourage cessation of substance use and alcohol use  Trend labs, replete electrolytes as needed      Vital Signs:   Date/Time Temp Pulse Resp BP MAP (mmHg) SpO2 O2 Device   04/22/22 0700 97 5 °F (36 4 °C) 60 18 116/72 86 98 % None (Room air)   04/21/22 1908 98 3 °F (36 8 °C) 65 18 134/63 -- 97 % None (Room air)   04/21/22 1525 98 3 °F (36 8 °C) 73 18 135/79 97 99 % None (Room air)   04/21/22 1111 -- 64 -- 114/66 -- 98 % None (Room air)   04/21/22 0723 97 7 °F (36 5 °C) 73 18 116/69 -- 100 % None (Room air)   04/21/22 0500 97 6 °F (36 4 °C) 71 18 124/76 -- 97 % None (Room air)   04/20/22 2300 -- 60 18 -- -- 96 % None (Room air)   04/20/22 2100 98 °F (36 7 °C) 65 18 131/81 -- 94 % None (Room air)   04/20/22 1924 98 7 °F (37 1 °C) 76 18 130/72 -- 98 % None (Room air)   04/20/22 1528 99 °F (37 2 °C) 62 16 125/73 -- 98 % None (Room air)   04/20/22 1208 -- 86 -- 118/51 -- -- --   04/20/22 1004 -- 66 -- 132/66 -- -- --   04/20/22 0807 98 6 °F (37 °C) 86 16 146/93 -- 97 % None (Room air)   04/20/22 0539 98 6 °F (37 °C) 66 16 101/56 -- 96 % None (Room air)         Pertinent Labs/Diagnostic Results:   Results from last 7 days   Lab Units 04/18/22  1635   SARS-COV-2  Negative     Results from last 7 days   Lab Units 04/22/22  0458 04/21/22  0554 04/20/22  0506 04/19/22  0528 04/19/22  0528 04/18/22  1615 04/18/22  1615   WBC Thousand/uL 2 28* 2 55* 2 85*   < > 2 23*   < > 2 88*   HEMOGLOBIN g/dL 11 7* 11 9* 12 9  --  12 1  --  12 6   HEMATOCRIT % 34 8* 35 5* 37 0  --  35 6*  --  37 3   PLATELETS Thousands/uL 29* 35* 32*   < > 30*   < > 28*   NEUTROS ABS Thousands/µL  --   --   --   --   --   --  2 03    < > = values in this interval not displayed       Results from last 7 days   Lab Units 04/22/22  0458 04/21/22  0554 04/20/22  0506 04/19/22  0529 04/18/22  1615   SODIUM mmol/L 134* 133* 133* 135* 136   POTASSIUM mmol/L 3 9 3 8 3 9 3 6 3 4*   CHLORIDE mmol/L 105 105 104 104 100   CO2 mmol/L 24 24 24 27 24   ANION GAP mmol/L 5 4* 5 4* 12   BUN mg/dL 12 12 10 10 7   CREATININE mg/dL 0 67* 0 65* 0 60* 0 55* 0 60   EGFR ml/min/1 73sq m 107 108 112 116 112   CALCIUM mg/dL 8 0* 8 0* 8 3* 7 8* 8 4   MAGNESIUM mg/dL 1 8 1 7 1 7 1 8 1 3*     Results from last 7 days   Lab Units 04/22/22  0458 04/21/22  0554 04/20/22  0506 04/19/22  0529 04/18/22  1615   AST U/L 73* 72* 99* 115* 134*  127*   ALT U/L 33 33 42 42 51  51   ALK PHOS U/L 96 98 104 109 114  116   TOTAL PROTEIN g/dL 7 1 7 1 7 9 7 5 7 5  7 5   ALBUMIN g/dL 3 0 3 0 3 3 3 2 2 9*  3 0*   TOTAL BILIRUBIN mg/dL 4 53* 4 68* 5 86* 4 90* 4 26*  4 39*   BILIRUBIN DIRECT mg/dL  --   --   --   --  2 43*   AMMONIA umol/L  --   --   --  57*  --      Results from last 7 days   Lab Units 04/22/22  0458 04/21/22  0554 04/20/22  0506 04/19/22  0529 04/18/22  1615   GLUCOSE RANDOM mg/dL 76 85 99 111* 124     Results from last 7 days   Lab Units 04/18/22  1615   PROTIME seconds 17 0*   INR  1 42*     Results from last 7 days   Lab Units 04/18/22  1615   LIPASE u/L 302      Results from last 7 days   Lab Units 04/19/22  0727   CLARITY UA  Clear   COLOR UA  Germaine*   SPEC GRAV UA  1 015   PH UA  7 0   GLUCOSE UA mg/dl Negative   KETONES UA mg/dl Negative   BLOOD UA  Negative PROTEIN UA mg/dl Negative   NITRITE UA  Negative   BILIRUBIN UA  Negative   UROBILINOGEN UA mg/dL 4 0*   LEUKOCYTES UA  Negative     Results from last 7 days   Lab Units 04/18/22  1635   INFLUENZA A PCR  Negative   INFLUENZA B PCR  Negative   RSV PCR  Negative     Results from last 7 days   Lab Units 04/18/22  1855   AMPH/METH  Negative   BARBITURATE UR  Negative   BENZODIAZEPINE UR  Negative   COCAINE UR  Positive*   METHADONE URINE  Negative   OPIATE UR  Negative   PCP UR  Negative   THC UR  Positive*     Results from last 7 days   Lab Units 04/18/22  1615   ETHANOL LVL mg/dL 15*       Medications:   Scheduled Medications:  folic acid, 1 mg, Oral, Daily  lactulose, 20 g, Oral, BID  multivitamin-minerals, 1 tablet, Oral, Daily  thiamine, 500 mg, Intravenous, TID  traZODone, 50 mg, Oral, HS    Continuous IV Infusions:    sodium chloride 0 9 %, 125 mL/hr, Intravenous, Continuous    PRN Meds:  acetaminophen, 650 mg, Oral, Q6H PRN  hydrOXYzine HCL, 50 mg, Oral, Q6H PRN; 4/21 x3        Discharge Plan: D    Network Utilization Review Department  ATTENTION: Please call with any questions or concerns to [phone] and carefully listen to the prompts so that you are directed to the right person  All voicemails are confidential   Tila Zenon all requests for admission clinical reviews, approved or denied determinations and any other requests to dedicated fax number below belonging to the campus where the patient is receiving treatment   List of dedicated fax numbers for the Facilities:  [address] DENIALS (Administrative/Medical Necessity) [phone]   [address] (Maternity/NICU/Pediatrics) [phone]   [address] [place] [address] [address] [facility] [address] [address] [address] [address]rena [phone] [address] [phone]

## 2022-04-22 NOTE — ASSESSMENT & PLAN NOTE
· Patient with a history of chronic alcohol use; h/o DTs and withdrawal seizures  · Serum EtOH 15 in the ED (4/18/22, 1615)  · Received Ativan 4 mg in the ED  · Improved withdrawal symptoms  · Discontinued SEWS protocol with symptom-triggered phenobarbital followed for medical management of alcohol withdrawal  · Received 1949 4 mg total phenobarbital; last dose 4/20/2022 2110 pm  · Currently appears stable off phenobarbital- mild intention tremor b/l UEs; VSS  · Outpatient follow-up with PCP

## 2022-04-22 NOTE — ASSESSMENT & PLAN NOTE
· Chronic per patient's EMR  · CBC this AM revealed stable hgb and platelets  · Macrocytic anemia- hgb 11 9,   · Platelets 35  · Leukopenia- WBCs 2 55   · Likely 2/2 chronic myelosuppression due to alcohol use and cirrhosis  · Treated with thiamine and folic acid  · Hold pharmacologic DVT ppx 2/2 PTL <50   · Continue SCDs   · Outpatient follow-up with PCP

## 2022-04-22 NOTE — DISCHARGE SUMMARY
MEDICAL DETOX UNIT, LEVEL 4  Department of Medical Toxicology  Reason for Admission/Principal Problem:  ETOH withdrawal  Admitting provider: LUCIE Bradley MD   4/18/2022  7:34 PM       Discharging Physician / Practitioner: Comfort Griffiths PA-C  PCP: Nataly Osuna MD  Admission Date:   Admission Orders (From admission, onward)     Ordered        04/18/22 1951  Inpatient Admission  Once                      Discharge Date: 04/22/22    Medical Problems             Resolved Problems  Date Reviewed: 4/22/2022          Resolved    Hypokalemia 4/21/2022     Resolved by  [facility] LUCIE Luz    Hypomagnesemia 4/21/2022     Resolved by  [facility] LUCIE Luz                Anterior cervical lymphadenopathy  Assessment & Plan  · Pt reports anterior cervical lymphadenopathy on admission  · Non-tender, palpable anterior cervical LAD on exam  · In the setting of chronic alcohol use and h/o chewing tobacco use  · CT soft tissue neck:  No enlarged adenopathy, no soft tissue mass  · Recommend outpatient follow-up PCP    Marijuana use  Assessment & Plan  · Patient reports occasional marijuana use  · Last used 4-5 days PTA  · UDS + for THC and cocaine  · Pt denies other substance use, including cocaine, heroin, and methamphetamine (Possible contaminant of cocaine in marijuana)  · Encourage cessation    Pancytopenia ([facility] [address] )  Assessment & Plan  · Chronic per patient's EMR  · CBC this AM revealed stable hgb and platelets  · Macrocytic anemia- hgb 11 9,   · Platelets 35  · Leukopenia- WBCs 2 55   · Likely 2/2 chronic myelosuppression due to alcohol use and cirrhosis  · Treated with thiamine and folic acid  · Hold pharmacologic DVT ppx 2/2 PTL <50   · Continue SCDs   · Outpatient follow-up with PCP    Prolonged Q-T interval on ECG  Assessment & Plan  · QTc 524 on EKG in the ED  · Repeat EKG 4/19 a m  revealed improved QTC of 500     · Likely 2/2 electrolyte disturbances- electrolytes replenished as above  · Avoid QT prolonging agents    Alcoholic cirrhosis of liver (HCC)  Assessment & Plan  · H/o alcoholic cirrhosis  · Pt was discharged from the detox unit on lactulose 20 g BID,   · Pt reports he has not had OP GI f/u and has not been taking his lactulose x3 weeks  · CT abd/pelvis 4/19/2022: "Cirrhosis with signs of portal hypertension as evidenced by recanalized umbilical vein "  · CMP this AM revealed LFTs down-trending, total bilirubin 4 68  · On exam: caput medusae present, jaundice and scleral icterus   · Continue lactulose  · Encourage alcohol cessation  · Recommend prompt outpatient follow-up GI follow-up upon discharge    Hyperbilirubinemia  Assessment & Plan  · Chronic and stable  · Total bilirubin 5 86, increased from 4 9, and 4 2   · In the setting of alcoholic cirrhosis  · Mild scleral icterus on exam  · Outpatient follow-up with PCP and GI for continued monitoring    Alcohol use disorder, severe, dependence ([facility] [address] )  Assessment & Plan  · Patient with a history of chronic alcohol use  · Previous admission to [address] [place] [address] Detox unit from 01/11-01/15/22  · Pt reports he relapsed 2-3 weeks after discharge due to losing his job and his house  · Treated with thiamine/folic acid supplementation, MVI  ·  saw patient and he does not want inpatient or outpatient rehab resources  Admits will seek help if he needs it      * Alcohol withdrawal syndrome with complication [facility])  Assessment & Plan  · Patient with a history of chronic alcohol use; h/o DTs and withdrawal seizures  · Serum EtOH 15 in the ED (4/18/22, 1615)  · Received Ativan 4 mg in the ED  · Improved withdrawal symptoms  · Discontinued SEWS protocol with symptom-triggered phenobarbital followed for medical management of alcohol withdrawal  · Received 1949 4 mg total phenobarbital; last dose 4/20/2022 2110 pm  · Currently appears stable off phenobarbital- mild intention tremor b/l UEs; VSS  · Outpatient follow-up with PCP      Consultations During Hospital Stay:  ·  Case management     Procedures Performed:   · None    Significant Findings / Test Results:   · Pancytopenia  · Hypomagnesemia - resolved  · Hypokalemia - resolved    Incidental Findings:   · None     Test Results Pending at Discharge (will require follow up): · None     Outpatient Tests Requested:  · None    Complications:  None    Reason for Admission:  ETOH withdrawal    Hospital Course:     Jordin Lundy is a 65 y o  male patient with past medical history of alcohol use disorder, alcoholic cirrhosis, and chronic thrombocytopenia who originally presented to the hospital on 4/18/2022 requesting assistance with alcohol detoxification  He initially presented to [address] [place] [address] Emergency Department with significant tremors and concern for seizures  He endorsed symptoms of anxiety, tremors and intermittent in visual disturbances  After evaluation in emergency department patient was subsequently transferred to the medical detox unit for medical management of alcohol withdrawal   Upon arrival to the unit SEWS protocol was initiated and patient received initial dose of 650 mg phenobarbital   Due to patient's current history of alcoholic cirrhosis CT abdomen pelvis was obtained and revealed cirrhosis with signs of portal hypertension as evidence by recanalized umbilical vein  Lactulose was restarted and LFTs were monitored throughout the remainder of his hospitalization  Patient reported nontender anterior cervical lymphadenopathy, and CT soft tissue of the neck was ordered for further evaluation revealing no enlarged or pathologic adenopathy  On 04/21/22 physical therapy was consulted evaluation of ambulatory dysfunction, recommendations included no inpatient rehabilitation needs    On 4/22/22 [place] protocol was discontinued and patient had received a total of 1950 mg of phenobarbital   Alcohol withdrawal have been adequately managed, and patient no longer exhibiting symptoms  Please see above list of diagnoses and related plan for additional information  Condition at Discharge: good     Discharge Day Visit / Exam:     Subjective:  Pt was seen and a bedside examination performed  Pt admits feeling much better and agreeable to being discharge  Pt admits does not want any inpatient/outpatient rehab resources provided for him  Pt admits will follow-up as needed regarding his alcohol dependence  Pt otherwise denies any current tremors, N/V, abdominal pain, chills, CP, SOB or any other complaint on review of systems  Vitals: Blood Pressure: 116/72 (04/22/22 0700)  Pulse: 60 (04/22/22 0700)  Temperature: 97 5 °F (36 4 °C) (04/22/22 0700)  Temp Source: Temporal (04/22/22 0700)  Respirations: 18 (04/22/22 0700)  Height: 6' (182 9 cm) (04/18/22 1949)  Weight - Scale: 87 7 kg (193 lb 6 4 oz) (04/18/22 1949)  SpO2: 98 % (04/22/22 0700)  Exam:   Physical Exam  Constitutional:       General: He is not in acute distress  Appearance: Normal appearance  He is not ill-appearing  HENT:      Head: Normocephalic and atraumatic  Nose: No congestion or rhinorrhea  Eyes:      General: Scleral icterus present  Extraocular Movements: Extraocular movements intact  Conjunctiva/sclera: Conjunctivae normal       Pupils: Pupils are equal, round, and reactive to light  Cardiovascular:      Rate and Rhythm: Normal rate and regular rhythm  Pulses: Normal pulses  Heart sounds: Normal heart sounds  Pulmonary:      Effort: Pulmonary effort is normal  No respiratory distress  Breath sounds: Normal breath sounds  No stridor  No wheezing  Abdominal:      General: Bowel sounds are normal       Palpations: Abdomen is soft  Tenderness: There is no abdominal tenderness  Musculoskeletal:         General: No swelling or tenderness  Cervical back: Normal range of motion and neck supple  No rigidity or tenderness     Skin:     General: Skin is warm and dry  Capillary Refill: Capillary refill takes less than 2 seconds  Coloration: Skin is not jaundiced  Findings: No bruising or lesion  Neurological:      Mental Status: He is alert and oriented to person, place, and time  GCS: GCS eye subscore is 4  GCS verbal subscore is 5  GCS motor subscore is 6  Motor: No tremor, seizure activity or pronator drift  Coordination: Finger-Nose-Finger Test normal    Psychiatric:         Mood and Affect: Mood normal          Thought Content: Thought content is not paranoid or delusional  Thought content does not include homicidal or suicidal ideation  Thought content does not include homicidal or suicidal plan  Discussion with Family:  Discussed with patient not with family    Discharge instructions/Information to patient and family:   See after visit summary for information provided to patient and family  Provisions for Follow-Up Care:  See after visit summary for information related to follow-up care and any pertinent home health orders  Disposition:     Home    For Discharges to [facility] SNF:   · Not Applicable to this Patient - Not Applicable to this Patient    Planned Readmission: NA     Discharge Statement:  I spent 35 minutes discharging the patient  This time was spent on the day of discharge  I had direct contact with the patient on the day of discharge  Greater than 50% of the total time was spent examining patient, answering all patient questions, arranging and discussing plan of care with patient as well as directly providing post-discharge instructions  Additional time then spent on discharge activities  Discharge Medications:  See after visit summary for reconciled discharge medications provided to patient and family        ** Please Note: This note has been constructed using a voice recognition system **

## 2022-04-22 NOTE — DISCHARGE INSTRUCTIONS
Abuse of Alcohol   WHAT YOU NEED TO KNOW:   Alcohol abuse means you drink more than the recommended daily or weekly limits  You may be drinking alcohol regularly or drinking large amounts in a short period of time (binge drinking)  You continue to drink even when it causes legal, work, or relationship problems  DISCHARGE INSTRUCTIONS:   Call your local emergency number (911 in the [address]), or have someone call if:   · You have sudden chest pain or trouble breathing  · You want to harm yourself or others  · You have a seizure  Seek care immediately if:   · You cannot stop vomiting or you vomit blood  Call your doctor if:   · You have hallucinations (you see or hear things that are not real)  · You have questions or concerns about your condition or care  Medicines:   · Vitamin supplements  may be given to treat low vitamin levels  Alcohol can make it hard for your body to absorb enough vitamins such as B1  Vitamin supplements may also be given to prevent alcohol-related brain damage  · Take your medicine as directed  Contact your healthcare provider if you think your medicine is not helping or if you have side effects  Tell him or her if you are allergic to any medicine  Keep a list of the medicines, vitamins, and herbs you take  Include the amounts, and when and why you take them  Bring the list or the pill bottles to follow-up visits  Carry your medicine list with you in case of an emergency  Recommended alcohol limits:  One drink is defined as 12 ounces (oz) of beer, 5 oz of wine, or 1 5 oz of liquor such as whiskey  · Men 21 to 64 years  should limit alcohol to 2 drinks a day  Do not have more than 4 drinks in 1 day or more than 14 in 1 week  · All women, and men 72 or older  should limit alcohol to 1 drink in a day  Do not have more than 3 drinks in 1 day or more than 7 in 1 week  Do not drink alcohol if you are pregnant      Health problems alcohol abuse can cause:   · Cancer in your liver, pancreas, stomach, colon, kidney, or breast    · Stroke or a heart attack    · Liver, kidney, or lung disease    · Blackouts, memory loss, brain damage, or dementia    · Diabetes, immune system problems, or thiamine (vitamin B1) deficiency    · Problems for you and your baby if you drink while pregnant    Manage alcohol use:   · Work with healthcare providers on goals to drink less  This can help prevent health problems  For example, you may start by planning your weekly alcohol use  It will be easier to have fewer drinks if you plan ahead  · Have food when you drink alcohol  Food will prevent alcohol from getting into your system too quickly  Eat before you have your first alcohol drink  · Time your drinks carefully  Have no more than 1 drink in an hour  Have a liquid such as water, coffee, or a soft drink between alcohol drinks  · Do not drive if you have had alcohol  Plan ahead so you have a safe ride home  Make sure someone who has not been drinking can help you get home safely  Plan to use a taxi or other ride service, if needed  · Do not drink alcohol if you are taking medicine  Alcohol is dangerous when you combine it with certain medicines, such as acetaminophen or blood pressure medicine  Talk to your healthcare provider about all the medicines you currently take  Follow up with your doctor as directed:  Write down your questions so you remember to ask them during your visits  For support and more information:   · Alcoholics Anonymous  Web Address: http://www merritt info/    · Substance [facility] and [facility] [address]  Web Address: https://Zeenoh/    © 244[facility] 2022 Information is for End User's use only and may not be sold, redistributed or otherwise used for commercial purposes   All illustrations and images included in CareNotes® are the copyrighted property of A D A M , Inc  or Jailene Lundberg  [facility] above information is an  only  It is not intended as medical advice for individual conditions or treatments  Talk to your doctor, nurse or pharmacist before following any medical regimen to see if it is safe and effective for you  Cirrhosis   WHAT YOU NEED TO KNOW:   Cirrhosis is long-term scarring of the liver  The liver makes enzymes and bile that help digest food and gives your body energy  It also removes harmful material from your body, such as alcohol and other chemicals  Cirrhosis is caused by repeated damage to your liver over time  Scar tissue starts to replace healthy liver tissue  The scar tissue prevents the liver from working properly  DISCHARGE INSTRUCTIONS:   Return to the emergency department if:   · You have pain during a bowel movement and it is black or contains blood  · You have a fast heart rate and fast breathing  · You are dizzy or confused  · You have severe pain in your abdomen  · You have trouble breathing  · Your vomit looks like it has coffee grinds or blood in it  Contact your healthcare provider if:   · You have a fever  · You have red or itchy skin  · You are in pain and feel weak  · You have questions or concerns about your condition or care  Medicines: You may need medicine to treat the cause of your cirrhosis  You may also need medicine to treat any health problems caused by cirrhosis  · Antiviral medicine  may be needed if your cirrhosis is caused by hepatitis  Antiviral medicine may prevent or decrease swelling and damage to your liver  · Blood pressure medicine  is used to treat high blood pressure in the portal vein (the vein that goes to your liver)  · Diuretics  decrease extra fluid that collects in a part of your body, such as your legs and abdomen  Diuretics can also decrease your blood pressure  You will urinate more often when you take this medicine      · Antibiotics  help prevent or treat a bacterial infection  · Take your medicine as directed  Contact your healthcare provider if you think your medicine is not helping or if you have side effects  Tell him or her if you are allergic to any medicine  Keep a list of the medicines, vitamins, and herbs you take  Include the amounts, and when and why you take them  Bring the list or the pill bottles to follow-up visits  Carry your medicine list with you in case of an emergency  Do not drink alcohol:  Alcohol will cause more damage to your liver  Manage cirrhosis:   · Do not smoke  Nicotine and other chemicals in cigarettes and cigars can cause blood vessel and lung damage  Ask your healthcare provider for information if you currently smoke and need help to quit  E-cigarettes or smokeless tobacco still contain nicotine  Talk to your healthcare provider before you use these products  · Eat a variety of healthy foods  Healthy foods include fruits, vegetables, whole-grain breads, low-fat dairy products, beans, lean meat, and fish  Ask if you need to be on a special diet  · Reach or maintain a healthy weight  You may develop fatty liver disease if you are overweight  Ask your healthcare provider for a healthy weight for you  He can help you create a safe weight loss plan if you are overweight  · Limit sodium (salt)  You may need to decrease the amount of sodium you eat if you have swelling caused by fluid buildup  Sodium is found in table salt and salty foods such as canned foods, frozen foods, and potato chips  · Drink liquids as directed  Ask how much liquid to drink each day and which liquids are best for you  For most people, good liquids to drink are water, juice, and milk  Liquids can help your liver work better  · Ask about vaccines  You may have a hard time fighting infection because of cirrhosis  Vaccines help protect you against viruses that can cause diseases such as the flu or hepatitis   Viral hepatitis is caused by a virus that leads to inflammation of the liver  You may need a hepatitis A or B vaccine  You may also need a pneumonia vaccine  Always get a flu vaccine each year as soon as it becomes available  · Ask about medicines  Some medicines can harm your liver  Acetaminophen is an example  Talk to your healthcare provider about all your medicines  Do not take any over-the-counter medicine or herbal supplements until your healthcare provider says it is okay  Follow up with your doctor as directed:  Write down your questions so you remember to ask them during your visits  © Copyright [employer] 2022 Information is for End User's use only and may not be sold, redistributed or otherwise used for commercial purposes  All illustrations and images included in CareNotes® are the copyrighted property of A D A M , Inc  or Jailene Robbins   The above information is an  only  It is not intended as medical advice for individual conditions or treatments  Talk to your doctor, nurse or pharmacist before following any medical regimen to see if it is safe and effective for you  Pancytopenia   WHAT YOU NEED TO KNOW:   What is pancytopenia? Pancytopenia is low levels of red blood cells, white blood cells, and platelets  Red blood cells carry oxygen to all the organs and tissues of your body  White blood cells help your body fight infection by attacking and killing germs  Platelets stop the bleeding when you are cut or injured  Pancytopenia increases your risk for infection and bleeding  Without treatment these problems can become life-threatening  What causes pancytopenia? There are many causes of pancytopenia   Any of the following may cause pancytopenia:  · A genetic condition that causes low blood cell levels    · An autoimmune condition that attacks the bone marrow    · A viral or bacterial infection    · Pregnancy    · Not enough folic acid or vitamin Z25 in the foods you eat    · Cancer, chemotherapy, or radiation therapy    · Exposure to toxins such as benzene, arsenic, or insecticides    · Some medicines, such as antiseizure medicine, antibiotics, or medicines to treat autoimmune diseases    What are the signs and symptoms of pancytopenia? · Feeling tired, weak, dizzy, or short of breath    · Frequent fevers or infections    · Pale skin or purple or red dots on the skin    · Bleeding from the gums or nose, blood in bowel movements or urine, or heavy bleeding from a cut    · Heavy menstrual bleeding in females    · Bruising easily, or getting bruises without an injury    How is pancytopenia diagnosed? Your healthcare provider will examine you  Tell the provider about any symptoms you have  You may need any of the following:  · Blood tests  are used to check for infection and blood cell levels  Other blood tests are used to find the cause of pancytopenia  · A bone marrow biopsy  is a procedure to remove a small amount of bone marrow from your bone  This procedure is used to check how well your bone marrow is making blood cells  The bone marrow sample is also checked for tumors or anything that prevents the bone marrow from making healthy cells  How is pancytopenia treated? The treatment of pancytopenia depends on the cause  You may need any of the following:  · A blood transfusion  can help increase red blood cell, white blood cell, and platelet levels  This may prevent bleeding or organ damage  This does not treat pancytopenia  Instead, a blood transfusion may keep you safe until the cause of pancytopenia is known  · A stem cell transplant  is a procedure to replace unhealthy stem cells with healthy cells  Stem cells are able to become all of the blood cells  Stem cells can also travel to your bone marrow and can become new bone marrow cells  How should I balance rest with activity? Rest when needed  Rest will help you save energy for other activities   Do activities when your energy levels are the highest  Know your limits and do not plan too many activities for one day  How do I prevent or control bleeding? · Do not take aspirin or NSAIDs  These medicines can cause you to bleed and bruise more easily  · Use caution with skin and mouth care  Use a soft washcloth and a soft toothbrush  This can keep your skin and gums from bleeding  Keep your nails trimmed to prevent scratches  If you shave, use an electric shaver  · Apply firm, steady pressure to stop bleeding from a wound  Apply pressure with a clean gauze or towel for 5 to 10 minutes  Call 911 if bleeding becomes heavy or does not stop  · Do not play contact sports or do activities that can cause bleeding  Ask your healthcare provider what activities are safe for you to do  What can I do to prevent infections? · Wash your hands often  Use an alcohol-based hand rub if soap and water are not available  · Stay away from crowds and anyone who may be sick  Ask your healthcare provider if you need to wear a mask in public places  · Eat a low-bacteria diet as directed  This will help decrease your risk for an infection  Choose, prepare, and cook foods that contain a low amount of bacteria  Examples include pasteurized milk, well-cooked meats, and cooked pasta  Ask your healthcare provider for more information about a low-bacteria diet  Call 911 of have someone else call for any of the following:   · You cannot be woken  · You have a seizure  · You have trouble breathing  · You cannot stop the bleeding from a wound even after you hold firm pressure for 10 minutes  When should I seek immediate care? · You have a fever or chills  · You feel dizzy or you faint  · You have blood in your bowel movements or urine  · Your heart is beating faster than usual     When should I contact my healthcare provider? · You have a rash or red or purple dots on your skin       · You feel more tired than usual  · You have questions or concerns about your condition or care  CARE AGREEMENT:   You have the right to help plan your care  Learn about your health condition and how it may be treated  Discuss treatment options with your healthcare providers to decide what care you want to receive  You always have the right to refuse treatment  The above information is an  only  It is not intended as medical advice for individual conditions or treatments  Talk to your doctor, nurse or pharmacist before following any medical regimen to see if it is safe and effective for you  © Copyright [employer] 2022 Information is for End User's use only and may not be sold, redistributed or otherwise used for commercial purposes  All illustrations and images included in CareNotes® are the copyrighted property of [employer]  or Feast de alcohol   LO QUE NECESITA SABER:   La abstinencia del alcohol es un miri de síntomas que ocurren cuando usted ashish alcohol a diario y galileo de beberlo repentinamente  Puede comenzar dentro de las 5 horas después de willingham última bebida y DEVANTESDORF a los 2 o 3 días  La abstinencia también podría suceder si usted reduce repentinamente la cantidad de alcohol que jorge normalmente  INSTRUCCIONES SOBRE EL JENN HOSPITALARIA:   Llame al [facility] de emergencias local (911 en los [place]) en cualquiera de los siguientes casos:  · Tiene dolor en el pecho o dificultad para respirar de forma repentina  · Se desmaya o taylor que ha convulsionado  · Usted siente vernell si Ricky Covert lastimarse o lastimar a otras personas  Llame a willingham médico si:  · [place]illingham respiración o latidos cardíacos están más rápidos de lo normal     · Usted está confundido, alucinando o demasiado agitado  · Usted no puede dejar de vomitar o vomita urmila  · Está temblando y no mejora después de kathryn willingham medicamento      · Usted tiene preguntas o inquietudes acerca de willingham condición o cuidado  Medicamentos:  · Los medicamentos se pueden administrar para calmarlo y ayudarle a manejar lavon síntomas  Se pueden recomendar suplementos vitamínicos vernell tiamina (vitamina B1)  · Sangrey lavon medicamentos vernell se le haya indicado  Consulte con ramos médico si usted taylor que ramos medicamento no le está ayudando o si presenta efectos secundarios  Infórmele si es alérgico a algún medicamento  Mantenga ashley lista actualizada de los [place], las vitaminas y los productos herbales que jorge  Incluya los siguientes datos de los medicamentos: cantidad, frecuencia y motivo de administración  Traiga con usted la lista o los envases de las píldoras a lavon citas de seguimiento  Lleve la lista de los medicamentos con usted en james de ashley emergencia  Asegúrese que alguien esté con usted trish ramos abstinencia: [place] persona debe ayudarle a kathryn lavon medicamentos y [place] en un ambiente calmado y tranquilo  También debe vigilar lavon síntomas y saber qué hacer si lavon síntomas Eldonna Baldy  Aprenda a dejar de kathryn alcohol de forma sahni: [place] con ramos médico para crear un plan que usted pueda seguir para dejar de kathryn alcohol de ashley forma sahni  Reshma Marrow lavon hábitos repentinamente puede ser letal   Para [de-identified] y más información:  · Alcoholics Anonymous  Web Address: http://www merritt info/    · Substance Abuse and [address] [address]  Web Address: https://Service2Media/    Programe ashley monica con ramos médico dentro de 1 día: Anote lavon preguntas para que se acuerde de hacerlas trish lavon visitas  © Copyright [employer] 2022 Information is for End User's use only and may not be sold, redistributed or otherwise used for commercial purposes  All illustrations and images included in CareNotes® are the copyrighted property of A ASHA A Mayela RUSH  or [address] [place] [address] es sólo para uso en educación   Ramos intención no es darle un consejo [employer] enfermedades o tratamientos  Colsulte con willingham Alexandro Sloane farmacéutico antes de seguir cualquier régimen médico para saber si es seguro y efectivo para usted

## 2022-04-25 NOTE — UTILIZATION REVIEW
Notification of Discharge   This is a Notification of Discharge from our facility [address]  Please be advised that this patient has been discharge from our facility  Below you will find the admission and discharge date and time including the patients disposition  UTILIZATION REVIEW CONTACT:  Delfino Fleming MA  Utilization   Network Utilization Review Department  Phone: [phone] x carefully listen to the prompts  All voicemails are confidential   Email: [email or web address]     PHYSICIAN ADVISORY SERVICES:  FOR [id number] REVIEW - MEDICAL NECESSITY DENIAL  Phone: [phone]  Fax: [phone]  Email: [email or web address]     PRESENTATION DATE: 4/18/2022  7:34 PM  OBERVATION ADMISSION DATE:   INPATIENT ADMISSION DATE: 4/18/22  7:34 PM   DISCHARGE DATE: 4/22/2022  3:57 PM  DISPOSITION: Home/Self Care Home/Self Care      IMPORTANT INFORMATION:  Send all requests for admission clinical reviews, approved or denied determinations and any other requests to dedicated fax number below belonging to the campus where the patient is receiving treatment   List of dedicated fax numbers:  [address] DENIALS (Administrative/Medical Necessity) [phone]   [address] (Maternity/NICU/Pediatrics) [phone]   [facility] [phone]   [address] [facility] [phone]   [address] [place] [address] [phone]   [address] [phone]   [facility]  [phone]   [address] [place] [address], [facility]  [address] [phone]

## 2022-09-14 ENCOUNTER — APPOINTMENT (INPATIENT)
Dept: RADIOLOGY | Facility: HOSPITAL | Age: 57
DRG: 433 | End: 2022-09-14
Payer: COMMERCIAL

## 2022-09-14 ENCOUNTER — HOSPITAL ENCOUNTER (INPATIENT)
Facility: HOSPITAL | Age: 57
LOS: 3 days | Discharge: LEFT BEFORE DISCHARGE | DRG: 433 | End: 2022-09-17
Attending: EMERGENCY MEDICINE | Admitting: STUDENT IN AN ORGANIZED HEALTH CARE EDUCATION/TRAINING PROGRAM
Payer: COMMERCIAL

## 2022-09-14 ENCOUNTER — APPOINTMENT (EMERGENCY)
Dept: RADIOLOGY | Facility: HOSPITAL | Age: 57
DRG: 433 | End: 2022-09-14
Payer: COMMERCIAL

## 2022-09-14 DIAGNOSIS — K92.0 HEMATEMESIS, UNSPECIFIED WHETHER NAUSEA PRESENT: ICD-10-CM

## 2022-09-14 DIAGNOSIS — R23.8 BULLAE: ICD-10-CM

## 2022-09-14 DIAGNOSIS — K70.30 ALCOHOLIC CIRRHOSIS OF LIVER WITHOUT ASCITES (HCC): ICD-10-CM

## 2022-09-14 DIAGNOSIS — K74.60 CIRRHOSIS (HCC): Primary | ICD-10-CM

## 2022-09-14 DIAGNOSIS — K76.6 PORTAL HYPERTENSION (HCC): ICD-10-CM

## 2022-09-14 DIAGNOSIS — D69.6 THROMBOCYTOPENIA (HCC): ICD-10-CM

## 2022-09-14 PROBLEM — S09.90XA HEAD TRAUMA: Status: ACTIVE | Noted: 2022-09-14

## 2022-09-14 LAB
ABO GROUP BLD: NORMAL
ABO GROUP BLD: NORMAL
ALBUMIN SERPL BCP-MCNC: 2.7 G/DL (ref 3.5–5)
ALP SERPL-CCNC: 126 U/L (ref 46–116)
ALT SERPL W P-5'-P-CCNC: 72 U/L (ref 12–78)
AMMONIA PLAS-SCNC: <10 UMOL/L (ref 11–35)
ANION GAP SERPL CALCULATED.3IONS-SCNC: 8 MMOL/L (ref 4–13)
APTT PPP: 33 SECONDS (ref 23–37)
AST SERPL W P-5'-P-CCNC: 177 U/L (ref 5–45)
BASOPHILS # BLD AUTO: 0.04 THOUSANDS/ΜL (ref 0–0.1)
BASOPHILS NFR BLD AUTO: 1 % (ref 0–1)
BILIRUB SERPL-MCNC: 5.34 MG/DL (ref 0.2–1)
BILIRUB UR QL STRIP: NEGATIVE
BLD GP AB SCN SERPL QL: NEGATIVE
BUN SERPL-MCNC: 9 MG/DL (ref 5–25)
CALCIUM ALBUM COR SERPL-MCNC: 9 MG/DL (ref 8.3–10.1)
CALCIUM SERPL-MCNC: 8 MG/DL (ref 8.3–10.1)
CHLORIDE SERPL-SCNC: 106 MMOL/L (ref 96–108)
CLARITY UR: CLEAR
CO2 SERPL-SCNC: 29 MMOL/L (ref 21–32)
COLOR UR: ABNORMAL
CREAT SERPL-MCNC: 0.87 MG/DL (ref 0.6–1.3)
EOSINOPHIL # BLD AUTO: 0.14 THOUSAND/ΜL (ref 0–0.61)
EOSINOPHIL NFR BLD AUTO: 5 % (ref 0–6)
ERYTHROCYTE [DISTWIDTH] IN BLOOD BY AUTOMATED COUNT: 17.3 % (ref 11.6–15.1)
GFR SERPL CREATININE-BSD FRML MDRD: 96 ML/MIN/1.73SQ M
GLUCOSE SERPL-MCNC: 127 MG/DL (ref 65–140)
GLUCOSE UR STRIP-MCNC: NEGATIVE MG/DL
HCT VFR BLD AUTO: 40.3 % (ref 36.5–49.3)
HGB BLD-MCNC: 13.5 G/DL (ref 12–17)
HGB UR QL STRIP.AUTO: NEGATIVE
IMM GRANULOCYTES # BLD AUTO: 0.01 THOUSAND/UL (ref 0–0.2)
IMM GRANULOCYTES NFR BLD AUTO: 0 % (ref 0–2)
INR PPP: 1.4 (ref 0.84–1.19)
KETONES UR STRIP-MCNC: NEGATIVE MG/DL
LEUKOCYTE ESTERASE UR QL STRIP: NEGATIVE
LYMPHOCYTES # BLD AUTO: 0.55 THOUSANDS/ΜL (ref 0.6–4.47)
LYMPHOCYTES NFR BLD AUTO: 19 % (ref 14–44)
MAGNESIUM SERPL-MCNC: 1.6 MG/DL (ref 1.6–2.6)
MCH RBC QN AUTO: 33.6 PG (ref 26.8–34.3)
MCHC RBC AUTO-ENTMCNC: 33.5 G/DL (ref 31.4–37.4)
MCV RBC AUTO: 100 FL (ref 82–98)
MONOCYTES # BLD AUTO: 0.38 THOUSAND/ΜL (ref 0.17–1.22)
MONOCYTES NFR BLD AUTO: 13 % (ref 4–12)
NEUTROPHILS # BLD AUTO: 1.72 THOUSANDS/ΜL (ref 1.85–7.62)
NEUTS SEG NFR BLD AUTO: 62 % (ref 43–75)
NITRITE UR QL STRIP: NEGATIVE
NRBC BLD AUTO-RTO: 0 /100 WBCS
PH UR STRIP.AUTO: 7.5 [PH]
PLATELET # BLD AUTO: 22 THOUSANDS/UL (ref 149–390)
PMV BLD AUTO: 10.2 FL (ref 8.9–12.7)
POTASSIUM SERPL-SCNC: 3.6 MMOL/L (ref 3.5–5.3)
PROT SERPL-MCNC: 7.7 G/DL (ref 6.4–8.4)
PROT UR STRIP-MCNC: NEGATIVE MG/DL
PROTHROMBIN TIME: 17.3 SECONDS (ref 11.6–14.5)
RBC # BLD AUTO: 4.02 MILLION/UL (ref 3.88–5.62)
RH BLD: POSITIVE
RH BLD: POSITIVE
SARS-COV-2 RNA RESP QL NAA+PROBE: NEGATIVE
SODIUM SERPL-SCNC: 143 MMOL/L (ref 135–147)
SP GR UR STRIP.AUTO: 1.01 (ref 1–1.03)
SPECIMEN EXPIRATION DATE: NORMAL
UROBILINOGEN UR QL STRIP.AUTO: >=8 E.U./DL
WBC # BLD AUTO: 2.84 THOUSAND/UL (ref 4.31–10.16)

## 2022-09-14 PROCEDURE — 99285 EMERGENCY DEPT VISIT HI MDM: CPT

## 2022-09-14 PROCEDURE — 86850 RBC ANTIBODY SCREEN: CPT | Performed by: EMERGENCY MEDICINE

## 2022-09-14 PROCEDURE — 81003 URINALYSIS AUTO W/O SCOPE: CPT | Performed by: EMERGENCY MEDICINE

## 2022-09-14 PROCEDURE — 85610 PROTHROMBIN TIME: CPT | Performed by: EMERGENCY MEDICINE

## 2022-09-14 PROCEDURE — 85025 COMPLETE CBC W/AUTO DIFF WBC: CPT | Performed by: EMERGENCY MEDICINE

## 2022-09-14 PROCEDURE — U0003 INFECTIOUS AGENT DETECTION BY NUCLEIC ACID (DNA OR RNA); SEVERE ACUTE RESPIRATORY SYNDROME CORONAVIRUS 2 (SARS-COV-2) (CORONAVIRUS DISEASE [COVID-19]), AMPLIFIED PROBE TECHNIQUE, MAKING USE OF HIGH THROUGHPUT TECHNOLOGIES AS DESCRIBED BY CMS-2020-01-R: HCPCS | Performed by: EMERGENCY MEDICINE

## 2022-09-14 PROCEDURE — 93005 ELECTROCARDIOGRAM TRACING: CPT

## 2022-09-14 PROCEDURE — 36415 COLL VENOUS BLD VENIPUNCTURE: CPT | Performed by: EMERGENCY MEDICINE

## 2022-09-14 PROCEDURE — G1004 CDSM NDSC: HCPCS

## 2022-09-14 PROCEDURE — 80053 COMPREHEN METABOLIC PANEL: CPT | Performed by: EMERGENCY MEDICINE

## 2022-09-14 PROCEDURE — 83735 ASSAY OF MAGNESIUM: CPT | Performed by: EMERGENCY MEDICINE

## 2022-09-14 PROCEDURE — 70450 CT HEAD/BRAIN W/O DYE: CPT

## 2022-09-14 PROCEDURE — U0005 INFEC AGEN DETEC AMPLI PROBE: HCPCS | Performed by: EMERGENCY MEDICINE

## 2022-09-14 PROCEDURE — 86900 BLOOD TYPING SEROLOGIC ABO: CPT | Performed by: EMERGENCY MEDICINE

## 2022-09-14 PROCEDURE — C9113 INJ PANTOPRAZOLE SODIUM, VIA: HCPCS | Performed by: PHYSICIAN ASSISTANT

## 2022-09-14 PROCEDURE — 99285 EMERGENCY DEPT VISIT HI MDM: CPT | Performed by: EMERGENCY MEDICINE

## 2022-09-14 PROCEDURE — 85730 THROMBOPLASTIN TIME PARTIAL: CPT | Performed by: EMERGENCY MEDICINE

## 2022-09-14 PROCEDURE — 82140 ASSAY OF AMMONIA: CPT | Performed by: EMERGENCY MEDICINE

## 2022-09-14 PROCEDURE — 74177 CT ABD & PELVIS W/CONTRAST: CPT

## 2022-09-14 PROCEDURE — C9113 INJ PANTOPRAZOLE SODIUM, VIA: HCPCS | Performed by: STUDENT IN AN ORGANIZED HEALTH CARE EDUCATION/TRAINING PROGRAM

## 2022-09-14 PROCEDURE — 99222 1ST HOSP IP/OBS MODERATE 55: CPT | Performed by: PHYSICIAN ASSISTANT

## 2022-09-14 PROCEDURE — 99223 1ST HOSP IP/OBS HIGH 75: CPT | Performed by: NURSE PRACTITIONER

## 2022-09-14 PROCEDURE — 86901 BLOOD TYPING SEROLOGIC RH(D): CPT | Performed by: EMERGENCY MEDICINE

## 2022-09-14 RX ORDER — CHLORDIAZEPOXIDE HYDROCHLORIDE 25 MG/1
50 CAPSULE, GELATIN COATED ORAL EVERY 6 HOURS SCHEDULED
Status: COMPLETED | OUTPATIENT
Start: 2022-09-15 | End: 2022-09-16

## 2022-09-14 RX ORDER — LIDOCAINE 50 MG/G
1 PATCH TOPICAL DAILY
Status: DISCONTINUED | OUTPATIENT
Start: 2022-09-14 | End: 2022-09-17 | Stop reason: HOSPADM

## 2022-09-14 RX ORDER — LANOLIN ALCOHOL/MO/W.PET/CERES
100 CREAM (GRAM) TOPICAL DAILY
Status: DISCONTINUED | OUTPATIENT
Start: 2022-09-14 | End: 2022-09-17 | Stop reason: HOSPADM

## 2022-09-14 RX ORDER — LORAZEPAM 2 MG/ML
2 INJECTION INTRAMUSCULAR ONCE
Status: COMPLETED | OUTPATIENT
Start: 2022-09-14 | End: 2022-09-14

## 2022-09-14 RX ORDER — LACTULOSE 20 G/30ML
20 SOLUTION ORAL 2 TIMES DAILY
Status: DISCONTINUED | OUTPATIENT
Start: 2022-09-14 | End: 2022-09-17 | Stop reason: HOSPADM

## 2022-09-14 RX ORDER — FOLIC ACID 1 MG/1
1 TABLET ORAL DAILY
Status: DISCONTINUED | OUTPATIENT
Start: 2022-09-15 | End: 2022-09-17 | Stop reason: HOSPADM

## 2022-09-14 RX ORDER — METHOCARBAMOL 500 MG/1
500 TABLET, FILM COATED ORAL EVERY 6 HOURS PRN
Status: DISCONTINUED | OUTPATIENT
Start: 2022-09-14 | End: 2022-09-17 | Stop reason: HOSPADM

## 2022-09-14 RX ORDER — NICOTINE 21 MG/24HR
1 PATCH, TRANSDERMAL 24 HOURS TRANSDERMAL DAILY
Status: DISCONTINUED | OUTPATIENT
Start: 2022-09-14 | End: 2022-09-17 | Stop reason: HOSPADM

## 2022-09-14 RX ORDER — CHLORDIAZEPOXIDE HYDROCHLORIDE 25 MG/1
50 CAPSULE, GELATIN COATED ORAL EVERY 4 HOURS
Status: COMPLETED | OUTPATIENT
Start: 2022-09-14 | End: 2022-09-15

## 2022-09-14 RX ORDER — ONDANSETRON 2 MG/ML
4 INJECTION INTRAMUSCULAR; INTRAVENOUS EVERY 6 HOURS PRN
Status: DISCONTINUED | OUTPATIENT
Start: 2022-09-14 | End: 2022-09-17 | Stop reason: HOSPADM

## 2022-09-14 RX ORDER — CHLORDIAZEPOXIDE HYDROCHLORIDE 25 MG/1
25 CAPSULE, GELATIN COATED ORAL EVERY 4 HOURS
Status: DISCONTINUED | OUTPATIENT
Start: 2022-09-16 | End: 2022-09-17 | Stop reason: HOSPADM

## 2022-09-14 RX ORDER — CEFTRIAXONE 1 G/50ML
1000 INJECTION, SOLUTION INTRAVENOUS EVERY 24 HOURS
Status: DISCONTINUED | OUTPATIENT
Start: 2022-09-14 | End: 2022-09-17 | Stop reason: HOSPADM

## 2022-09-14 RX ORDER — LORAZEPAM 2 MG/ML
0.5 INJECTION INTRAMUSCULAR ONCE
Status: COMPLETED | OUTPATIENT
Start: 2022-09-14 | End: 2022-09-14

## 2022-09-14 RX ORDER — ONDANSETRON 2 MG/ML
4 INJECTION INTRAMUSCULAR; INTRAVENOUS ONCE
Status: COMPLETED | OUTPATIENT
Start: 2022-09-14 | End: 2022-09-14

## 2022-09-14 RX ORDER — PANTOPRAZOLE SODIUM 40 MG/10ML
40 INJECTION, POWDER, LYOPHILIZED, FOR SOLUTION INTRAVENOUS
Status: DISCONTINUED | OUTPATIENT
Start: 2022-09-14 | End: 2022-09-14

## 2022-09-14 RX ORDER — PANTOPRAZOLE SODIUM 40 MG/10ML
40 INJECTION, POWDER, LYOPHILIZED, FOR SOLUTION INTRAVENOUS EVERY 12 HOURS SCHEDULED
Status: DISCONTINUED | OUTPATIENT
Start: 2022-09-14 | End: 2022-09-17 | Stop reason: HOSPADM

## 2022-09-14 RX ORDER — CHLORDIAZEPOXIDE HYDROCHLORIDE 25 MG/1
25 CAPSULE, GELATIN COATED ORAL EVERY 6 HOURS SCHEDULED
Status: DISCONTINUED | OUTPATIENT
Start: 2022-09-17 | End: 2022-09-17 | Stop reason: HOSPADM

## 2022-09-14 RX ADMIN — THIAMINE HCL TAB 100 MG 100 MG: 100 TAB at 13:02

## 2022-09-14 RX ADMIN — MORPHINE SULFATE 2 MG: 2 INJECTION, SOLUTION INTRAMUSCULAR; INTRAVENOUS at 17:58

## 2022-09-14 RX ADMIN — IOHEXOL 100 ML: 350 INJECTION, SOLUTION INTRAVENOUS at 09:09

## 2022-09-14 RX ADMIN — LACTULOSE 20 G: 20 SOLUTION ORAL at 17:58

## 2022-09-14 RX ADMIN — LIDOCAINE 5% 1 PATCH: 700 PATCH TOPICAL at 10:24

## 2022-09-14 RX ADMIN — LORAZEPAM 2 MG: 2 INJECTION INTRAMUSCULAR; INTRAVENOUS at 20:06

## 2022-09-14 RX ADMIN — OCTREOTIDE ACETATE 50 MCG/HR: 500 INJECTION, SOLUTION INTRAVENOUS; SUBCUTANEOUS at 15:15

## 2022-09-14 RX ADMIN — LORAZEPAM 2 MG: 2 INJECTION INTRAMUSCULAR; INTRAVENOUS at 14:35

## 2022-09-14 RX ADMIN — CEFTRIAXONE 1000 MG: 1 INJECTION, SOLUTION INTRAVENOUS at 15:15

## 2022-09-14 RX ADMIN — B-COMPLEX W/ C & FOLIC ACID TAB 1 TABLET: TAB at 13:03

## 2022-09-14 RX ADMIN — LORAZEPAM 0.5 MG: 2 INJECTION INTRAMUSCULAR; INTRAVENOUS at 12:07

## 2022-09-14 RX ADMIN — PANTOPRAZOLE SODIUM 40 MG: 40 INJECTION, POWDER, FOR SOLUTION INTRAVENOUS at 20:55

## 2022-09-14 RX ADMIN — CHLORDIAZEPOXIDE HYDROCHLORIDE 50 MG: 25 CAPSULE ORAL at 20:55

## 2022-09-14 RX ADMIN — CHLORDIAZEPOXIDE HYDROCHLORIDE 50 MG: 25 CAPSULE ORAL at 16:42

## 2022-09-14 RX ADMIN — ONDANSETRON 4 MG: 2 INJECTION INTRAMUSCULAR; INTRAVENOUS at 10:31

## 2022-09-14 RX ADMIN — PANTOPRAZOLE SODIUM 40 MG: 40 INJECTION, POWDER, FOR SOLUTION INTRAVENOUS at 10:31

## 2022-09-14 NOTE — PLAN OF CARE
Problem: PAIN - ADULT  Goal: Verbalizes/displays adequate comfort level or baseline comfort level  Description: Interventions:  - Encourage patient to monitor pain and request assistance  - Assess pain using appropriate pain scale  - Administer analgesics based on type and severity of pain and evaluate response  - Implement non-pharmacological measures as appropriate and evaluate response  - Consider cultural and social influences on pain and pain management  - Notify physician/advanced practitioner if interventions unsuccessful or patient reports new pain  Outcome: Progressing     Problem: INFECTION - ADULT  Goal: Absence or prevention of progression during hospitalization  Description: INTERVENTIONS:  - Assess and monitor for signs and symptoms of infection  - Monitor lab/diagnostic results  - Monitor all insertion sites, i e  indwelling lines, tubes, and drains  - Monitor endotracheal if appropriate and nasal secretions for changes in amount and color  - [place] appropriate cooling/warming therapies per order  - Administer medications as ordered  - Instruct and encourage patient and family to use good hand hygiene technique  - Identify and instruct in appropriate isolation precautions for identified infection/condition  Outcome: Progressing  Goal: Absence of fever/infection during neutropenic period  Description: INTERVENTIONS:  - Monitor WBC    Outcome: Progressing     Problem: SAFETY ADULT  Goal: Patient will remain free of falls  Description: INTERVENTIONS:  - Educate patient/family on patient safety including physical limitations  - Instruct patient to call for assistance with activity   - Consult OT/PT to assist with strengthening/mobility   - Keep Call bell within reach  - Keep bed low and locked with side rails adjusted as appropriate  - Keep care items and personal belongings within reach  - Initiate and maintain comfort rounds  - Make Fall Risk Sign visible to staff  - Offer Toileting every 2 Hours, in advance of need  - Initiate/Maintain bed/chair alarm  - Obtain necessary fall risk management equipment: bed/chair alarm  - Apply yellow socks and bracelet for high fall risk patients  - Consider moving patient to room near nurses station  Outcome: Progressing  Goal: Maintain or return to baseline ADL function  Description: INTERVENTIONS:  -  Assess patient's ability to carry out ADLs; assess patient's baseline for ADL function and identify physical deficits which impact ability to perform ADLs (bathing, care of mouth/teeth, toileting, grooming, dressing, etc )  - Assess/evaluate cause of self-care deficits   - Assess range of motion  - Assess patient's mobility; develop plan if impaired  - Assess patient's need for assistive devices and provide as appropriate  - Encourage maximum independence but intervene and supervise when necessary  - Involve family in performance of ADLs  - Assess for home care needs following discharge   - Consider OT consult to assist with ADL evaluation and planning for discharge  - Provide patient education as appropriate  Outcome: Progressing  Goal: Maintains/Returns to pre admission functional level  Description: INTERVENTIONS:  - Perform BMAT or MOVE assessment daily    - Set and communicate daily mobility goal to care team and patient/family/caregiver  - Collaborate with rehabilitation services on mobility goals if consulted  - Perform Range of Motion 3 times a day  - Reposition patient every 2  hours    - Dangle patient 3 times a day  - Stand patient 3 times a day  - Ambulate patient 3 times a day  - Out of bed to chair 3 times a day   - Out of bed for meals 3 times a day  - Out of bed for toileting  - Record patient progress and toleration of activity level   Outcome: Progressing     Problem: DISCHARGE PLANNING  Goal: Discharge to home or other facility with appropriate resources  Description: INTERVENTIONS:  - Identify barriers to discharge w/patient and caregiver  - Arrange for needed discharge resources and transportation as appropriate  - Identify discharge learning needs (meds, wound care, etc )  - Arrange for interpretive services to assist at discharge as needed  - Refer to Case Management Department for coordinating discharge planning if the patient needs post-hospital services based on physician/advanced practitioner order or complex needs related to functional status, cognitive ability, or social support system  Outcome: Progressing     Problem: Knowledge Deficit  Goal: Patient/family/caregiver demonstrates understanding of disease process, treatment plan, medications, and discharge instructions  Description: Complete learning assessment and assess knowledge base    Interventions:  - Provide teaching at level of understanding  - Provide teaching via preferred learning methods  Outcome: Progressing

## 2022-09-14 NOTE — ASSESSMENT & PLAN NOTE
Pt c/o months of bloody vomit on and off  Associated with RLQ pain  Not currently having bloody emesis  H/o cirrhosis and today's Ct scan abd/pelvis 9/14: Hepatic cirrhosis with signs of increasing portal hypertension    In addition to splenomegaly there are enlarging varices inclusive of prominent periumbilical varices which are significantly increasing in size   GI consulted and EGD planned for Friday  Avoid NSAIDS and Tylenol  PPI ordered  Octreotide drip order by GI

## 2022-09-14 NOTE — ASSESSMENT & PLAN NOTE
Pt with h/o cirrhosis c/o months of on and off bloody stools and having occasional bloody emesis    - GI consulted and recommends EGD and colonoscopy Friday, transfuse unit plts prior   -Monitor PLT count, today PLTS 22, Consider transfusing if PLT drop below 20  -Monitor for bleeding, monitor bowel movement for blood  - Alcohol cessation counselled

## 2022-09-14 NOTE — LETTER
[address] [facility] [address]  Dept: [phone]    September 16, 2022     Patient: Angelica Cameron   YOB: 1965   Date of Visit: 9/14/2022       To Whom it May Concern:    Brien Harley is under the professional care of [facility] [address]  He has been admitted to the hospital since 9/14/2022 with a to be determined discharge date  If you have any questions or concerns, please don't hesitate to call  Sincerely,                  ALIA Jackman, [place]  Inpatient   [facility] [id number]  [email or web address]
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
## 2022-09-14 NOTE — ASSESSMENT & PLAN NOTE
Pt with history of cirrhosis and come in c/o painful lumps on abdomen for one year but getting worse also c/o bruising more easily   -  CT abd/plevis 9/14: Hepatic cirrhosis with signs of increasing portal hypertension    In addition to splenomegaly there are enlarging varices inclusive of prominent periumbilical varices which are significantly increasing in size    - GI consulted and appreciate input: Plan for EGD and colonoscopy for Friday  - Trend liver enzymes & PLT counts   -Avoid NSAIDS and Tylenol
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
## 2022-09-14 NOTE — ED PROVIDER NOTES
History  Chief Complaint   Patient presents with    Abdominal Pain     Patient c/o painful lumps on abdomen x about one year that have been getting larger  Also c/o easily bruising and his cuts and scrapes not healing  Patient has a history of end stage cirrhosis  He states he has been having painful lumps in the abdominal wall for about a year, which turned out to be distended veins consistent with caput medusa  He has also noted bleeding when he urinates and defecates  He bleeds easily and bruises easily  Patient feels weak  He was drinking alcohol last night  Prior to Admission Medications   Prescriptions Last Dose Informant Patient Reported? Taking? Multiple Vitamin (multivitamin) tablet   No No   Sig: Take 1 tablet by mouth daily   folic acid (FOLVITE) 1 mg tablet   No No   Sig: Take 1 tablet (1 mg total) by mouth daily   hydrOXYzine HCL (ATARAX) 50 mg tablet   No No   Sig: Take 1 tablet (50 mg total) by mouth every 6 (six) hours as needed for anxiety   lactulose 20 g/30 mL   No No   Sig: Take 30 mL (20 g total) by mouth 2 (two) times a day   thiamine 100 MG tablet   No No   Sig: Take 1 tablet (100 mg total) by mouth daily      Facility-Administered Medications: None       Past Medical History:   Diagnosis Date    ETOH abuse        Past Surgical History:   Procedure Laterality Date    GALLBLADDER SURGERY         Family History   Problem Relation Age of Onset    Heart disease Mother     Heart disease Father      I have reviewed and agree with the history as documented  E-Cigarette/Vaping    E-Cigarette Use Never User      E-Cigarette/Vaping Substances    Nicotine No     THC No     CBD No     Flavoring No     Other No     Unknown No      Social History     Tobacco Use    Smoking status: Never Smoker    Smokeless tobacco: Current User   Vaping Use    Vaping Use: Never used   Substance Use Topics    Alcohol use: Yes     Comment: every other day has beery and mixed drinks  last drink 12 hours ago    Drug use: Not Currently     Types: Marijuana       Review of Systems   Constitutional: Negative for chills and fever  HENT: Positive for nosebleeds  Negative for congestion  Eyes: Negative for visual disturbance  Respiratory: Negative for cough and shortness of breath  Cardiovascular: Negative for chest pain  Gastrointestinal: Positive for abdominal distention and abdominal pain  Negative for vomiting  Genitourinary: Positive for hematuria  Negative for dysuria  Musculoskeletal: Positive for arthralgias and myalgias  Negative for back pain  Skin: Positive for color change  Neurological: Positive for tremors and weakness  Negative for syncope  Hematological: Bruises/bleeds easily  Psychiatric/Behavioral: Negative for confusion  All other systems reviewed and are negative  Physical Exam  Physical Exam  Vitals and nursing note reviewed  Constitutional:       Appearance: Normal appearance  He is well-developed  HENT:      Head: Normocephalic  Right Ear: External ear normal       Left Ear: External ear normal       Nose: Nose normal       Mouth/Throat:      Pharynx: Oropharynx is clear  Eyes:      General: Scleral icterus present  Extraocular Movements: Extraocular movements intact  Cardiovascular:      Rate and Rhythm: Normal rate and regular rhythm  Pulses: Normal pulses  Pulmonary:      Effort: Pulmonary effort is normal    Abdominal:      Palpations: Abdomen is soft  Tenderness: There is abdominal tenderness  Comments: Patient is tender in the right upper quadrant  He has distended superficial veins of the abdominal wall present   Musculoskeletal:         General: Normal range of motion  Cervical back: Normal range of motion and neck supple  Skin:     General: Skin is warm and dry  Capillary Refill: Capillary refill takes less than 2 seconds  Neurological:      General: No focal deficit present        Mental Status: He is alert     Psychiatric:         Mood and Affect: Mood normal          Behavior: Behavior normal          Vital Signs  ED Triage Vitals [09/14/22 0722]   Temperature Pulse Respirations Blood Pressure SpO2   (!) 96 9 °F (36 1 °C) 86 19 156/88 97 %      Temp Source Heart Rate Source Patient Position - Orthostatic VS BP Location FiO2 (%)   Tympanic Monitor Sitting -- --      Pain Score       --           Vitals:    09/14/22 0722 09/14/22 0916   BP: 156/88 158/92   Pulse: 86 73   Patient Position - Orthostatic VS: Sitting          Visual Acuity      ED Medications  Medications   iohexol (OMNIPAQUE) 350 MG/ML injection (SINGLE-DOSE) 100 mL (100 mL Intravenous Given 9/14/22 0909)       Diagnostic Studies  Results Reviewed     Procedure Component Value Units Date/Time    Ammonia [[id number]]  (Abnormal) Collected: 09/14/22 0814    Lab Status: Final result Specimen: Blood from Arm, Left Updated: 09/14/22 0859     Ammonia <10 umol/L     Comprehensive metabolic panel [[id number]]  (Abnormal) Collected: 09/14/22 0814    Lab Status: Final result Specimen: Blood from Arm, Left Updated: 09/14/22 0852     Sodium 143 mmol/L      Potassium 3 6 mmol/L      Chloride 106 mmol/L      CO2 29 mmol/L      ANION GAP 8 mmol/L      BUN 9 mg/dL      Creatinine 0 87 mg/dL      Glucose 127 mg/dL      Calcium 8 0 mg/dL      Corrected Calcium 9 0 mg/dL       U/L      ALT 72 U/L      Alkaline Phosphatase 126 U/L      Total Protein 7 7 g/dL      Albumin 2 7 g/dL      Total Bilirubin 5 34 mg/dL      eGFR 96 ml/min/1 73sq m     Narrative:      Meaghan guidelines for Chronic Kidney Disease (CKD):     Stage 1 with normal or high GFR (GFR > 90 mL/min/1 73 square meters)    Stage 2 Mild CKD (GFR = 60-89 mL/min/1 73 square meters)    Stage 3A Moderate CKD (GFR = 45-59 mL/min/1 73 square meters)    Stage 3B Moderate CKD (GFR = 30-44 mL/min/1 73 square meters)    Stage 4 Severe CKD (GFR = 15-29 mL/min/1 73 square meters)    Stage 5 End Stage CKD (GFR <15 mL/min/1 73 square meters)  Note: GFR calculation is accurate only with a steady state creatinine    Magnesium [[id number]]  (Normal) Collected: 09/14/22 0814    Lab Status: Final result Specimen: Blood from Arm, Left Updated: 09/14/22 0852     Magnesium 1 6 mg/dL     CBC and differential [[id number]]  (Abnormal) Collected: 09/14/22 0814    Lab Status: Final result Specimen: Blood from Arm, Left Updated: 09/14/22 0852     WBC 2 84 Thousand/uL      RBC 4 02 Million/uL      Hemoglobin 13 5 g/dL      Hematocrit 40 3 %       fL      MCH 33 6 pg      MCHC 33 5 g/dL      RDW 17 3 %      MPV 10 2 fL      Platelets 22 Thousands/uL      nRBC 0 /100 WBCs      Neutrophils Relative 62 %      Immat GRANS % 0 %      Lymphocytes Relative 19 %      Monocytes Relative 13 %      Eosinophils Relative 5 %      Basophils Relative 1 %      Neutrophils Absolute 1 72 Thousands/µL      Immature Grans Absolute 0 01 Thousand/uL      Lymphocytes Absolute 0 55 Thousands/µL      Monocytes Absolute 0 38 Thousand/µL      Eosinophils Absolute 0 14 Thousand/µL      Basophils Absolute 0 04 Thousands/µL     Protime-INR [[id number]]  (Abnormal) Collected: 09/14/22 0814    Lab Status: Final result Specimen: Blood from Arm, Left Updated: 09/14/22 0847     Protime 17 3 seconds      INR 1 40    APTT [[id number]]  (Normal) Collected: 09/14/22 0814    Lab Status: Final result Specimen: Blood from Arm, Left Updated: 09/14/22 0847     PTT 33 seconds     UA (URINE) with reflex to Scope [[id number]]     Lab Status: No result Specimen: Urine                  CT abdomen pelvis with contrast   Final Result by Naif Colmenares MD (09/14 7961)      Hepatic cirrhosis with signs of increasing portal hypertension    In addition to splenomegaly there are enlarging varices inclusive of prominent periumbilical varices which are significantly increasing in size when compared to March 2021 and account for    the palpable abnormalities described in the patient's clinical history  Workstation performed: [id number]                    Procedures  ECG 12 Lead Documentation Only    Date/Time: 9/14/2022 8:28 AM  Performed by: Orlando Franz MD  Authorized by: Orlando Franz MD     Indications / Diagnosis:  Cirrhosis, abdominal pain  ECG reviewed by me, the ED Provider: yes    Patient location:  ED  Interpretation:     Interpretation: abnormal    Rate:     ECG rate:  75    ECG rate assessment: normal    Rhythm:     Rhythm: sinus rhythm    Ectopy:     Ectopy: none    QRS:     QRS axis:  Normal    QRS intervals:  Normal  Conduction:     Conduction: normal    ST segments:     ST segments:  Normal  T waves:     T waves: normal    Other findings:     Other findings: prolonged qTc interval               ED Course                                             MDM  Number of Diagnoses or Management Options  Diagnosis management comments: Patient has signs and symptoms of decompensated liver disease  Suspect bleeding and pancytopenia      Disposition  Final diagnoses:   Cirrhosis ([facility] 75 )   Thrombocytopenia ([facility] 75 )   Portal hypertension ([place] [address] )     Time reflects when diagnosis was documented in both MDM as applicable and the Disposition within this note     Time User Action Codes Description Comment    9/14/2022  9:51 AM Lashawn Forth A Add [K74 60] Cirrhosis ([facility] 75 )     9/14/2022  9:51 AM Lashawn Forth A Add [D69 6] Thrombocytopenia ([place] [address] )     9/14/2022  9:51 AM Lashawn Forth A Add [K76 6] Portal hypertension [facility])       ED Disposition     ED Disposition   Admit    Condition   Stable    Date/Time   Wed Sep 14, 2022  9:51 AM    Comment   Case was discussed with hospitalist and the patient's admission status was agreed to be Admission Status: inpatient status to the service of Dr Lebron Has              Follow-up Information    None         Patient's Medications   Discharge Prescriptions    No medications on file       No discharge procedures on file      PDMP Review     None          ED Provider  Electronically Signed by           Bonilla Pedroza MD  09/14/22 7382
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
## 2022-09-14 NOTE — ASSESSMENT & PLAN NOTE
Pt has currently drink 2 to 3 shots of liquir two times a week or when he is in pain from his "stomach bumps"  Pt has h/o alcohol withdrawal and recently seen in [facility] ED 4/18 and was at [facility] for Detox in Jan of this year  Last drink per patient was twelve hours ago today   Pt having noticeable b/l tremors also per patient, while going through withdrawaling from alcohol can develop seizurers  - [facility] protocol initiated  -Librium protocotol intiated  -Seizure precautions  -Continue lactulose  -Encourage alcohol cessation

## 2022-09-15 PROBLEM — F10.930 ALCOHOL WITHDRAWAL SYNDROME WITHOUT COMPLICATION (HCC): Status: ACTIVE | Noted: 2020-12-05

## 2022-09-15 PROBLEM — F10.230 ALCOHOL WITHDRAWAL SYNDROME WITHOUT COMPLICATION (HCC): Status: ACTIVE | Noted: 2020-12-05

## 2022-09-15 LAB
AFP-TM SERPL-MCNC: 6 NG/ML (ref 0.5–8)
ALBUMIN SERPL BCP-MCNC: 2.5 G/DL (ref 3.5–5)
ALP SERPL-CCNC: 117 U/L (ref 46–116)
ALT SERPL W P-5'-P-CCNC: 65 U/L (ref 12–78)
ANION GAP SERPL CALCULATED.3IONS-SCNC: 8 MMOL/L (ref 4–13)
AST SERPL W P-5'-P-CCNC: 164 U/L (ref 5–45)
ATRIAL RATE: 75 BPM
BILIRUB SERPL-MCNC: 7.6 MG/DL (ref 0.2–1)
BUN SERPL-MCNC: 10 MG/DL (ref 5–25)
CALCIUM ALBUM COR SERPL-MCNC: 8.6 MG/DL (ref 8.3–10.1)
CALCIUM SERPL-MCNC: 7.4 MG/DL (ref 8.3–10.1)
CHLORIDE SERPL-SCNC: 100 MMOL/L (ref 96–108)
CO2 SERPL-SCNC: 28 MMOL/L (ref 21–32)
CREAT SERPL-MCNC: 0.83 MG/DL (ref 0.6–1.3)
ERYTHROCYTE [DISTWIDTH] IN BLOOD BY AUTOMATED COUNT: 16.7 % (ref 11.6–15.1)
GFR SERPL CREATININE-BSD FRML MDRD: 98 ML/MIN/1.73SQ M
GLUCOSE SERPL-MCNC: 112 MG/DL (ref 65–140)
HCT VFR BLD AUTO: 37 % (ref 36.5–49.3)
HGB BLD-MCNC: 12.6 G/DL (ref 12–17)
INR PPP: 1.51 (ref 0.84–1.19)
MAGNESIUM SERPL-MCNC: 1.4 MG/DL (ref 1.6–2.6)
MCH RBC QN AUTO: 34 PG (ref 26.8–34.3)
MCHC RBC AUTO-ENTMCNC: 34.1 G/DL (ref 31.4–37.4)
MCV RBC AUTO: 100 FL (ref 82–98)
P AXIS: 8 DEGREES
PLATELET # BLD AUTO: 20 THOUSANDS/UL (ref 149–390)
POTASSIUM SERPL-SCNC: 3.5 MMOL/L (ref 3.5–5.3)
PR INTERVAL: 156 MS
PROT SERPL-MCNC: 7.2 G/DL (ref 6.4–8.4)
PROTHROMBIN TIME: 18.3 SECONDS (ref 11.6–14.5)
QRS AXIS: 33 DEGREES
QRSD INTERVAL: 96 MS
QT INTERVAL: 434 MS
QTC INTERVAL: 484 MS
RBC # BLD AUTO: 3.71 MILLION/UL (ref 3.88–5.62)
SODIUM SERPL-SCNC: 136 MMOL/L (ref 135–147)
T WAVE AXIS: 29 DEGREES
VENTRICULAR RATE: 75 BPM
WBC # BLD AUTO: 2.9 THOUSAND/UL (ref 4.31–10.16)

## 2022-09-15 PROCEDURE — 93010 ELECTROCARDIOGRAM REPORT: CPT | Performed by: INTERNAL MEDICINE

## 2022-09-15 PROCEDURE — C9113 INJ PANTOPRAZOLE SODIUM, VIA: HCPCS | Performed by: PHYSICIAN ASSISTANT

## 2022-09-15 PROCEDURE — 99232 SBSQ HOSP IP/OBS MODERATE 35: CPT | Performed by: PHYSICIAN ASSISTANT

## 2022-09-15 PROCEDURE — 80053 COMPREHEN METABOLIC PANEL: CPT | Performed by: NURSE PRACTITIONER

## 2022-09-15 PROCEDURE — 85610 PROTHROMBIN TIME: CPT | Performed by: PHYSICIAN ASSISTANT

## 2022-09-15 PROCEDURE — 82105 ALPHA-FETOPROTEIN SERUM: CPT | Performed by: PHYSICIAN ASSISTANT

## 2022-09-15 PROCEDURE — 85027 COMPLETE CBC AUTOMATED: CPT | Performed by: NURSE PRACTITIONER

## 2022-09-15 PROCEDURE — 99221 1ST HOSP IP/OBS SF/LOW 40: CPT | Performed by: SPECIALIST

## 2022-09-15 PROCEDURE — 99233 SBSQ HOSP IP/OBS HIGH 50: CPT | Performed by: NURSE PRACTITIONER

## 2022-09-15 PROCEDURE — 83735 ASSAY OF MAGNESIUM: CPT | Performed by: NURSE PRACTITIONER

## 2022-09-15 RX ORDER — MAGNESIUM SULFATE HEPTAHYDRATE 40 MG/ML
2 INJECTION, SOLUTION INTRAVENOUS ONCE
Status: CANCELLED | OUTPATIENT
Start: 2022-09-15 | End: 2022-09-15

## 2022-09-15 RX ORDER — MAGNESIUM SULFATE HEPTAHYDRATE 40 MG/ML
2 INJECTION, SOLUTION INTRAVENOUS ONCE
Status: COMPLETED | OUTPATIENT
Start: 2022-09-15 | End: 2022-09-15

## 2022-09-15 RX ADMIN — LIDOCAINE 5% 1 PATCH: 700 PATCH TOPICAL at 08:25

## 2022-09-15 RX ADMIN — CHLORDIAZEPOXIDE HYDROCHLORIDE 50 MG: 25 CAPSULE ORAL at 08:24

## 2022-09-15 RX ADMIN — B-COMPLEX W/ C & FOLIC ACID TAB 1 TABLET: TAB at 08:24

## 2022-09-15 RX ADMIN — OCTREOTIDE ACETATE 50 MCG/HR: 500 INJECTION, SOLUTION INTRAVENOUS; SUBCUTANEOUS at 00:24

## 2022-09-15 RX ADMIN — THIAMINE HCL TAB 100 MG 100 MG: 100 TAB at 08:24

## 2022-09-15 RX ADMIN — OCTREOTIDE ACETATE 50 MCG/HR: 500 INJECTION, SOLUTION INTRAVENOUS; SUBCUTANEOUS at 21:10

## 2022-09-15 RX ADMIN — LACTULOSE 20 G: 20 SOLUTION ORAL at 08:24

## 2022-09-15 RX ADMIN — POLYETHYLENE GLYCOL 3350, SODIUM SULFATE ANHYDROUS, SODIUM BICARBONATE, SODIUM CHLORIDE, POTASSIUM CHLORIDE 4000 ML: 236; 22.74; 6.74; 5.86; 2.97 POWDER, FOR SOLUTION ORAL at 16:26

## 2022-09-15 RX ADMIN — CEFTRIAXONE 1000 MG: 1 INJECTION, SOLUTION INTRAVENOUS at 13:23

## 2022-09-15 RX ADMIN — MAGNESIUM SULFATE HEPTAHYDRATE 2 G: 40 INJECTION, SOLUTION INTRAVENOUS at 14:39

## 2022-09-15 RX ADMIN — CHLORDIAZEPOXIDE HYDROCHLORIDE 50 MG: 25 CAPSULE ORAL at 00:30

## 2022-09-15 RX ADMIN — METHOCARBAMOL 500 MG: 500 TABLET ORAL at 00:27

## 2022-09-15 RX ADMIN — CHLORDIAZEPOXIDE HYDROCHLORIDE 50 MG: 25 CAPSULE ORAL at 17:12

## 2022-09-15 RX ADMIN — FOLIC ACID 1 MG: 1 TABLET ORAL at 08:24

## 2022-09-15 RX ADMIN — PANTOPRAZOLE SODIUM 40 MG: 40 INJECTION, POWDER, FOR SOLUTION INTRAVENOUS at 08:25

## 2022-09-15 RX ADMIN — CHLORDIAZEPOXIDE HYDROCHLORIDE 50 MG: 25 CAPSULE ORAL at 12:40

## 2022-09-15 RX ADMIN — PANTOPRAZOLE SODIUM 40 MG: 40 INJECTION, POWDER, FOR SOLUTION INTRAVENOUS at 20:34

## 2022-09-15 RX ADMIN — LACTULOSE 20 G: 20 SOLUTION ORAL at 17:13

## 2022-09-15 RX ADMIN — CHLORDIAZEPOXIDE HYDROCHLORIDE 50 MG: 25 CAPSULE ORAL at 04:42
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
## 2022-09-15 NOTE — PLAN OF CARE
Problem: PAIN - ADULT  Goal: Verbalizes/displays adequate comfort level or baseline comfort level  Description: Interventions:  - Encourage patient to monitor pain and request assistance  - Assess pain using appropriate pain scale  - Administer analgesics based on type and severity of pain and evaluate response  - Implement non-pharmacological measures as appropriate and evaluate response  - Consider cultural and social influences on pain and pain management  - Notify physician/advanced practitioner if interventions unsuccessful or patient reports new pain  Outcome: Progressing     Problem: INFECTION - ADULT  Goal: Absence or prevention of progression during hospitalization  Description: INTERVENTIONS:  - Assess and monitor for signs and symptoms of infection  - Monitor lab/diagnostic results  - Monitor all insertion sites, i e  indwelling lines, tubes, and drains  - Monitor endotracheal if appropriate and nasal secretions for changes in amount and color  - [place] appropriate cooling/warming therapies per order  - Administer medications as ordered  - Instruct and encourage patient and family to use good hand hygiene technique  - Identify and instruct in appropriate isolation precautions for identified infection/condition  Outcome: Progressing  Goal: Absence of fever/infection during neutropenic period  Description: INTERVENTIONS:  - Monitor WBC    Outcome: Progressing     Problem: SAFETY ADULT  Goal: Patient will remain free of falls  Description: INTERVENTIONS:  - Educate patient/family on patient safety including physical limitations  - Instruct patient to call for assistance with activity   - Consult OT/PT to assist with strengthening/mobility   - Keep Call bell within reach  - Keep bed low and locked with side rails adjusted as appropriate  - Keep care items and personal belongings within reach  - Initiate and maintain comfort rounds  - Make Fall Risk Sign visible to staff  - Apply yellow socks and bracelet for high fall risk patients  - Consider moving patient to room near nurses station  Outcome: Progressing  Goal: Maintain or return to baseline ADL function  Description: INTERVENTIONS:  -  Assess patient's ability to carry out ADLs; assess patient's baseline for ADL function and identify physical deficits which impact ability to perform ADLs (bathing, care of mouth/teeth, toileting, grooming, dressing, etc )  - Assess/evaluate cause of self-care deficits   - Assess range of motion  - Assess patient's mobility; develop plan if impaired  - Assess patient's need for assistive devices and provide as appropriate  - Encourage maximum independence but intervene and supervise when necessary  - Involve family in performance of ADLs  - Assess for home care needs following discharge   - Consider OT consult to assist with ADL evaluation and planning for discharge  - Provide patient education as appropriate  Outcome: Progressing  Goal: Maintains/Returns to pre admission functional level  Description: INTERVENTIONS:  - Perform BMAT or MOVE assessment daily    - Set and communicate daily mobility goal to care team and patient/family/caregiver     - Collaborate with rehabilitation services on mobility goals if consulted  - Out of bed for toileting  - Record patient progress and toleration of activity level   Outcome: Progressing     Problem: DISCHARGE PLANNING  Goal: Discharge to home or other facility with appropriate resources  Description: INTERVENTIONS:  - Identify barriers to discharge w/patient and caregiver  - Arrange for needed discharge resources and transportation as appropriate  - Identify discharge learning needs (meds, wound care, etc )  - Arrange for interpretive services to assist at discharge as needed  - Refer to Case Management Department for coordinating discharge planning if the patient needs post-hospital services based on physician/advanced practitioner order or complex needs related to functional status, cognitive ability, or social support system  Outcome: Progressing     Problem: Knowledge Deficit  Goal: Patient/family/caregiver demonstrates understanding of disease process, treatment plan, medications, and discharge instructions  Description: Complete learning assessment and assess knowledge base  Interventions:  - Provide teaching at level of understanding  - Provide teaching via preferred learning methods  Outcome: Progressing     Problem: Nutrition/Hydration-ADULT  Goal: Nutrient/Hydration intake appropriate for improving, restoring or maintaining nutritional needs  Description: Monitor and assess patient's nutrition/hydration status for malnutrition  Collaborate with interdisciplinary team and initiate plan and interventions as ordered  Monitor patient's weight and dietary intake as ordered or per policy  Utilize nutrition screening tool and intervene as necessary  Determine patient's food preferences and provide high-protein, high-caloric foods as appropriate       INTERVENTIONS:  - Monitor oral intake, urinary output, labs, and treatment plans  - Assess nutrition and hydration status and recommend course of action  - Evaluate amount of meals eaten  - Assist patient with eating if necessary   - Allow adequate time for meals  - Recommend/ encourage appropriate diets, oral nutritional supplements, and vitamin/mineral supplements  - Order, calculate, and assess calorie counts as needed  - Recommend, monitor, and adjust tube feedings and TPN/PPN based on assessed needs  - Assess need for intravenous fluids  - Provide specific nutrition/hydration education as appropriate  - Include patient/family/caregiver in decisions related to nutrition  Outcome: Progressing     Problem: Potential for Falls  Goal: Patient will remain free of falls  Description: INTERVENTIONS:  - Educate patient/family on patient safety including physical limitations  - Instruct patient to call for assistance with activity   - Consult OT/PT to assist with strengthening/mobility   - Keep Call bell within reach  - Keep bed low and locked with side rails adjusted as appropriate  - Keep care items and personal belongings within reach  - Initiate and maintain comfort rounds  - Make Fall Risk Sign visible to staff  - Apply yellow socks and bracelet for high fall risk patients  - Consider moving patient to room near nurses station  Outcome: Progressing     Problem: MOBILITY - ADULT  Goal: Maintain or return to baseline ADL function  Description: INTERVENTIONS:  -  Assess patient's ability to carry out ADLs; assess patient's baseline for ADL function and identify physical deficits which impact ability to perform ADLs (bathing, care of mouth/teeth, toileting, grooming, dressing, etc )  - Assess/evaluate cause of self-care deficits   - Assess range of motion  - Assess patient's mobility; develop plan if impaired  - Assess patient's need for assistive devices and provide as appropriate  - Encourage maximum independence but intervene and supervise when necessary  - Involve family in performance of ADLs  - Assess for home care needs following discharge   - Consider OT consult to assist with ADL evaluation and planning for discharge  - Provide patient education as appropriate  Outcome: Progressing  Goal: Maintains/Returns to pre admission functional level  Description: INTERVENTIONS:  - Perform BMAT or MOVE assessment daily    - Set and communicate daily mobility goal to care team and patient/family/caregiver     - Collaborate with rehabilitation services on mobility goals if consulted    - Record patient progress and toleration of activity level   Outcome: Progressing

## 2022-09-15 NOTE — ASSESSMENT & PLAN NOTE
Pt has currently drink 2 to 3 shots of liquir two times a week or when he is in pain from his "stomach bumps"  Pt has h/o alcohol withdrawal and recently seen in [place] ED 4/18 and was at [facility] for Detox in Jan of this year  Last drink per patient was twelve hours ago today   Pt having noticeable b/l tremors also per patient, while going through withdrawaling from alcohol can develop seizurers  - [facility] protocol initiated  -Librium protocotol intiated  -Seizure precautions  -Continue lactulose  -Encourage alcohol cessation
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
## 2022-09-15 NOTE — ASSESSMENT & PLAN NOTE
Patient presented with abdominal pain, hematemesis  -  CT abd/plevis 9/14: Hepatic cirrhosis with signs of increasing portal hypertension    In addition to splenomegaly there are enlarging varices inclusive of prominent periumbilical varices which are significantly increasing in size    - GI consulted and appreciate input: Plan for EGD and colonoscopy for Friday  - Trend liver enzymes & PLT counts   -Avoid NSAIDS and Tylenol

## 2022-09-15 NOTE — PROGRESS NOTES
Progress Note - Elta Day 65 y o  male MRN: [id number]    Unit/Bed#: [address] [place] [address] Encounter: [id number]        Assessment/Plan: This is a 36-year-old male with history of cirrhosis likely secondary to alcohol abuse as previous serologic workup was negative  CT scan did show findings consistent with increasing portal hypertension and patient has significant thrombocytopenia and is complaining of rectal bleeding  Will need EGD and colonoscopy, will need platelets the morning of the procedure  Was started on IV Protonix b i d as well as octreotide drip  Did report hematemesis at home, as well as occasional melena  Will plan EGD and colonoscopy for tomorrow  Patient had CT of head for recent injury which showed no acute abnormality  On clear liquid diet  Hemoglobin remains stable, will continue to trend  No reports of GI bleeding since admission  Also, has been started on ceftriaxone due to GI bleeding in a cirrhotic  Surprisingly he has no evidence of ascites on imaging  Discussed alcohol cessation, continue CIWA protocol  Alpha fetoprotein WNL, did have CT scan on admission which had shown likely regenerative nodules with no definite mass  Spoke with [facility] practitioner and will receive platelets in a m  tomorrow to achieve goal of greater than 50,000 for procedures  MELD currently 20, increased from 17 yesterday  Subjective:   Patient is lying in bed  Nurse reports no bowel movements or evidence of hematemesis, patient otherwise on clear liquid diet  Denies any bowel movements or increased abdominal pain      Objective:     Vitals: /87 (BP Location: Right arm)   Pulse 76   Temp 99 °F (37 2 °C) (Oral)   Resp 18   Wt 88 1 kg (194 lb 4 8 oz)   SpO2 95%   BMI 26 35 kg/m²       Physical Exam:  Gen-alert no acute distress  Abd-positive bowel sounds, nondistended, some vague tenderness in the umbilical area, no rebound rigidity guarding       Lab, Imaging and other studies:   Recent Results (from the past 72 hour(s))   CBC and differential    Collection Time: 09/14/22  8:14 AM   Result Value Ref Range    WBC 2 84 (L) 4 31 - 10 16 Thousand/uL    RBC 4 02 3 88 - 5 62 Million/uL    Hemoglobin 13 5 12 0 - 17 0 g/dL    Hematocrit 40 3 36 5 - 49 3 %     (H) 82 - 98 fL    MCH 33 6 26 8 - 34 3 pg    MCHC 33 5 31 4 - 37 4 g/dL    RDW 17 3 (H) 11 6 - 15 1 %    MPV 10 2 8 9 - 12 7 fL    Platelets 22 (LL) 285 - 390 Thousands/uL    nRBC 0 /100 WBCs    Neutrophils Relative 62 43 - 75 %    Immat GRANS % 0 0 - 2 %    Lymphocytes Relative 19 14 - 44 %    Monocytes Relative 13 (H) 4 - 12 %    Eosinophils Relative 5 0 - 6 %    Basophils Relative 1 0 - 1 %    Neutrophils Absolute 1 72 (L) 1 85 - 7 62 Thousands/µL    Immature Grans Absolute 0 01 0 00 - 0 20 Thousand/uL    Lymphocytes Absolute 0 55 (L) 0 60 - 4 47 Thousands/µL    Monocytes Absolute 0 38 0 17 - 1 22 Thousand/µL    Eosinophils Absolute 0 14 0 00 - 0 61 Thousand/µL    Basophils Absolute 0 04 0 00 - 0 10 Thousands/µL   Protime-INR    Collection Time: 09/14/22  8:14 AM   Result Value Ref Range    Protime 17 3 (H) 11 6 - 14 5 seconds    INR 1 40 (H) 0 84 - 1 19   APTT    Collection Time: 09/14/22  8:14 AM   Result Value Ref Range    PTT 33 23 - 37 seconds   Comprehensive metabolic panel    Collection Time: 09/14/22  8:14 AM   Result Value Ref Range    Sodium 143 135 - 147 mmol/L    Potassium 3 6 3 5 - 5 3 mmol/L    Chloride 106 96 - 108 mmol/L    CO2 29 21 - 32 mmol/L    ANION GAP 8 4 - 13 mmol/L    BUN 9 5 - 25 mg/dL    Creatinine 0 87 0 60 - 1 30 mg/dL    Glucose 127 65 - 140 mg/dL    Calcium 8 0 (L) 8 3 - 10 1 mg/dL    Corrected Calcium 9 0 8 3 - 10 1 mg/dL     (H) 5 - 45 U/L    ALT 72 12 - 78 U/L    Alkaline Phosphatase 126 (H) 46 - 116 U/L    Total Protein 7 7 6 4 - 8 4 g/dL    Albumin 2 7 (L) 3 5 - 5 0 g/dL    Total Bilirubin 5 34 (H) 0 20 - 1 00 mg/dL    eGFR 96 ml/min/1 73sq m   Type and screen    Collection Time: 09/14/22  8:14 AM   Result Value Ref Range    ABO Grouping O     Rh Factor Positive     Antibody Screen Negative     Specimen Expiration Date [id number]    Ammonia    Collection Time: 09/14/22  8:14 AM   Result Value Ref Range    Ammonia <10 (L) 11 - 35 umol/L   Magnesium    Collection Time: 09/14/22  8:14 AM   Result Value Ref Range    Magnesium 1 6 1 6 - 2 6 mg/dL   ECG 12 lead    Collection Time: 09/14/22  8:26 AM   Result Value Ref Range    Ventricular Rate 75 BPM    Atrial Rate 75 BPM    NY Interval 156 ms    QRSD Interval 96 ms    QT Interval 434 ms    QTC Interval 484 ms    P Axis 8 degrees    QRS Axis 33 degrees    T Wave Axis 29 degrees   ABORh Recheck - Contact Blood Bank Prior to Collection    Collection Time: 09/14/22  9:21 AM   Result Value Ref Range    ABO Grouping O     Rh Factor Positive    COVID only    Collection Time: 09/14/22  9:57 AM    Specimen: Nose; Nares   Result Value Ref Range    SARS-CoV-2 Negative Negative   UA (URINE) with reflex to Scope    Collection Time: 09/14/22 11:41 AM   Result Value Ref Range    Color, UA Germaine     Clarity, UA Clear     Specific [place], UA 1 015 1 000 - 1 030    pH, UA 7 5 5 0, 5 5, 6 0, 6 5, 7 0, 7 5, 8 0, 8 5, 9 0    Leukocytes, UA Negative Negative    Nitrite, UA Negative Negative    Protein, UA Negative Negative mg/dl    Glucose, UA Negative Negative mg/dl    Ketones, UA Negative Negative mg/dl    Urobilinogen, UA >=8 0 (A) 0 2, 1 0 E U /dl E U /dl    Bilirubin, UA Negative Negative    Occult Blood, UA Negative Negative   Comprehensive metabolic panel    Collection Time: 09/15/22  4:51 AM   Result Value Ref Range    Sodium 136 135 - 147 mmol/L    Potassium 3 5 3 5 - 5 3 mmol/L    Chloride 100 96 - 108 mmol/L    CO2 28 21 - 32 mmol/L    ANION GAP 8 4 - 13 mmol/L    BUN 10 5 - 25 mg/dL    Creatinine 0 83 0 60 - 1 30 mg/dL    Glucose 112 65 - 140 mg/dL    Calcium 7 4 (L) 8 3 - 10 1 mg/dL    Corrected Calcium 8 6 8 3 - 10 1 mg/dL     (H) 5 - 45 U/L    ALT 65 12 - 78 U/L    Alkaline Phosphatase 117 (H) 46 - 116 U/L    Total Protein 7 2 6 4 - 8 4 g/dL    Albumin 2 5 (L) 3 5 - 5 0 g/dL    Total Bilirubin 7 60 (H) 0 20 - 1 00 mg/dL    eGFR 98 ml/min/1 73sq m   Magnesium    Collection Time: 09/15/22  4:51 AM   Result Value Ref Range    Magnesium 1 4 (L) 1 6 - 2 6 mg/dL   CBC (With Platelets)    Collection Time: 09/15/22  4:51 AM   Result Value Ref Range    WBC 2 90 (L) 4 31 - 10 16 Thousand/uL    RBC 3 71 (L) 3 88 - 5 62 Million/uL    Hemoglobin 12 6 12 0 - 17 0 g/dL    Hematocrit 37 0 36 5 - 49 3 %     (H) 82 - 98 fL    MCH 34 0 26 8 - 34 3 pg    MCHC 34 1 31 4 - 37 4 g/dL    RDW 16 7 (H) 11 6 - 15 1 %    Platelets 20 (LL) 132 - 390 Thousands/uL   Protime-INR    Collection Time: 09/15/22  4:51 AM   Result Value Ref Range    Protime 18 3 (H) 11 6 - 14 5 seconds    INR 1 51 (H) 0 84 - 1 19

## 2022-09-15 NOTE — ASSESSMENT & PLAN NOTE
Pt c/o months of bloody vomit on and off  Associated with RLQ pain  Not currently having bloody emesis  H/o cirrhosis and today's Ct scan abd/pelvis 9/14: Hepatic cirrhosis with signs of increasing portal hypertension    In addition to splenomegaly there are enlarging varices inclusive of prominent periumbilical varices which are significantly increasing in size   GI consulted and EGD planned for Friday  Avoid NSAIDS and Tylenol  PPI ordered  Octreotide drip order by GI   No further episodes of bleeding
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
## 2022-09-15 NOTE — PLAN OF CARE
Problem: INFECTION - ADULT  Goal: Absence or prevention of progression during hospitalization  Description: INTERVENTIONS:  - Assess and monitor for signs and symptoms of infection  - Monitor lab/diagnostic results  - Monitor all insertion sites, i e  indwelling lines, tubes, and drains  - Monitor endotracheal if appropriate and nasal secretions for changes in amount and color  - [place] appropriate cooling/warming therapies per order  - Administer medications as ordered  - Instruct and encourage patient and family to use good hand hygiene technique  - Identify and instruct in appropriate isolation precautions for identified infection/condition  Outcome: Progressing     Problem: INFECTION - ADULT  Goal: Absence of fever/infection during neutropenic period  Description: INTERVENTIONS:  - Monitor WBC    Outcome: Progressing     Problem: SAFETY ADULT  Goal: Patient will remain free of falls  Description: INTERVENTIONS:  - Educate patient/family on patient safety including physical limitations  - Instruct patient to call for assistance with activity   - Consult OT/PT to assist with strengthening/mobility   - Keep Call bell within reach  - Keep bed low and locked with side rails adjusted as appropriate  - Keep care items and personal belongings within reach  - Initiate and maintain comfort rounds  - Make Fall Risk Sign visible to staff  - Offer Toileting every 2 Hours, in advance of need  - Initiate/Maintain bed alarm  - Obtain necessary fall risk management equipment: call bell  - Apply yellow socks and bracelet for high fall risk patients  - Consider moving patient to room near nurses station  Outcome: Progressing     Problem: SAFETY ADULT  Goal: Maintain or return to baseline ADL function  Description: INTERVENTIONS:  -  Assess patient's ability to carry out ADLs; assess patient's baseline for ADL function and identify physical deficits which impact ability to perform ADLs (bathing, care of mouth/teeth, toileting, grooming, dressing, etc )  - Assess/evaluate cause of self-care deficits   - Assess range of motion  - Assess patient's mobility; develop plan if impaired  - Assess patient's need for assistive devices and provide as appropriate  - Encourage maximum independence but intervene and supervise when necessary  - Involve family in performance of ADLs  - Assess for home care needs following discharge   - Consider OT consult to assist with ADL evaluation and planning for discharge  - Provide patient education as appropriate  Outcome: Progressing     Problem: Knowledge Deficit  Goal: Patient/family/caregiver demonstrates understanding of disease process, treatment plan, medications, and discharge instructions  Description: Complete learning assessment and assess knowledge base  Interventions:  - Provide teaching at level of understanding  - Provide teaching via preferred learning methods  Outcome: Progressing     Problem: Nutrition/Hydration-ADULT  Goal: Nutrient/Hydration intake appropriate for improving, restoring or maintaining nutritional needs  Description: Monitor and assess patient's nutrition/hydration status for malnutrition  Collaborate with interdisciplinary team and initiate plan and interventions as ordered  Monitor patient's weight and dietary intake as ordered or per policy  Utilize nutrition screening tool and intervene as necessary  Determine patient's food preferences and provide high-protein, high-caloric foods as appropriate       INTERVENTIONS:  - Monitor oral intake, urinary output, labs, and treatment plans  - Assess nutrition and hydration status and recommend course of action  - Evaluate amount of meals eaten  - Assist patient with eating if necessary   - Allow adequate time for meals  - Recommend/ encourage appropriate diets, oral nutritional supplements, and vitamin/mineral supplements  - Order, calculate, and assess calorie counts as needed  - Recommend, monitor, and adjust tube feedings and TPN/PPN based on assessed needs  - Assess need for intravenous fluids  - Provide specific nutrition/hydration education as appropriate  - Include patient/family/caregiver in decisions related to nutrition  Outcome: Progressing
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
## 2022-09-15 NOTE — ASSESSMENT & PLAN NOTE
Pt with h/o cirrhosis c/o months of on and off bloody stools and having occasional bloody emesis    -Monitor PLT count, today PLTS 22, Consider transfusing if PLT drop below 20  -Monitor for bleeding, monitor bowel movement for blood  - Alcohol cessation counseled  Plan is for EGD and colonoscopy in the morning, transfuse platelets tomorrow morning

## 2022-09-16 ENCOUNTER — ANESTHESIA (INPATIENT)
Dept: PERIOP | Facility: HOSPITAL | Age: 57
DRG: 433 | End: 2022-09-16
Payer: COMMERCIAL

## 2022-09-16 ENCOUNTER — APPOINTMENT (OUTPATIENT)
Dept: PERIOP | Facility: HOSPITAL | Age: 57
DRG: 433 | End: 2022-09-16
Payer: COMMERCIAL

## 2022-09-16 ENCOUNTER — ANESTHESIA EVENT (INPATIENT)
Dept: PERIOP | Facility: HOSPITAL | Age: 57
DRG: 433 | End: 2022-09-16
Payer: COMMERCIAL

## 2022-09-16 LAB
ALBUMIN SERPL BCP-MCNC: 2.6 G/DL (ref 3.5–5)
ALP SERPL-CCNC: 121 U/L (ref 46–116)
ALT SERPL W P-5'-P-CCNC: 57 U/L (ref 12–78)
ANION GAP SERPL CALCULATED.3IONS-SCNC: 9 MMOL/L (ref 4–13)
AST SERPL W P-5'-P-CCNC: 117 U/L (ref 5–45)
BASOPHILS # BLD AUTO: 0.03 THOUSANDS/ΜL (ref 0–0.1)
BASOPHILS NFR BLD AUTO: 1 % (ref 0–1)
BILIRUB SERPL-MCNC: 9.03 MG/DL (ref 0.2–1)
BUN SERPL-MCNC: 11 MG/DL (ref 5–25)
CALCIUM ALBUM COR SERPL-MCNC: 8.8 MG/DL (ref 8.3–10.1)
CALCIUM SERPL-MCNC: 7.7 MG/DL (ref 8.3–10.1)
CHLORIDE SERPL-SCNC: 100 MMOL/L (ref 96–108)
CO2 SERPL-SCNC: 26 MMOL/L (ref 21–32)
CREAT SERPL-MCNC: 0.92 MG/DL (ref 0.6–1.3)
EOSINOPHIL # BLD AUTO: 0.2 THOUSAND/ΜL (ref 0–0.61)
EOSINOPHIL NFR BLD AUTO: 7 % (ref 0–6)
ERYTHROCYTE [DISTWIDTH] IN BLOOD BY AUTOMATED COUNT: 16.3 % (ref 11.6–15.1)
GFR SERPL CREATININE-BSD FRML MDRD: 92 ML/MIN/1.73SQ M
GLUCOSE SERPL-MCNC: 122 MG/DL (ref 65–140)
HCT VFR BLD AUTO: 38.6 % (ref 36.5–49.3)
HGB BLD-MCNC: 12.9 G/DL (ref 12–17)
IMM GRANULOCYTES # BLD AUTO: 0.01 THOUSAND/UL (ref 0–0.2)
IMM GRANULOCYTES NFR BLD AUTO: 0 % (ref 0–2)
LYMPHOCYTES # BLD AUTO: 0.37 THOUSANDS/ΜL (ref 0.6–4.47)
LYMPHOCYTES NFR BLD AUTO: 13 % (ref 14–44)
MCH RBC QN AUTO: 34 PG (ref 26.8–34.3)
MCHC RBC AUTO-ENTMCNC: 33.4 G/DL (ref 31.4–37.4)
MCV RBC AUTO: 102 FL (ref 82–98)
MONOCYTES # BLD AUTO: 0.29 THOUSAND/ΜL (ref 0.17–1.22)
MONOCYTES NFR BLD AUTO: 10 % (ref 4–12)
NEUTROPHILS # BLD AUTO: 1.94 THOUSANDS/ΜL (ref 1.85–7.62)
NEUTS SEG NFR BLD AUTO: 69 % (ref 43–75)
NRBC BLD AUTO-RTO: 0 /100 WBCS
PLATELET # BLD AUTO: 38 THOUSANDS/UL (ref 149–390)
PMV BLD AUTO: 10.4 FL (ref 8.9–12.7)
POTASSIUM SERPL-SCNC: 3.5 MMOL/L (ref 3.5–5.3)
PROT SERPL-MCNC: 7.3 G/DL (ref 6.4–8.4)
RBC # BLD AUTO: 3.79 MILLION/UL (ref 3.88–5.62)
SODIUM SERPL-SCNC: 135 MMOL/L (ref 135–147)
WBC # BLD AUTO: 2.84 THOUSAND/UL (ref 4.31–10.16)

## 2022-09-16 PROCEDURE — 0DB58ZX EXCISION OF ESOPHAGUS, VIA NATURAL OR ARTIFICIAL OPENING ENDOSCOPIC, DIAGNOSTIC: ICD-10-PCS | Performed by: STUDENT IN AN ORGANIZED HEALTH CARE EDUCATION/TRAINING PROGRAM

## 2022-09-16 PROCEDURE — 99233 SBSQ HOSP IP/OBS HIGH 50: CPT | Performed by: NURSE PRACTITIONER

## 2022-09-16 PROCEDURE — 85025 COMPLETE CBC W/AUTO DIFF WBC: CPT | Performed by: NURSE PRACTITIONER

## 2022-09-16 PROCEDURE — C9113 INJ PANTOPRAZOLE SODIUM, VIA: HCPCS | Performed by: INTERNAL MEDICINE

## 2022-09-16 PROCEDURE — C9113 INJ PANTOPRAZOLE SODIUM, VIA: HCPCS | Performed by: PHYSICIAN ASSISTANT

## 2022-09-16 PROCEDURE — 45378 DIAGNOSTIC COLONOSCOPY: CPT | Performed by: INTERNAL MEDICINE

## 2022-09-16 PROCEDURE — 80053 COMPREHEN METABOLIC PANEL: CPT | Performed by: NURSE PRACTITIONER

## 2022-09-16 PROCEDURE — 88305 TISSUE EXAM BY PATHOLOGIST: CPT | Performed by: PATHOLOGY

## 2022-09-16 PROCEDURE — P9035 PLATELET PHERES LEUKOREDUCED: HCPCS

## 2022-09-16 PROCEDURE — 43239 EGD BIOPSY SINGLE/MULTIPLE: CPT | Performed by: INTERNAL MEDICINE

## 2022-09-16 RX ORDER — HYDROMORPHONE HCL/PF 1 MG/ML
0.2 SYRINGE (ML) INJECTION
Status: DISCONTINUED | OUTPATIENT
Start: 2022-09-16 | End: 2022-09-17 | Stop reason: HOSPADM

## 2022-09-16 RX ORDER — PROPOFOL 10 MG/ML
INJECTION, EMULSION INTRAVENOUS AS NEEDED
Status: DISCONTINUED | OUTPATIENT
Start: 2022-09-16 | End: 2022-09-16

## 2022-09-16 RX ORDER — PROPOFOL 10 MG/ML
INJECTION, EMULSION INTRAVENOUS CONTINUOUS PRN
Status: DISCONTINUED | OUTPATIENT
Start: 2022-09-16 | End: 2022-09-16

## 2022-09-16 RX ORDER — SODIUM CHLORIDE, SODIUM LACTATE, POTASSIUM CHLORIDE, CALCIUM CHLORIDE 600; 310; 30; 20 MG/100ML; MG/100ML; MG/100ML; MG/100ML
INJECTION, SOLUTION INTRAVENOUS CONTINUOUS PRN
Status: DISCONTINUED | OUTPATIENT
Start: 2022-09-16 | End: 2022-09-16

## 2022-09-16 RX ORDER — LIDOCAINE HYDROCHLORIDE 10 MG/ML
INJECTION, SOLUTION EPIDURAL; INFILTRATION; INTRACAUDAL; PERINEURAL AS NEEDED
Status: DISCONTINUED | OUTPATIENT
Start: 2022-09-16 | End: 2022-09-16

## 2022-09-16 RX ORDER — CARVEDILOL 6.25 MG/1
6.25 TABLET ORAL 2 TIMES DAILY WITH MEALS
Status: DISCONTINUED | OUTPATIENT
Start: 2022-09-16 | End: 2022-09-17 | Stop reason: HOSPADM

## 2022-09-16 RX ADMIN — CHLORDIAZEPOXIDE HYDROCHLORIDE 50 MG: 25 CAPSULE ORAL at 12:04

## 2022-09-16 RX ADMIN — FOLIC ACID 1 MG: 1 TABLET ORAL at 08:08

## 2022-09-16 RX ADMIN — PROPOFOL 100 MCG/KG/MIN: 10 INJECTION, EMULSION INTRAVENOUS at 14:37

## 2022-09-16 RX ADMIN — PROPOFOL 200 MG: 10 INJECTION, EMULSION INTRAVENOUS at 14:37

## 2022-09-16 RX ADMIN — CEFTRIAXONE 1000 MG: 1 INJECTION, SOLUTION INTRAVENOUS at 13:00

## 2022-09-16 RX ADMIN — LACTULOSE 20 G: 20 SOLUTION ORAL at 08:08

## 2022-09-16 RX ADMIN — CHLORDIAZEPOXIDE HYDROCHLORIDE 50 MG: 25 CAPSULE ORAL at 06:51

## 2022-09-16 RX ADMIN — LACTULOSE 20 G: 20 SOLUTION ORAL at 17:10

## 2022-09-16 RX ADMIN — CHLORDIAZEPOXIDE HYDROCHLORIDE 50 MG: 25 CAPSULE ORAL at 00:10

## 2022-09-16 RX ADMIN — OCTREOTIDE ACETATE 50 MCG/HR: 500 INJECTION, SOLUTION INTRAVENOUS; SUBCUTANEOUS at 19:16

## 2022-09-16 RX ADMIN — CHLORDIAZEPOXIDE HYDROCHLORIDE 25 MG: 25 CAPSULE ORAL at 16:32

## 2022-09-16 RX ADMIN — HYDROMORPHONE HYDROCHLORIDE 0.2 MG: 1 INJECTION, SOLUTION INTRAMUSCULAR; INTRAVENOUS; SUBCUTANEOUS at 21:00

## 2022-09-16 RX ADMIN — SODIUM CHLORIDE, SODIUM LACTATE, POTASSIUM CHLORIDE, AND CALCIUM CHLORIDE: .6; .31; .03; .02 INJECTION, SOLUTION INTRAVENOUS at 14:08

## 2022-09-16 RX ADMIN — THIAMINE HCL TAB 100 MG 100 MG: 100 TAB at 08:08

## 2022-09-16 RX ADMIN — OCTREOTIDE ACETATE 50 MCG/HR: 500 INJECTION, SOLUTION INTRAVENOUS; SUBCUTANEOUS at 07:53

## 2022-09-16 RX ADMIN — LIDOCAINE HYDROCHLORIDE 5 ML: 10 INJECTION, SOLUTION EPIDURAL; INFILTRATION; INTRACAUDAL; PERINEURAL at 14:37

## 2022-09-16 RX ADMIN — CARVEDILOL 6.25 MG: 6.25 TABLET, FILM COATED ORAL at 17:10

## 2022-09-16 RX ADMIN — PANTOPRAZOLE SODIUM 40 MG: 40 INJECTION, POWDER, FOR SOLUTION INTRAVENOUS at 20:30

## 2022-09-16 RX ADMIN — CHLORDIAZEPOXIDE HYDROCHLORIDE 25 MG: 25 CAPSULE ORAL at 20:32

## 2022-09-16 RX ADMIN — B-COMPLEX W/ C & FOLIC ACID TAB 1 TABLET: TAB at 08:08

## 2022-09-16 RX ADMIN — PANTOPRAZOLE SODIUM 40 MG: 40 INJECTION, POWDER, FOR SOLUTION INTRAVENOUS at 08:11

## 2022-09-16 NOTE — PERIOPERATIVE NURSING NOTE
Patient transferred back to home unit (4 [facility])  Patient awake and alert; denies any complaints at this time  Requesting to get OOB  Educated patient regarding sedation and staying in bed at this time

## 2022-09-16 NOTE — UTILIZATION REVIEW
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  Biopsies not obtained due to the presence of varices  No nodularity or obvious suggestion of dysplasia on narrow band imaging  Mild gastric erythema, possible portal hypertensive gastropathy  Biopsies obtained  No blood or bleeding source identified     RECOMMENDATION:  Await pathology results  Begin nonselective beta-blocker  Repeat EGD in several months for re-evaluation and possible esophageal biopsy or banding      9/16 S/p Colonoscopy;  IMPRESSION:  Large, nonbleeding internal hemorrhoids  Likely small rectal varices    Visualized portions of Ileal and colonic mucosa otherwise normal     RECOMMENDATION:  Repeat colonoscopy in 3 years due to an inadequate bowel preparation  High-fiber diet  Would avoid hemorrhoidal banding at this time due to likely presence of rectal varices       Vital Signs:   09/16/22 0750 98 2 °F (36 8 °C) 82 -- 136/90 -- 96 % -- --   09/16/22 07:49:49 98 2 °F (36 8 °C) 78 -- 136/90 105 97 % -- --   09/16/22 06:34:30 97 8 °F (36 6 °C) 67 19 123/73 90 95 %       Pertinent Labs/Diagnostic Results:   Results from last 7 days   Lab Units 09/14/22  0957   SARS-COV-2  Negative     Results from last 7 days   Lab Units 09/16/22  0557 09/15/22  0451 09/14/22  0814   WBC Thousand/uL 2 84* 2 90* 2 84*   HEMOGLOBIN g/dL 12 9 12 6 13 5   HEMATOCRIT % 38 6 37 0 40 3   PLATELETS Thousands/uL 38* 20* 22*   NEUTROS ABS Thousands/µL 1 94  --  1 72*         Results from last 7 days   Lab Units 09/16/22  0557 09/15/22  0451 09/14/22  0814   SODIUM mmol/L 135 136 143   POTASSIUM mmol/L 3 5 3 5 3 6   CHLORIDE mmol/L 100 100 106   CO2 mmol/L 26 28 29   ANION GAP mmol/L 9 8 8   BUN mg/dL 11 10 9   CREATININE mg/dL 0 92 0 83 0 87   EGFR ml/min/1 73sq m 92 98 96   CALCIUM mg/dL 7 7* 7 4* 8 0*   MAGNESIUM mg/dL  --  1 4* 1 6     Results from last 7 days   Lab Units 09/16/22  0557 09/15/22  0451 09/14/22  0814   AST U/L 117* 164* 177*   ALT U/L 57 65 72   ALK PHOS U/L 121* 117* 126*   TOTAL PROTEIN g/dL 7 3 7 2 7 7   ALBUMIN g/dL 2 6* 2 5* 2 7*   TOTAL BILIRUBIN mg/dL 9 03* 7 60* 5 34*   AMMONIA umol/L  --   --  <10*         Results from last 7 days   Lab Units 09/16/22  0557 09/15/22  0451 09/14/22  0814   GLUCOSE RANDOM mg/dL 122 112 127         Results from last 7 days   Lab Units 09/15/22  0451 09/14/22  0814   PROTIME seconds 18 3* 17 3*   INR  1 51* 1 40*   PTT seconds  --  33         Results from last 7 days   Lab Units 09/14/22  1141   CLARITY UA  Clear   COLOR UA  Germaine   SPEC GRAV UA  1 015   PH UA  7 5   GLUCOSE UA mg/dl Negative   KETONES UA mg/dl Negative   BLOOD UA  Negative   PROTEIN UA mg/dl Negative   NITRITE UA  Negative   BILIRUBIN UA  Negative   UROBILINOGEN UA E U /dl >=8 0*   LEUKOCYTES UA  Negative       Medications:   Scheduled Medications:  bisacodyl, 10 mg, Oral, Once  cefTRIAXone, 1,000 mg, Intravenous, Q24H  chlordiazePOXIDE, 25 mg, Oral, Q4H  [START ON 9/17/2022] chlordiazePOXIDE, 25 mg, Oral, O4X LEATHA  folic acid, 1 mg, Oral, Daily  lactulose, 20 g, Oral, BID  lidocaine, 1 patch, Topical, Daily  multivitamin stress formula, 1 tablet, Oral, Daily  nicotine, 1 patch, Transdermal, Daily  pantoprazole, 40 mg, Intravenous, Q12H [facility] & alf  thiamine, 100 mg, Oral, Daily      Continuous IV Infusions:  octreotide, 50 mcg/hr, Intravenous, Continuous      PRN Meds:  methocarbamol, 500 mg, Oral, Q6H PRN  ondansetron, 4 mg, Intravenous, Q6H PRN        Discharge Plan: TBD    Network Utilization Review Department  ATTENTION: Please call with any questions or concerns to [phone] and carefully listen to the prompts so that you are directed to the right person  All voicemails are confidential   Jonny Greer all requests for admission clinical reviews, approved or denied determinations and any other requests to dedicated fax number below belonging to the campus where the patient is receiving treatment   List of dedicated fax numbers for the Facilities:  [address] DENIALS (Administrative/Medical Necessity) [phone]   [address] (Maternity/NICU/Pediatrics) [phone]   [address]  [address] [facility] [place] [address] [address] [place] [address] Trena Yolanda [address] [facility] [place] [address] [phone]

## 2022-09-16 NOTE — PLAN OF CARE
Problem: PAIN - ADULT  Goal: Verbalizes/displays adequate comfort level or baseline comfort level  Description: Interventions:  - Encourage patient to monitor pain and request assistance  - Assess pain using appropriate pain scale  - Administer analgesics based on type and severity of pain and evaluate response  - Implement non-pharmacological measures as appropriate and evaluate response  - Consider cultural and social influences on pain and pain management  - Notify physician/advanced practitioner if interventions unsuccessful or patient reports new pain  Outcome: Progressing     Problem: INFECTION - ADULT  Goal: Absence or prevention of progression during hospitalization  Description: INTERVENTIONS:  - Assess and monitor for signs and symptoms of infection  - Monitor lab/diagnostic results  - Monitor all insertion sites, i e  indwelling lines, tubes, and drains  - Monitor endotracheal if appropriate and nasal secretions for changes in amount and color  - [place] appropriate cooling/warming therapies per order  - Administer medications as ordered  - Instruct and encourage patient and family to use good hand hygiene technique  - Identify and instruct in appropriate isolation precautions for identified infection/condition  Outcome: Progressing     Problem: INFECTION - ADULT  Goal: Absence of fever/infection during neutropenic period  Description: INTERVENTIONS:  - Monitor WBC    Outcome: Progressing     Problem: SAFETY ADULT  Goal: Patient will remain free of falls  Description: INTERVENTIONS:  - Educate patient/family on patient safety including physical limitations  - Instruct patient to call for assistance with activity   - Consult OT/PT to assist with strengthening/mobility   - Keep Call bell within reach  - Keep bed low and locked with side rails adjusted as appropriate  - Keep care items and personal belongings within reach  - Initiate and maintain comfort rounds  - Make Fall Risk Sign visible to staff  - Offer Toileting every 2 Hours, in advance of need  - Initiate/Maintain bed alarm  - Obtain necessary fall risk management equipment: call bell  Problem: Knowledge Deficit  Goal: Patient/family/caregiver demonstrates understanding of disease process, treatment plan, medications, and discharge instructions  Description: Complete learning assessment and assess knowledge base    Interventions:  - Provide teaching at level of understanding  - Provide teaching via preferred learning methods  Outcome: Progressing     Problem: Potential for Falls  Goal: Patient will remain free of falls  Description: INTERVENTIONS:  - Educate patient/family on patient safety including physical limitations  - Instruct patient to call for assistance with activity   - Consult OT/PT to assist with strengthening/mobility   - Keep Call bell within reach  - Keep bed low and locked with side rails adjusted as appropriate  - Keep care items and personal belongings within reach  - Initiate and maintain comfort rounds  - Make Fall Risk Sign visible to staff  - Offer Toileting every 2 Hours, in advance of need  - Initiate/Maintain bed alarm  - Obtain necessary fall risk management equipment: call bell  - Apply yellow socks and bracelet for high fall risk patients  - Consider moving patient to room near nurses station  Outcome: Progressing     - Apply yellow socks and bracelet for high fall risk patients  - Consider moving patient to room near nurses station  Outcome: Progressing
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
## 2022-09-16 NOTE — ASSESSMENT & PLAN NOTE
Pt has currently drink 2 to 3 shots of liquir two times a week or when he is in pain from his "stomach bumps"  Pt has h/o alcohol withdrawal and recently seen in [facility] 4/18 and was at [facility] for Detox in Jan of this year  Last drink per patient was twelve hours ago today   Pt having noticeable b/l tremors also per patient, while going through withdrawaling from alcohol can develop seizurers  - [facility] protocol initiated  -Librium protocotol intiated  -Seizure precautions  -Continue lactulose  -Encourage alcohol cessation

## 2022-09-16 NOTE — ASSESSMENT & PLAN NOTE
Pt with h/o cirrhosis c/o months of on and off bloody stools and having occasional bloody emesis    -Monitor PLT count, today PLTS 22, Consider transfusing if PLT drop below 20  -Monitor for bleeding, monitor bowel movement for blood  - Alcohol cessation counseled  Plan is for EGD and colonoscopy today, repeat labs post platelets pending

## 2022-09-16 NOTE — CASE MANAGEMENT
Case Management Assessment & Discharge Planning Note    Patient name Milly Bowen  Location [address] [facility] [address] Srinivas Vivas 55-* MRN [id number]  : 1965 Date 2022       Current Admission Date: 2022  Current Admission Diagnosis:Alcoholic cirrhosis of liver [facility])   Patient Active Problem List    Diagnosis Date Noted    Head trauma 2022    Bullae 2022    Hematemesis 2022    Alcohol withdrawal syndrome with complication ([facility] )     Marijuana use 2022    Anterior cervical lymphadenopathy 2022    Pancytopenia ([facility]     Alcoholic cirrhosis of liver ([facility] ) 2022    COVID-19 2022    Right hip pain 2021    Decompensated hepatic cirrhosis ([facility] ) 2021    Liver mass 2021    Closed fracture of nasal bone 2021    Open wound of tongue due to bite 2021    Electrolyte abnormality 2021    Tobacco abuse 2020    Alcohol withdrawal syndrome without complication ([facility] )     Alcohol use disorder, severe, dependence ([facility] ) 2020    Transaminitis 2020    Hyperbilirubinemia 2020    Thrombocytopenia ([facility] ) 2020      LOS (days): 2  Geometric Mean LOS (GMLOS) (days):   Days to GMLOS:     OBJECTIVE:    Risk of Unplanned Readmission Score: 21 87      Current admission status: Inpatient     Preferred Pharmacy:   Anthony Staples [facility], [facility] [place] ([address] [place] [address])  [address] [address] [place] [address] [phone]) [facility]  Phone: [phone] Fax: [phone]    Primary Care Provider: Rahel Berman MD    Primary Insurance: [address] [place] [address]  Secondary Insurance:     ASSESSMENT:  [place] 26 Proxies    There are no active Health Care Proxies on file      Readmission Root Cause  30 Day Readmission: No    Patient Information  Admitted from[de-identified] Home  Mental Status: Alert  During Assessment patient was accompanied by: Not accompanied during assessment  Assessment information provided by[de-identified] Patient  Primary Caregiver: Self  Support Systems: Self, Friend, Family members  [place] of Residence: [address] [place] [address] do you live in?: [place], [place]  Type of Current Residence: 3 story home  In the last 12 months, was there a time when you were not able to pay the mortgage or rent on time?: No  In the last 12 months, how many places have you lived?: 1  In the last 12 months, was there a time when you did not have a steady place to sleep or slept in a shelter (including now)?: No  Homeless/housing insecurity resource given?: N/A  Living Arrangements: Lives w/ Extended Family (Lives with sister and another "relative")  Is patient a ?: No    Activities of Daily Living Prior to Admission  Functional Status: Independent  Completes ADLs independently?: Yes  Ambulates independently?: Yes  Does patient use assisted devices?: No  Does patient currently own DME?: No  Does patient have a history of Outpatient Therapy (PT/OT)?: No  Does the patient have a history of Short-Term Rehab?: Yes (Hx at [email or web address])  Does patient have a history of HHC?: No  Does patient currently have [address]?: No     Patient Information Continued  Income Source: Employed (Works as a  at [employer])  Does patient have prescription coverage?: Yes (Uses [facility] in [place]; Denies issues with OOP costs)  Within the past 12 months, you worried that your food would run out before you got the money to buy more : Never true  Within the past 12 months, the food you bought just didn't last and you didn't have money to get more : Never true  Food insecurity resource given?: N/A  Does patient receive dialysis treatments?: No  Does patient have a history of substance abuse?: Yes  Historical substance use preference: Alcohol/ETOH, Marijuana  Is patient currently in treatment for substance abuse?: No  Patient declined treatment information    Does patient have a history of Mental Health Diagnosis?: Yes (Bipolar D/O)  Has patient received inpatient treatment related to mental health in the last 2 years?: No     Means of Transportation  Means of Transport to Appts[de-identified] Friends (Mainly uses bicycle)  In the past 12 months, has lack of transportation kept you from medical appointments or from getting medications?: No  In the past 12 months, has lack of transportation kept you from meetings, work, or from getting things needed for daily living?: No  Was application for public transport provided?: N/A    DISCHARGE DETAILS:    Discharge planning discussed with[de-identified] Patient     CM contacted family/caregiver?: No- see comments (Patient specifically informed SW that he does not want any family contacted at this time )  Were Treatment Team discharge recommendations reviewed with patient/caregiver?: Yes  Did patient/caregiver verbalize understanding of patient care needs?: Yes  Were patient/caregiver advised of the risks associated with not following Treatment Team discharge recommendations?: Yes     [address]         Is the patient interested in [place] [address] at discharge?: No    DME Referral Provided  Referral made for DME?: No     Would you like to participate in our [facility] [address] service program?  : No - Declined    Treatment Team Recommendation: Home  Discharge Destination Plan[de-identified] Home  Transport at Discharge : Other (Comment) (Needs Lyft)    Pt requested SW assist with faxing a hospitalization letter to his employer at [employer]  SW called and spoke with rep [employer]  [employer] notes that [employer] services are contracted and HR related issues are separate from [place]  She requested SW fax letter to [phone] and she will deliver to manager Juan Soares  SW discussed with patient  He is agreeable to plan  Letter faxed with original provided to patient  Patient has no other questions or concerns at this time  He is planned for EGD today  He notes he is feeling much better today from an ETOH withdrawal standpoint  He is not interested in D&A tx at this time  His plan is to return back home  He prefers to walk home  SW advised Lyft can be arranged as well
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
## 2022-09-16 NOTE — ANESTHESIA POSTPROCEDURE EVALUATION
Post-Op Assessment Note    CV Status:  Stable       Mental Status:  Sleepy   Hydration Status:  Stable   PONV Controlled:  Controlled   Airway Patency:  Patent      Post Op Vitals Reviewed: Yes      Staff: CRNA         No complications documented      BP   117/68   Temp      Pulse 65   Resp   20   SpO2   100

## 2022-09-16 NOTE — PLAN OF CARE
Problem: PAIN - ADULT  Goal: Verbalizes/displays adequate comfort level or baseline comfort level  Description: Interventions:  - Encourage patient to monitor pain and request assistance  - Assess pain using appropriate pain scale  - Administer analgesics based on type and severity of pain and evaluate response  - Implement non-pharmacological measures as appropriate and evaluate response  - Consider cultural and social influences on pain and pain management  - Notify physician/advanced practitioner if interventions unsuccessful or patient reports new pain  Outcome: Progressing     Problem: INFECTION - ADULT  Goal: Absence or prevention of progression during hospitalization  Description: INTERVENTIONS:  - Assess and monitor for signs and symptoms of infection  - Monitor lab/diagnostic results  - Monitor all insertion sites, i e  indwelling lines, tubes, and drains  - Monitor endotracheal if appropriate and nasal secretions for changes in amount and color  - [place] appropriate cooling/warming therapies per order  - Administer medications as ordered  - Instruct and encourage patient and family to use good hand hygiene technique  - Identify and instruct in appropriate isolation precautions for identified infection/condition  Outcome: Progressing  Goal: Absence of fever/infection during neutropenic period  Description: INTERVENTIONS:  - Monitor WBC    Outcome: Progressing     Problem: SAFETY ADULT  Goal: Patient will remain free of falls  Description: INTERVENTIONS:  - Educate patient/family on patient safety including physical limitations  - Instruct patient to call for assistance with activity   - Consult OT/PT to assist with strengthening/mobility   - Keep Call bell within reach  - Keep bed low and locked with side rails adjusted as appropriate  - Keep care items and personal belongings within reach  - Initiate and maintain comfort rounds  - Make Fall Risk Sign visible to staff  - Offer Toileting every 2 Hours, in advance of need  - Initiate/Maintain Bed alarm  - Obtain necessary fall risk management equipment: as required   - Apply yellow socks and bracelet for high fall risk patients  - Consider moving patient to room near nurses station  Outcome: Progressing  Goal: Maintain or return to baseline ADL function  Description: INTERVENTIONS:  -  Assess patient's ability to carry out ADLs; assess patient's baseline for ADL function and identify physical deficits which impact ability to perform ADLs (bathing, care of mouth/teeth, toileting, grooming, dressing, etc )  - Assess/evaluate cause of self-care deficits   - Assess range of motion  - Assess patient's mobility; develop plan if impaired  - Assess patient's need for assistive devices and provide as appropriate  - Encourage maximum independence but intervene and supervise when necessary  - Involve family in performance of ADLs  - Assess for home care needs following discharge   - Consider OT consult to assist with ADL evaluation and planning for discharge  - Provide patient education as appropriate  Outcome: Progressing  Goal: Maintains/Returns to pre admission functional level  Description: INTERVENTIONS:  - Perform BMAT or MOVE assessment daily    - Set and communicate daily mobility goal to care team and patient/family/caregiver  - Collaborate with rehabilitation services on mobility goals if consulted  - Perform Range of Motion 3 times a day  - Reposition patient every 2 hours    - Dangle patient 3 times a day  - Stand patient 3 times a day  - Ambulate patient 3 times a day  - Out of bed to chair 3 times a day   - Out of bed for meals 3 times a day  - Out of bed for toileting  - Record patient progress and toleration of activity level   Outcome: Progressing     Problem: DISCHARGE PLANNING  Goal: Discharge to home or other facility with appropriate resources  Description: INTERVENTIONS:  - Identify barriers to discharge w/patient and caregiver  - Arrange for needed discharge resources and transportation as appropriate  - Identify discharge learning needs (meds, wound care, etc )  - Arrange for interpretive services to assist at discharge as needed  - Refer to Case Management Department for coordinating discharge planning if the patient needs post-hospital services based on physician/advanced practitioner order or complex needs related to functional status, cognitive ability, or social support system  Outcome: Progressing     Problem: Knowledge Deficit  Goal: Patient/family/caregiver demonstrates understanding of disease process, treatment plan, medications, and discharge instructions  Description: Complete learning assessment and assess knowledge base  Interventions:  - Provide teaching at level of understanding  - Provide teaching via preferred learning methods  Outcome: Progressing     Problem: Nutrition/Hydration-ADULT  Goal: Nutrient/Hydration intake appropriate for improving, restoring or maintaining nutritional needs  Description: Monitor and assess patient's nutrition/hydration status for malnutrition  Collaborate with interdisciplinary team and initiate plan and interventions as ordered  Monitor patient's weight and dietary intake as ordered or per policy  Utilize nutrition screening tool and intervene as necessary  Determine patient's food preferences and provide high-protein, high-caloric foods as appropriate       INTERVENTIONS:  - Monitor oral intake, urinary output, labs, and treatment plans  - Assess nutrition and hydration status and recommend course of action  - Evaluate amount of meals eaten  - Assist patient with eating if necessary   - Allow adequate time for meals  - Recommend/ encourage appropriate diets, oral nutritional supplements, and vitamin/mineral supplements  - Order, calculate, and assess calorie counts as needed  - Recommend, monitor, and adjust tube feedings and TPN/PPN based on assessed needs  - Assess need for intravenous fluids  - Provide specific nutrition/hydration education as appropriate  - Include patient/family/caregiver in decisions related to nutrition  Outcome: Progressing     Problem: Potential for Falls  Goal: Patient will remain free of falls  Description: INTERVENTIONS:  - Educate patient/family on patient safety including physical limitations  - Instruct patient to call for assistance with activity   - Consult OT/PT to assist with strengthening/mobility   - Keep Call bell within reach  - Keep bed low and locked with side rails adjusted as appropriate  - Keep care items and personal belongings within reach  - Initiate and maintain comfort rounds  - Make Fall Risk Sign visible to staff  - Offer Toileting every 2 Hours, in advance of need  - Initiate/Maintain Bed alarm  - Obtain necessary fall risk management equipment: as required   - Apply yellow socks and bracelet for high fall risk patients  - Consider moving patient to room near nurses station  Outcome: Progressing     Problem: MOBILITY - ADULT  Goal: Maintain or return to baseline ADL function  Description: INTERVENTIONS:  -  Assess patient's ability to carry out ADLs; assess patient's baseline for ADL function and identify physical deficits which impact ability to perform ADLs (bathing, care of mouth/teeth, toileting, grooming, dressing, etc )  - Assess/evaluate cause of self-care deficits   - Assess range of motion  - Assess patient's mobility; develop plan if impaired  - Assess patient's need for assistive devices and provide as appropriate  - Encourage maximum independence but intervene and supervise when necessary  - Involve family in performance of ADLs  - Assess for home care needs following discharge   - Consider OT consult to assist with ADL evaluation and planning for discharge  - Provide patient education as appropriate  Outcome: Progressing  Goal: Maintains/Returns to pre admission functional level  Description: INTERVENTIONS:  - Perform BMAT or MOVE assessment daily     - Set and communicate daily mobility goal to care team and patient/family/caregiver  - Collaborate with rehabilitation services on mobility goals if consulted  - Perform Range of Motion 3 times a day  - Reposition patient every 2 hours    - Dangle patient 3 times a day  - Stand patient 3 times a day  - Ambulate patient 3 times a day  - Out of bed to chair 3 times a day   - Out of bed for meals 3 times a day  - Out of bed for toileting  - Record patient progress and toleration of activity level   Outcome: Progressing

## 2022-09-16 NOTE — ANESTHESIA PREPROCEDURE EVALUATION
Procedure:  EGD  COLONOSCOPY    Relevant Problems   ANESTHESIA (within normal limits)      CARDIO (within normal limits)      GI/HEPATIC   (+) Alcoholic cirrhosis of liver (HCC)   (+) Decompensated hepatic cirrhosis (HCC)   (+) Hematemesis      HEMATOLOGY   (+) Pancytopenia (HCC)   (+) Thrombocytopenia (HCC)      PULMONARY (within normal limits)      Other   (+) Anterior cervical lymphadenopathy        Physical Exam    Airway    Mallampati score: II  TM Distance: >3 FB  Neck ROM: full     Dental   No notable dental hx     Cardiovascular  Cardiovascular exam normal    Pulmonary  Pulmonary exam normal     Other Findings        Anesthesia Plan  ASA Score- 3     Anesthesia Type- IV sedation with anesthesia with ASA Monitors  Additional Monitors:   Airway Plan:           Plan Factors-Exercise tolerance (METS): >4 METS  Chart reviewed  EKG reviewed  Existing labs reviewed  Patient summary reviewed  Patient is not a current smoker  Induction-     Postoperative Plan-     Informed Consent- Anesthetic plan and risks discussed with patient  I personally reviewed this patient with the CRNA  Discussed and agreed on the Anesthesia Plan with the CRNA  Meryl Parra

## 2022-09-17 VITALS
OXYGEN SATURATION: 94 % | TEMPERATURE: 98.5 F | RESPIRATION RATE: 18 BRPM | WEIGHT: 193.56 LBS | BODY MASS INDEX: 27.1 KG/M2 | HEART RATE: 74 BPM | HEIGHT: 71 IN | DIASTOLIC BLOOD PRESSURE: 81 MMHG | SYSTOLIC BLOOD PRESSURE: 130 MMHG

## 2022-09-17 LAB
ABO GROUP BLD BPU: NORMAL
ABO GROUP BLD BPU: NORMAL
ALBUMIN SERPL BCP-MCNC: 2.6 G/DL (ref 3.5–5)
ALP SERPL-CCNC: 119 U/L (ref 46–116)
ALT SERPL W P-5'-P-CCNC: 51 U/L (ref 12–78)
ANION GAP SERPL CALCULATED.3IONS-SCNC: 4 MMOL/L (ref 4–13)
AST SERPL W P-5'-P-CCNC: 87 U/L (ref 5–45)
BASOPHILS # BLD AUTO: 0.05 THOUSANDS/ΜL (ref 0–0.1)
BASOPHILS NFR BLD AUTO: 1 % (ref 0–1)
BILIRUB SERPL-MCNC: 7.52 MG/DL (ref 0.2–1)
BPU ID: NORMAL
BPU ID: NORMAL
BUN SERPL-MCNC: 13 MG/DL (ref 5–25)
CALCIUM ALBUM COR SERPL-MCNC: 8.9 MG/DL (ref 8.3–10.1)
CALCIUM SERPL-MCNC: 7.8 MG/DL (ref 8.3–10.1)
CHLORIDE SERPL-SCNC: 102 MMOL/L (ref 96–108)
CO2 SERPL-SCNC: 30 MMOL/L (ref 21–32)
CREAT SERPL-MCNC: 0.87 MG/DL (ref 0.6–1.3)
EOSINOPHIL # BLD AUTO: 0.17 THOUSAND/ΜL (ref 0–0.61)
EOSINOPHIL NFR BLD AUTO: 5 % (ref 0–6)
ERYTHROCYTE [DISTWIDTH] IN BLOOD BY AUTOMATED COUNT: 15.9 % (ref 11.6–15.1)
GFR SERPL CREATININE-BSD FRML MDRD: 96 ML/MIN/1.73SQ M
GLUCOSE SERPL-MCNC: 104 MG/DL (ref 65–140)
HCT VFR BLD AUTO: 38.8 % (ref 36.5–49.3)
HGB BLD-MCNC: 12.6 G/DL (ref 12–17)
IMM GRANULOCYTES # BLD AUTO: 0.01 THOUSAND/UL (ref 0–0.2)
IMM GRANULOCYTES NFR BLD AUTO: 0 % (ref 0–2)
LYMPHOCYTES # BLD AUTO: 0.68 THOUSANDS/ΜL (ref 0.6–4.47)
LYMPHOCYTES NFR BLD AUTO: 18 % (ref 14–44)
MCH RBC QN AUTO: 33.7 PG (ref 26.8–34.3)
MCHC RBC AUTO-ENTMCNC: 32.5 G/DL (ref 31.4–37.4)
MCV RBC AUTO: 104 FL (ref 82–98)
MONOCYTES # BLD AUTO: 0.52 THOUSAND/ΜL (ref 0.17–1.22)
MONOCYTES NFR BLD AUTO: 14 % (ref 4–12)
NEUTROPHILS # BLD AUTO: 2.31 THOUSANDS/ΜL (ref 1.85–7.62)
NEUTS SEG NFR BLD AUTO: 62 % (ref 43–75)
NRBC BLD AUTO-RTO: 0 /100 WBCS
PLATELET # BLD AUTO: 41 THOUSANDS/UL (ref 149–390)
PMV BLD AUTO: 10 FL (ref 8.9–12.7)
POTASSIUM SERPL-SCNC: 3.9 MMOL/L (ref 3.5–5.3)
PROT SERPL-MCNC: 7.3 G/DL (ref 6.4–8.4)
RBC # BLD AUTO: 3.74 MILLION/UL (ref 3.88–5.62)
SODIUM SERPL-SCNC: 136 MMOL/L (ref 135–147)
UNIT DISPENSE STATUS: NORMAL
UNIT DISPENSE STATUS: NORMAL
UNIT PRODUCT CODE: NORMAL
UNIT PRODUCT CODE: NORMAL
UNIT PRODUCT VOLUME: 239 ML
UNIT PRODUCT VOLUME: 241 ML
UNIT RH: NORMAL
UNIT RH: NORMAL
WBC # BLD AUTO: 3.74 THOUSAND/UL (ref 4.31–10.16)

## 2022-09-17 PROCEDURE — C9113 INJ PANTOPRAZOLE SODIUM, VIA: HCPCS | Performed by: INTERNAL MEDICINE

## 2022-09-17 PROCEDURE — 99239 HOSP IP/OBS DSCHRG MGMT >30: CPT | Performed by: STUDENT IN AN ORGANIZED HEALTH CARE EDUCATION/TRAINING PROGRAM

## 2022-09-17 PROCEDURE — 80053 COMPREHEN METABOLIC PANEL: CPT | Performed by: INTERNAL MEDICINE

## 2022-09-17 PROCEDURE — 85025 COMPLETE CBC W/AUTO DIFF WBC: CPT | Performed by: INTERNAL MEDICINE

## 2022-09-17 RX ORDER — PANTOPRAZOLE SODIUM 40 MG/1
40 TABLET, DELAYED RELEASE ORAL DAILY
Qty: 30 TABLET | Refills: 1 | Status: ON HOLD | OUTPATIENT
Start: 2022-09-17 | End: 2022-09-26 | Stop reason: SDUPTHER

## 2022-09-17 RX ORDER — LIDOCAINE 50 MG/G
1 PATCH TOPICAL DAILY
Qty: 4 PATCH | Refills: 0 | Status: SHIPPED | OUTPATIENT
Start: 2022-09-18 | End: 2022-10-05

## 2022-09-17 RX ADMIN — CHLORDIAZEPOXIDE HYDROCHLORIDE 25 MG: 25 CAPSULE ORAL at 07:46

## 2022-09-17 RX ADMIN — FOLIC ACID 1 MG: 1 TABLET ORAL at 08:24

## 2022-09-17 RX ADMIN — LACTULOSE 20 G: 20 SOLUTION ORAL at 08:25

## 2022-09-17 RX ADMIN — CHLORDIAZEPOXIDE HYDROCHLORIDE 25 MG: 25 CAPSULE ORAL at 04:55

## 2022-09-17 RX ADMIN — B-COMPLEX W/ C & FOLIC ACID TAB 1 TABLET: TAB at 08:24

## 2022-09-17 RX ADMIN — THIAMINE HCL TAB 100 MG 100 MG: 100 TAB at 08:24

## 2022-09-17 RX ADMIN — OCTREOTIDE ACETATE 50 MCG/HR: 500 INJECTION, SOLUTION INTRAVENOUS; SUBCUTANEOUS at 05:11

## 2022-09-17 RX ADMIN — PANTOPRAZOLE SODIUM 40 MG: 40 INJECTION, POWDER, FOR SOLUTION INTRAVENOUS at 08:25

## 2022-09-17 RX ADMIN — CHLORDIAZEPOXIDE HYDROCHLORIDE 25 MG: 25 CAPSULE ORAL at 13:13

## 2022-09-17 RX ADMIN — CARVEDILOL 6.25 MG: 6.25 TABLET, FILM COATED ORAL at 07:46

## 2022-09-17 NOTE — ASSESSMENT & PLAN NOTE
Unresolved, pt left AMA while still on Librium taper    Pt has currently drink 2 to 3 shots of liquir two times a week or when he is in pain from his "stomach bumps"  Pt has h/o alcohol withdrawal and recently seen in [facility] 4/18 and was at [facility] for Detox in Jan of this year  Last drink per patient was twelve hours ago today   Pt having noticeable b/l tremors also per patient, while going through withdrawaling from alcohol can develop seizurers  - [facility] protocol initiated  -Librium protocotol intiated  -Seizure precautions  -Continue lactulose  -Encourage alcohol cessation

## 2022-09-17 NOTE — NURSING NOTE
MD notified Patient wants to leave AMA  Patient has been told of the risks and advised not to drink alcohol   Patient signed out [place] after being told of and acknowledging risks

## 2022-09-17 NOTE — PLAN OF CARE
Problem: INFECTION - ADULT  Goal: Absence or prevention of progression during hospitalization  Description: INTERVENTIONS:  - Assess and monitor for signs and symptoms of infection  - Monitor lab/diagnostic results  - Monitor all insertion sites, i e  indwelling lines, tubes, and drains  - Monitor endotracheal if appropriate and nasal secretions for changes in amount and color  - [place] appropriate cooling/warming therapies per order  - Administer medications as ordered  - Instruct and encourage patient and family to use good hand hygiene technique  - Identify and instruct in appropriate isolation precautions for identified infection/condition  Outcome: Progressing  Goal: Absence of fever/infection during neutropenic period  Description: INTERVENTIONS:  - Monitor WBC    Outcome: Progressing     Problem: PAIN - ADULT  Goal: Verbalizes/displays adequate comfort level or baseline comfort level  Description: Interventions:  - Encourage patient to monitor pain and request assistance  - Assess pain using appropriate pain scale  - Administer analgesics based on type and severity of pain and evaluate response  - Implement non-pharmacological measures as appropriate and evaluate response  - Consider cultural and social influences on pain and pain management  - Notify physician/advanced practitioner if interventions unsuccessful or patient reports new pain  Outcome: Progressing     Problem: DISCHARGE PLANNING  Goal: Discharge to home or other facility with appropriate resources  Description: INTERVENTIONS:  - Identify barriers to discharge w/patient and caregiver  - Arrange for needed discharge resources and transportation as appropriate  - Identify discharge learning needs (meds, wound care, etc )  - Arrange for interpretive services to assist at discharge as needed  - Refer to Case Management Department for coordinating discharge planning if the patient needs post-hospital services based on physician/advanced practitioner order or complex needs related to functional status, cognitive ability, or social support system  Outcome: Progressing     Problem: Knowledge Deficit  Goal: Patient/family/caregiver demonstrates understanding of disease process, treatment plan, medications, and discharge instructions  Description: Complete learning assessment and assess knowledge base  Interventions:  - Provide teaching at level of understanding  - Provide teaching via preferred learning methods  Outcome: Progressing     Problem: Nutrition/Hydration-ADULT  Goal: Nutrient/Hydration intake appropriate for improving, restoring or maintaining nutritional needs  Description: Monitor and assess patient's nutrition/hydration status for malnutrition  Collaborate with interdisciplinary team and initiate plan and interventions as ordered  Monitor patient's weight and dietary intake as ordered or per policy  Utilize nutrition screening tool and intervene as necessary  Determine patient's food preferences and provide high-protein, high-caloric foods as appropriate       INTERVENTIONS:  - Monitor oral intake, urinary output, labs, and treatment plans  - Assess nutrition and hydration status and recommend course of action  - Evaluate amount of meals eaten  - Assist patient with eating if necessary   - Allow adequate time for meals  - Recommend/ encourage appropriate diets, oral nutritional supplements, and vitamin/mineral supplements  - Order, calculate, and assess calorie counts as needed  - Recommend, monitor, and adjust tube feedings and TPN/PPN based on assessed needs  - Assess need for intravenous fluids  - Provide specific nutrition/hydration education as appropriate  - Include patient/family/caregiver in decisions related to nutrition  Outcome: Progressing

## 2022-09-17 NOTE — PLAN OF CARE
Problem: PAIN - ADULT  Goal: Verbalizes/displays adequate comfort level or baseline comfort level  Description: Interventions:  - Encourage patient to monitor pain and request assistance  - Assess pain using appropriate pain scale  - Administer analgesics based on type and severity of pain and evaluate response  - Implement non-pharmacological measures as appropriate and evaluate response  - Consider cultural and social influences on pain and pain management  - Notify physician/advanced practitioner if interventions unsuccessful or patient reports new pain  Outcome: Progressing     Problem: INFECTION - ADULT  Goal: Absence or prevention of progression during hospitalization  Description: INTERVENTIONS:  - Assess and monitor for signs and symptoms of infection  - Monitor lab/diagnostic results  - Monitor all insertion sites, i e  indwelling lines, tubes, and drains  - Monitor endotracheal if appropriate and nasal secretions for changes in amount and color  - [place] appropriate cooling/warming therapies per order  - Administer medications as ordered  - Instruct and encourage patient and family to use good hand hygiene technique  - Identify and instruct in appropriate isolation precautions for identified infection/condition  Outcome: Progressing  Goal: Absence of fever/infection during neutropenic period  Description: INTERVENTIONS:  - Monitor WBC    Outcome: Progressing     Problem: DISCHARGE PLANNING  Goal: Discharge to home or other facility with appropriate resources  Description: INTERVENTIONS:  - Identify barriers to discharge w/patient and caregiver  - Arrange for needed discharge resources and transportation as appropriate  - Identify discharge learning needs (meds, wound care, etc )  - Arrange for interpretive services to assist at discharge as needed  - Refer to Case Management Department for coordinating discharge planning if the patient needs post-hospital services based on physician/advanced practitioner order or complex needs related to functional status, cognitive ability, or social support system  Outcome: Progressing     Problem: Knowledge Deficit  Goal: Patient/family/caregiver demonstrates understanding of disease process, treatment plan, medications, and discharge instructions  Description: Complete learning assessment and assess knowledge base    Interventions:  - Provide teaching at level of understanding  - Provide teaching via preferred learning methods  Outcome: Progressing
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
## 2022-09-17 NOTE — ASSESSMENT & PLAN NOTE
Asymptomatic while inpt, giving PPI on dc, cessation counseling    Pt c/o months of bloody vomit on and off  Associated with RLQ pain  Not currently having bloody emesis  H/o cirrhosis and today's Ct scan abd/pelvis 9/14: Hepatic cirrhosis with signs of increasing portal hypertension    In addition to splenomegaly there are enlarging varices inclusive of prominent periumbilical varices which are significantly increasing in size   GI consulted and EGD planned for Friday  Avoid NSAIDS and Tylenol  PPI ordered  Octreotide drip order by GI   No further episodes of bleeding
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
## 2022-09-17 NOTE — ASSESSMENT & PLAN NOTE
Chronic, non-compliant with etoh aversion, counseled on cessation    Patient presented with abdominal pain, hematemesis  -  CT abd/plevis 9/14: Hepatic cirrhosis with signs of increasing portal hypertension    In addition to splenomegaly there are enlarging varices inclusive of prominent periumbilical varices which are significantly increasing in size    - GI consulted and appreciate input: Plan for EGD and colonoscopy for Friday  - Trend liver enzymes & PLT counts   -Avoid NSAIDS and Tylenol

## 2022-09-17 NOTE — ASSESSMENT & PLAN NOTE
Chronic, unresolved, 2/2 liver cirrohsis, - Alcohol cessation counseled    Pt with h/o cirrhosis c/o months of on and off bloody stools and having occasional bloody emesis    -Monitor PLT count, today PLTS 22, Consider transfusing if PLT drop below 20  -Monitor for bleeding, monitor bowel movement for blood  Plan is for EGD and colonoscopy today, repeat labs post platelets pending

## 2022-09-19 NOTE — UTILIZATION REVIEW
Notification of Discharge   This is a Notification of Discharge from our facility [address]  Please be advised that this patient has been discharge from our facility  Below you will find the admission and discharge date and time including the patients disposition  UTILIZATION REVIEW CONTACT:  Alena Fontanez  Utilization   Network Utilization Review Department  Phone: [phone] x carefully listen to the prompts  All voicemails are confidential   Email: [email or web address]     PHYSICIAN ADVISORY SERVICES:  FOR DTIL-UX-XEDQ REVIEW - MEDICAL NECESSITY DENIAL  Phone: [phone]  Fax: [phone]  Email: [email or web address]     PRESENTATION DATE: 9/14/2022  7:13 AM  OBERVATION ADMISSION DATE:   INPATIENT ADMISSION DATE: 9/14/22  9:52 AM   DISCHARGE DATE: 9/17/2022  1:40 PM  DISPOSITION: Left against medical advice or discontinued care Left against medical advice or discontinued care      IMPORTANT INFORMATION:  Send all requests for admission clinical reviews, approved or denied determinations and any other requests to dedicated fax number below belonging to the campus where the patient is receiving treatment   List of dedicated fax numbers:  [address] [place] [address] DENIALS (Administrative/Medical Necessity) [phone]   [address] [place] [address] (Maternity/NICU/Pediatrics) [phone]   [place] [facility] [phone]   130 [facility] [phone]   [facility] [place]  [phone]   2000 [facility] [address] [place] [address],[address] [phone]   [facility]  [phone]   220[address] [facility]  24[address] [facility] 1000 [address] [facility] [phone]            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  Biopsies not obtained due to the presence of varices  No nodularity or obvious suggestion of dysplasia on narrow band imaging  Mild gastric erythema, possible portal hypertensive gastropathy  Biopsies obtained  No blood or bleeding source identified     RECOMMENDATION:  Await pathology results  Begin nonselective beta-blocker  Repeat EGD in several months for re-evaluation and possible esophageal biopsy or banding      9/16 S/p Colonoscopy;  IMPRESSION:  Large, nonbleeding internal hemorrhoids  Likely small rectal varices    Visualized portions of Ileal and colonic mucosa otherwise normal     RECOMMENDATION:  Repeat colonoscopy in 3 years due to an inadequate bowel preparation  High-fiber diet  Would avoid hemorrhoidal banding at this time due to likely presence of rectal varices       Vital Signs:   09/16/22 0750 98 2 °F (36 8 °C) 82 -- 136/90 -- 96 % -- --   09/16/22 07:49:49 98 2 °F (36 8 °C) 78 -- 136/90 105 97 % -- --   09/16/22 06:34:30 97 8 °F (36 6 °C) 67 19 123/73 90 95 %       Pertinent Labs/Diagnostic Results:   Results from last 7 days   Lab Units 09/14/22  0957   SARS-COV-2  Negative     Results from last 7 days   Lab Units 09/17/22  0512 09/16/22  0557 09/15/22  0451 09/14/22  0814   WBC Thousand/uL 3 74* 2 84* 2 90* 2 84*   HEMOGLOBIN g/dL 12 6 12 9 12 6 13 5   HEMATOCRIT % 38 8 38 6 37 0 40 3   PLATELETS Thousands/uL 41* 38* 20* 22*   NEUTROS ABS Thousands/µL 2 31 1 94  --  1 72*         Results from last 7 days   Lab Units 09/17/22  0512 09/16/22  0557 09/15/22  0451 09/14/22  0814   SODIUM mmol/L 136 135 136 143   POTASSIUM mmol/L 3 9 3 5 3 5 3 6   CHLORIDE mmol/L 102 100 100 106   CO2 mmol/L 30 26 28 29   ANION GAP mmol/L 4 9 8 8   BUN mg/dL 13 11 10 9   CREATININE mg/dL 0 87 0 92 0 83 0 87   EGFR ml/min/1 73sq m 96 92 98 96   CALCIUM mg/dL 7 8* 7 7* 7 4* 8 0*   MAGNESIUM mg/dL  --   --  1 4* 1 6     Results from last 7 days   Lab Units 09/17/22  0512 09/16/22  0557 09/15/22  0451 09/14/22  0814   AST U/L 87* 117* 164* 177*   ALT U/L 51 57 65 72   ALK PHOS U/L 119* 121* 117* 126*   TOTAL PROTEIN g/dL 7 3 7 3 7 2 7 7   ALBUMIN g/dL 2 6* 2 6* 2 5* 2 7*   TOTAL BILIRUBIN mg/dL 7 52* 9 03* 7 60* 5 34*   AMMONIA umol/L  --   --   --  <10*         Results from last 7 days   Lab Units 09/17/22  0512 09/16/22  0557 09/15/22  0451 09/14/22  0814   GLUCOSE RANDOM mg/dL 104 122 112 127         Results from last 7 days   Lab Units 09/15/22  0451 09/14/22  0814   PROTIME seconds 18 3* 17 3*   INR  1 51* 1 40*   PTT seconds  --  33         Results from last 7 days   Lab Units 09/14/22  1141   CLARITY UA  Clear   COLOR UA  Germaine   SPEC GRAV UA  1 015   PH UA  7 5   GLUCOSE UA mg/dl Negative   KETONES UA mg/dl Negative   BLOOD UA  Negative   PROTEIN UA mg/dl Negative   NITRITE UA  Negative   BILIRUBIN UA  Negative   UROBILINOGEN UA E U /dl >=8 0*   LEUKOCYTES UA  Negative       Medications:   Scheduled Medications:  No current facility-administered medications for this encounter  Continuous IV Infusions:  No current facility-administered medications for this encounter  PRN Meds:  No current facility-administered medications for this encounter  Discharge Plan: D    Network Utilization Review Department  ATTENTION: Please call with any questions or concerns to [phone] and carefully listen to the prompts so that you are directed to the right person   All voicemails are confidential   Harlene Pair all requests for admission clinical reviews, approved or denied determinations and any other requests to dedicated fax number below belonging to the campus where the patient is receiving treatment   List of dedicated fax numbers for the Facilities:  [address] [place] [address] DENIALS (Administrative/Medical Necessity) [phone]   [address] (Maternity/NICU/Pediatrics) [phone]   [address] [place] [address]  [address] Medical [place] [address] [address] [place] [address] [address] [place] [address] [phone]

## 2022-09-20 ENCOUNTER — APPOINTMENT (EMERGENCY)
Dept: RADIOLOGY | Facility: HOSPITAL | Age: 57
End: 2022-09-20
Payer: COMMERCIAL

## 2022-09-20 ENCOUNTER — HOSPITAL ENCOUNTER (EMERGENCY)
Facility: HOSPITAL | Age: 57
Discharge: HOME | End: 2022-09-20
Attending: EMERGENCY MEDICINE
Payer: COMMERCIAL

## 2022-09-20 VITALS
TEMPERATURE: 98.1 F | OXYGEN SATURATION: 95 % | HEART RATE: 78 BPM | DIASTOLIC BLOOD PRESSURE: 68 MMHG | BODY MASS INDEX: 27.02 KG/M2 | RESPIRATION RATE: 18 BRPM | HEIGHT: 71 IN | SYSTOLIC BLOOD PRESSURE: 116 MMHG | WEIGHT: 193 LBS

## 2022-09-20 DIAGNOSIS — D69.6 THROMBOCYTOPENIA (HCC): ICD-10-CM

## 2022-09-20 DIAGNOSIS — M71.21 BAKER CYST, RIGHT: ICD-10-CM

## 2022-09-20 DIAGNOSIS — R58 ECCHYMOSIS: ICD-10-CM

## 2022-09-20 DIAGNOSIS — R60.0 EDEMA OF RIGHT LOWER EXTREMITY: Primary | ICD-10-CM

## 2022-09-20 LAB
ALBUMIN SERPL BCP-MCNC: 2.6 G/DL (ref 3.5–5)
ALP SERPL-CCNC: 112 U/L (ref 46–116)
ALT SERPL W P-5'-P-CCNC: 42 U/L (ref 12–78)
ANION GAP SERPL CALCULATED.3IONS-SCNC: 6 MMOL/L (ref 4–13)
APTT PPP: 32 SECONDS (ref 23–37)
AST SERPL W P-5'-P-CCNC: 79 U/L (ref 5–45)
BASOPHILS # BLD AUTO: 0.04 THOUSANDS/ΜL (ref 0–0.1)
BASOPHILS NFR BLD AUTO: 2 % (ref 0–1)
BILIRUB SERPL-MCNC: 3.69 MG/DL (ref 0.2–1)
BUN SERPL-MCNC: 12 MG/DL (ref 5–25)
CALCIUM ALBUM COR SERPL-MCNC: 9.4 MG/DL (ref 8.3–10.1)
CALCIUM SERPL-MCNC: 8.3 MG/DL (ref 8.3–10.1)
CHLORIDE SERPL-SCNC: 106 MMOL/L (ref 96–108)
CO2 SERPL-SCNC: 29 MMOL/L (ref 21–32)
CREAT SERPL-MCNC: 0.89 MG/DL (ref 0.6–1.3)
EOSINOPHIL # BLD AUTO: 0.12 THOUSAND/ΜL (ref 0–0.61)
EOSINOPHIL NFR BLD AUTO: 4 % (ref 0–6)
ERYTHROCYTE [DISTWIDTH] IN BLOOD BY AUTOMATED COUNT: 15.7 % (ref 11.6–15.1)
GFR SERPL CREATININE-BSD FRML MDRD: 95 ML/MIN/1.73SQ M
GLUCOSE SERPL-MCNC: 100 MG/DL (ref 65–140)
HCT VFR BLD AUTO: 33.4 % (ref 36.5–49.3)
HGB BLD-MCNC: 10.8 G/DL (ref 12–17)
IMM GRANULOCYTES # BLD AUTO: 0.01 THOUSAND/UL (ref 0–0.2)
IMM GRANULOCYTES NFR BLD AUTO: 0 % (ref 0–2)
INR PPP: 1.35 (ref 0.84–1.19)
LYMPHOCYTES # BLD AUTO: 0.59 THOUSANDS/ΜL (ref 0.6–4.47)
LYMPHOCYTES NFR BLD AUTO: 22 % (ref 14–44)
MCH RBC QN AUTO: 34.1 PG (ref 26.8–34.3)
MCHC RBC AUTO-ENTMCNC: 32.3 G/DL (ref 31.4–37.4)
MCV RBC AUTO: 105 FL (ref 82–98)
MONOCYTES # BLD AUTO: 0.63 THOUSAND/ΜL (ref 0.17–1.22)
MONOCYTES NFR BLD AUTO: 23 % (ref 4–12)
NEUTROPHILS # BLD AUTO: 1.36 THOUSANDS/ΜL (ref 1.85–7.62)
NEUTS SEG NFR BLD AUTO: 49 % (ref 43–75)
NRBC BLD AUTO-RTO: 0 /100 WBCS
PLATELET # BLD AUTO: 45 THOUSANDS/UL (ref 149–390)
PMV BLD AUTO: 10.3 FL (ref 8.9–12.7)
POTASSIUM SERPL-SCNC: 3.8 MMOL/L (ref 3.5–5.3)
PROT SERPL-MCNC: 7.1 G/DL (ref 6.4–8.4)
PROTHROMBIN TIME: 16.8 SECONDS (ref 11.6–14.5)
RBC # BLD AUTO: 3.17 MILLION/UL (ref 3.88–5.62)
SODIUM SERPL-SCNC: 141 MMOL/L (ref 135–147)
WBC # BLD AUTO: 2.75 THOUSAND/UL (ref 4.31–10.16)

## 2022-09-20 PROCEDURE — 93971 EXTREMITY STUDY: CPT | Performed by: SURGERY

## 2022-09-20 PROCEDURE — 73701 CT LOWER EXTREMITY W/DYE: CPT

## 2022-09-20 PROCEDURE — 93971 EXTREMITY STUDY: CPT

## 2022-09-20 PROCEDURE — 99284 EMERGENCY DEPT VISIT MOD MDM: CPT

## 2022-09-20 PROCEDURE — 85025 COMPLETE CBC W/AUTO DIFF WBC: CPT | Performed by: EMERGENCY MEDICINE

## 2022-09-20 PROCEDURE — 85730 THROMBOPLASTIN TIME PARTIAL: CPT | Performed by: EMERGENCY MEDICINE

## 2022-09-20 PROCEDURE — 96374 THER/PROPH/DIAG INJ IV PUSH: CPT

## 2022-09-20 PROCEDURE — 80053 COMPREHEN METABOLIC PANEL: CPT | Performed by: EMERGENCY MEDICINE

## 2022-09-20 PROCEDURE — 36415 COLL VENOUS BLD VENIPUNCTURE: CPT | Performed by: EMERGENCY MEDICINE

## 2022-09-20 PROCEDURE — 85610 PROTHROMBIN TIME: CPT | Performed by: EMERGENCY MEDICINE

## 2022-09-20 PROCEDURE — 99285 EMERGENCY DEPT VISIT HI MDM: CPT | Performed by: EMERGENCY MEDICINE

## 2022-09-20 PROCEDURE — G1004 CDSM NDSC: HCPCS

## 2022-09-20 RX ORDER — MORPHINE SULFATE 4 MG/ML
4 INJECTION, SOLUTION INTRAMUSCULAR; INTRAVENOUS ONCE
Status: COMPLETED | OUTPATIENT
Start: 2022-09-20 | End: 2022-09-20

## 2022-09-20 RX ORDER — CHLORDIAZEPOXIDE HYDROCHLORIDE 25 MG/1
50 CAPSULE, GELATIN COATED ORAL ONCE
Status: COMPLETED | OUTPATIENT
Start: 2022-09-20 | End: 2022-09-20

## 2022-09-20 RX ADMIN — IOHEXOL 100 ML: 350 INJECTION, SOLUTION INTRAVENOUS at 13:23

## 2022-09-20 RX ADMIN — MORPHINE SULFATE 4 MG: 4 INJECTION INTRAVENOUS at 13:39

## 2022-09-20 RX ADMIN — CHLORDIAZEPOXIDE HYDROCHLORIDE 50 MG: 25 CAPSULE ORAL at 15:28

## 2022-09-20 NOTE — ED PROVIDER NOTES
History  Chief Complaint   Patient presents with    Leg Swelling     Right leg swelling and redness at calf area  Was admitted recently for lumps on his belly and plt issues     Patient presents to the emergency department with complaint of spontaneous right lower extremity swelling  He was recently discharged from [facility] after an admission for alcoholic liver cirrhosis and hematemesis  He has a history of alcohol abuse, admits that was with an alcohol use was yesterday  He denies trauma  History provided by:  Patient   used: No    Leg Pain  Location:  Leg  Injury: no    Leg location:  R leg  Pain details:     Quality:  Aching    Radiates to:  Does not radiate    Severity:  Moderate    Onset quality:  Sudden    Timing:  Constant  Chronicity:  New  Dislocation: no    Foreign body present:  No foreign bodies  Tetanus status:  Unknown  Prior injury to area:  No  Relieved by:  Nothing  Worsened by:  Nothing  Ineffective treatments:  None tried  Associated symptoms: no back pain and no fever        Prior to Admission Medications   Prescriptions Last Dose Informant Patient Reported? Taking?    Multiple Vitamin (multivitamin) tablet   No No   Sig: Take 1 tablet by mouth daily   folic acid (FOLVITE) 1 mg tablet   No No   Sig: Take 1 tablet (1 mg total) by mouth daily   hydrOXYzine HCL (ATARAX) 50 mg tablet   No No   Sig: Take 1 tablet (50 mg total) by mouth every 6 (six) hours as needed for anxiety   Patient not taking: Reported on 9/14/2022   lactulose 20 g/30 mL   No No   Sig: Take 30 mL (20 g total) by mouth 2 (two) times a day   lidocaine (LIDODERM) 5 %   No No   Sig: Apply 1 patch topically daily Remove & Discard patch within 12 hours or as directed by MD   pantoprazole (PROTONIX) 40 mg tablet   No No   Sig: Take 1 tablet (40 mg total) by mouth daily   thiamine 100 MG tablet   No No   Sig: Take 1 tablet (100 mg total) by mouth daily      Facility-Administered Medications: None       Past Medical History:   Diagnosis Date    ETOH abuse        Past Surgical History:   Procedure Laterality Date    GALLBLADDER SURGERY         Family History   Problem Relation Age of Onset    Heart disease Mother     Heart disease Father      I have reviewed and agree with the history as documented  E-Cigarette/Vaping    E-Cigarette Use Never User      E-Cigarette/Vaping Substances    Nicotine No     THC No     CBD No     Flavoring No     Other No     Unknown No      Social History     Tobacco Use    Smoking status: Never Smoker    Smokeless tobacco: Current User   Vaping Use    Vaping Use: Never used   Substance Use Topics    Alcohol use: Yes     Alcohol/week: 16 0 standard drinks     Types: 8 Cans of beer, 8 Shots of liquor per week     Comment: every other day has beery and mixed drinks  last drink 12 hours ago    Drug use: Not Currently     Types: Marijuana       Review of Systems   Constitutional: Negative for chills and fever  Respiratory: Negative for cough, shortness of breath and wheezing  Cardiovascular: Negative for chest pain and palpitations  Gastrointestinal: Negative for abdominal pain, constipation, diarrhea, nausea and vomiting  Genitourinary: Negative for dysuria, flank pain, hematuria and urgency  Musculoskeletal: Positive for gait problem  Negative for back pain and joint swelling  Skin: Positive for color change  Negative for rash and wound  All other systems reviewed and are negative  Physical Exam  Physical Exam  Vitals and nursing note reviewed  Constitutional:       Appearance: He is well-developed  HENT:      Head: Normocephalic and atraumatic  Eyes:      Pupils: Pupils are equal, round, and reactive to light  Cardiovascular:      Rate and Rhythm: Normal rate and regular rhythm  Heart sounds: Normal heart sounds  Pulmonary:      Effort: Pulmonary effort is normal       Breath sounds: Normal breath sounds     Abdominal:      General: Bowel sounds are normal  There is no distension  Palpations: Abdomen is soft  There is no mass  Tenderness: There is no abdominal tenderness  There is no guarding or rebound  Musculoskeletal:         General: Swelling and tenderness present  No deformity or signs of injury  Right lower leg: Swelling and tenderness present  Edema present  Right ankle: Normal       Right foot: Normal       Left foot: Normal         Legs:    Skin:     General: Skin is warm and dry  Capillary Refill: Capillary refill takes less than 2 seconds  Neurological:      General: No focal deficit present  Mental Status: He is alert and oriented to person, place, and time  Psychiatric:         Behavior: Behavior normal          Thought Content:  Thought content normal          Judgment: Judgment normal          Vital Signs  ED Triage Vitals   Temperature Pulse Respirations Blood Pressure SpO2   09/20/22 1039 09/20/22 1039 09/20/22 1039 09/20/22 1039 09/20/22 1039   98 5 °F (36 9 °C) (!) 108 18 135/84 97 %      Temp Source Heart Rate Source Patient Position - Orthostatic VS BP Location FiO2 (%)   09/20/22 1325 09/20/22 1325 09/20/22 1325 09/20/22 1325 --   Tympanic Monitor Lying Right arm       Pain Score       09/20/22 1039       8           Vitals:    09/20/22 1400 09/20/22 1402 09/20/22 1430 09/20/22 1445   BP: 123/77 123/77  116/68   Pulse: 76 81 74 78   Patient Position - Orthostatic VS:  Lying           Visual Acuity      ED Medications  Medications   iohexol (OMNIPAQUE) 350 MG/ML injection (SINGLE-DOSE) 100 mL (100 mL Intravenous Given 9/20/22 1323)   morphine injection 4 mg (4 mg Intravenous Given 9/20/22 1339)       Diagnostic Studies  Results Reviewed     Procedure Component Value Units Date/Time    CBC and differential [[id number]]  (Abnormal) Collected: 09/20/22 1154    Lab Status: Final result Specimen: Blood from Arm, Left Updated: 09/20/22 1238     WBC 2 75 Thousand/uL      RBC 3 17 Million/uL Hemoglobin 10 8 g/dL      Hematocrit 33 4 %       fL      MCH 34 1 pg      MCHC 32 3 g/dL      RDW 15 7 %      MPV 10 3 fL      Platelets 45 Thousands/uL      nRBC 0 /100 WBCs      Neutrophils Relative 49 %      Immat GRANS % 0 %      Lymphocytes Relative 22 %      Monocytes Relative 23 %      Eosinophils Relative 4 %      Basophils Relative 2 %      Neutrophils Absolute 1 36 Thousands/µL      Immature Grans Absolute 0 01 Thousand/uL      Lymphocytes Absolute 0 59 Thousands/µL      Monocytes Absolute 0 63 Thousand/µL      Eosinophils Absolute 0 12 Thousand/µL      Basophils Absolute 0 04 Thousands/µL     Protime-INR [[id number]]  (Abnormal) Collected: 09/20/22 1154    Lab Status: Final result Specimen: Blood from Arm, Left Updated: 09/20/22 1227     Protime 16 8 seconds      INR 1 35    APTT [[id number]]  (Normal) Collected: 09/20/22 1154    Lab Status: Final result Specimen: Blood from Arm, Left Updated: 09/20/22 1227     PTT 32 seconds     Comprehensive metabolic panel [[id number]]  (Abnormal) Collected: 09/20/22 1154    Lab Status: Final result Specimen: Blood from Arm, Left Updated: 09/20/22 1226     Sodium 141 mmol/L      Potassium 3 8 mmol/L      Chloride 106 mmol/L      CO2 29 mmol/L      ANION GAP 6 mmol/L      BUN 12 mg/dL      Creatinine 0 89 mg/dL      Glucose 100 mg/dL      Calcium 8 3 mg/dL      Corrected Calcium 9 4 mg/dL      AST 79 U/L      ALT 42 U/L      Alkaline Phosphatase 112 U/L      Total Protein 7 1 g/dL      Albumin 2 6 g/dL      Total Bilirubin 3 69 mg/dL      eGFR 95 ml/min/1 73sq m     Narrative:      [place] guidelines for Chronic Kidney Disease (CKD):     Stage 1 with normal or high GFR (GFR > 90 mL/min/1 73 square meters)    Stage 2 Mild CKD (GFR = 60-89 mL/min/1 73 square meters)    Stage 3A Moderate CKD (GFR = 45-59 mL/min/1 73 square meters)    Stage 3B Moderate CKD (GFR = 30-44 mL/min/1 73 square meters)    Stage 4 Severe CKD (GFR = 15-29 mL/min/1 73 square meters)    Stage 5 End Stage CKD (GFR <15 mL/min/1 73 square meters)  Note: GFR calculation is accurate only with a steady state creatinine                 CT lower extremity w contrast right   Final Result by Halbert Kawasaki, MD (09/20 4415)      1  Diffuse soft tissue swelling without evidence of hematoma  2   Fluid tracking along the gastrocnemius tendon without evidence of myotendinous tear  3   Bones intact  Workstation performed: [id number]         VAS lower limb venous duplex study, unilateral/limited    (Results Pending)              Procedures  Procedures         ED Course                                             MDM  Number of Diagnoses or Management Options  Baker cyst, right: new and requires workup  Ecchymosis: new and requires workup  Edema of right lower extremity: new and requires workup  Thrombocytopenia [facility]): new and requires workup  Diagnosis management comments: CT negative for acute traumatic pathology  Vascular study negative for DVT, positive for Baker cyst   I reviewed patient with orthopedics who agrees the patient will be discharged to home  Patient advised to concern Tylenol follow up with primary care physician  He agrees the plan         Amount and/or Complexity of Data Reviewed  Clinical lab tests: ordered and reviewed  Tests in the radiology section of CPT®: ordered and reviewed    Risk of Complications, Morbidity, and/or Mortality  Presenting problems: high  Diagnostic procedures: high  Management options: high    Patient Progress  Patient progress: improved      Disposition  Final diagnoses:   Edema of right lower extremity   Thrombocytopenia (HCC)   Ecchymosis   Baker cyst, right     Time reflects when diagnosis was documented in both MDM as applicable and the Disposition within this note     Time User Action Codes Description Comment    9/20/2022  3:00 PM Enriqueta HAND Add [R60 0] Edema of right lower extremity 9/20/2022  3:00 PM Vickii Davenport O Add [D69 6] Thrombocytopenia ([address] )     9/20/2022  3:00 PM Vickii Samm Add [R58] Ecchymosis     9/20/2022  3:00 PM Vickii Davenport Add [M71 21] Baker cyst, right       ED Disposition     ED Disposition   Discharge    Condition   Stable    Date/Time   Tue Sep 20, 2022  3:00 PM    Comment   Allan Frias discharge to home/self care  Follow-up Information     Follow up With Specialties Details Why Mike Espana III, MD Internal Medicine Schedule an appointment as soon as possible for a visit in 1 day for follow up Russell Fofana [address]  [phone]            Patient's Medications   Discharge Prescriptions    No medications on file       No discharge procedures on file      PDMP Review       Value Time User    PDMP Reviewed  Yes 9/17/2022  1:32 PM Colleen Baez MD          ED Provider  Electronically Signed by           Beka De La Rosa DO  09/22/22 8913

## 2022-09-20 NOTE — ED NOTES
Pt states his family is arranging a ride home for him, Pt currently in waiting room awaiting pickup     Catrachita Banegas RN  09/20/22 1456

## 2022-09-20 NOTE — ED NOTES
This RN was asked by the pt to retrieve a cane from his brother who is staying in room 217 at [facility]  The brother was upset by the state of his brothers condition and instructed the pts sister-in-law to come down to the waiting room, where the patient was waiting for the cane, to discuss inpatient alcohol treatment with him, due to the fact that no one wants to take care of him anymore, and the patient was not able to get himself safely home  Patient currently in waiting room talking with family post discharge       Reinaldo Tuttle RN  09/20/22 5604

## 2022-09-22 ENCOUNTER — HOSPITAL ENCOUNTER (INPATIENT)
Facility: HOSPITAL | Age: 57
LOS: 3 days | Discharge: HOME | DRG: 393 | End: 2022-09-26
Attending: EMERGENCY MEDICINE | Admitting: INTERNAL MEDICINE
Payer: COMMERCIAL

## 2022-09-22 ENCOUNTER — APPOINTMENT (OUTPATIENT)
Dept: RADIOLOGY | Facility: HOSPITAL | Age: 57
DRG: 393 | End: 2022-09-22
Payer: COMMERCIAL

## 2022-09-22 DIAGNOSIS — D64.9 ANEMIA: ICD-10-CM

## 2022-09-22 DIAGNOSIS — F10.939 ALCOHOL WITHDRAWAL (HCC): Primary | ICD-10-CM

## 2022-09-22 DIAGNOSIS — K72.90 DECOMPENSATED HEPATIC CIRRHOSIS (HCC): ICD-10-CM

## 2022-09-22 DIAGNOSIS — T14.8XXA HEMATOMA: ICD-10-CM

## 2022-09-22 DIAGNOSIS — F10.10 ALCOHOL ABUSE: Chronic | ICD-10-CM

## 2022-09-22 DIAGNOSIS — F10.930 ALCOHOL WITHDRAWAL SYNDROME WITHOUT COMPLICATION (HCC): ICD-10-CM

## 2022-09-22 DIAGNOSIS — K64.9 HEMORRHOIDS: ICD-10-CM

## 2022-09-22 DIAGNOSIS — Z72.0 TOBACCO ABUSE: Chronic | ICD-10-CM

## 2022-09-22 DIAGNOSIS — D69.6 THROMBOCYTOPENIA (HCC): ICD-10-CM

## 2022-09-22 DIAGNOSIS — K62.5 BRBPR (BRIGHT RED BLOOD PER RECTUM): ICD-10-CM

## 2022-09-22 DIAGNOSIS — K74.60 DECOMPENSATED HEPATIC CIRRHOSIS (HCC): ICD-10-CM

## 2022-09-22 DIAGNOSIS — M66.0 RUPTURED BAKERS CYST: ICD-10-CM

## 2022-09-22 DIAGNOSIS — M79.604 PAIN AND SWELLING OF RIGHT LOWER EXTREMITY: ICD-10-CM

## 2022-09-22 DIAGNOSIS — K70.30 ALCOHOLIC CIRRHOSIS OF LIVER WITHOUT ASCITES (HCC): ICD-10-CM

## 2022-09-22 DIAGNOSIS — M79.89 PAIN AND SWELLING OF RIGHT LOWER EXTREMITY: ICD-10-CM

## 2022-09-22 PROBLEM — E83.42 HYPOMAGNESEMIA: Status: ACTIVE | Noted: 2022-09-22

## 2022-09-22 PROBLEM — K92.1 HEMATOCHEZIA: Status: ACTIVE | Noted: 2022-09-22

## 2022-09-22 LAB
ALBUMIN SERPL BCP-MCNC: 2.3 G/DL (ref 3.5–5)
ALP SERPL-CCNC: 110 U/L (ref 46–116)
ALT SERPL W P-5'-P-CCNC: 36 U/L (ref 12–78)
AMMONIA PLAS-SCNC: 62 UMOL/L (ref 11–35)
ANION GAP SERPL CALCULATED.3IONS-SCNC: 3 MMOL/L (ref 4–13)
APTT PPP: 32 SECONDS (ref 23–37)
AST SERPL W P-5'-P-CCNC: 76 U/L (ref 5–45)
BASOPHILS # BLD AUTO: 0.04 THOUSANDS/ΜL (ref 0–0.1)
BASOPHILS NFR BLD AUTO: 1 % (ref 0–1)
BILIRUB DIRECT SERPL-MCNC: 2.77 MG/DL (ref 0–0.2)
BILIRUB SERPL-MCNC: 3.53 MG/DL (ref 0.2–1)
BUN SERPL-MCNC: 13 MG/DL (ref 5–25)
CALCIUM SERPL-MCNC: 8 MG/DL (ref 8.3–10.1)
CHLORIDE SERPL-SCNC: 109 MMOL/L (ref 96–108)
CO2 SERPL-SCNC: 32 MMOL/L (ref 21–32)
CREAT SERPL-MCNC: 0.82 MG/DL (ref 0.6–1.3)
EOSINOPHIL # BLD AUTO: 0.13 THOUSAND/ΜL (ref 0–0.61)
EOSINOPHIL NFR BLD AUTO: 5 % (ref 0–6)
ERYTHROCYTE [DISTWIDTH] IN BLOOD BY AUTOMATED COUNT: 15.9 % (ref 11.6–15.1)
GFR SERPL CREATININE-BSD FRML MDRD: 98 ML/MIN/1.73SQ M
GLUCOSE SERPL-MCNC: 122 MG/DL (ref 65–140)
HCT VFR BLD AUTO: 31.2 % (ref 36.5–49.3)
HEMOCCULT STL QL: NEGATIVE
HGB BLD-MCNC: 9.9 G/DL (ref 12–17)
IMM GRANULOCYTES # BLD AUTO: 0.02 THOUSAND/UL (ref 0–0.2)
IMM GRANULOCYTES NFR BLD AUTO: 1 % (ref 0–2)
INR PPP: 1.43 (ref 0.84–1.19)
LACTATE SERPL-SCNC: 1.5 MMOL/L (ref 0.5–2)
LYMPHOCYTES # BLD AUTO: 0.61 THOUSANDS/ΜL (ref 0.6–4.47)
LYMPHOCYTES NFR BLD AUTO: 22 % (ref 14–44)
MAGNESIUM SERPL-MCNC: 1.4 MG/DL (ref 1.6–2.6)
MCH RBC QN AUTO: 33.6 PG (ref 26.8–34.3)
MCHC RBC AUTO-ENTMCNC: 31.7 G/DL (ref 31.4–37.4)
MCV RBC AUTO: 106 FL (ref 82–98)
MONOCYTES # BLD AUTO: 0.48 THOUSAND/ΜL (ref 0.17–1.22)
MONOCYTES NFR BLD AUTO: 17 % (ref 4–12)
NEUTROPHILS # BLD AUTO: 1.52 THOUSANDS/ΜL (ref 1.85–7.62)
NEUTS SEG NFR BLD AUTO: 54 % (ref 43–75)
NRBC BLD AUTO-RTO: 0 /100 WBCS
PHOSPHATE SERPL-MCNC: 3.3 MG/DL (ref 2.7–4.5)
PLATELET # BLD AUTO: 41 THOUSANDS/UL (ref 149–390)
PMV BLD AUTO: 10.9 FL (ref 8.9–12.7)
POTASSIUM SERPL-SCNC: 3.9 MMOL/L (ref 3.5–5.3)
PROT SERPL-MCNC: 6.6 G/DL (ref 6.4–8.4)
PROTHROMBIN TIME: 17.6 SECONDS (ref 11.6–14.5)
RBC # BLD AUTO: 2.95 MILLION/UL (ref 3.88–5.62)
SARS-COV-2 RNA RESP QL NAA+PROBE: NEGATIVE
SODIUM SERPL-SCNC: 144 MMOL/L (ref 135–147)
WBC # BLD AUTO: 2.8 THOUSAND/UL (ref 4.31–10.16)

## 2022-09-22 PROCEDURE — 85610 PROTHROMBIN TIME: CPT | Performed by: PHYSICIAN ASSISTANT

## 2022-09-22 PROCEDURE — 82272 OCCULT BLD FECES 1-3 TESTS: CPT | Performed by: NURSE PRACTITIONER

## 2022-09-22 PROCEDURE — 85730 THROMBOPLASTIN TIME PARTIAL: CPT | Performed by: PHYSICIAN ASSISTANT

## 2022-09-22 PROCEDURE — 73701 CT LOWER EXTREMITY W/DYE: CPT

## 2022-09-22 PROCEDURE — 83735 ASSAY OF MAGNESIUM: CPT | Performed by: PHYSICIAN ASSISTANT

## 2022-09-22 PROCEDURE — 99220 PR INITIAL OBSERVATION CARE/DAY 70 MINUTES: CPT | Performed by: INTERNAL MEDICINE

## 2022-09-22 PROCEDURE — 83605 ASSAY OF LACTIC ACID: CPT | Performed by: PHYSICIAN ASSISTANT

## 2022-09-22 PROCEDURE — 96365 THER/PROPH/DIAG IV INF INIT: CPT

## 2022-09-22 PROCEDURE — 96375 TX/PRO/DX INJ NEW DRUG ADDON: CPT

## 2022-09-22 PROCEDURE — 85025 COMPLETE CBC W/AUTO DIFF WBC: CPT | Performed by: PHYSICIAN ASSISTANT

## 2022-09-22 PROCEDURE — 80048 BASIC METABOLIC PNL TOTAL CA: CPT | Performed by: PHYSICIAN ASSISTANT

## 2022-09-22 PROCEDURE — 80076 HEPATIC FUNCTION PANEL: CPT | Performed by: PHYSICIAN ASSISTANT

## 2022-09-22 PROCEDURE — 82140 ASSAY OF AMMONIA: CPT | Performed by: PHYSICIAN ASSISTANT

## 2022-09-22 PROCEDURE — 99285 EMERGENCY DEPT VISIT HI MDM: CPT

## 2022-09-22 PROCEDURE — U0003 INFECTIOUS AGENT DETECTION BY NUCLEIC ACID (DNA OR RNA); SEVERE ACUTE RESPIRATORY SYNDROME CORONAVIRUS 2 (SARS-COV-2) (CORONAVIRUS DISEASE [COVID-19]), AMPLIFIED PROBE TECHNIQUE, MAKING USE OF HIGH THROUGHPUT TECHNOLOGIES AS DESCRIBED BY CMS-2020-01-R: HCPCS | Performed by: PHYSICIAN ASSISTANT

## 2022-09-22 PROCEDURE — 88305 TISSUE EXAM BY PATHOLOGIST: CPT | Performed by: PATHOLOGY

## 2022-09-22 PROCEDURE — 36415 COLL VENOUS BLD VENIPUNCTURE: CPT | Performed by: PHYSICIAN ASSISTANT

## 2022-09-22 PROCEDURE — U0005 INFEC AGEN DETEC AMPLI PROBE: HCPCS | Performed by: PHYSICIAN ASSISTANT

## 2022-09-22 PROCEDURE — 84100 ASSAY OF PHOSPHORUS: CPT | Performed by: PHYSICIAN ASSISTANT

## 2022-09-22 PROCEDURE — 99285 EMERGENCY DEPT VISIT HI MDM: CPT | Performed by: PHYSICIAN ASSISTANT

## 2022-09-22 PROCEDURE — G1004 CDSM NDSC: HCPCS

## 2022-09-22 RX ORDER — ACETAMINOPHEN 325 MG/1
650 TABLET ORAL EVERY 8 HOURS PRN
Status: DISCONTINUED | OUTPATIENT
Start: 2022-09-22 | End: 2022-09-26 | Stop reason: HOSPADM

## 2022-09-22 RX ORDER — LANOLIN ALCOHOL/MO/W.PET/CERES
100 CREAM (GRAM) TOPICAL DAILY
Status: DISCONTINUED | OUTPATIENT
Start: 2022-09-22 | End: 2022-09-26 | Stop reason: HOSPADM

## 2022-09-22 RX ORDER — PANTOPRAZOLE SODIUM 40 MG/1
40 TABLET, DELAYED RELEASE ORAL DAILY
Status: DISCONTINUED | OUTPATIENT
Start: 2022-09-22 | End: 2022-09-26 | Stop reason: HOSPADM

## 2022-09-22 RX ORDER — FOLIC ACID 1 MG/1
1 TABLET ORAL DAILY
Status: DISCONTINUED | OUTPATIENT
Start: 2022-09-22 | End: 2022-09-26 | Stop reason: HOSPADM

## 2022-09-22 RX ORDER — MAGNESIUM SULFATE HEPTAHYDRATE 40 MG/ML
2 INJECTION, SOLUTION INTRAVENOUS ONCE
Status: COMPLETED | OUTPATIENT
Start: 2022-09-22 | End: 2022-09-22

## 2022-09-22 RX ORDER — NICOTINE 21 MG/24HR
14 PATCH, TRANSDERMAL 24 HOURS TRANSDERMAL DAILY
Status: DISCONTINUED | OUTPATIENT
Start: 2022-09-22 | End: 2022-09-26 | Stop reason: HOSPADM

## 2022-09-22 RX ORDER — LACTULOSE 20 G/30ML
20 SOLUTION ORAL 2 TIMES DAILY
Status: DISCONTINUED | OUTPATIENT
Start: 2022-09-22 | End: 2022-09-23

## 2022-09-22 RX ORDER — CHLORDIAZEPOXIDE HYDROCHLORIDE 25 MG/1
25 CAPSULE, GELATIN COATED ORAL EVERY 8 HOURS SCHEDULED
Status: DISCONTINUED | OUTPATIENT
Start: 2022-09-22 | End: 2022-09-23

## 2022-09-22 RX ORDER — HYDROCORTISONE 25 MG/G
CREAM TOPICAL 4 TIMES DAILY PRN
Status: DISCONTINUED | OUTPATIENT
Start: 2022-09-22 | End: 2022-09-26 | Stop reason: HOSPADM

## 2022-09-22 RX ORDER — LORAZEPAM 0.5 MG/1
0.5 TABLET ORAL EVERY 6 HOURS PRN
Status: DISCONTINUED | OUTPATIENT
Start: 2022-09-22 | End: 2022-09-26 | Stop reason: HOSPADM

## 2022-09-22 RX ORDER — HYDROMORPHONE HCL/PF 1 MG/ML
0.2 SYRINGE (ML) INJECTION EVERY 4 HOURS PRN
Status: DISCONTINUED | OUTPATIENT
Start: 2022-09-22 | End: 2022-09-23

## 2022-09-22 RX ORDER — ONDANSETRON 2 MG/ML
4 INJECTION INTRAMUSCULAR; INTRAVENOUS EVERY 6 HOURS PRN
Status: DISCONTINUED | OUTPATIENT
Start: 2022-09-22 | End: 2022-09-26 | Stop reason: HOSPADM

## 2022-09-22 RX ORDER — CALCIUM CARBONATE 200(500)MG
1000 TABLET,CHEWABLE ORAL DAILY PRN
Status: DISCONTINUED | OUTPATIENT
Start: 2022-09-22 | End: 2022-09-26 | Stop reason: HOSPADM

## 2022-09-22 RX ORDER — SODIUM CHLORIDE, SODIUM GLUCONATE, SODIUM ACETATE, POTASSIUM CHLORIDE, MAGNESIUM CHLORIDE, SODIUM PHOSPHATE, DIBASIC, AND POTASSIUM PHOSPHATE .53; .5; .37; .037; .03; .012; .00082 G/100ML; G/100ML; G/100ML; G/100ML; G/100ML; G/100ML; G/100ML
50 INJECTION, SOLUTION INTRAVENOUS CONTINUOUS
Status: DISCONTINUED | OUTPATIENT
Start: 2022-09-22 | End: 2022-09-26

## 2022-09-22 RX ORDER — HYDROMORPHONE HCL/PF 1 MG/ML
0.5 SYRINGE (ML) INJECTION ONCE
Status: COMPLETED | OUTPATIENT
Start: 2022-09-22 | End: 2022-09-22

## 2022-09-22 RX ORDER — NADOLOL 20 MG/1
20 TABLET ORAL DAILY
Status: DISCONTINUED | OUTPATIENT
Start: 2022-09-23 | End: 2022-09-26 | Stop reason: HOSPADM

## 2022-09-22 RX ORDER — POLYETHYLENE GLYCOL 3350 17 G/17G
17 POWDER, FOR SOLUTION ORAL DAILY PRN
Status: DISCONTINUED | OUTPATIENT
Start: 2022-09-22 | End: 2022-09-26 | Stop reason: HOSPADM

## 2022-09-22 RX ORDER — OXYCODONE HYDROCHLORIDE AND ACETAMINOPHEN 5; 325 MG/1; MG/1
1 TABLET ORAL EVERY 8 HOURS PRN
Status: DISCONTINUED | OUTPATIENT
Start: 2022-09-22 | End: 2022-09-26

## 2022-09-22 RX ORDER — HYDROCORTISONE ACETATE 25 MG/1
25 SUPPOSITORY RECTAL 2 TIMES DAILY
Status: DISCONTINUED | OUTPATIENT
Start: 2022-09-22 | End: 2022-09-26

## 2022-09-22 RX ADMIN — IOHEXOL 100 ML: 350 INJECTION, SOLUTION INTRAVENOUS at 17:14

## 2022-09-22 RX ADMIN — CHLORDIAZEPOXIDE HYDROCHLORIDE 25 MG: 25 CAPSULE ORAL at 21:30

## 2022-09-22 RX ADMIN — CHLORDIAZEPOXIDE HYDROCHLORIDE 25 MG: 25 CAPSULE ORAL at 14:52

## 2022-09-22 RX ADMIN — FOLIC ACID 1 MG: 1 TABLET ORAL at 14:52

## 2022-09-22 RX ADMIN — OXYCODONE HYDROCHLORIDE AND ACETAMINOPHEN 1 TABLET: 5; 325 TABLET ORAL at 17:37

## 2022-09-22 RX ADMIN — Medication 1 TABLET: at 14:52

## 2022-09-22 RX ADMIN — MAGNESIUM SULFATE HEPTAHYDRATE 2 G: 40 INJECTION, SOLUTION INTRAVENOUS at 13:23

## 2022-09-22 RX ADMIN — SODIUM CHLORIDE, SODIUM GLUCONATE, SODIUM ACETATE, POTASSIUM CHLORIDE, MAGNESIUM CHLORIDE, SODIUM PHOSPHATE, DIBASIC, AND POTASSIUM PHOSPHATE 75 ML/HR: .53; .5; .37; .037; .03; .012; .00082 INJECTION, SOLUTION INTRAVENOUS at 14:55

## 2022-09-22 RX ADMIN — HYDROMORPHONE HYDROCHLORIDE 0.5 MG: 1 INJECTION, SOLUTION INTRAMUSCULAR; INTRAVENOUS; SUBCUTANEOUS at 12:36

## 2022-09-22 RX ADMIN — LACTULOSE 20 G: 10 SOLUTION ORAL at 17:37

## 2022-09-22 RX ADMIN — PANTOPRAZOLE SODIUM 40 MG: 40 TABLET, DELAYED RELEASE ORAL at 14:52

## 2022-09-22 RX ADMIN — THIAMINE HCL TAB 100 MG 100 MG: 100 TAB at 14:52

## 2022-09-22 NOTE — ASSESSMENT & PLAN NOTE
Chronic secondary to alcohol cirrhosis  · Trend counts daily  · Will likely need platelet transfusion prior to any procedures/intervention

## 2022-09-22 NOTE — H&P
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  Mild gastric erythema, possible portal hypertensive gastropathy "  Colonoscopy 9/16/22: "Large, nonbleeding internal hemorrhoids  Likely small rectal varices "  At that time, GI recommended EGD in several months for re-evaluation and possible esophageal biopsy or banding  Repeat colonoscopy in 3 years due to an inadequate bowel bowel prep  Would avoid hemorrhoidal banding at this time due to likely presence of rectal varices   · Suspecting hemorrhoid bleeding   · Spoke with GI on admission   · Plan for clear or full liquids   · Obtain bleeding scan for any signs of bleeding   · Continue PPI  · Start Anusol suppository BID and Hydrocortisone cream QID PRN   · Monitor HGB     Pain and swelling of right lower extremity  Assessment & Plan  Presents with complaints of right lower extremity swelling, bruising, and pain  Left lower extremity duplex 9/20/22: which revealed a  well-defined hypoechoic non vascularized cystic-type structure noted in the popliteal fossa and a hyperechoic structure noted from the proximal calf extending to the mid/distal calf  Right lower extremity 9/20/22:  Diffuse soft tissue swelling without evidence of hematoma  Fluid tracking along the gastrocnemius tendon without evidence of mild tendinitis tear  Bones intact    · Now with severe pain, swelling, and difficulty ambulating  · Repeat left lower extremity venous duplex to rule out DVT  · Repeat CT lower extremity for further evaluation  · Recommend leg elevation, heat and compression the Ace wrap for support  · Tylenol, Percocet, and Dilaudid as needed for pain  · Unable to use NSAIDs given liver disease   · Consider need for orthopedic evaluation pending progress    Alcohol withdrawal syndrome without complication [facility])  Assessment & Plan  Presents with tremors in his upper extremities  States he drinks a large glass of vodka twice a week  States his last drink was yesterday  · Start Librium 25 mg every 8 hours with Ativan 0 5 mg as needed for anxiety or alcohol withdrawal  · Monitor on CIWA  · Folic acid, thiamine, multivitamin  · Ongoing discussion about the importance of alcohol cessation    Hypomagnesemia  Assessment & Plan  Magnesium level 1 4  · Replete with Mag sulfate 2 g IV x1 and monitor    Alcoholic cirrhosis of liver (HCC)  Assessment & Plan  Alcoholic cirrhosis secondary to alcohol abuse  Recent CT scan showed findings consistent of increasing portal hypertension  Patient has history of thrombocytopenia and rectal bleeding  Recently underwent EGD and colonoscopy  INR 1 43, total bili 3 53, ammonia 62  · Consult GI  · Continue PPI, nadolol, lactulose  · Avoid antiplatelets and DVT prophylaxis given thrombocytopenia  · Continue to discuss the importance of alcohol cessation  · Trend LFTs and T bili    Tobacco abuse  Assessment & Plan  · Nicotine patch  · Smoking cessation education and counseling    Thrombocytopenia ([address] )  Assessment & Plan  Chronic secondary to alcohol cirrhosis  · Trend counts daily  · Will likely need platelet transfusion prior to any procedures/intervention    VTE Pharmacologic Prophylaxis:   High Risk (Score >/= 5) - Pharmacological DVT Prophylaxis Contraindicated  Sequential Compression Devices Ordered  Code Status: Level 1 - Full Code full code, level 1  Discussion with family: Patient declined call to        Anticipated Length of Stay: Patient will be admitted on an observation basis with an anticipated length of stay of less than 2 midnights secondary to Hematochezia, right lower extremity pain and swelling, ambulatory dysfunction  Total Time for Visit, including Counseling / Coordination of Care: 60 minutes Greater than 50% of this total time spent on direct patient counseling and coordination of care  Chief Complaint:  Leg swelling and pain    History of Present Illness:  Akash Lord is a 65 y o  male with a PMH of liver cirrhosis secondary to alcohol abuse, tobacco dependence, cirrhosis, thrombocytopenia who presents with right lower extremity pain  Patient was recently admitted to the hospital for hematemesis for which he underwent an EGD and colonoscopy  Return to the emergency department due to right lower extremity pain and swelling  At that time patient had a venous duplex and a CT scan done which revealed a Baker cyst and soft tissue swelling  The plan was discussed with Orthopedic surgery and the decision was made to discharge the patient home  Patient returned to the hospital today for further evaluation of his right lower extremity pain, swelling, and bruising  Patient states his calf is so swollen he cannot ambulate  His pain is worsening  He states that he is having blood in his stool which looks like "cherries"  Patient states he drinks 2 large glasses of vodka twice a week  Patient states his last drink was yesterday  He does have tremors on examination  He does report right lower extremity pain and would like something for his pain  He denies any headache, dizziness, chest pain, shortness a breath, nausea, vomiting, or diarrhea  Review of Systems:  Review of Systems   Constitutional: Positive for activity change  Negative for chills and fever  HENT: Negative for postnasal drip and sore throat  Eyes: Negative for pain and visual disturbance     Respiratory: Negative for cough, shortness of breath and wheezing  Cardiovascular: Negative for chest pain and palpitations  Gastrointestinal: Positive for blood in stool  Negative for abdominal pain, constipation, diarrhea, nausea and vomiting  Genitourinary: Negative for dysuria and hematuria  Musculoskeletal: Positive for gait problem and myalgias  Skin: Positive for color change (bruising to RLE)  Neurological: Positive for tremors and weakness  Negative for dizziness and headaches  Psychiatric/Behavioral: The patient is not nervous/anxious  All other systems reviewed and are negative  Past Medical and Surgical History:   Past Medical History:   Diagnosis Date    ETOH abuse        Past Surgical History:   Procedure Laterality Date    GALLBLADDER SURGERY         Meds/Allergies:  Prior to Admission medications    Medication Sig Start Date End Date Taking? Authorizing Provider   folic acid (FOLVITE) 1 mg tablet Take 1 tablet (1 mg total) by mouth daily 1/15/22   Cate Villanueva MD   hydrOXYzine HCL (ATARAX) 50 mg tablet Take 1 tablet (50 mg total) by mouth every 6 (six) hours as needed for anxiety  Patient not taking: Reported on 9/14/2022 4/22/22   Sunday Little PA-C   lactulose 20 g/30 mL Take 30 mL (20 g total) by mouth 2 (two) times a day 1/15/22 4/18/22  Cate Villanueva MD   lidocaine (LIDODERM) 5 % Apply 1 patch topically daily Remove & Discard patch within 12 hours or as directed by MD 9/18/22   Nicolasa Stewart MD   Multiple Vitamin (multivitamin) tablet Take 1 tablet by mouth daily 12/8/20   Lashanda Issa DO   pantoprazole (PROTONIX) 40 mg tablet Take 1 tablet (40 mg total) by mouth daily 9/17/22   Nicolasa Stewart MD   thiamine 100 MG tablet Take 1 tablet (100 mg total) by mouth daily 1/15/22   Cate Villanueva MD     I have reviewed home medications with patient personally      Allergies: No Known Allergies    Social History:  Marital Status: Single   Patient Pre-hospital Living Situation: Home  Patient Pre-hospital Level of Mobility: walks  Patient Pre-hospital Diet Restrictions: none   Substance Use History:   Social History     Substance and Sexual Activity   Alcohol Use Yes    Alcohol/week: 16 0 standard drinks    Types: 8 Cans of beer, 8 Shots of liquor per week    Comment: not drinking daily, drinks twice a week or if he has pain     Social History     Tobacco Use   Smoking Status Never Smoker   Smokeless Tobacco Current User     Social History     Substance and Sexual Activity   Drug Use Not Currently    Types: Marijuana       Family History:  Family History   Problem Relation Age of Onset    Heart disease Mother     Heart disease Father        Physical Exam:     Vitals:   Blood Pressure: 119/73 (09/22/22 1400)  Pulse: 78 (09/22/22 1400)  Temperature: (!) 97 3 °F (36 3 °C) (09/22/22 1110)  Temp Source: Tympanic (09/22/22 1110)  Respirations: 18 (09/22/22 1110)  Weight - Scale: 90 1 kg (198 lb 9 6 oz) (09/22/22 1110)  SpO2: 94 % (09/22/22 1400)    Physical Exam  Vitals and nursing note reviewed  Constitutional:       General: He is not in acute distress  Appearance: He is ill-appearing  He is not toxic-appearing or diaphoretic  Comments: Pleasant, talkative gentleman resting in bed on room air    HENT:      Head: Normocephalic  Mouth/Throat:      Mouth: Mucous membranes are dry  Eyes:      Conjunctiva/sclera: Conjunctivae normal    Cardiovascular:      Rate and Rhythm: Normal rate  Pulmonary:      Effort: Pulmonary effort is normal       Breath sounds: Normal breath sounds  No wheezing, rhonchi or rales  Abdominal:      General: Bowel sounds are normal  There is no distension  Palpations: Abdomen is soft  Tenderness: There is no abdominal tenderness  Musculoskeletal:         General: Swelling (RLE) and tenderness (RLE) present  Normal range of motion  Cervical back: Normal range of motion  Comments: Upper extremity tremors    Skin:     General: Skin is warm and dry        Capillary Refill: Capillary refill takes less than 2 seconds  Comments: Significant bruising to RLE   Neurological:      Mental Status: He is alert and oriented to person, place, and time  Mental status is at baseline  Motor: Weakness present  Psychiatric:         Mood and Affect: Mood normal          Behavior: Behavior normal          Thought Content: Thought content normal                   Additional Data:     Lab Results:  Results from last 7 days   Lab Units 09/22/22  1236   WBC Thousand/uL 2 80*   HEMOGLOBIN g/dL 9 9*   HEMATOCRIT % 31 2*   PLATELETS Thousands/uL 41*   NEUTROS PCT % 54   LYMPHS PCT % 22   MONOS PCT % 17*   EOS PCT % 5     Results from last 7 days   Lab Units 09/22/22  1236   SODIUM mmol/L 144   POTASSIUM mmol/L 3 9   CHLORIDE mmol/L 109*   CO2 mmol/L 32   BUN mg/dL 13   CREATININE mg/dL 0 82   ANION GAP mmol/L 3*   CALCIUM mg/dL 8 0*   ALBUMIN g/dL 2 3*   TOTAL BILIRUBIN mg/dL 3 53*   ALK PHOS U/L 110   ALT U/L 36   AST U/L 76*   GLUCOSE RANDOM mg/dL 122     Results from last 7 days   Lab Units 09/22/22  1236   INR  1 43*             Results from last 7 days   Lab Units 09/22/22  1236   LACTIC ACID mmol/L 1 5       Imaging: No pertinent imaging reviewed  VAS lower limb venous duplex study, unilateral/limited    (Results Pending)   CT lower extremity w contrast right    (Results Pending)       EKG and Other Studies Reviewed on Admission:   · EKG: No EKG obtained  ** Please Note: This note has been constructed using a voice recognition system   **

## 2022-09-22 NOTE — ED NOTES
Betzy Graff, PAC does not want a sepsis alert called at this time       Hina Carranza, RN  09/22/22 2204

## 2022-09-22 NOTE — ED PROVIDER NOTES
History  Chief Complaint   Patient presents with    Leg Swelling     States his right leg is very swollen and he has trouble ambulating  States his last drink was 8 hours ago   Alcohol Intoxication     Last drink 8 hours ago  No tremors at present  Hx of  seizures with withdrawal in past      Patient is a 60-year-old male with past medical history significant for alcohol abuse, alcoholic cirrhosis, history of seizure related to alcohol withdrawal, recent admission for hematemesis, who initially presented 2 days ago for evaluation of right leg swelling  Evaluation at that time included vascular ultrasound and CT scan which revealed a Baker cyst and soft tissue swelling  Patient was discharged home, and presents today for further evaluation of right lower extremity edema and worsening pain and ecchymosis  He states he is having difficulty ambulating at this time  He also states that he is "pooping cherries  Patient specifically denies injury, fever, chills, muscle weakness, numbness, tingling, neck pain, back pain, dizziness, hematemesis, loss of consciousness, vomiting, abdominal pain  Christopher Valverde He does endorse decreased range of motion, swelling and stiffness        History provided by:  Patient  Leg Pain  Location:  Leg (Right leg from thigh to ankle)  Pain details:     Quality:  Cramping, throbbing, pressure and shooting    Radiates to:  Does not radiate    Severity:  Severe    Onset quality:  Sudden    Duration:  3 days    Timing:  Constant    Progression:  Worsening  Chronicity:  New  Prior injury to area:  No  Relieved by:  Nothing  Worsened by:  Nothing  Ineffective treatments:  None tried  Associated symptoms: decreased ROM and swelling    Associated symptoms: no back pain, no fatigue, no fever, no muscle weakness, no numbness and no stiffness    Swelling:     Location:  Leg    Onset quality:  Gradual    Duration:  3 days    Timing:  Constant    Progression:  Worsening    Chronicity:  New  Risk factors: no concern for non-accidental trauma, no frequent fractures, no known bone disorder, no obesity and no recent illness    Rectal Bleeding - Minor  Quality:  Maroon  Amount: Moderate  Duration:  2 weeks  Timing:  Constant  Chronicity:  New  Context: hemorrhoids    Context comment:  Known internal hemorrhoids  Similar prior episodes: yes    Relieved by:  Nothing  Worsened by:  Nothing  Ineffective treatments:  None tried  Associated symptoms: no abdominal pain, no dizziness, no fever and no vomiting    Risk factors: liver disease        Prior to Admission Medications   Prescriptions Last Dose Informant Patient Reported? Taking? Multiple Vitamin (multivitamin) tablet 9/21/2022 at Unknown time  No Yes   Sig: Take 1 tablet by mouth daily   folic acid (FOLVITE) 1 mg tablet 9/21/2022 at Unknown time  No Yes   Sig: Take 1 tablet (1 mg total) by mouth daily   hydrOXYzine HCL (ATARAX) 50 mg tablet Not Taking at Unknown time  No No   Sig: Take 1 tablet (50 mg total) by mouth every 6 (six) hours as needed for anxiety   Patient not taking: No sig reported   lactulose 20 g/30 mL Past Month at Unknown time  No Yes   Sig: Take 30 mL (20 g total) by mouth 2 (two) times a day   lidocaine (LIDODERM) 5 % Unknown at Unknown time  No No   Sig: Apply 1 patch topically daily Remove & Discard patch within 12 hours or as directed by MD   pantoprazole (PROTONIX) 40 mg tablet Unknown at Unknown time  No No   Sig: Take 1 tablet (40 mg total) by mouth daily   thiamine 100 MG tablet 9/21/2022 at Unknown time  No Yes   Sig: Take 1 tablet (100 mg total) by mouth daily      Facility-Administered Medications: None       Past Medical History:   Diagnosis Date    ETOH abuse        Past Surgical History:   Procedure Laterality Date    GALLBLADDER SURGERY         Family History   Problem Relation Age of Onset    Heart disease Mother     Heart disease Father      I have reviewed and agree with the history as documented      E-Cigarette/Vaping    E-Cigarette Use Never User      E-Cigarette/Vaping Substances    Nicotine No     THC No     CBD No     Flavoring No     Other No     Unknown No      Social History     Tobacco Use    Smoking status: Never Smoker    Smokeless tobacco: Current User   Vaping Use    Vaping Use: Never used   Substance Use Topics    Alcohol use: Yes     Alcohol/week: 16 0 standard drinks     Types: 8 Cans of beer, 8 Shots of liquor per week     Comment: not drinking daily, drinks twice a week or if he has pain    Drug use: Not Currently     Types: Marijuana       Review of Systems   Constitutional: Negative for chills, fatigue and fever  HENT: Negative for ear pain and sore throat  Eyes: Negative for pain and visual disturbance  Respiratory: Negative for cough and shortness of breath  Cardiovascular: Negative for chest pain and palpitations  Gastrointestinal: Positive for hematochezia  Negative for abdominal pain and vomiting  Endocrine: Negative  Genitourinary: Negative for dysuria and hematuria  Musculoskeletal: Positive for gait problem  Negative for arthralgias, back pain and stiffness  Skin: Positive for color change  Negative for rash  Allergic/Immunologic: Negative  Neurological: Negative for dizziness, seizures and syncope  Hematological: Negative  Psychiatric/Behavioral: Negative  All other systems reviewed and are negative  Physical Exam  Physical Exam  Vitals and nursing note reviewed  Constitutional:       Appearance: He is well-developed and normal weight  He is ill-appearing  HENT:      Head: Normocephalic and atraumatic  Right Ear: Tympanic membrane, ear canal and external ear normal       Left Ear: Tympanic membrane, ear canal and external ear normal       Nose: Nose normal       Mouth/Throat:      Mouth: Mucous membranes are moist    Eyes:      Conjunctiva/sclera: Conjunctivae normal       Pupils: Pupils are equal, round, and reactive to light     Cardiovascular: Rate and Rhythm: Normal rate and regular rhythm  Pulses: Normal pulses  Heart sounds: Normal heart sounds  No murmur heard  Pulmonary:      Effort: Pulmonary effort is normal  No respiratory distress  Breath sounds: Normal breath sounds  Abdominal:      General: Abdomen is flat  Palpations: Abdomen is soft  Tenderness: There is no abdominal tenderness  Musculoskeletal:         General: Swelling and tenderness present  Normal range of motion  Cervical back: Normal range of motion and neck supple  Right lower leg: 3+ Pitting Edema present  Legs:    Skin:     General: Skin is warm and dry  Capillary Refill: Capillary refill takes less than 2 seconds  Neurological:      General: No focal deficit present  Mental Status: He is alert and oriented to person, place, and time  Cranial Nerves: No cranial nerve deficit     Psychiatric:         Mood and Affect: Mood normal          Behavior: Behavior normal          Vital Signs  ED Triage Vitals [09/22/22 1110]   Temperature Pulse Respirations Blood Pressure SpO2   (!) 97 3 °F (36 3 °C) 91 18 130/82 99 %      Temp Source Heart Rate Source Patient Position - Orthostatic VS BP Location FiO2 (%)   Tympanic Monitor Sitting Left arm --      Pain Score       8           Vitals:    09/22/22 1245 09/22/22 1300 09/22/22 1330 09/22/22 1400   BP:  112/68 116/71 119/73   Pulse: 80 78 82 78   Patient Position - Orthostatic VS:             Visual Acuity      ED Medications  Medications   folic acid (FOLVITE) tablet 1 mg (1 mg Oral Given 9/22/22 1452)   pantoprazole (PROTONIX) EC tablet 40 mg (40 mg Oral Given 9/22/22 1452)   thiamine tablet 100 mg (100 mg Oral Given 9/22/22 1452)   lactulose oral solution 20 g (has no administration in time range)   acetaminophen (TYLENOL) tablet 650 mg (has no administration in time range)   polyethylene glycol (MIRALAX) packet 17 g (has no administration in time range)   ondansetron [facility]) injection 4 mg (has no administration in time range)   calcium carbonate (TUMS) chewable tablet 1,000 mg (has no administration in time range)   chlordiazePOXIDE (LIBRIUM) capsule 25 mg (25 mg Oral Given 9/22/22 1452)   HYDROmorphone (DILAUDID) injection 0 2 mg (has no administration in time range)   hydrocortisone (ANUSOL-HC) 2 5 % rectal cream (has no administration in time range)   hydrocortisone (ANUSOL-HC) rectal suppository 25 mg (has no administration in time range)   oxyCODONE-acetaminophen (PERCOCET) 5-325 mg per tablet 1 tablet (has no administration in time range)   multi-electrolyte (PLASMALYTE-A/ISOLYTE-S PH 7 4) IV solution (75 mL/hr Intravenous New Bag 9/22/22 1455)   multivitamin-minerals (CENTRUM) tablet 1 tablet (1 tablet Oral Given 9/22/22 1452)   LORazepam (ATIVAN) tablet 0 5 mg (has no administration in time range)   HYDROmorphone (DILAUDID) injection 0 5 mg (0 5 mg Intravenous Given 9/22/22 1236)   magnesium sulfate 2 g/50 mL IVPB (premix) 2 g (0 g Intravenous Stopped 9/22/22 1510)       Diagnostic Studies  Results Reviewed     Procedure Component Value Units Date/Time    COVID only [[id number]]  (Normal) Collected: 09/22/22 1447    Lab Status: Final result Specimen: Nares from Nose Updated: 09/22/22 1542     SARS-CoV-2 Negative    Narrative:      FOR PEDIATRIC PATIENTS - copy/paste COVID Guidelines URL to browser: [email or web address]    SARS-CoV-2 assay is a Nucleic Acid Amplification assay intended for the  qualitative detection of nucleic acid from SARS-CoV-2 in nasopharyngeal  swabs  Results are for the presumptive identification of SARS-CoV-2 RNA  Positive results are indicative of infection with SARS-CoV-2, the virus  causing COVID-19, but do not rule out bacterial infection or co-infection  with other viruses   Laboratories within the [place] and its  territories are required to report all positive results to the appropriate  public health authorities  Negative results do not preclude SARS-CoV-2  infection and should not be used as the sole basis for treatment or other  patient management decisions  Negative results must be combined with  clinical observations, patient history, and epidemiological information  This test has not been FDA cleared or approved  This test has been authorized by FDA under an Emergency Use Authorization  (EUA)  This test is only authorized for the duration of time the  declaration that circumstances exist justifying the authorization of the  emergency use of an in vitro diagnostic tests for detection of SARS-CoV-2  virus and/or diagnosis of COVID-19 infection under section 564(b)(1) of  the Act, 21 U  S C  771XWP-7(J)(1), unless the authorization is terminated  or revoked sooner  The test has been validated but independent review by FDA  and CLIA is pending  Test performed using [employer] GeneXpert: This RT-PCR assay targets N2,  a region unique to SARS-CoV-2  A conserved region in the E-gene was chosen  for pan-Sarbecovirus detection which includes SARS-CoV-2  According to CMS-2020-01-R, this platform meets the definition of high-[employer] [place] technology      Occult blood 1-3, stool [[id number]]     Lab Status: No result Specimen: Stool     CBC and differential [[id number]]  (Abnormal) Collected: 09/22/22 1236    Lab Status: Final result Specimen: Blood from Arm, Right Updated: 09/22/22 1312     WBC 2 80 Thousand/uL      RBC 2 95 Million/uL      Hemoglobin 9 9 g/dL      Hematocrit 31 2 %       fL      MCH 33 6 pg      MCHC 31 7 g/dL      RDW 15 9 %      MPV 10 9 fL      Platelets 41 Thousands/uL      nRBC 0 /100 WBCs      Neutrophils Relative 54 %      Immat GRANS % 1 %      Lymphocytes Relative 22 %      Monocytes Relative 17 %      Eosinophils Relative 5 %      Basophils Relative 1 %      Neutrophils Absolute 1 52 Thousands/µL      Immature Grans Absolute 0 02 Thousand/uL Lymphocytes Absolute 0 61 Thousands/µL      Monocytes Absolute 0 48 Thousand/µL      Eosinophils Absolute 0 13 Thousand/µL      Basophils Absolute 0 04 Thousands/µL     Lactic acid [[id number]]  (Normal) Collected: 09/22/22 1236    Lab Status: Final result Specimen: Blood from Arm, Right Updated: 09/22/22 1310     LACTIC ACID 1 5 mmol/L     Narrative:      Result may be elevated if tourniquet was used during collection      Basic metabolic panel [[id number]]  (Abnormal) Collected: 09/22/22 1236    Lab Status: Final result Specimen: Blood from Arm, Right Updated: 09/22/22 1307     Sodium 144 mmol/L      Potassium 3 9 mmol/L      Chloride 109 mmol/L      CO2 32 mmol/L      ANION GAP 3 mmol/L      BUN 13 mg/dL      Creatinine 0 82 mg/dL      Glucose 122 mg/dL      Calcium 8 0 mg/dL      eGFR 98 ml/min/1 73sq m     Narrative:      [place] guidelines for Chronic Kidney Disease (CKD):     Stage 1 with normal or high GFR (GFR > 90 mL/min/1 73 square meters)    Stage 2 Mild CKD (GFR = 60-89 mL/min/1 73 square meters)    Stage 3A Moderate CKD (GFR = 45-59 mL/min/1 73 square meters)    Stage 3B Moderate CKD (GFR = 30-44 mL/min/1 73 square meters)    Stage 4 Severe CKD (GFR = 15-29 mL/min/1 73 square meters)    Stage 5 End Stage CKD (GFR <15 mL/min/1 73 square meters)  Note: GFR calculation is accurate only with a steady state creatinine    Hepatic function panel [[id number]]  (Abnormal) Collected: 09/22/22 1236    Lab Status: Final result Specimen: Blood from Arm, Right Updated: 09/22/22 1307     Total Bilirubin 3 53 mg/dL      Bilirubin, Direct 2 77 mg/dL      Alkaline Phosphatase 110 U/L      AST 76 U/L      ALT 36 U/L      Total Protein 6 6 g/dL      Albumin 2 3 g/dL     Magnesium [[id number]]  (Abnormal) Collected: 09/22/22 1236    Lab Status: Final result Specimen: Blood from Arm, Right Updated: 09/22/22 1307     Magnesium 1 4 mg/dL     Phosphorus [[id number]]  (Normal) Collected: 09/22/22 1236 Lab Status: Final result Specimen: Blood from Arm, Right Updated: 09/22/22 1307     Phosphorus 3 3 mg/dL     Protime-INR [[id number]]  (Abnormal) Collected: 09/22/22 1236    Lab Status: Final result Specimen: Blood from Arm, Right Updated: 09/22/22 1305     Protime 17 6 seconds      INR 1 43    APTT [[id number]]  (Normal) Collected: 09/22/22 1236    Lab Status: Final result Specimen: Blood from Arm, Right Updated: 09/22/22 1305     PTT 32 seconds     Ammonia [[id number]]  (Abnormal) Collected: 09/22/22 1236    Lab Status: Final result Specimen: Blood from Arm, Right Updated: 09/22/22 1303     Ammonia 62 umol/L                  No orders to display              Procedures  Procedures         ED Course  ED Course as of 09/22/22 1607   Thu Sep 22, 2022   1314 Aware pt triggers by algorithm for severe sepsis - no concerns for infectious etiology   1403 Hemoglobin(!): 9 9                                             MDM  Number of Diagnoses or Management Options  Alcohol withdrawal ([facility] ): new and requires workup  Anemia: new and requires workup  BRBPR (bright red blood per rectum): new and requires workup  Ruptured Bakers cyst: new and requires workup  Thrombocytopenia ([facility] ): new and requires workup  Diagnosis management comments: Pt to be admitted for further evaluation          Amount and/or Complexity of Data Reviewed  Clinical lab tests: reviewed and ordered  Tests in the radiology section of CPT®: ordered and reviewed  Tests in the medicine section of CPT®: ordered and reviewed  Decide to obtain previous medical records or to obtain history from someone other than the patient: yes  Review and summarize past medical records: yes  Independent visualization of images, tracings, or specimens: yes    Risk of Complications, Morbidity, and/or Mortality  Presenting problems: moderate  Diagnostic procedures: moderate  Management options: moderate    Patient Progress  Patient progress: stable      Disposition  Final diagnoses: Alcohol withdrawal (HCC)   Ruptured Bakers cyst   Thrombocytopenia (HCC)   Anemia   BRBPR (bright red blood per rectum)     Time reflects when diagnosis was documented in both MDM as applicable and the Disposition within this note     Time User Action Codes Description Comment    9/22/2022  2:19 PM Delia Carloz [F10 239] Alcohol withdrawal ([facility] )     9/22/2022  2:19 PM Stephie Silver Add [M66 0] Ruptured Bakers cyst     9/22/2022  2:19 PM Eliz Freeman Add [D69 6] Thrombocytopenia ([facility] )     9/22/2022  2:20 PM Stephie Silver Add [D64 9] Anemia     9/22/2022  2:20 PM Eliz Freeman Add [K62 5] BRBPR (bright red blood per rectum)       ED Disposition     ED Disposition   Admit    Condition   Stable    Date/Time   Thu Sep 22, 2022  2:18 PM    Comment   Case was discussed with CHING and the patient's admission status was agreed to be Admission Status: observation status to the service of Dr Maria A Kapadia              Follow-up Information    None         Current Discharge Medication List      CONTINUE these medications which have NOT CHANGED    Details   folic acid (FOLVITE) 1 mg tablet Take 1 tablet (1 mg total) by mouth daily  Qty: 30 tablet, Refills: 0    Associated Diagnoses: Alcohol abuse      lactulose 20 g/30 mL Take 30 mL (20 g total) by mouth 2 (two) times a day  Qty: 1800 mL, Refills: 0    Associated Diagnoses: Decompensated hepatic cirrhosis (HCC)      Multiple Vitamin (multivitamin) tablet Take 1 tablet by mouth daily  Qty: 30 tablet, Refills: 0    Associated Diagnoses: Alcohol abuse      thiamine 100 MG tablet Take 1 tablet (100 mg total) by mouth daily  Qty: 30 tablet, Refills: 0    Associated Diagnoses: Alcohol abuse      hydrOXYzine HCL (ATARAX) 50 mg tablet Take 1 tablet (50 mg total) by mouth every 6 (six) hours as needed for anxiety  Qty: 30 tablet, Refills: 0    Associated Diagnoses: Anxiety      lidocaine (LIDODERM) 5 % Apply 1 patch topically daily Remove & Discard patch within 12 hours or as directed by MD  Qty: 4 patch, Refills: 0    Associated Diagnoses: Alcoholic cirrhosis of liver without ascites (HCC)      pantoprazole (PROTONIX) 40 mg tablet Take 1 tablet (40 mg total) by mouth daily  Qty: 30 tablet, Refills: 1    Associated Diagnoses: Alcoholic cirrhosis of liver without ascites ([facility] )             No discharge procedures on file      PDMP Review       Value Time User    PDMP Reviewed  Yes 9/17/2022  1:32 PM Wolf Nguyen MD          ED Provider  Electronically Signed by           Alexei Zazueta PA-C  09/22/22 9376

## 2022-09-22 NOTE — ASSESSMENT & PLAN NOTE
Alcoholic cirrhosis secondary to alcohol abuse  Recent CT scan showed findings consistent of increasing portal hypertension  Patient has history of thrombocytopenia and rectal bleeding  Recently underwent EGD and colonoscopy    INR 1 43, total bili 3 53, ammonia 62  · Consult GI  · Continue PPI, nadolol, lactulose  · Avoid antiplatelets and DVT prophylaxis given thrombocytopenia  · Continue to discuss the importance of alcohol cessation  · Trend LFTs and T bili

## 2022-09-22 NOTE — ASSESSMENT & PLAN NOTE
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  Mild gastric erythema, possible portal hypertensive gastropathy "  Colonoscopy 9/16/22: "Large, nonbleeding internal hemorrhoids  Likely small rectal varices "  At that time, GI recommended EGD in several months for re-evaluation and possible esophageal biopsy or banding  Repeat colonoscopy in 3 years due to an inadequate bowel bowel prep   Would avoid hemorrhoidal banding at this time due to likely presence of rectal varices   · Suspecting hemorrhoid bleeding   · Spoke with GI on admission   · Plan for clear or full liquids   · Obtain bleeding scan for any signs of bleeding   · Continue PPI  · Start Anusol suppository BID and Hydrocortisone cream QID PRN   · Monitor HGB

## 2022-09-22 NOTE — ASSESSMENT & PLAN NOTE
Presents with tremors in his upper extremities  States he drinks a large glass of vodka twice a week  States his last drink was yesterday    · Start Librium 25 mg every 8 hours with Ativan 0 5 mg as needed for anxiety or alcohol withdrawal  · Monitor on CIWA  · Folic acid, thiamine, multivitamin  · Ongoing discussion about the importance of alcohol cessation

## 2022-09-22 NOTE — ASSESSMENT & PLAN NOTE
Presents with complaints of right lower extremity swelling, bruising, and pain  Left lower extremity duplex 9/20/22: which revealed a  well-defined hypoechoic non vascularized cystic-type structure noted in the popliteal fossa and a hyperechoic structure noted from the proximal calf extending to the mid/distal calf  Right lower extremity 9/20/22:  Diffuse soft tissue swelling without evidence of hematoma  Fluid tracking along the gastrocnemius tendon without evidence of mild tendinitis tear  Bones intact    · Now with severe pain, swelling, and difficulty ambulating  · Repeat left lower extremity venous duplex to rule out DVT  · Repeat CT lower extremity for further evaluation  · Recommend leg elevation, heat and compression the Ace wrap for support  · Tylenol, Percocet, and Dilaudid as needed for pain  · Unable to use NSAIDs given liver disease   · Consider need for orthopedic evaluation pending progress

## 2022-09-23 ENCOUNTER — APPOINTMENT (OUTPATIENT)
Dept: RADIOLOGY | Facility: HOSPITAL | Age: 57
DRG: 393 | End: 2022-09-23
Payer: COMMERCIAL

## 2022-09-23 LAB
ABO GROUP BLD: NORMAL
ALBUMIN SERPL BCP-MCNC: 2.3 G/DL (ref 3.5–5)
ALP SERPL-CCNC: 98 U/L (ref 46–116)
ALT SERPL W P-5'-P-CCNC: 34 U/L (ref 12–78)
AMMONIA PLAS-SCNC: 70 UMOL/L (ref 11–35)
ANION GAP SERPL CALCULATED.3IONS-SCNC: 6 MMOL/L (ref 4–13)
AST SERPL W P-5'-P-CCNC: 77 U/L (ref 5–45)
BASOPHILS # BLD AUTO: 0.05 THOUSANDS/ΜL (ref 0–0.1)
BASOPHILS NFR BLD AUTO: 2 % (ref 0–1)
BILIRUB SERPL-MCNC: 5.57 MG/DL (ref 0.2–1)
BLD GP AB SCN SERPL QL: NEGATIVE
BUN SERPL-MCNC: 11 MG/DL (ref 5–25)
CALCIUM ALBUM COR SERPL-MCNC: 9.2 MG/DL (ref 8.3–10.1)
CALCIUM SERPL-MCNC: 7.8 MG/DL (ref 8.3–10.1)
CHLORIDE SERPL-SCNC: 103 MMOL/L (ref 96–108)
CO2 SERPL-SCNC: 29 MMOL/L (ref 21–32)
CREAT SERPL-MCNC: 0.92 MG/DL (ref 0.6–1.3)
EOSINOPHIL # BLD AUTO: 0.16 THOUSAND/ΜL (ref 0–0.61)
EOSINOPHIL NFR BLD AUTO: 6 % (ref 0–6)
ERYTHROCYTE [DISTWIDTH] IN BLOOD BY AUTOMATED COUNT: 15.4 % (ref 11.6–15.1)
GFR SERPL CREATININE-BSD FRML MDRD: 92 ML/MIN/1.73SQ M
GLUCOSE P FAST SERPL-MCNC: 106 MG/DL (ref 65–99)
GLUCOSE SERPL-MCNC: 106 MG/DL (ref 65–140)
HCT VFR BLD AUTO: 30.8 % (ref 36.5–49.3)
HGB BLD-MCNC: 10.3 G/DL (ref 12–17)
IMM GRANULOCYTES # BLD AUTO: 0.01 THOUSAND/UL (ref 0–0.2)
IMM GRANULOCYTES NFR BLD AUTO: 0 % (ref 0–2)
LYMPHOCYTES # BLD AUTO: 0.59 THOUSANDS/ΜL (ref 0.6–4.47)
LYMPHOCYTES NFR BLD AUTO: 21 % (ref 14–44)
MAGNESIUM SERPL-MCNC: 1.6 MG/DL (ref 1.6–2.6)
MCH RBC QN AUTO: 34.4 PG (ref 26.8–34.3)
MCHC RBC AUTO-ENTMCNC: 33.4 G/DL (ref 31.4–37.4)
MCV RBC AUTO: 103 FL (ref 82–98)
MONOCYTES # BLD AUTO: 0.42 THOUSAND/ΜL (ref 0.17–1.22)
MONOCYTES NFR BLD AUTO: 15 % (ref 4–12)
NEUTROPHILS # BLD AUTO: 1.56 THOUSANDS/ΜL (ref 1.85–7.62)
NEUTS SEG NFR BLD AUTO: 56 % (ref 43–75)
NRBC BLD AUTO-RTO: 0 /100 WBCS
PHOSPHATE SERPL-MCNC: 3.1 MG/DL (ref 2.7–4.5)
PLATELET # BLD AUTO: 38 THOUSANDS/UL (ref 149–390)
PMV BLD AUTO: 10.2 FL (ref 8.9–12.7)
POTASSIUM SERPL-SCNC: 3.8 MMOL/L (ref 3.5–5.3)
PROT SERPL-MCNC: 6.6 G/DL (ref 6.4–8.4)
RBC # BLD AUTO: 2.99 MILLION/UL (ref 3.88–5.62)
RH BLD: POSITIVE
SODIUM SERPL-SCNC: 138 MMOL/L (ref 135–147)
SPECIMEN EXPIRATION DATE: NORMAL
WBC # BLD AUTO: 2.79 THOUSAND/UL (ref 4.31–10.16)

## 2022-09-23 PROCEDURE — 93971 EXTREMITY STUDY: CPT

## 2022-09-23 PROCEDURE — 97163 PT EVAL HIGH COMPLEX 45 MIN: CPT

## 2022-09-23 PROCEDURE — 82140 ASSAY OF AMMONIA: CPT | Performed by: NURSE PRACTITIONER

## 2022-09-23 PROCEDURE — 97110 THERAPEUTIC EXERCISES: CPT

## 2022-09-23 PROCEDURE — 99232 SBSQ HOSP IP/OBS MODERATE 35: CPT | Performed by: NURSE PRACTITIONER

## 2022-09-23 PROCEDURE — 93971 EXTREMITY STUDY: CPT | Performed by: SURGERY

## 2022-09-23 PROCEDURE — 99223 1ST HOSP IP/OBS HIGH 75: CPT | Performed by: INTERNAL MEDICINE

## 2022-09-23 PROCEDURE — 83735 ASSAY OF MAGNESIUM: CPT | Performed by: NURSE PRACTITIONER

## 2022-09-23 PROCEDURE — 84100 ASSAY OF PHOSPHORUS: CPT | Performed by: NURSE PRACTITIONER

## 2022-09-23 PROCEDURE — 99222 1ST HOSP IP/OBS MODERATE 55: CPT | Performed by: PHYSICIAN ASSISTANT

## 2022-09-23 PROCEDURE — 85025 COMPLETE CBC W/AUTO DIFF WBC: CPT | Performed by: NURSE PRACTITIONER

## 2022-09-23 PROCEDURE — 80053 COMPREHEN METABOLIC PANEL: CPT | Performed by: NURSE PRACTITIONER

## 2022-09-23 PROCEDURE — 86850 RBC ANTIBODY SCREEN: CPT | Performed by: INTERNAL MEDICINE

## 2022-09-23 PROCEDURE — 86900 BLOOD TYPING SEROLOGIC ABO: CPT | Performed by: INTERNAL MEDICINE

## 2022-09-23 PROCEDURE — 86901 BLOOD TYPING SEROLOGIC RH(D): CPT | Performed by: INTERNAL MEDICINE

## 2022-09-23 RX ORDER — HYDROMORPHONE HCL/PF 1 MG/ML
0.5 SYRINGE (ML) INJECTION EVERY 4 HOURS PRN
Status: DISCONTINUED | OUTPATIENT
Start: 2022-09-23 | End: 2022-09-26

## 2022-09-23 RX ORDER — CHLORDIAZEPOXIDE HYDROCHLORIDE 25 MG/1
25 CAPSULE, GELATIN COATED ORAL ONCE
Status: COMPLETED | OUTPATIENT
Start: 2022-09-23 | End: 2022-09-23

## 2022-09-23 RX ORDER — CHLORDIAZEPOXIDE HYDROCHLORIDE 25 MG/1
50 CAPSULE, GELATIN COATED ORAL EVERY 8 HOURS SCHEDULED
Status: COMPLETED | OUTPATIENT
Start: 2022-09-23 | End: 2022-09-24

## 2022-09-23 RX ORDER — CEFAZOLIN SODIUM 2 G/50ML
2000 SOLUTION INTRAVENOUS EVERY 8 HOURS
Status: DISCONTINUED | OUTPATIENT
Start: 2022-09-23 | End: 2022-09-26 | Stop reason: HOSPADM

## 2022-09-23 RX ORDER — LACTULOSE 20 G/30ML
20 SOLUTION ORAL 3 TIMES DAILY
Status: DISCONTINUED | OUTPATIENT
Start: 2022-09-23 | End: 2022-09-26 | Stop reason: HOSPADM

## 2022-09-23 RX ORDER — MAGNESIUM SULFATE HEPTAHYDRATE 40 MG/ML
2 INJECTION, SOLUTION INTRAVENOUS ONCE
Status: COMPLETED | OUTPATIENT
Start: 2022-09-23 | End: 2022-09-23

## 2022-09-23 RX ORDER — PREDNISOLONE SODIUM PHOSPHATE 15 MG/5ML
40 SOLUTION ORAL DAILY
Status: DISCONTINUED | OUTPATIENT
Start: 2022-09-23 | End: 2022-09-26 | Stop reason: HOSPADM

## 2022-09-23 RX ADMIN — CHLORDIAZEPOXIDE HYDROCHLORIDE 25 MG: 25 CAPSULE ORAL at 10:59

## 2022-09-23 RX ADMIN — Medication 1 TABLET: at 09:15

## 2022-09-23 RX ADMIN — PREDNISOLONE SODIUM PHOSPHATE 40 MG: 15 SOLUTION ORAL at 16:48

## 2022-09-23 RX ADMIN — OXYCODONE HYDROCHLORIDE AND ACETAMINOPHEN 1 TABLET: 5; 325 TABLET ORAL at 20:31

## 2022-09-23 RX ADMIN — SODIUM CHLORIDE, SODIUM GLUCONATE, SODIUM ACETATE, POTASSIUM CHLORIDE, MAGNESIUM CHLORIDE, SODIUM PHOSPHATE, DIBASIC, AND POTASSIUM PHOSPHATE 50 ML/HR: .53; .5; .37; .037; .03; .012; .00082 INJECTION, SOLUTION INTRAVENOUS at 23:00

## 2022-09-23 RX ADMIN — LACTULOSE 20 G: 10 SOLUTION ORAL at 09:15

## 2022-09-23 RX ADMIN — PANTOPRAZOLE SODIUM 40 MG: 40 TABLET, DELAYED RELEASE ORAL at 09:15

## 2022-09-23 RX ADMIN — HYDROMORPHONE HYDROCHLORIDE 0.5 MG: 1 INJECTION, SOLUTION INTRAMUSCULAR; INTRAVENOUS; SUBCUTANEOUS at 14:31

## 2022-09-23 RX ADMIN — CEFAZOLIN SODIUM 2000 MG: 2 SOLUTION INTRAVENOUS at 13:26

## 2022-09-23 RX ADMIN — CHLORDIAZEPOXIDE HYDROCHLORIDE 25 MG: 25 CAPSULE ORAL at 05:37

## 2022-09-23 RX ADMIN — FOLIC ACID 1 MG: 1 TABLET ORAL at 09:15

## 2022-09-23 RX ADMIN — NADOLOL 20 MG: 20 TABLET ORAL at 09:15

## 2022-09-23 RX ADMIN — LACTULOSE 20 G: 10 SOLUTION ORAL at 16:33

## 2022-09-23 RX ADMIN — CHLORDIAZEPOXIDE HYDROCHLORIDE 50 MG: 25 CAPSULE ORAL at 13:26

## 2022-09-23 RX ADMIN — HYDROMORPHONE HYDROCHLORIDE 0.5 MG: 1 INJECTION, SOLUTION INTRAMUSCULAR; INTRAVENOUS; SUBCUTANEOUS at 21:53

## 2022-09-23 RX ADMIN — CHLORDIAZEPOXIDE HYDROCHLORIDE 50 MG: 25 CAPSULE ORAL at 21:43

## 2022-09-23 RX ADMIN — LACTULOSE 20 G: 10 SOLUTION ORAL at 21:43

## 2022-09-23 RX ADMIN — MAGNESIUM SULFATE HEPTAHYDRATE 2 G: 40 INJECTION, SOLUTION INTRAVENOUS at 09:15

## 2022-09-23 RX ADMIN — THIAMINE HCL TAB 100 MG 100 MG: 100 TAB at 09:15

## 2022-09-23 RX ADMIN — HYDROMORPHONE HYDROCHLORIDE 0.2 MG: 1 INJECTION, SOLUTION INTRAMUSCULAR; INTRAVENOUS; SUBCUTANEOUS at 09:20

## 2022-09-23 RX ADMIN — NICOTINE 14 MG: 14 PATCH, EXTENDED RELEASE TRANSDERMAL at 09:14

## 2022-09-23 RX ADMIN — CEFAZOLIN SODIUM 2000 MG: 2 SOLUTION INTRAVENOUS at 21:44

## 2022-09-23 NOTE — ASSESSMENT & PLAN NOTE
Presents with tremors in his upper extremities  States he drinks a large glass of vodka twice a week    States his last drink was day prior to admission  · Increased Librium to 50 mg every 8 hours for 3 doses with Ativan 0 5 mg as needed for anxiety or alcohol withdrawal  · Will decrease Librium 25 mg q 8 hours after 3rd dose  · Monitor on CIWA  · Folic acid, thiamine, multivitamin  · Ongoing discussion about the importance of alcohol cessation

## 2022-09-23 NOTE — PROGRESS NOTES
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  Mild gastric erythema, possible portal hypertensive gastropathy "  Colonoscopy 9/16/22: "Large, nonbleeding internal hemorrhoids  Likely small rectal varices "  At that time, GI recommended EGD in several months for re-evaluation and possible esophageal biopsy or banding  Repeat colonoscopy in 3 years due to an inadequate bowel bowel prep  Would avoid hemorrhoidal banding at this time due to likely presence of rectal varices   · Suspecting hemorrhoid bleeding   · Spoke with GI   · OK to put on low sodium diet and monitor tolerance   · Obtain bleeding scan for any signs of bleeding   · Continue PPI  · Start Anusol suppository BID and Hydrocortisone cream QID PRN   · Monitor HGB     Pain and swelling of right lower extremity  Assessment & Plan  Presents with complaints of right lower extremity swelling, bruising, and pain  Left lower extremity duplex 9/20/22: which revealed a  well-defined hypoechoic non vascularized cystic-type structure noted in the popliteal fossa and a hyperechoic structure noted from the proximal calf extending to the mid/distal calf  Right lower extremity 9/20/22:  Diffuse soft tissue swelling without evidence of hematoma  Fluid tracking along the gastrocnemius tendon without evidence of mild tendinitis tear  Bones intact    · Now with severe pain, swelling, and difficulty ambulating  · Repeat left lower extremity venous duplex to rule out DVT; follow-up on results  · Repeat CT lower extremity showed heterogenous collection within the medial head gastrocnemius muscle suspicious for intramuscular hematoma as per radiology report  · Consulted acute care surgery a follow-up on recommendations  · Recommend leg elevation, heat and compression the Ace wrap for support  · Tylenol, Percocet, and Dilaudid as needed for pain  · Unable to use NSAIDs given liver disease       Hypomagnesemia  Assessment & Plan  Magnesium level 1 6  · Replete with Mag sulfate 2 g IV x1 and monitor    Alcoholic cirrhosis of liver (HCC)  Assessment & Plan  Alcoholic cirrhosis secondary to alcohol abuse  Recent CT scan showed findings consistent of increasing portal hypertension  Patient has history of thrombocytopenia and rectal bleeding  Recently underwent EGD and colonoscopy  INR 1 43, total bili 3 53, ammonia 62-> 70; increased lactulose to t i d   Titrate to 3 BMs daily  · Consult GI  · Continue PPI, nadolol, lactulose  · Avoid antiplatelets and DVT prophylaxis given thrombocytopenia  · Continue to discuss the importance of alcohol cessation  · Trend LFTs and T bili    Thrombocytopenia (HCC)  Assessment & Plan  Chronic secondary to alcohol cirrhosis  · Trend counts daily  · Will likely need platelet transfusion prior to any procedures/intervention    Alcohol withdrawal syndrome without complication [facility])  Assessment & Plan  Presents with tremors in his upper extremities  States he drinks a large glass of vodka twice a week    States his last drink was day prior to admission  · Increased Librium to 50 mg every 8 hours for 3 doses with Ativan 0 5 mg as needed for anxiety or alcohol withdrawal  · Will decrease Librium 25 mg q 8 hours after 3rd dose  · Monitor on CIWA  · Folic acid, thiamine, multivitamin  · Ongoing discussion about the importance of alcohol cessation    Tobacco abuse  Assessment & Plan  · Nicotine patch  · Smoking cessation education and counseling           VTE Pharmacologic Prophylaxis:   Pharmacologic: Pharmacologic VTE Prophylaxis contraindicated due to Anemia, low platelets  Mechanical VTE Prophylaxis in Place: Yes left leg    Patient Centered Rounds: I have performed bedside rounds with nursing staff today  Discussions with Specialists or Other Care Team Provider: Yes  Education and Discussions with Family / Patient:Yes  Time Spent for Care: 30 minutes  More than 50% of total time spent on counseling and coordination of care as described above  Current Length of Stay: 0 day(s)  Current Patient Status: Inpatient     Discharge Plan:  Not stable for discharge today    Code Status: Level 1 - Full Code      Subjective:   Patient seen lying in bed, reports that his right leg is very painful, continues with swelling, difficulty ambulating  States that he is hungry and is hoping that he will be able to eat something today  Denies abdominal discomfort, no nausea or vomiting  Objective:     Vitals:   Temp (24hrs), Av 4 °F (36 9 °C), Min:98 2 °F (36 8 °C), Max:98 6 °F (37 °C)    Temp:  [98 2 °F (36 8 °C)-98 6 °F (37 °C)] 98 6 °F (37 °C)  HR:  [76-77] 76  Resp:  [18] 18  BP: (132-134)/(75-86) 132/86  SpO2:  [95 %-97 %] 97 %  Body mass index is 28 49 kg/m²  Input and Output Summary (last 24 hours): Intake/Output Summary (Last 24 hours) at 2022 1633  Last data filed at 2022 0601  Gross per 24 hour   Intake 952 5 ml   Output 1500 ml   Net -547 5 ml        Physical Exam:     Physical Exam  Vitals and nursing note reviewed  Constitutional:       Appearance: He is ill-appearing  HENT:      Head: Normocephalic  Nose: Nose normal       Mouth/Throat:      Mouth: Mucous membranes are dry  Eyes:      General: Scleral icterus present  Extraocular Movements: Extraocular movements intact  Pupils: Pupils are equal, round, and reactive to light     Cardiovascular:      Rate and Rhythm: Normal rate and regular rhythm  Pulses: Normal pulses  Heart sounds: Normal heart sounds  Pulmonary:      Effort: Pulmonary effort is normal       Breath sounds: Normal breath sounds  Abdominal:      General: There is distension  Comments: Mild tenderness with palpation right umbilical region    Genitourinary:     Comments: Voiding spontaneously  Musculoskeletal:      Cervical back: Normal range of motion  Comments: Right lower extremity demonstrates significant ecchymosis from back of  thigh all the way down to patient's ankle; calf noted to be ecchymotic, skin stretched taut  Skin:     Capillary Refill: Capillary refill takes less than 2 seconds  Coloration: Skin is jaundiced  Findings: Bruising present  Neurological:      Mental Status: He is oriented to person, place, and time  Psychiatric:         Attention and Perception: Attention normal          Mood and Affect: Mood is anxious  Speech: Speech normal          Behavior: Behavior is cooperative  Thought Content:  Thought content normal          Additional Data:     Labs:    Results from last 7 days   Lab Units 09/23/22  0538 09/22/22  1236 09/20/22  1154   WBC Thousand/uL 2 79* 2 80* 2 75*   HEMOGLOBIN g/dL 10 3* 9 9* 10 8*   HEMATOCRIT % 30 8* 31 2* 33 4*   PLATELETS Thousands/uL 38* 41* 45*   NEUTROS PCT % 56 54 49     Results from last 7 days   Lab Units 09/23/22  0538 09/22/22  1236 09/20/22  1154   SODIUM mmol/L 138 144 141   POTASSIUM mmol/L 3 8 3 9 3 8   CHLORIDE mmol/L 103 109* 106   CO2 mmol/L 29 32 29   BUN mg/dL 11 13 12   CREATININE mg/dL 0 92 0 82 0 89   CALCIUM mg/dL 7 8* 8 0* 8 3   TOTAL BILIRUBIN mg/dL 5 57* 3 53* 3 69*   ALK PHOS U/L 98 110 112   ALT U/L 34 36 42   AST U/L 77* 76* 79*     Results from last 7 days   Lab Units 09/22/22  1236 09/20/22  1154   INR  1 43* 1 35*         No results found for: HGBA1C      Results from last 7 days   Lab Units 09/22/22  1236   LACTIC ACID mmol/L 1 5       * I Have Reviewed All Lab Data Listed Above  * Additional Pertinent Lab Tests Reviewed: All Labs Within Last 24 Hours Reviewed    Imaging:     CT lower extremity w contrast right   Final Result by Juan Skinner MD (09/23 1202)   1  Heterogeneous collection within the medial head gastrocnemius muscle suspicious for intramuscular hematoma  Close clinical follow-up is recommended  Follow-up CT in 4-6 weeks is recommended to confirm resolution and exclude an underlying soft    tissue mass  2   Diffuse subcutaneous edema about the right leg, ankle and foot suspicious for cellulitis  The study was marked in EPIC for significant notification  Workstation performed: [id number]         VAS lower limb venous duplex study, unilateral/limited    (Results Pending)     Imaging Reports Reviewed by myself    Cultures:   Blood Culture:   Lab Results   Component Value Date    BLOODCX No Growth After 5 Days  12/05/2020    BLOODCX No Growth After 5 Days   12/05/2020     Urine Culture: No results found for: URINECX  Sputum Culture: No components found for: SPUTUMCX  Wound Culture: No results found for: WOUNDCULT    Last 24 Hours Medication List:   Current Facility-Administered Medications   Medication Dose Route Frequency Provider Last Rate    acetaminophen  650 mg Oral Q8H PRN TIMMY Galeas      calcium carbonate  1,000 mg Oral Daily PRN TIMMY Galeas      cefazolin  2,000 mg Intravenous Q8H Ronita Severs, CRNP 2,000 mg (09/23/22 1326)    chlordiazePOXIDE  50 mg Oral [facility] [address] [place], [address]      folic acid  1 mg Oral Daily TIMMY Galeas      hydrocortisone   Topical 4x Daily PRN TIMMY Galeas      hydrocortisone  25 mg Rectal BID TIMMY Galeas      HYDROmorphone  0 5 mg Intravenous Q4H PRN Ronita Severs, CRNP      lactulose  20 g Oral TID Ronita Severs, CRNP      LORazepam  0 5 mg Oral Q6H PRN TIMMY Gan      multi-electrolyte  50 mL/hr Intravenous Continuous Simba Navarro MD 50 mL/hr (09/22/22 2049)    multivitamin-minerals  1 tablet Oral Daily Ruben Higgins, TIMMY      nadolol  20 mg Oral Daily [place], [address]      nicotine  14 mg Transdermal Daily Yeni Bonilla, [address]      ondansetron  4 mg Intravenous Q6H PRN TIMMY Gan      oxyCODONE-acetaminophen  1 tablet Oral Q8H PRN Ruben Higgins, TIMMY      pantoprazole  40 mg Oral Daily [place], [address]      polyethylene glycol  17 g Oral Daily PRN Ruben Higgins, TIMMY      prednisoLONE  40 mg Oral Daily TIMMY Solares      thiamine  100 mg Oral Daily TIMMY Gan          Today, Patient Was Seen By: TIMMY Aldridge    ** Please Note: Dragon 360 Dictation voice to text software may have been used in the creation of this document   **

## 2022-09-23 NOTE — PLAN OF CARE
Problem: MOBILITY - ADULT  Goal: Maintain or return to baseline ADL function  Description: INTERVENTIONS:  -  Assess patient's ability to carry out ADLs; assess patient's baseline for ADL function and identify physical deficits which impact ability to perform ADLs (bathing, care of mouth/teeth, toileting, grooming, dressing, etc )  - Assess/evaluate cause of self-care deficits   - Assess range of motion  - Assess patient's mobility; develop plan if impaired  - Assess patient's need for assistive devices and provide as appropriate  - Encourage maximum independence but intervene and supervise when necessary  - Involve family in performance of ADLs  - Assess for home care needs following discharge   - Consider OT consult to assist with ADL evaluation and planning for discharge  - Provide patient education as appropriate  Outcome: Progressing  Goal: Maintains/Returns to pre admission functional level  Description: INTERVENTIONS:  - Perform BMAT or MOVE assessment daily    - Set and communicate daily mobility goal to care team and patient/family/caregiver  - Collaborate with rehabilitation services on mobility goals if consulted  - Perform Range of Motion  times a day  - Reposition patient every  hours    - Dangle patient times a day  - times a day  - Ambulate patient  times a day  - Out of bed to chair  times a day   - Out of bed for meals times a day  - Out of bed for toileting  - Record patient progress and toleration of activity level   Outcome: Progressing     Problem: Potential for Falls  Goal: Patient will remain free of falls  Description: INTERVENTIONS:  - Educate patient/family on patient safety including physical limitations  - Instruct patient to call for assistance with activity   - Consult OT/PT to assist with strengthening/mobility   - Keep Call bell within reach  - Keep bed low and locked with side rails adjusted as appropriate  - Keep care items and personal belongings within reach  - Initiate and maintain comfort rounds  - Make Fall Risk Sign visible to staff  - Offer Toileting every  Hours, in advance of need  - Initiate/Maintain alarm  - Obtain necessary fall risk management equipment  - Apply yellow socks and bracelet for high fall risk patients  - Consider moving patient to room near nurses station  Outcome: Progressing     Problem: PAIN - ADULT  Goal: Verbalizes/displays adequate comfort level or baseline comfort level  Description: Interventions:  - Encourage patient to monitor pain and request assistance  - Assess pain using appropriate pain scale  - Administer analgesics based on type and severity of pain and evaluate response  - Implement non-pharmacological measures as appropriate and evaluate response  - Consider cultural and social influences on pain and pain management  - Notify physician/advanced practitioner if interventions unsuccessful or patient reports new pain  Outcome: Progressing     Problem: INFECTION - ADULT  Goal: Absence or prevention of progression during hospitalization  Description: INTERVENTIONS:  - Assess and monitor for signs and symptoms of infection  - Monitor lab/diagnostic results  - Monitor all insertion sites, i e  indwelling lines, tubes, and drains  - Monitor endotracheal if appropriate and nasal secretions for changes in amount and color  - Gilman appropriate cooling/warming therapies per order  - Administer medications as ordered  - Instruct and encourage patient and family to use good hand hygiene technique  - Identify and instruct in appropriate isolation precautions for identified infection/condition  Outcome: Progressing  Goal: Absence of fever/infection during neutropenic period  Description: INTERVENTIONS:  - Monitor WBC    Outcome: Progressing     Problem: SAFETY ADULT  Goal: Maintain or return to baseline ADL function  Description: INTERVENTIONS:  -  Assess patient's ability to carry out ADLs; assess patient's baseline for ADL function and identify physical deficits which impact ability to perform ADLs (bathing, care of mouth/teeth, toileting, grooming, dressing, etc )  - Assess/evaluate cause of self-care deficits   - Assess range of motion  - Assess patient's mobility; develop plan if impaired  - Assess patient's need for assistive devices and provide as appropriate  - Encourage maximum independence but intervene and supervise when necessary  - Involve family in performance of ADLs  - Assess for home care needs following discharge   - Consider OT consult to assist with ADL evaluation and planning for discharge  - Provide patient education as appropriate  Outcome: Progressing  Goal: Maintains/Returns to pre admission functional level  Description: INTERVENTIONS:  - Perform BMAT or MOVE assessment daily    - Set and communicate daily mobility goal to care team and patient/family/caregiver  - Collaborate with rehabilitation services on mobility goals if consulted  - Perform Range of Motion  times a day  - Reposition patient every  hours    - Dangle patient  times a day  - Stand patient  times a day  - Ambulate patient  times a day  - Out of bed to chair  times a day   - Out of bed for meals  times a day  - Out of bed for toileting  - Record patient progress and toleration of activity level   Outcome: Progressing  Goal: Patient will remain free of falls  Description: INTERVENTIONS:  - Educate patient/family on patient safety including physical limitations  - Instruct patient to call for assistance with activity   - Consult OT/PT to assist with strengthening/mobility   - Keep Call bell within reach  - Keep bed low and locked with side rails adjusted as appropriate  - Keep care items and personal belongings within reach  - Initiate and maintain comfort rounds  - Make Fall Risk Sign visible to staff  - Offer Toileting every Hours, in advance of need  - Initiate/Maintain alarm  - Obtain necessary fall risk management equipment:   - Apply yellow socks and bracelet for high fall risk patients  - Consider moving patient to room near nurses station  Outcome: Progressing     Problem: DISCHARGE PLANNING  Goal: Discharge to home or other facility with appropriate resources  Description: INTERVENTIONS:  - Identify barriers to discharge w/patient and caregiver  - Arrange for needed discharge resources and transportation as appropriate  - Identify discharge learning needs (meds, wound care, etc )  - Arrange for interpretive services to assist at discharge as needed  - Refer to Case Management Department for coordinating discharge planning if the patient needs post-hospital services based on physician/advanced practitioner order or complex needs related to functional status, cognitive ability, or social support system  Outcome: Progressing     Problem: Knowledge Deficit  Goal: Patient/family/caregiver demonstrates understanding of disease process, treatment plan, medications, and discharge instructions  Description: Complete learning assessment and assess knowledge base    Interventions:  - Provide teaching at level of understanding  - Provide teaching via preferred learning methods  Outcome: Progressing

## 2022-09-23 NOTE — UTILIZATION REVIEW
Initial Clinical Review    Observation 9/22/22 @ 1425 converted to inpatient admission 9/23/22 @ 1626 for continued care & tx for hematochezia  Admission: Date/Time/Statement:   Admission Orders (From admission, onward)     Ordered        09/23/22 1626  Inpatient Admission  Once            09/22/22 1425  Place in Observation  Once                      Orders Placed This Encounter   Procedures    Inpatient Admission     Standing Status:   Standing     Number of Occurrences:   1     Order Specific Question:   Level of Care     Answer:   Med Surg [16]     Order Specific Question:   Estimated length of stay     Answer:   More than 2 Midnights     Order Specific Question:   Certification     Answer:   I certify that inpatient services are medically necessary for this patient for a duration of greater than two midnights  See H&P and MD Progress Notes for additional information about the patient's course of treatment  ED Arrival Information     Expected   -    Arrival   9/22/2022 10:40    Acuity   Urgent            Means of arrival   Walk-In    Escorted by   Family Member    Service   Hospitalist    Admission type   Urgent            Arrival complaint   alc withdrawal           Chief Complaint   Patient presents with    Leg Swelling     States his right leg is very swollen and he has trouble ambulating  States his last drink was 8 hours ago   Alcohol Intoxication     Last drink 8 hours ago  No tremors at present  Hx of  seizures with withdrawal in past      Initial Presentation:  52-year-old male with past medical history significant for alcohol abuse, alcoholic cirrhosis, history of seizure related to alcohol withdrawal, recent admission for hematemesis, who initially presented 2 days ago for evaluation of right leg swelling  Evaluation at that time included vascular ultrasound and CT scan which revealed a Baker cyst and soft tissue swelling    Patient was discharged home, and presents today for further evaluation of right lower extremity edema and worsening pain and ecchymosis  He states he is having difficulty ambulating at this time  He also states that he is "pooping cherries  Placed in observation status for hematochezia  Observation to IP admission 9/23/22:  Persistent tremors, jaundice, scleral icterus (CIWA 4), Librium increased to 50 mg Q8H  Lactulose increased to TID  IV Mg repletion given  Started on Rayshalonda Levine  RLE pain & swelling, requiring IV Dilaudid  Surgery consulted, recommending leg elevation, heat and compression the Ace wrap for support  Started on IVABT  IVF maintained  Per surg: ventral wall hernia, not surgical candidate 2nd alcoholic cirrhosis unless emergent    Day 2- 9/24/22:  Less pain & swelling RLE, however continues to require IV Dilaudid  Aggressive elevation per surgery  Bruising 2nd hematoma extending from upper thigh to foot  Ammonia level down to 60 with increase of lactulose dosing  Continued tremors, CIWA 3, remains on Librium TID      9/25/22:  RLE/Calf is much softer, ecchymotic from back of top of thigh down to foot  Swelling is markedly decreased  Elevation continued  Ammonia 53, trending downward, ongoing tremors, CIWA 3, Librium dosing continued       ED Triage Vitals [09/22/22 1110]   Temperature Pulse Respirations Blood Pressure SpO2   (!) 97 3 °F (36 3 °C) 91 18 130/82 99 %      Temp Source Heart Rate Source Patient Position - Orthostatic VS BP Location FiO2 (%)   Tympanic Monitor Sitting Left arm --      Pain Score       8          Wt Readings from Last 1 Encounters:   09/26/22 87 8 kg (193 lb 9 6 oz)     Additional Vital Signs:   Date/Time Temp Pulse Resp BP MAP (mmHg) SpO2 O2 Device Patient Position - Orthostatic VS   09/22/22 1900 98 2 °F (36 8 °C) 77 18 134/75 99 95 % None (Room air) Lying   09/22/22 1600 97 6 °F (36 4 °C) 77 18 132/82 102 94 % None (Room air) Sitting   09/22/22 1400 -- 78 -- 119/73 90 94 % -- --   09/22/22 1330 -- 82 -- 116/71 89 96 % -- --     Pertinent Labs/Diagnostic Test Results:   VAS lower limb venous duplex study, unilateral/limited   Final Result (09/23 1840)  RIGHT LOWER LIMB  No evidence of acute or chronic deep vein thrombosis   No evidence of superficial thrombophlebitis noted  Doppler evaluation shows a normal response to augmentation maneuvers  Popliteal, posterior tibial and anterior tibial arterial Doppler waveforms are  triphasic  LEFT LOWER LIMB LIMITED  Evaluation shows no evidence of thrombus in the common femoral vein  Doppler evaluation shows a normal response to augmentation maneuvers  CT lower extremity w contrast right   Final Result (09/23 1202)   1  Heterogeneous collection within the medial head gastrocnemius muscle suspicious for intramuscular hematoma  Close clinical follow-up is recommended  Follow-up CT in 4-6 weeks is recommended to confirm resolution and exclude an underlying soft    tissue mass  2   Diffuse subcutaneous edema about the right leg, ankle and foot suspicious for cellulitis          Results from last 7 days   Lab Units 09/22/22  1447   SARS-COV-2  Negative     Results from last 7 days   Lab Units 09/26/22  0532 09/25/22  0528 09/24/22  0500 09/23/22  0538 09/22/22  1236 09/20/22  1154   WBC Thousand/uL 5 15 5 44 3 94* 2 79* 2 80* 2 75*   HEMOGLOBIN g/dL 11 8* 10 6* 11 0* 10 3* 9 9* 10 8*   HEMATOCRIT % 36 2* 32 6* 33 3* 30 8* 31 2* 33 4*   PLATELETS Thousands/uL 54* 48* 44* 38* 41* 45*   NEUTROS ABS Thousands/µL  --   --   --  1 56* 1 52* 1 36*     Results from last 7 days   Lab Units 09/26/22  0532 09/25/22  0528 09/24/22  0500 09/23/22  0538 09/22/22  1236   SODIUM mmol/L 140 140 137 138 144   POTASSIUM mmol/L 4 1 3 9 4 1 3 8 3 9   CHLORIDE mmol/L 106 106 104 103 109*   CO2 mmol/L 29 27 28 29 32   ANION GAP mmol/L 5 7 5 6 3*   BUN mg/dL 14 16 12 11 13   CREATININE mg/dL 0 89 1 11 0 89 0 92 0 82   EGFR ml/min/1 73sq m 95 73 95 92 98   CALCIUM mg/dL 7 7* 7 8* 7 7* 7 8* 8 0* MAGNESIUM mg/dL 1 9 1 8 1 9 1 6 1 4*   PHOSPHORUS mg/dL  --   --   --  3 1 3 3     Results from last 7 days   Lab Units 09/26/22  0532 09/25/22  1016 09/25/22  0528 09/25/22  0428 09/24/22  0500 09/23/22  0636 09/23/22  0538 09/22/22  1236 09/20/22  1154   AST U/L 54*  --  55*  --  64*  --  77* 76* 79*   ALT U/L 29  --  29  --  33  --  34 36 42   ALK PHOS U/L 97  --  106  --  108  --  98 110 112   TOTAL PROTEIN g/dL 6 7  --  6 1*  --  6 8  --  6 6 6 6 7 1   ALBUMIN g/dL 2 3*  --  2 1*  --  2 3*  --  2 3* 2 3* 2 6*   TOTAL BILIRUBIN mg/dL  --   --   --  3 62* 4 32*  --  5 57* 3 53* 3 69*   BILIRUBIN DIRECT mg/dL  --   --   --   --   --   --   --  2 77*  --    AMMONIA umol/L 33 53*  --   --  60* 70*  --  62*  --      Results from last 7 days   Lab Units 09/26/22  0532 09/25/22  0528 09/24/22  0500 09/23/22  0538 09/22/22  1236 09/20/22  1154   GLUCOSE RANDOM mg/dL 88 132 138 106 122 100     Results from last 7 days   Lab Units 09/24/22  0500 09/22/22  1236 09/20/22  1154   PROTIME seconds 19 1* 17 6* 16 8*   INR  1 60* 1 43* 1 35*   PTT seconds  --  32 32     Results from last 7 days   Lab Units 09/22/22  1236   LACTIC ACID mmol/L 1 5     ED Treatment:   Medication Administration from 09/22/2022 1039 to 09/22/2022 1528       Date/Time Order Dose Route Action     09/22/2022 1236 HYDROmorphone (DILAUDID) injection 0 5 mg 0 5 mg Intravenous Given     09/22/2022 1323 magnesium sulfate 2 g/50 mL IVPB (premix) 2 g 2 g Intravenous New Bag     90/79/1417 8791 folic acid (FOLVITE) tablet 1 mg 1 mg Oral Given     09/22/2022 1452 pantoprazole (PROTONIX) EC tablet 40 mg 40 mg Oral Given     09/22/2022 1452 thiamine tablet 100 mg 100 mg Oral Given     09/22/2022 1452 chlordiazePOXIDE (LIBRIUM) capsule 25 mg 25 mg Oral Given     09/22/2022 1455 multi-electrolyte (PLASMALYTE-A/ISOLYTE-S PH 7 4) IV solution 75 mL/hr Intravenous New Bag     09/22/2022 1452 multivitamin-minerals (CENTRUM) tablet 1 tablet 1 tablet Oral Given Past Medical History:   Diagnosis Date    ETOH abuse      Present on Admission:   Thrombocytopenia ([facility] )   Alcohol withdrawal syndrome without complication (HCC)   Tobacco abuse   Alcoholic cirrhosis of liver (HCC)      Admitting Diagnosis: Alcohol withdrawal ([place] [address] ) [F10 239]  Alcohol intoxication ([place] [address] ) [F10 929]  Anemia [D64 9]  Thrombocytopenia ([place] [address] ) [D69 6]  Leg swelling [M79 89]  BRBPR (bright red blood per rectum) [K62 5]  Ruptured Bakers cyst [M66 0]  Age/Sex: 65 y o  male  Admission Orders:  Consult GI  Elevate RLE  Ace warp RLE  Pt/ot eval & tx    Scheduled Medications:  chlordiazePOXIDE, 25 mg, Oral, [address]  folic acid, 1 mg, Oral, Daily  hydrocortisone, 25 mg, Rectal, BID  lactulose, 20 g, Oral, TID  magnesium sulfate, 2 g, Intravenous, Once  multivitamin-minerals, 1 tablet, Oral, Daily  nadolol, 20 mg, Oral, Daily  nicotine, 14 mg, Transdermal, Daily  pantoprazole, 40 mg, Oral, Daily  thiamine, 100 mg, Oral, Daily    Continuous IV Infusions:  multi-electrolyte, 50 mL/hr, Intravenous, Continuous    PRN Meds:  acetaminophen, 650 mg, Oral, Q8H PRN  calcium carbonate, 1,000 mg, Oral, Daily PRN  hydrocortisone, , Topical, 4x Daily PRN  HYDROmorphone, 0 2 mg, Intravenous, Q4H PRN9/23 x1>d/c'd  HYDROmorphone, 0 5 mg, Intravenous, Q4H, PRN, 9/23 x2, 9/24 x3, 9/25 x1, 9/26 x1  LORazepam, 0 5 mg, Oral, Q6H PRN  ondansetron, 4 mg, Intravenous, Q6H PRN  oxyCODONE-acetaminophen, 1 tablet, Oral, Q8H PRN, 9/22 x1, 9/23 x1, 9/25 x1  polyethylene glycol, 17 g, Oral, Daily PRN    Network Utilization Review Department  ATTENTION: Please call with any questions or concerns to [phone] and carefully listen to the prompts so that you are directed to the right person  All voicemails are confidential   Farrel Bodily all requests for admission clinical reviews, approved or denied determinations and any other requests to dedicated fax number below belonging to the campus where the patient is receiving treatment   List of dedicated fax numbers for the Facilities:  [address] [facility] [address] DENIALS (Administrative/Medical Necessity) [phone]   1000  [facility] (Maternity/NICU/Pediatrics) [phone]   [address] [place] [address]  [address] [facility] [place] [address] [id number] [place] [address] T[address] Yolanda [address] [place], [place] [address] [phone]

## 2022-09-23 NOTE — CONSULTS
Consultation - General Surgery   Bigg Nesbitt 65 y o  male MRN: [id number]  Unit/Bed#: [address] [place] [address] Encounter: [id number]    Assessment/Plan     Assessment:  1) Diffuse RLE hematoma - use swelling with turgor in thigh and calf, more turgor and taut skin in right calf than thigh, compartments are soft and compressible with palpation, patient does have active range of motion with dorsiflexion, plantar flexion, EHL, patient does have intact palpable pulse on dorsalis pedis and posterior tibialis 2+, patient does have sensation in the lower extremity, patient's skin does not appear pale, patient does have some palpable tenderness on the posterior calf which is to be expected with hematoma and diffuse ecchymosis, passive dorsiflexion of foot does not elicit significant severe tenderness, patient does not exhibit signs of pain out of proportion  2) Multiple Ventral wall Hernia -  palpable fascial defects on ventral aspect of abdominal wall, patient has had surgical history so could be incisional hernias, soft and reducible, patient does report the umbilical hernia was somewhat discolored at the level of the skin but no longer is  3) Liver Cirrhosis - significant liver disease that is related to alcohol use, patient does have observed asterixis versus alcoholic tremors, MELD score 17, child Erazo score C, elevated ammonia, slight alteration in mental status  4) Hematochezia - reports of bloody bowel movements, patient had no complaints or reports of this at this current moment, there was a suspicion of hemorrhoids being the underlying etiology    Plan:  1-4)   - based on significant alcoholic cirrhosis patient is not a candidate for ventral hernia repair unless in an emergent situation  - originally intended on using compressive Ace bandage however hematoma is not well confined to 1 area and seems to be diffuse throughout the right lower extremity  - oozing/bleeding is likely precipitated by significant thrombocytopenia  - possible considerations for repletion of platelets  - in order to decrease swelling would recommend significant elevation with multiple pillows or with sling around IV pole   - continue to follow neurovascular presentation of right lower extremity over the course of the next 48 hours and to ensure that there is no worsening in swelling or her neurovascular status that would warrant evacuation of hematoma  - p r n  Analgesia  - colonoscopy per GI if indicated for hematochezia  - management of cirrhosis per gastroenterology team    History of Present Illness   HPI:  Shari Bronson is a 65 y o  male with past medical history for cholecystectomy, alcoholic cirrhosis, thrombocytopenia presenting to the acute care surgery team due to right lower extremity swelling with suspicion of hematoma  Patient when asked why he came in to the hospital reports that it is due to the bumps on his abdomen  Patient points to bumps on his abdomen which exhibit signs of multiple small hernias on the right side of abdominal wall  Patient reports the wound is belly button had changed color overlying the skin in that he was slightly uncomfortable  Patient reports that they are better now  Patient does seem slightly confused when answering questions  Patient does have known liver cirrhosis due to alcohol abuse and it is uncertain if elevated ammonia levels over causing him to respond appropriately  Patient when asked about his hematochezia does report that he was having some blood in his stool that was not a major problem  Patient also when asked about his leg reports that it is swollen but he does not know why  Patient does report that it is tender to touch  Patient denies any knowledge of how it started or why  Patient denies any history of trauma  Patient denies any lack of sensation or lack of motor movement  Patient was able to follow commands to examine sensory and motor components    Patient denies any numbness, tingling, lack of motor movement of lower extremities  Patient is delayed and somewhat slurred in his responses though  Accuracy of his historical counts are somewhat in question  Patient at rest denies any current pain in the right lower extremity  Patient reports he does not know how it all happened when that all started a couple days after he left the hospital at his last admission  Inpatient consult to Acute Care Surgery  Consult performed by: Toni Moya PA-C  Consult ordered by: TIMMY Veliz          Review of Systems   Unable to perform ROS: Mental status change   Constitutional: Negative for chills and fever  HENT: Negative for congestion and rhinorrhea  Respiratory: Negative for cough, chest tightness, shortness of breath and wheezing  Cardiovascular: Positive for leg swelling  Negative for chest pain and palpitations  Gastrointestinal: Positive for blood in stool  Negative for abdominal distention, abdominal pain, constipation, diarrhea, nausea and vomiting  Genitourinary: Negative for difficulty urinating, dysuria and hematuria  Musculoskeletal: Positive for gait problem  Negative for arthralgias  Skin: Positive for color change  Negative for wound  Neurological: Positive for tremors  Negative for weakness and numbness  Psychiatric/Behavioral: Positive for confusion  Negative for agitation         Historical Information   Past Medical History:   Diagnosis Date    ETOH abuse      Past Surgical History:   Procedure Laterality Date    GALLBLADDER SURGERY       Social History   Social History     Substance and Sexual Activity   Alcohol Use Yes    Alcohol/week: 16 0 standard drinks    Types: 8 Cans of beer, 8 Shots of liquor per week    Comment: not drinking daily, drinks twice a week or if he has pain     Social History     Substance and Sexual Activity   Drug Use Not Currently    Types: Marijuana     E-Cigarette/Vaping    E-Cigarette Use Never User      E-Cigarette/Vaping Substances    Nicotine No     THC No     CBD No     Flavoring No     Other No     Unknown No      Social History     Tobacco Use   Smoking Status Never Smoker   Smokeless Tobacco Current User     Family History: non-contributory    Meds/Allergies   all current active meds have been reviewed and current meds:   Current Facility-Administered Medications   Medication Dose Route Frequency    acetaminophen (TYLENOL) tablet 650 mg  650 mg Oral Q8H PRN    calcium carbonate (TUMS) chewable tablet 1,000 mg  1,000 mg Oral Daily PRN    ceFAZolin (ANCEF) IVPB (premix in dextrose) 2,000 mg 50 mL  2,000 mg Intravenous Q8H    chlordiazePOXIDE (LIBRIUM) capsule 50 mg  50 mg Oral [address]    folic acid (FOLVITE) tablet 1 mg  1 mg Oral Daily    hydrocortisone (ANUSOL-HC) 2 5 % rectal cream   Topical 4x Daily PRN    hydrocortisone (ANUSOL-HC) rectal suppository 25 mg  25 mg Rectal BID    HYDROmorphone (DILAUDID) injection 0 5 mg  0 5 mg Intravenous Q4H PRN    lactulose oral solution 20 g  20 g Oral TID    LORazepam (ATIVAN) tablet 0 5 mg  0 5 mg Oral Q6H PRN    multi-electrolyte (PLASMALYTE-A/ISOLYTE-S PH 7 4) IV solution  50 mL/hr Intravenous Continuous    multivitamin-minerals (CENTRUM) tablet 1 tablet  1 tablet Oral Daily    nadolol (CORGARD) tablet 20 mg  20 mg Oral Daily    nicotine (NICODERM CQ) 14 mg/24hr TD 24 hr patch 14 mg  14 mg Transdermal Daily    ondansetron (ZOFRAN) injection 4 mg  4 mg Intravenous Q6H PRN    oxyCODONE-acetaminophen (PERCOCET) 5-325 mg per tablet 1 tablet  1 tablet Oral Q8H PRN    pantoprazole (PROTONIX) EC tablet 40 mg  40 mg Oral Daily    polyethylene glycol (MIRALAX) packet 17 g  17 g Oral Daily PRN    prednisoLONE (ORAPRED) oral solution 40 mg  40 mg Oral Daily    thiamine tablet 100 mg  100 mg Oral Daily     No Known Allergies    Objective   First Vitals:   Blood Pressure: 130/82 (09/22/22 1110)  Pulse: 91 (09/22/22 1110)  Temperature: (!) 97 3 °F (36 3 °C) (09/22/22 1110)  Temp Source: Tympanic (09/22/22 1110)  Respirations: 18 (09/22/22 1110)  Height: 5' 11" (180 3 cm) (09/23/22 0500)  Weight - Scale: 90 1 kg (198 lb 9 6 oz) (09/22/22 1110)  SpO2: 99 % (09/22/22 1110)    Current Vitals:   Blood Pressure: 132/86 (09/23/22 1631)  Pulse: 76 (09/23/22 1631)  Temperature: 98 6 °F (37 °C) (09/23/22 1631)  Temp Source: Oral (09/23/22 1631)  Respirations: 18 (09/23/22 1631)  Height: 5' 11" (180 3 cm) (09/23/22 0500)  Weight - Scale: 92 7 kg (204 lb 4 8 oz) (09/23/22 0500)  SpO2: 97 % (09/23/22 1631)      Intake/Output Summary (Last 24 hours) at 9/23/2022 1637  Last data filed at 9/23/2022 0601  Gross per 24 hour   Intake 952 5 ml   Output 1500 ml   Net -547 5 ml       Invasive Devices  Report    Peripheral Intravenous Line  Duration           Peripheral IV 09/22/22 Dorsal (posterior); Right Forearm 1 day                Physical Exam  Constitutional:       General: He is not in acute distress  Appearance: He is normal weight  He is not ill-appearing or diaphoretic  Interventions: He is not intubated  HENT:      Head: Normocephalic and atraumatic  Right Ear: Hearing normal  No decreased hearing noted  Left Ear: Hearing normal  No decreased hearing noted  Nose: Nose normal  No septal deviation  Cardiovascular:      Rate and Rhythm: Normal rate and regular rhythm  Pulses:           Dorsalis pedis pulses are 2+ on the right side  Posterior tibial pulses are 2+ on the right side  Heart sounds: Normal heart sounds, S1 normal and S2 normal  Heart sounds not distant  No murmur heard  Pulmonary:      Effort: Pulmonary effort is normal  No tachypnea, bradypnea, accessory muscle usage, prolonged expiration, respiratory distress or retractions  He is not intubated  Breath sounds: Normal breath sounds  No stridor, decreased air movement or transmitted upper airway sounds  No decreased breath sounds, wheezing, rhonchi or rales  Abdominal:      General: Abdomen is flat  Bowel sounds are decreased  There is no distension  Palpations: There is no shifting dullness or fluid wave  Tenderness: There is abdominal tenderness in the right lower quadrant and periumbilical area  There is no guarding  Hernia: A hernia is present  Hernia is present in the umbilical area and ventral area  Musculoskeletal:      Right upper leg: Swelling present  No tenderness  Right lower leg: Tenderness present  No deformity or bony tenderness  Edema present  Right ankle: Swelling and ecchymosis present  No deformity  Tenderness present  Normal range of motion  Right foot: Normal range of motion and normal capillary refill  Swelling and tenderness present  Normal pulse  Left foot: Normal range of motion  Legs:    Feet:      Right foot:      Protective Sensation: 3 sites tested  3 sites sensed  Skin:     General: Skin is warm and dry  Capillary Refill: Capillary refill takes less than 2 seconds  Findings: Ecchymosis present  No erythema, rash or wound  Neurological:      Mental Status: He is alert  Sensory: Sensation is intact  Motor: Motor function is intact  Lab Results:   I have personally reviewed pertinent lab results  , CBC:   Lab Results   Component Value Date    WBC 2 79 (L) 09/23/2022    HGB 10 3 (L) 09/23/2022    HCT 30 8 (L) 09/23/2022     (H) 09/23/2022    PLT 38 (LL) 09/23/2022    MCH 34 4 (H) 09/23/2022    MCHC 33 4 09/23/2022    RDW 15 4 (H) 09/23/2022    MPV 10 2 09/23/2022    NRBC 0 09/23/2022   , CMP:   Lab Results   Component Value Date    SODIUM 138 09/23/2022    K 3 8 09/23/2022     09/23/2022    CO2 29 09/23/2022    BUN 11 09/23/2022    CREATININE 0 92 09/23/2022    CALCIUM 7 8 (L) 09/23/2022    AST 77 (H) 09/23/2022    ALT 34 09/23/2022    ALKPHOS 98 09/23/2022    EGFR 92 09/23/2022     Imaging: I have personally reviewed pertinent reports      EKG, Pathology, and Other Studies: I have personally reviewed pertinent reports  Counseling / Coordination of Care  Total floor / unit time spent today 35 minutes  Greater than 50% of total time was spent with the patient and / or family counseling and / or coordination of care  A description of the counseling / coordination of care:  Developing plan, reviewing with attending, coordinating care, physical exam, history taking, reviewing labs, attempts at obtaining history, patient Education

## 2022-09-23 NOTE — ASSESSMENT & PLAN NOTE
Presents with complaints of right lower extremity swelling, bruising, and pain  Left lower extremity duplex 9/20/22: which revealed a  well-defined hypoechoic non vascularized cystic-type structure noted in the popliteal fossa and a hyperechoic structure noted from the proximal calf extending to the mid/distal calf  Right lower extremity 9/20/22:  Diffuse soft tissue swelling without evidence of hematoma  Fluid tracking along the gastrocnemius tendon without evidence of mild tendinitis tear  Bones intact    · Now with severe pain, swelling, and difficulty ambulating  · Repeat left lower extremity venous duplex to rule out DVT; follow-up on results  · Repeat CT lower extremity showed heterogenous collection within the medial head gastrocnemius muscle suspicious for intramuscular hematoma as per radiology report  · Consulted acute care surgery a follow-up on recommendations  · Recommend leg elevation, heat and compression the Ace wrap for support  · Tylenol, Percocet, and Dilaudid as needed for pain  · Unable to use NSAIDs given liver disease

## 2022-09-23 NOTE — PHYSICAL THERAPY NOTE
PHYSICAL THERAPY EVALUATION/TREATMENT     09/23/22 1610   Note Type   Note type Evaluation   Pain Assessment   Pain Assessment Tool Magallanes-Baker FACES   Magallanes-Baker FACES Pain Rating 8  (Right lower extremity, groin to foot)   Restrictions/Precautions   Other Precautions Chair Alarm; Bed Alarm; Fall Risk;Pain  (Right lower extremity hematoma, alcohol withdrawal)   Home Living   Type of [address] [place] [address] Two level;Stairs to enter with rails  (Three stairs to enter)   [address] [facility]   Additional Comments Patient states independence prior to admission without assistive device  Prior Function   Lives With Family  ([facility])   Receives Help From Family   ADL Assistance Independent   IADLs Independent   Falls in the last 6 months 1 to 4   Comments Patient states family being able to assist as needed and patient requiring assist within the last couple weeks for ADLs and IADLs   General   Additional Pertinent History Chart reviewed, patient admitted with alcohol withdrawal, right lower extremity swelling  Patient now presents with gait dysfunction and R LE hematoma    Patient cooperative with activity but presents with tremulous type movements in alcohol withdrawal and with limited weight-bearing ability on right lower extremity due to pain   Family/Caregiver Present No   Cognition   Overall Cognitive Status WFL   Arousal/Participation Cooperative   Orientation Level Oriented X4   Following Commands Follows multistep commands with increased time or repetition   Subjective   Subjective Patient reports right lower extremity pain from groin to foot   RLE Assessment   RLE Assessment   (ROM WFL, strength 3+/5)   LLE Assessment   LLE Assessment   (ROM WFL, strength 4/5)   Coordination   Movements are Fluid and Coordinated 0   Coordination and Movement Description Tremors noted in bilateral lower extremities with alcohol withdrawal   Bed Mobility   Supine to Sit 5  Supervision   Sit to Supine 5  Supervision Transfers   Sit to Stand 3  Moderate assistance   Additional items Assist x 1   Stand to Sit 3  Moderate assistance   Additional items Assist x 1;Verbal cues   Ambulation/Elevation   Gait Assistance 3  Moderate assist   Additional items Assist x 1;Verbal cues; Tactile cues   Assistive Device Rolling walker   Distance 10 ft with antalgic type gait patterning due to voluntary limitation of weight-bearing on right lower extremity at this time due to pain   Balance   Static Sitting Fair   Dynamic Sitting Fair -   Static Standing Fair -   Dynamic Standing Poor +   Ambulatory Poor +   Activity Tolerance   Activity Tolerance Patient limited by fatigue;Patient limited by pain;Treatment limited secondary to medical complications (Comment)  (Limited by weight-bearing difficulty on right lower extremity)   Nurse Made Aware Yes   Assessment   Prognosis Good   Problem List Decreased strength;Decreased range of motion;Decreased endurance; Impaired balance;Decreased mobility; Decreased coordination;Pain   Assessment Patient seen for Physical Therapy evaluation  Patient admitted with Hematochezia  Comorbidities affecting patient's physical performance include: liver cirrhosis secondary to alcohol abuse, tobacco dependence, cirrhosis, thrombocytopenia who presents with right lower extremity pain     Personal factors affecting patient at time of initial evaluation include: lives in two story house, inability to ambulate household distances, inability to navigate community distances, inability to navigate level surfaces without external assistance, inability to perform dynamic tasks in community, limited home support, positive fall history, inability to perform physical activity, inability to perform ADLS and inability to perform IADLS    Prior to admission, patient was independent with functional mobility without assistive device, independent with ADLS, independent with IADLS, living in a multi-level home, ambulating household distance, ambulating community distances and home with family assist   Please find objective findings from Physical Therapy assessment regarding body systems outlined above with impairments and limitations including weakness, decreased ROM, impaired balance, decreased endurance, impaired coordination, gait deviations, pain, decreased activity tolerance, decreased functional mobility tolerance and fall risk  The Barthel Index was used as a functional outcome tool presenting with a score of Barthel Index Score: 45 today indicating marked limitations of functional mobility and ADLS  Patient's clinical presentation is currently unstable/unpredictable as seen in patient's presentation of vital sign response, changing level of pain, increased fall risk, new onset of impairment of functional mobility, decreased endurance and new onset of weakness  Pt would benefit from continued Physical Therapy treatment to address deficits as defined above and maximize level of functional mobility  As demonstrated by objective findings, the assigned level of complexity for this evaluation is high  The patient's -[facility] Basic Mobility Inpatient Short Form Raw Score is 15  A Raw score of less than or equal to 16 suggests the patient may benefit from discharge to post-acute rehabilitation services although patient should be able to return home with assist of family and home PT services  Please also refer to the recommendation of the Physical Therapist for safe discharge planning     Goals   Patient Goals To decrease pain   STG Expiration Date 09/30/22   Short Term Goal #1 Transfers and gait with roller walker independently   Short Term Goal #2 Gait endurance to functional household distances   LTG Expiration Date 10/07/22   Long Term Goal #1 Strength bilateral lower extremities 4/4+   Long Term Goal #2 Independent transfers and gait without assistive device for functional community distances   Plan   Treatment/Interventions ADL retraining;Functional transfer training;LE strengthening/ROM; Therapeutic exercise; Endurance training;Patient/family training;Equipment eval/education;Gait training; Compensatory technique education   PT Frequency Other (Comment)  (5 times per week)   Recommendation   PT Discharge Recommendation Home with home health rehabilitation   Equipment Recommended [address] [facility] Recommended Wheeled walker   AM-PAC Basic Mobility Inpatient   Turning in Bed Without Bedrails 4   Lying on Back to Sitting on Edge of Flat Bed 4   Moving Bed to Chair 2   Standing Up From Chair 2   Walk in Room 2   Climb 3-5 Stairs 1   Basic Mobility Inpatient Raw Score 15   Basic Mobility Standardized Score 36 97   Highest Level Of Mobility   -[facility] Goal 4: Move to chair/commode   -[facility] Achieved 6: Walk 10 steps or more   Barthel Index   Feeding 10   Bathing 0   Grooming Score 0   Dressing Score 5   Bladder Score 10   Bowels Score 10   Toilet Use Score 5   Transfers (Bed/Chair) Score 5   Mobility (Level Surface) Score 0   Stairs Score 0   Barthel Index Score 45   Additional Treatment Session   Start Time 1555   End Time 1610   Treatment Assessment S:  Patient reports 8/10 pain in right lower extremity O: Bilateral lower extremity exercise completed as listed below a:  Patient will benefit from increasing functional mobility as medically allowed with progression to return to independence    Continued physical therapy with progression as tolerated   Exercises   Quad Sets Supine;10 reps;Bilateral   Heelslides Supine;10 reps;Bilateral   Hip Flexion Sitting;10 reps;Bilateral   Hip Abduction Sitting;10 reps;Bilateral   Knee AROM Long Arc Quad Sitting;5 reps;Bilateral   Ankle Pumps Supine;10 reps;Bilateral   Licensure   [place] License Number  Alyx Rogers [id number]

## 2022-09-23 NOTE — ASSESSMENT & PLAN NOTE
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  Mild gastric erythema, possible portal hypertensive gastropathy "  Colonoscopy 9/16/22: "Large, nonbleeding internal hemorrhoids  Likely small rectal varices "  At that time, GI recommended EGD in several months for re-evaluation and possible esophageal biopsy or banding  Repeat colonoscopy in 3 years due to an inadequate bowel bowel prep   Would avoid hemorrhoidal banding at this time due to likely presence of rectal varices   · Suspecting hemorrhoid bleeding   · Spoke with GI   · OK to put on low sodium diet and monitor tolerance   · Obtain bleeding scan for any signs of bleeding   · Continue PPI  · Start Anusol suppository BID and Hydrocortisone cream QID PRN   · Monitor HGB

## 2022-09-23 NOTE — ASSESSMENT & PLAN NOTE
Alcoholic cirrhosis secondary to alcohol abuse  Recent CT scan showed findings consistent of increasing portal hypertension  Patient has history of thrombocytopenia and rectal bleeding  Recently underwent EGD and colonoscopy    INR 1 43, total bili 3 53, ammonia 62-> 70; increased lactulose to t i d   Titrate to 3 BMs daily  · Consult GI  · Continue PPI, nadolol, lactulose  · Avoid antiplatelets and DVT prophylaxis given thrombocytopenia  · Continue to discuss the importance of alcohol cessation  · Trend LFTs and T bili

## 2022-09-23 NOTE — PHYSICIAN ADVISOR
Current patient class: Observation  The patient is currently on Hospital Day: 2 at [address] [place] [address]          After review of the relevant documentation, labs, vital signs and test results, the patient is appropriate for INPATIENT ADMISSION  Rationale is as follows: The patient is a 65 yrs Male who presented to the ED at 9/22/2022 11:13 AM with a chief complaint of Leg Swelling (States his right leg is very swollen and he has trouble ambulating  States his last drink was 8 hours ago  ) and Alcohol Intoxication (Last drink 8 hours ago  No tremors at present  Hx of  seizures with withdrawal in past ) Patient found to have RLE intramuscular hematoma on CT as well as subcutaneous edema suspicious for cellulitis  He has been started on ancef and surgery consult is pending  He also c/o rectal bleeding and GI consulted  He had recent admission with egd and colonoscopy 9/16/22 showing hemorrhoids  It is suspected hemorrhoidal bleeding  Pt with anemia due hematoma of leg, underlying liver disease and hgb has decreased from 12 6 to 10 8 to 10 3  Pt also with associated thrombocytopenia to 30-40's  He is currently on ciwa protocol and receiving lithium for alcohol withdrawal   Given multiple medical problems and ongoing work up and treatment, IP seems appropriate          The patients vitals on arrival were   ED Triage Vitals [09/22/22 1110]   Temperature Pulse Respirations Blood Pressure SpO2   (!) 97 3 °F (36 3 °C) 91 18 130/82 99 %      Temp Source Heart Rate Source Patient Position - Orthostatic VS BP Location FiO2 (%)   Tympanic Monitor Sitting Left arm --      Pain Score       8           Past Medical History:   Diagnosis Date    ETOH abuse      Past Surgical History:   Procedure Laterality Date    GALLBLADDER SURGERY         The patient was admitted to the hospital at N/A on N/A for the following diagnosis:  Alcohol withdrawal ([facility] [address] ) [F10 239]  Alcohol intoxication ([facility] [address] ) [F10 929]  Anemia [D64 9]  Thrombocytopenia ([address] ) [D69 6]  Leg swelling [M79 89]  BRBPR (bright red blood per rectum) [K62 5]  Ruptured Bakers cyst [M66 0]    Consults have been placed to:   IP CONSULT TO GASTROENTEROLOGY  IP CONSULT TO ACUTE CARE SURGERY    Vitals:    09/22/22 1400 09/22/22 1600 09/22/22 1900 09/23/22 0500   BP: 119/73 132/82 134/75    BP Location:  Left arm Left arm    Pulse: 78 77 77    Resp:  18 18    Temp:  97 6 °F (36 4 °C) 98 2 °F (36 8 °C)    TempSrc:  Oral Oral    SpO2: 94% 94% 95%    Weight:    92 7 kg (204 lb 4 8 oz)   Height:    5' 11" (1 803 m)       Most recent labs:    Recent Labs     09/22/22  1236 09/23/22  0538   WBC 2 80* 2 79*   HGB 9 9* 10 3*   HCT 31 2* 30 8*   PLT 41* 38*   K 3 9 3 8   CALCIUM 8 0* 7 8*   BUN 13 11   CREATININE 0 82 0 92   INR 1 43*  --    AST 76* 77*   ALT 36 34   ALKPHOS 110 98       Scheduled Meds:  Current Facility-Administered Medications   Medication Dose Route Frequency Provider Last Rate    acetaminophen  650 mg Oral Q8H PRN Zulay Sever, CRNP      calcium carbonate  1,000 mg Oral Daily PRN Zulay Sever, CRNP      cefazolin  2,000 mg Intravenous Q8H Bolivar Emerson, CRNP 2,000 mg (09/23/22 1326)    chlordiazePOXIDE  50 mg Oral [address] [place]      folic acid  1 mg Oral Daily Zulay Sever, CRNP      hydrocortisone   Topical 4x Daily PRN Zulay Sever, CRNP      hydrocortisone  25 mg Rectal BID Zulay Sever, CRNP      HYDROmorphone  0 5 mg Intravenous Q4H PRN Bolivar Emerson, CRNP      lactulose  20 g Oral TID Bolivar Emerson, CRNP      LORazepam  0 5 mg Oral Q6H PRN Zulay Sever, CRNP      multi-electrolyte  50 mL/hr Intravenous Continuous Lucina Brown MD 50 mL/hr (09/22/22 2049)    multivitamin-minerals  1 tablet Oral Daily Zulay Sever, CRNP      nadolol  20 mg Oral Daily Leandra Sever, CRNP      nicotine  14 mg Transdermal Daily Codie Londono, TIMMY      ondansetron  4 mg Intravenous Q6H PRN Codie Londono, TIMMY      oxyCODONE-acetaminophen  1 tablet Oral Q8H PRN Codie Londono, TIMMY      pantoprazole  40 mg Oral Daily Wandy Pablo      polyethylene glycol  17 g Oral Daily PRN Codie Londono, TIMMY      thiamine  100 mg Oral Daily TIMMY Pablo       Continuous Infusions:multi-electrolyte, 50 mL/hr, Last Rate: 50 mL/hr (09/22/22 2049)      PRN Meds:   acetaminophen    calcium carbonate    hydrocortisone    HYDROmorphone    LORazepam    ondansetron    oxyCODONE-acetaminophen    polyethylene glycol    Surgical procedures (if appropriate):

## 2022-09-23 NOTE — PLAN OF CARE
Problem: MOBILITY - ADULT  Goal: Maintain or return to baseline ADL function  Description: INTERVENTIONS:  -  Assess patient's ability to carry out ADLs; assess patient's baseline for ADL function and identify physical deficits which impact ability to perform ADLs (bathing, care of mouth/teeth, toileting, grooming, dressing, etc )  - Assess/evaluate cause of self-care deficits   - Assess range of motion  - Assess patient's mobility; develop plan if impaired  - Assess patient's need for assistive devices and provide as appropriate  - Encourage maximum independence but intervene and supervise when necessary  - Involve family in performance of ADLs  - Assess for home care needs following discharge   - Consider OT consult to assist with ADL evaluation and planning for discharge  - Provide patient education as appropriate  Outcome: Progressing  Goal: Maintains/Returns to pre admission functional level  Description: INTERVENTIONS:  - Perform BMAT or MOVE assessment daily    - Set and communicate daily mobility goal to care team and patient/family/caregiver  - Collaborate with rehabilitation services on mobility goals if consulted  - Perform Range of Motion 3 times a day  - Reposition patient every 2 hours    - Dangle patient 3 times a day  - Stand patient 3 times a day  - Ambulate patient 3 times a day  - Out of bed to chair 3 times a day   - Out of bed for meals 3 times a day  - Out of bed for toileting  - Record patient progress and toleration of activity level   Outcome: Progressing     Problem: Potential for Falls  Goal: Patient will remain free of falls  Description: INTERVENTIONS:  - Educate patient/family on patient safety including physical limitations  - Instruct patient to call for assistance with activity   - Consult OT/PT to assist with strengthening/mobility   - Keep Call bell within reach  - Keep bed low and locked with side rails adjusted as appropriate  - Keep care items and personal belongings within reach  - Initiate and maintain comfort rounds  - Make Fall Risk Sign visible to staff  - Offer Toileting every 2 Hours, in advance of need  - Initiate/Maintain bed alarm  - Obtain necessary fall risk management equipment: bed alarm, yellow socks   - Apply yellow socks and bracelet for high fall risk patients  - Consider moving patient to room near nurses station  Outcome: Progressing     Problem: PAIN - ADULT  Goal: Verbalizes/displays adequate comfort level or baseline comfort level  Description: Interventions:  - Encourage patient to monitor pain and request assistance  - Assess pain using appropriate pain scale  - Administer analgesics based on type and severity of pain and evaluate response  - Implement non-pharmacological measures as appropriate and evaluate response  - Consider cultural and social influences on pain and pain management  - Notify physician/advanced practitioner if interventions unsuccessful or patient reports new pain  Outcome: Progressing     Problem: INFECTION - ADULT  Goal: Absence or prevention of progression during hospitalization  Description: INTERVENTIONS:  - Assess and monitor for signs and symptoms of infection  - Monitor lab/diagnostic results  - Monitor all insertion sites, i e  indwelling lines, tubes, and drains  - Monitor endotracheal if appropriate and nasal secretions for changes in amount and color  - Wyatt appropriate cooling/warming therapies per order  - Administer medications as ordered  - Instruct and encourage patient and family to use good hand hygiene technique  - Identify and instruct in appropriate isolation precautions for identified infection/condition  Outcome: Progressing  Goal: Absence of fever/infection during neutropenic period  Description: INTERVENTIONS:  - Monitor WBC    Outcome: Progressing     Problem: SAFETY ADULT  Goal: Maintain or return to baseline ADL function  Description: INTERVENTIONS:  -  Assess patient's ability to carry out ADLs; assess patient's baseline for ADL function and identify physical deficits which impact ability to perform ADLs (bathing, care of mouth/teeth, toileting, grooming, dressing, etc )  - Assess/evaluate cause of self-care deficits   - Assess range of motion  - Assess patient's mobility; develop plan if impaired  - Assess patient's need for assistive devices and provide as appropriate  - Encourage maximum independence but intervene and supervise when necessary  - Involve family in performance of ADLs  - Assess for home care needs following discharge   - Consider OT consult to assist with ADL evaluation and planning for discharge  - Provide patient education as appropriate  Outcome: Progressing  Goal: Maintains/Returns to pre admission functional level  Description: INTERVENTIONS:  - Perform BMAT or MOVE assessment daily    - Set and communicate daily mobility goal to care team and patient/family/caregiver  - Collaborate with rehabilitation services on mobility goals if consulted  - Perform Range of Motion 3 times a day  - Reposition patient every 2 hours    - Dangle patient 3 times a day  - Stand patient 3 times a day  - Ambulate patient 3 times a day  - Out of bed to chair 3 times a day   - Out of bed for meals 3 times a day  - Out of bed for toileting  - Record patient progress and toleration of activity level   Outcome: Progressing  Goal: Patient will remain free of falls  Description: INTERVENTIONS:  - Educate patient/family on patient safety including physical limitations  - Instruct patient to call for assistance with activity   - Consult OT/PT to assist with strengthening/mobility   - Keep Call bell within reach  - Keep bed low and locked with side rails adjusted as appropriate  - Keep care items and personal belongings within reach  - Initiate and maintain comfort rounds  - Make Fall Risk Sign visible to staff  - Offer Toileting every 2 Hours, in advance of need  - Initiate/Maintain bed alarm  - Obtain necessary fall risk management equipment: bed alarm, yellow socks   - Apply yellow socks and bracelet for high fall risk patients  - Consider moving patient to room near nurses station  Outcome: Progressing     Problem: DISCHARGE PLANNING  Goal: Discharge to home or other facility with appropriate resources  Description: INTERVENTIONS:  - Identify barriers to discharge w/patient and caregiver  - Arrange for needed discharge resources and transportation as appropriate  - Identify discharge learning needs (meds, wound care, etc )  - Arrange for interpretive services to assist at discharge as needed  - Refer to Case Management Department for coordinating discharge planning if the patient needs post-hospital services based on physician/advanced practitioner order or complex needs related to functional status, cognitive ability, or social support system  Outcome: Progressing     Problem: Knowledge Deficit  Goal: Patient/family/caregiver demonstrates understanding of disease process, treatment plan, medications, and discharge instructions  Description: Complete learning assessment and assess knowledge base    Interventions:  - Provide teaching at level of understanding  - Provide teaching via preferred learning methods  Outcome: Progressing

## 2022-09-24 LAB
ALBUMIN SERPL BCP-MCNC: 2.3 G/DL (ref 3.5–5)
ALP SERPL-CCNC: 108 U/L (ref 46–116)
ALT SERPL W P-5'-P-CCNC: 33 U/L (ref 12–78)
AMMONIA PLAS-SCNC: 60 UMOL/L (ref 11–35)
ANION GAP SERPL CALCULATED.3IONS-SCNC: 5 MMOL/L (ref 4–13)
AST SERPL W P-5'-P-CCNC: 64 U/L (ref 5–45)
BILIRUB SERPL-MCNC: 4.32 MG/DL (ref 0.2–1)
BUN SERPL-MCNC: 12 MG/DL (ref 5–25)
CALCIUM ALBUM COR SERPL-MCNC: 9.1 MG/DL (ref 8.3–10.1)
CALCIUM SERPL-MCNC: 7.7 MG/DL (ref 8.3–10.1)
CHLORIDE SERPL-SCNC: 104 MMOL/L (ref 96–108)
CO2 SERPL-SCNC: 28 MMOL/L (ref 21–32)
CREAT SERPL-MCNC: 0.89 MG/DL (ref 0.6–1.3)
ERYTHROCYTE [DISTWIDTH] IN BLOOD BY AUTOMATED COUNT: 14.8 % (ref 11.6–15.1)
GFR SERPL CREATININE-BSD FRML MDRD: 95 ML/MIN/1.73SQ M
GLUCOSE SERPL-MCNC: 138 MG/DL (ref 65–140)
HCT VFR BLD AUTO: 33.3 % (ref 36.5–49.3)
HGB BLD-MCNC: 11 G/DL (ref 12–17)
INR PPP: 1.6 (ref 0.84–1.19)
MAGNESIUM SERPL-MCNC: 1.9 MG/DL (ref 1.6–2.6)
MCH RBC QN AUTO: 33.8 PG (ref 26.8–34.3)
MCHC RBC AUTO-ENTMCNC: 33 G/DL (ref 31.4–37.4)
MCV RBC AUTO: 103 FL (ref 82–98)
PLATELET # BLD AUTO: 44 THOUSANDS/UL (ref 149–390)
PMV BLD AUTO: 10.5 FL (ref 8.9–12.7)
POTASSIUM SERPL-SCNC: 4.1 MMOL/L (ref 3.5–5.3)
PROT SERPL-MCNC: 6.8 G/DL (ref 6.4–8.4)
PROTHROMBIN TIME: 19.1 SECONDS (ref 11.6–14.5)
RBC # BLD AUTO: 3.25 MILLION/UL (ref 3.88–5.62)
SODIUM SERPL-SCNC: 137 MMOL/L (ref 135–147)
WBC # BLD AUTO: 3.94 THOUSAND/UL (ref 4.31–10.16)

## 2022-09-24 PROCEDURE — 97167 OT EVAL HIGH COMPLEX 60 MIN: CPT

## 2022-09-24 PROCEDURE — 85027 COMPLETE CBC AUTOMATED: CPT | Performed by: NURSE PRACTITIONER

## 2022-09-24 PROCEDURE — 80053 COMPREHEN METABOLIC PANEL: CPT | Performed by: NURSE PRACTITIONER

## 2022-09-24 PROCEDURE — 99232 SBSQ HOSP IP/OBS MODERATE 35: CPT | Performed by: PHYSICIAN ASSISTANT

## 2022-09-24 PROCEDURE — 85610 PROTHROMBIN TIME: CPT | Performed by: NURSE PRACTITIONER

## 2022-09-24 PROCEDURE — 82140 ASSAY OF AMMONIA: CPT | Performed by: NURSE PRACTITIONER

## 2022-09-24 PROCEDURE — 99232 SBSQ HOSP IP/OBS MODERATE 35: CPT | Performed by: NURSE PRACTITIONER

## 2022-09-24 PROCEDURE — 83735 ASSAY OF MAGNESIUM: CPT | Performed by: NURSE PRACTITIONER

## 2022-09-24 RX ORDER — CHLORDIAZEPOXIDE HYDROCHLORIDE 25 MG/1
25 CAPSULE, GELATIN COATED ORAL EVERY 8 HOURS SCHEDULED
Status: DISCONTINUED | OUTPATIENT
Start: 2022-09-24 | End: 2022-09-26

## 2022-09-24 RX ADMIN — LACTULOSE 20 G: 10 SOLUTION ORAL at 17:06

## 2022-09-24 RX ADMIN — MAGNESIUM OXIDE TAB 400 MG (241.3 MG ELEMENTAL MG) 400 MG: 400 (241.3 MG) TAB at 08:21

## 2022-09-24 RX ADMIN — CHLORDIAZEPOXIDE HYDROCHLORIDE 25 MG: 25 CAPSULE ORAL at 13:31

## 2022-09-24 RX ADMIN — CEFAZOLIN SODIUM 2000 MG: 2 SOLUTION INTRAVENOUS at 13:31

## 2022-09-24 RX ADMIN — HYDROMORPHONE HYDROCHLORIDE 0.5 MG: 1 INJECTION, SOLUTION INTRAMUSCULAR; INTRAVENOUS; SUBCUTANEOUS at 05:06

## 2022-09-24 RX ADMIN — CHLORDIAZEPOXIDE HYDROCHLORIDE 50 MG: 25 CAPSULE ORAL at 05:06

## 2022-09-24 RX ADMIN — HYDROMORPHONE HYDROCHLORIDE 0.5 MG: 1 INJECTION, SOLUTION INTRAMUSCULAR; INTRAVENOUS; SUBCUTANEOUS at 17:18

## 2022-09-24 RX ADMIN — NADOLOL 20 MG: 20 TABLET ORAL at 08:22

## 2022-09-24 RX ADMIN — CEFAZOLIN SODIUM 2000 MG: 2 SOLUTION INTRAVENOUS at 20:59

## 2022-09-24 RX ADMIN — LACTULOSE 20 G: 10 SOLUTION ORAL at 08:22

## 2022-09-24 RX ADMIN — CEFAZOLIN SODIUM 2000 MG: 2 SOLUTION INTRAVENOUS at 05:06

## 2022-09-24 RX ADMIN — LACTULOSE 20 G: 10 SOLUTION ORAL at 20:59

## 2022-09-24 RX ADMIN — CHLORDIAZEPOXIDE HYDROCHLORIDE 25 MG: 25 CAPSULE ORAL at 22:49

## 2022-09-24 RX ADMIN — HYDROMORPHONE HYDROCHLORIDE 0.5 MG: 1 INJECTION, SOLUTION INTRAMUSCULAR; INTRAVENOUS; SUBCUTANEOUS at 22:49

## 2022-09-24 RX ADMIN — MAGNESIUM OXIDE TAB 400 MG (241.3 MG ELEMENTAL MG) 400 MG: 400 (241.3 MG) TAB at 17:06

## 2022-09-24 RX ADMIN — PREDNISOLONE SODIUM PHOSPHATE 40 MG: 15 SOLUTION ORAL at 08:23

## 2022-09-24 RX ADMIN — Medication 1 TABLET: at 08:22

## 2022-09-24 RX ADMIN — THIAMINE HCL TAB 100 MG 100 MG: 100 TAB at 08:21

## 2022-09-24 RX ADMIN — PANTOPRAZOLE SODIUM 40 MG: 40 TABLET, DELAYED RELEASE ORAL at 08:21

## 2022-09-24 RX ADMIN — FOLIC ACID 1 MG: 1 TABLET ORAL at 08:21

## 2022-09-24 NOTE — ASSESSMENT & PLAN NOTE
Presents with tremors in his upper extremities  States he drinks a large glass of vodka twice a week    States his last drink was day prior to admission  · Currently on Librium 25 mg p o  Q 8 hours  · Monitor on CIWA  · Folic acid, thiamine, multivitamin  · Ongoing discussion about the importance of alcohol cessation

## 2022-09-24 NOTE — ASSESSMENT & PLAN NOTE
Alcoholic cirrhosis secondary to alcohol abuse  Recent CT scan showed findings consistent of increasing portal hypertension  Patient has history of thrombocytopenia and rectal bleeding  Recently underwent EGD and colonoscopy  INR 1 43, total bili 3 53, ammonia 62-> 70; increased lactulose to t i d    Ammonia decreased to 60  Titrate to 3 BMs daily  · Consult GI  · Continue PPI, nadolol, lactulose  · Avoid antiplatelets and DVT prophylaxis given thrombocytopenia  · Continue to discuss the importance of alcohol cessation  · Trend LFTs and T bili

## 2022-09-24 NOTE — CASE MANAGEMENT
Case Management Assessment & Discharge Planning Note    Patient name Aaron Patel  [facility] MRN [id number]  : 1965 Date 2022       Current Admission Date: 2022  Current Admission Diagnosis:Hematochezia   Patient Active Problem List    Diagnosis Date Noted    Hematochezia 2022    Hypomagnesemia 2022    Pain and swelling of right lower extremity 2022    Head trauma 2022    Bullae 2022    Hematemesis 2022    [facility]     Marijuana use 2022    Anterior cervical lymphadenopathy 2022    [facility]ancytopenia ([facility]     Alcoholic cirrhosis of liver ([facility] ) 2022    COVID-19 2022    Right hip pain 2021    Decompensated hepatic cirrhosis ([facility] ) 2021    Liver mass 2021    Closed fracture of nasal bone 2021    Open wound of tongue due to bite 2021    Electrolyte abnormality 2021    Tobacco abuse 2020    Alcohol withdrawal syndrome without complication ([facility]     Alcohol use disorder, severe, dependence ([facility] ) 2020    Transaminitis 2020    Hyperbilirubinemia 2020    Thrombocytopenia ([facility] ) 2020      LOS (days): 1  Geometric Mean LOS (GMLOS) (days):   Days to GMLOS:     OBJECTIVE:  PATIENT READMITTED TO HOSPITAL  Risk of Unplanned Readmission Score: 37 85      Current admission status: Inpatient     Preferred Pharmacy:   Thed[facility], [facility] ([address] [place] [address])  [address] [address] [place] [address] ([address] [place] [address])  [place] [address]  Phone: [phone] Fax: [phone]    Primary Care Provider: Marcelo Carranza MD    Primary Insurance: [address] [place], [place] [address]e [facility]  Secondary Insurance:     ASSESSMENT:  Active Health Care Proxies    There are no active Health Care Proxies on file      Readmission Root Cause  30 Day Readmission: Yes  Who directed you to return to the hospital?: Self  Did you understand whom to contact if you had questions or problems?: Yes  Did you get your prescriptions before you left the hospital?: No  Reason[de-identified] Not preferred pharmacy, Preference for own pharmacy  Were you able to get your prescriptions filled when you left the hospital?: Yes  Did you take your medications as prescribed?: Yes  Were you able to get to your follow-up appointments?: No  Reason[de-identified] Readmitted prior to appointment  During previous admission, was a post-acute recommendation made?:  (Patient left AMA)  Patient was readmitted due to: R LE Swelling, ETOH Intoxication, Rectal Bleeding  Action Plan: GI/Surgery consults, Medical Mgmt, Therapy evaluation, Encourage ETOH cessation    Patient Information  Admitted from[de-identified] Home  Mental Status: Alert  During Assessment patient was accompanied by: Not accompanied during assessment  Assessment information provided by[de-identified] Patient  Primary Caregiver: Self  Support Systems: Family members, Friend, [facility] of Residence: [address] [place] [address] do you live in?: [address] entry access options   Select all that apply : Stairs  Number of steps to enter home : 3  Type of Current Residence: 2 story home  In the last 12 months, was there a time when you were not able to pay the mortgage or rent on time?: No  In the last 12 months, how many places have you lived?: 1  In the last 12 months, was there a time when you did not have a steady place to sleep or slept in a shelter (including now)?: No  Homeless/housing insecurity resource given?: N/A  Living Arrangements: Lives w/ Extended Family (Currently staying with cousin)  Is patient a ?: No    Activities of Daily Living Prior to Admission  Functional Status: Independent  Completes ADLs independently?: Yes  Ambulates independently?: Yes  Does patient use assisted devices?: No  Does patient currently own DME?: Yes  What DME does the patient currently own?: Scarlett Angulo  Does patient have a history of Outpatient Therapy (PT/OT)?: No  Does the patient have a history of Short-Term Rehab?: Yes (Hx at [facility])  Does patient have a history of HHC?: No  Does patient currently have [address]?: No     Patient Information Continued  Income Source: Employed (Works at [employer] in [facility])  Does patient have prescription coverage?: Yes (Uses [facility] in [place]; Denies issues with OOP costs)  Within the past 12 months, you worried that your food would run out before you got the money to buy more : Never true  Within the past 12 months, the food you bought just didn't last and you didn't have money to get more : Never true  Food insecurity resource given?: N/A  Does patient receive dialysis treatments?: No  Does patient have a history of substance abuse?: Yes  Historical substance use preference: Alcohol/ETOH  Is patient currently in treatment for substance abuse?: No  Patient declined treatment information  Does patient have a history of Mental Health Diagnosis?: Yes (Bipolar D/O)  Has patient received inpatient treatment related to mental health in the last 2 years?: No     Means of Transportation  Means of Transport to Appts[de-identified] Friends (mostly rides Bicycle)  In the past 12 months, has lack of transportation kept you from medical appointments or from getting medications?: No  In the past 12 months, has lack of transportation kept you from meetings, work, or from getting things needed for daily living?: No  Was application for public transport provided?: N/A    DISCHARGE DETAILS:    Discharge planning discussed with[de-identified] Patient  Freedom of Choice: Yes  Comments - Freedom of Choice: SW discussed recommendation by therapy for a [employer] and [address]  Patient is currently not eligible for [address] services as he is not homebound  Patient informs SW that he already has a RW at home and does not need/want SW to obtain equipment for him    CM contacted family/caregiver?: Yes  Were Treatment Team discharge recommendations reviewed with patient/caregiver?: Yes  Did patient/caregiver verbalize understanding of patient care needs?: Yes  Were patient/caregiver advised of the risks associated with not following Treatment Team discharge recommendations?: Yes    Contacts  Patient Contacts: Mitzi Hollingsworth (cousin)  Relationship to Patient[de-identified] Family  Contact Method: Phone  Phone Number: [phone]  Reason/Outcome: Emergency [address]         Is the patient interested in [place] [address] at discharge?: No    DME Referral Provided  Referral made for DME?: No     Would you like to participate in our [facility] [address] service program?  : No - Declined    Treatment Team Recommendation: Home with [address] [place] [address] (Patient is ineligible for [place] [address] services as he is not homebound)  Discharge Destination Plan[de-identified] Home  Transport at Discharge : Family

## 2022-09-24 NOTE — PROGRESS NOTES
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  Mild gastric erythema, possible portal hypertensive gastropathy "  Colonoscopy 9/16/22: "Large, nonbleeding internal hemorrhoids  Likely small rectal varices "  At that time, GI recommended EGD in several months for re-evaluation and possible esophageal biopsy or banding  Repeat colonoscopy in 3 years due to an inadequate bowel bowel prep  Would avoid hemorrhoidal banding at this time due to likely presence of rectal varices   · Suspecting hemorrhoid bleeding   · Spoke with GI   · OK to put on low sodium diet and monitor tolerance   · Obtain bleeding scan for any signs of bleeding   · Continue PPI  · Start Anusol suppository BID and Hydrocortisone cream QID PRN   · Monitor HGB     Pain and swelling of right lower extremity  Assessment & Plan  Presents with complaints of right lower extremity swelling, bruising, and pain  Left lower extremity duplex 9/20/22: which revealed a  well-defined hypoechoic non vascularized cystic-type structure noted in the popliteal fossa and a hyperechoic structure noted from the proximal calf extending to the mid/distal calf  Right lower extremity 9/20/22:  Diffuse soft tissue swelling without evidence of hematoma  Fluid tracking along the gastrocnemius tendon without evidence of mild tendinitis tear  Bones intact    · Now with severe pain, swelling, and difficulty ambulating  · Repeat left lower extremity venous duplex to rule out DVT; follow-up on results  · Repeat CT lower extremity showed heterogenous collection within the medial head gastrocnemius muscle suspicious for intramuscular hematoma as per radiology report  · Consulted acute care surgery a follow-up on recommendations; recommend monitoring at this time  · Recommend leg elevation, heat and compression the Ace wrap for support  · Tylenol, Percocet, and Dilaudid as needed for pain  · Unable to use NSAIDs given liver disease   · 9/24 leg improving, less swelling noted, patient reports less pain and less tension      Hypomagnesemia  Assessment & Plan  Magnesium level 1 9  · Repeat Mag in a m  Alcoholic cirrhosis of liver (HCC)  Assessment & Plan  Alcoholic cirrhosis secondary to alcohol abuse  Recent CT scan showed findings consistent of increasing portal hypertension  Patient has history of thrombocytopenia and rectal bleeding  Recently underwent EGD and colonoscopy  INR 1 43, total bili 3 53, ammonia 62-> 70; increased lactulose to t i d  Ammonia decreased to 60  Titrate to 3 BMs daily  · Consult GI  · Continue PPI, nadolol, lactulose  · Avoid antiplatelets and DVT prophylaxis given thrombocytopenia  · Continue to discuss the importance of alcohol cessation  · Trend LFTs and T bili    Thrombocytopenia (HCC)  Assessment & Plan  Chronic secondary to alcohol cirrhosis  · Trend counts daily  · Will likely need platelet transfusion prior to any procedures/intervention    Alcohol withdrawal syndrome without complication [facility])  Assessment & Plan  Presents with tremors in his upper extremities  States he drinks a large glass of vodka twice a week    States his last drink was day prior to admission  · Currently on Librium 25 mg p o  Q 8 hours  · Monitor on CIWA  · Folic acid, thiamine, multivitamin  · Ongoing discussion about the importance of alcohol cessation    Tobacco abuse  Assessment & Plan  · Nicotine patch  · Smoking cessation education and counseling          VTE Pharmacologic Prophylaxis:   Pharmacologic: Pharmacologic VTE Prophylaxis contraindicated due to Low platelets  Mechanical VTE Prophylaxis in Place: Yes left leg only    Patient Centered Rounds: I have performed bedside rounds with nursing staff today  Discussions with Specialists or Other Care Team Provider: Yes  Education and Discussions with Family / Patient:Yes  Time Spent for Care: 30 minutes  More than 50% of total time spent on counseling and coordination of care as described above  Current Length of Stay: 1 day(s)  Current Patient Status: Inpatient     Discharge Plan:  Not stable for discharge today, most likely in the next 24-48 hours pending patient's progress  Continue to monitor serial exams of right lower extremity, repeat labs in a m  Code Status: Level 1 - Full Code      Subjective:   Patient reports that he is feeling better, feels that his leg is improving less swollen than previous day  Less painful and able to ambulate with walker to bathroom with nursing staff  Good appetite no nausea or vomiting  Objective:     Vitals:   Temp (24hrs), Av 2 °F (36 8 °C), Min:98 °F (36 7 °C), Max:98 6 °F (37 °C)    Temp:  [98 °F (36 7 °C)-98 6 °F (37 °C)] 98 °F (36 7 °C)  HR:  [68-76] 75  Resp:  [18-19] 19  BP: (107-132)/(59-87) 131/87  SpO2:  [97 %-98 %] 98 %  Body mass index is 28 29 kg/m²  Input and Output Summary (last 24 hours): Intake/Output Summary (Last 24 hours) at 2022 1619  Last data filed at 2022 0241  Gross per 24 hour   Intake --   Output 800 ml   Net -800 ml        Physical Exam:     Physical Exam  Vitals and nursing note reviewed  Constitutional:       General: He is not in acute distress  HENT:      Head: Normocephalic  Nose: Nose normal       Mouth/Throat:      Mouth: Mucous membranes are moist    Eyes:      General: Scleral icterus present  Extraocular Movements: Extraocular movements intact        Pupils: Pupils are equal, round, and reactive to light  Cardiovascular:      Rate and Rhythm: Normal rate and regular rhythm  Pulses: Normal pulses  Heart sounds: Normal heart sounds  Pulmonary:      Effort: Pulmonary effort is normal       Breath sounds: Normal breath sounds  Abdominal:      General: Bowel sounds are normal  There is no distension  Palpations: Abdomen is soft  Tenderness: There is no abdominal tenderness  Genitourinary:     Comments: Voiding spontaneously  Musculoskeletal:         General: Swelling and tenderness present  Cervical back: Normal range of motion  Comments: Right lower extremity less swollen than previous exam  + tremors of hands noted   Skin:     General: Skin is warm and dry  Capillary Refill: Capillary refill takes less than 2 seconds  Findings: Bruising present  Neurological:      General: No focal deficit present  Mental Status: He is alert and oriented to person, place, and time     Psychiatric:         Mood and Affect: Mood normal          Behavior: Behavior normal          Additional Data:     Labs:    Results from last 7 days   Lab Units 09/24/22  0500 09/23/22  0538 09/22/22  1236 09/20/22  1154   WBC Thousand/uL 3 94* 2 79* 2 80* 2 75*   HEMOGLOBIN g/dL 11 0* 10 3* 9 9* 10 8*   HEMATOCRIT % 33 3* 30 8* 31 2* 33 4*   PLATELETS Thousands/uL 44* 38* 41* 45*   NEUTROS PCT %  --  56 54 49     Results from last 7 days   Lab Units 09/24/22  0500 09/23/22  0538 09/22/22  1236   SODIUM mmol/L 137 138 144   POTASSIUM mmol/L 4 1 3 8 3 9   CHLORIDE mmol/L 104 103 109*   CO2 mmol/L 28 29 32   BUN mg/dL 12 11 13   CREATININE mg/dL 0 89 0 92 0 82   CALCIUM mg/dL 7 7* 7 8* 8 0*   TOTAL BILIRUBIN mg/dL 4 32* 5 57* 3 53*   ALK PHOS U/L 108 98 110   ALT U/L 33 34 36   AST U/L 64* 77* 76*     Results from last 7 days   Lab Units 09/24/22  0500 09/22/22  1236 09/20/22  1154   INR  1 60* 1 43* 1 35*         No results found for: HGBA1C      Results from last 7 days   Lab Units 09/22/22  1236   LACTIC ACID mmol/L 1 5       * I Have Reviewed All Lab Data Listed Above  * Additional Pertinent Lab Tests Reviewed: All Labs Within Last 24 Hours Reviewed    Imaging:     VAS lower limb venous duplex study, unilateral/limited   Final Result by Ambar Tavares MD (09/23 1840)      CT lower extremity w contrast right   Final Result by Maryam Canales MD (09/23 1202)   1  Heterogeneous collection within the medial head gastrocnemius muscle suspicious for intramuscular hematoma  Close clinical follow-up is recommended  Follow-up CT in 4-6 weeks is recommended to confirm resolution and exclude an underlying soft    tissue mass  2   Diffuse subcutaneous edema about the right leg, ankle and foot suspicious for cellulitis  The study was marked in EPIC for significant notification  Workstation performed: [id number]           Imaging Reports Reviewed by myself    Cultures:   Blood Culture:   Lab Results   Component Value Date    BLOODCX No Growth After 5 Days  12/05/2020    BLOODCX No Growth After 5 Days   12/05/2020     Urine Culture: No results found for: URINECX  Sputum Culture: No components found for: SPUTUMCX  Wound Culture: No results found for: WOUNDCULT    Last 24 Hours Medication List:   Current Facility-Administered Medications   Medication Dose Route Frequency Provider Last Rate    acetaminophen  650 mg Oral Q8H PRN Ruthell Freeport, CRNP      calcium carbonate  1,000 mg Oral Daily PRN Ruthell Freeport, CRNP      cefazolin  2,000 mg Intravenous Q8H JAVIER AlanizNP 2,000 mg (09/24/22 1331)    chlordiazePOXIDE  25 mg Oral [address] [facility] [address] [place]      folic acid  1 mg Oral Daily Ruthell Freeport, CRNP      hydrocortisone   Topical 4x Daily PRN Ruthell Freeport, CRNP      hydrocortisone  25 mg Rectal BID Ruthell Freeport, CRNP      HYDROmorphone  0 5 mg Intravenous Q4H PRN [address] [facility]  Bijal Gavlan, TIMMY      lactulose  20 g Oral TID TIMMY Triana      LORazepam  0 5 mg Oral Q6H PRN Nguyễn Mo, CRNP      magnesium oxide  400 mg Oral BID Wang Cerda, [address]      multi-electrolyte  50 mL/hr Intravenous Continuous Mariposa Torre MD 50 mL/hr (09/23/22 2300)    multivitamin-minerals  1 tablet Oral Daily Nguyễn Mo, CRNP      nadolol  20 mg Oral Daily Nguyễn Mo, [address]      nicotine  14 mg Transdermal Daily Nguyễn Mo, CRNP      ondansetron  4 mg Intravenous Q6H PRN Nguyễn Mo, CRNP      oxyCODONE-acetaminophen  1 tablet Oral Q8H PRN Nguyễn Mo, CRNP      pantoprazole  40 mg Oral Daily Nguyễn Mo, CRNP      polyethylene glycol  17 g Oral Daily PRN Nguyễn Mo, CRNP      prednisoLONE  40 mg Oral Daily Emily Umanzor, CRNP      thiamine  100 mg Oral Daily Nguyễn Mo, CRNP          Today, Patient Was Seen By: TIMMY Triana    ** Please Note: Dragon 360 Dictation voice to text software may have been used in the creation of this document   **

## 2022-09-24 NOTE — PLAN OF CARE
Problem: MOBILITY - ADULT  Goal: Maintain or return to baseline ADL function  Description: INTERVENTIONS:  -  Assess patient's ability to carry out ADLs; assess patient's baseline for ADL function and identify physical deficits which impact ability to perform ADLs (bathing, care of mouth/teeth, toileting, grooming, dressing, etc )  - Assess/evaluate cause of self-care deficits   - Assess range of motion  - Assess patient's mobility; develop plan if impaired  - Assess patient's need for assistive devices and provide as appropriate  - Encourage maximum independence but intervene and supervise when necessary  - Involve family in performance of ADLs  - Assess for home care needs following discharge   - Consider OT consult to assist with ADL evaluation and planning for discharge  - Provide patient education as appropriate  Outcome: Progressing  Goal: Maintains/Returns to pre admission functional level  Description: INTERVENTIONS:  - Perform BMAT or MOVE assessment daily    - Set and communicate daily mobility goal to care team and patient/family/caregiver  - Collaborate with rehabilitation services on mobility goals if consulted  - Perform Range of Motion 3 times a day  - Reposition patient every 2 hours    - Dangle patient 2 times a day  - Stand patient 2 times a day  - Ambulate patient 3 times a day  - Out of bed to chair 3 times a day   - Out of bed for meals 3 times a day  - Out of bed for toileting  - Record patient progress and toleration of activity level   Outcome: Progressing     Problem: Potential for Falls  Goal: Patient will remain free of falls  Description: INTERVENTIONS:  - Educate patient/family on patient safety including physical limitations  - Instruct patient to call for assistance with activity   - Consult OT/PT to assist with strengthening/mobility   - Keep Call bell within reach  - Keep bed low and locked with side rails adjusted as appropriate  - Keep care items and personal belongings within reach  - Initiate and maintain comfort rounds  - Make Fall Risk Sign visible to staff  - Offer Toileting every 2 Hours, in advance of need  - Initiate/Maintain  bed alarm  - Obtain necessary fall risk management equipment: bed alarm, yellow socks   - Apply yellow socks and bracelet for high fall risk patients  - Consider moving patient to room near nurses station  Outcome: Progressing     Problem: PAIN - ADULT  Goal: Verbalizes/displays adequate comfort level or baseline comfort level  Description: Interventions:  - Encourage patient to monitor pain and request assistance  - Assess pain using appropriate pain scale  - Administer analgesics based on type and severity of pain and evaluate response  - Implement non-pharmacological measures as appropriate and evaluate response  - Consider cultural and social influences on pain and pain management  - Notify physician/advanced practitioner if interventions unsuccessful or patient reports new pain  Outcome: Progressing     Problem: INFECTION - ADULT  Goal: Absence or prevention of progression during hospitalization  Description: INTERVENTIONS:  - Assess and monitor for signs and symptoms of infection  - Monitor lab/diagnostic results  - Monitor all insertion sites, i e  indwelling lines, tubes, and drains  - Monitor endotracheal if appropriate and nasal secretions for changes in amount and color  - [place] appropriate cooling/warming therapies per order  - Administer medications as ordered  - Instruct and encourage patient and family to use good hand hygiene technique  - Identify and instruct in appropriate isolation precautions for identified infection/condition  Outcome: Progressing  Goal: Absence of fever/infection during neutropenic period  Description: INTERVENTIONS:  - Monitor WBC    Outcome: Progressing     Problem: SAFETY ADULT  Goal: Maintain or return to baseline ADL function  Description: INTERVENTIONS:  -  Assess patient's ability to carry out ADLs; assess patient's baseline for ADL function and identify physical deficits which impact ability to perform ADLs (bathing, care of mouth/teeth, toileting, grooming, dressing, etc )  - Assess/evaluate cause of self-care deficits   - Assess range of motion  - Assess patient's mobility; develop plan if impaired  - Assess patient's need for assistive devices and provide as appropriate  - Encourage maximum independence but intervene and supervise when necessary  - Involve family in performance of ADLs  - Assess for home care needs following discharge   - Consider OT consult to assist with ADL evaluation and planning for discharge  - Provide patient education as appropriate  Outcome: Progressing  Goal: Maintains/Returns to pre admission functional level  Description: INTERVENTIONS:  - Perform BMAT or MOVE assessment daily    - Set and communicate daily mobility goal to care team and patient/family/caregiver  - Collaborate with rehabilitation services on mobility goals if consulted  - Perform Range of Motion 3 times a day  - Reposition patient every 2 hours    - Dangle patient 3 times a day  - Stand patient 3 times a day  - Ambulate patient 3 times a day  - Out of bed to chair 3 times a day   - Out of bed for meals 3 times a day  - Out of bed for toileting  - Record patient progress and toleration of activity level   Outcome: Progressing  Goal: Patient will remain free of falls  Description: INTERVENTIONS:  - Educate patient/family on patient safety including physical limitations  - Instruct patient to call for assistance with activity   - Consult OT/PT to assist with strengthening/mobility   - Keep Call bell within reach  - Keep bed low and locked with side rails adjusted as appropriate  - Keep care items and personal belongings within reach  - Initiate and maintain comfort rounds  - Make Fall Risk Sign visible to staff  - Offer Toileting every 2 Hours, in advance of need  - Initiate/Maintain bed alarm  - Obtain necessary fall risk management equipment: bed alarm, yellow socks   - Apply yellow socks and bracelet for high fall risk patients  - Consider moving patient to room near nurses station  Outcome: Progressing     Problem: DISCHARGE PLANNING  Goal: Discharge to home or other facility with appropriate resources  Description: INTERVENTIONS:  - Identify barriers to discharge w/patient and caregiver  - Arrange for needed discharge resources and transportation as appropriate  - Identify discharge learning needs (meds, wound care, etc )  - Arrange for interpretive services to assist at discharge as needed  - Refer to Case Management Department for coordinating discharge planning if the patient needs post-hospital services based on physician/advanced practitioner order or complex needs related to functional status, cognitive ability, or social support system  Outcome: Progressing     Problem: Knowledge Deficit  Goal: Patient/family/caregiver demonstrates understanding of disease process, treatment plan, medications, and discharge instructions  Description: Complete learning assessment and assess knowledge base    Interventions:  - Provide teaching at level of understanding  - Provide teaching via preferred learning methods  Outcome: Progressing

## 2022-09-24 NOTE — ASSESSMENT & PLAN NOTE
Presents with complaints of right lower extremity swelling, bruising, and pain  Left lower extremity duplex 9/20/22: which revealed a  well-defined hypoechoic non vascularized cystic-type structure noted in the popliteal fossa and a hyperechoic structure noted from the proximal calf extending to the mid/distal calf  Right lower extremity 9/20/22:  Diffuse soft tissue swelling without evidence of hematoma  Fluid tracking along the gastrocnemius tendon without evidence of mild tendinitis tear  Bones intact    · Now with severe pain, swelling, and difficulty ambulating  · Repeat left lower extremity venous duplex to rule out DVT; follow-up on results  · Repeat CT lower extremity showed heterogenous collection within the medial head gastrocnemius muscle suspicious for intramuscular hematoma as per radiology report  · Consulted acute care surgery a follow-up on recommendations; recommend monitoring at this time  · Recommend leg elevation, heat and compression the Ace wrap for support  · Tylenol, Percocet, and Dilaudid as needed for pain  · Unable to use NSAIDs given liver disease   · 9/24 leg improving, less swelling noted, patient reports less pain and less tension

## 2022-09-24 NOTE — OCCUPATIONAL THERAPY NOTE
OT EVALUATION       09/24/22 1055   Note Type   Note type Evaluation   Restrictions/Precautions   Other Precautions Fall Risk;Pain   Pain Assessment   Pain Assessment Tool 0-10   Pain Score 7   Pain Location/Orientation Location: Leg;Orientation: Right;Location: Abdomen   Home Living   Type of Home House   Home Layout Two level  (3 MAGUI)   Bathroom Shower/Tub Walk-in shower   [address] [facility]   Prior Function   Level of [place] Independent with ADLs and functional mobility   Lives With Family  (cousin)   Receives Help From Family   ADL Assistance Independent   IADLs Independent   Falls in the last 6 months 1 to 4   Comments Patient admitted with RLE pain  ADL   Eating Assistance 7  Independent   Grooming Assistance 7  Independent   UB Bathing Assistance 7  Independent   LB Bathing Assistance 4  Minimal Assistance   UB Dressing Assistance 7  Independent   LB Dressing Assistance 4  Minimal [address] Assistance  5  Supervision/Setup   Additional Comments pt received standing in room folding clothing  Patient reports less pain in R leg today and able to bear weight  Transfers   Sit to Stand 5  Supervision   Stand to Sit 5  Supervision   Functional Mobility   Functional Mobility 4  Minimal assistance  (min assist/supervision)   Additional Comments functional household distance to and from bathroom with RW   Balance   Static Standing Fair   Dynamic Standing Fair -   Activity Tolerance   Activity Tolerance Patient limited by fatigue;Patient limited by pain   Nurse Made Aware yes, Tisha   RUE Assessment   RUE Assessment WFL   LUE Assessment   LUE Assessment WFL   Cognition   Overall Cognitive Status WFL   Arousal/Participation Cooperative   Attention Within functional limits   Orientation Level Oriented X4   Following Commands Follows multistep commands with increased time or repetition   Assessment   Limitation Decreased ADL status; Decreased UE strength;Decreased Safe judgement during ADL;Decreased endurance;Decreased self-care trans;Decreased high-level ADLs  (decreased balance and mobility)   Prognosis Good   Assessment Patient evaluated by Occupational Therapy  Patient admitted with Hematochezia  The patients occupational profile, medical and therapy history includes a extensive additional review of physical, cognitive, or psychosocial history related to current functional performance  Comorbidities affecting functional mobility and ADLS include: ETOH abuse  Prior to admission, patient was independent with functional mobility without assistive device, independent with ADLS and independent with IADLS  The evaluation identifies the following performance deficits: weakness, impaired balance, decreased endurance, increased fall risk, new onset of impairment of functional mobility, decreased ADLS, decreased IADLS, pain, decreased activity tolerance, decreased safety awareness, impaired judgement and decreased strength, that result in activity limitations and/or participation restrictions  This evaluation requires clinical decision making of high complexity, because the patient presents with comorbidites that affect occupational performance and required significant modification of tasks or assistance with consideration of multiple treatment options  The Barthel Index was used as a functional outcome tool presenting with a score of Barthel Index Score: 60, indicating marked limitations of functional mobility and ADLS  The patient's raw score on the -PAC Daily Activity inpatient short form is 21, standardized score is 44 27, greater than 39 4  Patients at this level are likely to benefit from DC to home  Please refer to the recommendation of the Occupational Therapist for safe DC planning  Patient will benefit from skilled Occupational Therapy services to address above deficits and facilitate a safe return to prior level of function     Goals   Patient Goals to decrease pain and go home   STG Time Frame (1-7 days)   Short Term Goal  Goals established to promote Patient Goals: to decrease pain and go home:  Patient will increase standing tolerance to 3 minutes during ADL task to decrease assistance level and decrease fall risk; Patient will increase functional mobility to and from bathroom with rolling walker independently to increase performance with ADLS and to use a toilet; Patient will tolerate 10 minutes of UE ROM/strengthening to increase general activity tolerance and performance in ADLS/IADLS; Patient will improve functional activity tolerance to 10 minutes of sustained functional tasks to increase participation in basic self-care and decrease assistance level;   Patient will increase dynamic standing balance to fair to improve postural stability and decrease fall risk during standing ADLS and transfers  LTG Time Frame   (8-14 days)   Long Term Goal Patient will increase standing tolerance to 6 minutes during ADL task to decrease assistance level and decrease fall risk; Patient will increase functional mobility to and from bathroom independently to increase performance with ADLS and to use a toilet; Patient will tolerate 20 minutes of UE ROM/strengthening to increase general activity tolerance and performance in ADLS/IADLS; Patient will improve functional activity tolerance to 20 minutes of sustained functional tasks to increase participation in basic self-care and decrease assistance level;   Patient will increase dynamic standing balance to fair+ to improve postural stability and decrease fall risk during standing ADLS and transfers  Pt will score >/= 24/24 on AM-PAC Daily Activity Inpatient scale to promote safe independence with ADLs and functional mobility; Pt will score >/= 90/100 on Barthel Index in order to decrease caregiver assistance needed and increase ability to perform ADLs and functional mobility     Functional Transfer Goals   Pt Will Perform All Functional Transfers   (STG independent) ADL Goals   Pt Will Perform Grooming Standing at sink  (STG independent)   Pt Will Perform Bathing   (STG supervision LTG Independent)   Pt Will Perform LE Dressing   (STG supervision LTG Independent)   Pt Will Perform Toileting   (STG independent)   Plan   Treatment Interventions ADL retraining;Functional transfer training;UE strengthening/ROM; Endurance training;Patient/family training;Equipment evaluation/education; Activityengagement; Compensatory technique education   Goal Expiration Date 10/08/22   OT Frequency 3-5x/wk   Recommendation   OT Discharge Recommendation Home with home health rehabilitation   AM-PAC Daily Activity Inpatient   Lower Body Dressing 3   Bathing 3   Toileting 3   Upper Body Dressing 4   Grooming 4   Eating 4   Daily Activity Raw Score 21   Daily Activity Standardized Score (Calc for Raw Score >=11) 44 27   AM-PAC Applied Cognition Inpatient   Following a Speech/Presentation 4   Understanding Ordinary Conversation 4   Taking Medications 4   Remembering Where Things Are Placed or Put Away 4   Remembering List of 4-5 Errands 4   Taking Care of Complicated Tasks 4   Applied Cognition Raw Score 24   Applied Cognition Standardized Score 62 21   Barthel Index   Feeding 10   Bathing 0   Grooming Score 5   Dressing Score 5   Bladder Score 10   Bowels Score 10   Toilet Use Score 5   Transfers (Bed/Chair) Score 10   Mobility (Level Surface) Score 0   Stairs Score 5   Barthel Index Score 60   Licensure   [place] License Number  Fran Silverio Gadiel Renny [id number] OTR/L [id number]

## 2022-09-25 LAB
ALBUMIN SERPL BCP-MCNC: 2.1 G/DL (ref 3.5–5)
ALP SERPL-CCNC: 106 U/L (ref 46–116)
ALT SERPL W P-5'-P-CCNC: 29 U/L (ref 12–78)
AMMONIA PLAS-SCNC: 53 UMOL/L (ref 11–35)
ANION GAP SERPL CALCULATED.3IONS-SCNC: 7 MMOL/L (ref 4–13)
AST SERPL W P-5'-P-CCNC: 55 U/L (ref 5–45)
BILIRUB SERPL-MCNC: 3.62 MG/DL (ref 0.2–1)
BUN SERPL-MCNC: 16 MG/DL (ref 5–25)
CALCIUM ALBUM COR SERPL-MCNC: 9.3 MG/DL (ref 8.3–10.1)
CALCIUM SERPL-MCNC: 7.8 MG/DL (ref 8.3–10.1)
CHLORIDE SERPL-SCNC: 106 MMOL/L (ref 96–108)
CO2 SERPL-SCNC: 27 MMOL/L (ref 21–32)
CREAT SERPL-MCNC: 1.11 MG/DL (ref 0.6–1.3)
ERYTHROCYTE [DISTWIDTH] IN BLOOD BY AUTOMATED COUNT: 15.3 % (ref 11.6–15.1)
GFR SERPL CREATININE-BSD FRML MDRD: 73 ML/MIN/1.73SQ M
GLUCOSE SERPL-MCNC: 132 MG/DL (ref 65–140)
HCT VFR BLD AUTO: 32.6 % (ref 36.5–49.3)
HGB BLD-MCNC: 10.6 G/DL (ref 12–17)
MAGNESIUM SERPL-MCNC: 1.8 MG/DL (ref 1.6–2.6)
MCH RBC QN AUTO: 34 PG (ref 26.8–34.3)
MCHC RBC AUTO-ENTMCNC: 32.5 G/DL (ref 31.4–37.4)
MCV RBC AUTO: 105 FL (ref 82–98)
PLATELET # BLD AUTO: 48 THOUSANDS/UL (ref 149–390)
PMV BLD AUTO: 10.8 FL (ref 8.9–12.7)
POTASSIUM SERPL-SCNC: 3.9 MMOL/L (ref 3.5–5.3)
PROT SERPL-MCNC: 6.1 G/DL (ref 6.4–8.4)
RBC # BLD AUTO: 3.12 MILLION/UL (ref 3.88–5.62)
SODIUM SERPL-SCNC: 140 MMOL/L (ref 135–147)
WBC # BLD AUTO: 5.44 THOUSAND/UL (ref 4.31–10.16)

## 2022-09-25 PROCEDURE — 80053 COMPREHEN METABOLIC PANEL: CPT | Performed by: NURSE PRACTITIONER

## 2022-09-25 PROCEDURE — 83735 ASSAY OF MAGNESIUM: CPT | Performed by: NURSE PRACTITIONER

## 2022-09-25 PROCEDURE — 82140 ASSAY OF AMMONIA: CPT | Performed by: NURSE PRACTITIONER

## 2022-09-25 PROCEDURE — 99232 SBSQ HOSP IP/OBS MODERATE 35: CPT | Performed by: NURSE PRACTITIONER

## 2022-09-25 PROCEDURE — 85027 COMPLETE CBC AUTOMATED: CPT | Performed by: NURSE PRACTITIONER

## 2022-09-25 RX ADMIN — CHLORDIAZEPOXIDE HYDROCHLORIDE 25 MG: 25 CAPSULE ORAL at 13:38

## 2022-09-25 RX ADMIN — SODIUM CHLORIDE, SODIUM GLUCONATE, SODIUM ACETATE, POTASSIUM CHLORIDE, MAGNESIUM CHLORIDE, SODIUM PHOSPHATE, DIBASIC, AND POTASSIUM PHOSPHATE 50 ML/HR: .53; .5; .37; .037; .03; .012; .00082 INJECTION, SOLUTION INTRAVENOUS at 05:25

## 2022-09-25 RX ADMIN — PANTOPRAZOLE SODIUM 40 MG: 40 TABLET, DELAYED RELEASE ORAL at 09:11

## 2022-09-25 RX ADMIN — OXYCODONE HYDROCHLORIDE AND ACETAMINOPHEN 1 TABLET: 5; 325 TABLET ORAL at 13:46

## 2022-09-25 RX ADMIN — THIAMINE HCL TAB 100 MG 100 MG: 100 TAB at 09:11

## 2022-09-25 RX ADMIN — PREDNISOLONE SODIUM PHOSPHATE 40 MG: 15 SOLUTION ORAL at 09:18

## 2022-09-25 RX ADMIN — NADOLOL 20 MG: 20 TABLET ORAL at 09:18

## 2022-09-25 RX ADMIN — HYDROMORPHONE HYDROCHLORIDE 0.5 MG: 1 INJECTION, SOLUTION INTRAMUSCULAR; INTRAVENOUS; SUBCUTANEOUS at 22:07

## 2022-09-25 RX ADMIN — MAGNESIUM OXIDE TAB 400 MG (241.3 MG ELEMENTAL MG) 400 MG: 400 (241.3 MG) TAB at 09:11

## 2022-09-25 RX ADMIN — Medication 1 TABLET: at 09:11

## 2022-09-25 RX ADMIN — SODIUM CHLORIDE, SODIUM GLUCONATE, SODIUM ACETATE, POTASSIUM CHLORIDE, MAGNESIUM CHLORIDE, SODIUM PHOSPHATE, DIBASIC, AND POTASSIUM PHOSPHATE 50 ML/HR: .53; .5; .37; .037; .03; .012; .00082 INJECTION, SOLUTION INTRAVENOUS at 15:32

## 2022-09-25 RX ADMIN — CHLORDIAZEPOXIDE HYDROCHLORIDE 25 MG: 25 CAPSULE ORAL at 05:22

## 2022-09-25 RX ADMIN — LACTULOSE 20 G: 10 SOLUTION ORAL at 09:12

## 2022-09-25 RX ADMIN — CHLORDIAZEPOXIDE HYDROCHLORIDE 25 MG: 25 CAPSULE ORAL at 22:00

## 2022-09-25 RX ADMIN — LACTULOSE 20 G: 10 SOLUTION ORAL at 15:18

## 2022-09-25 RX ADMIN — MAGNESIUM OXIDE TAB 400 MG (241.3 MG ELEMENTAL MG) 400 MG: 400 (241.3 MG) TAB at 17:22

## 2022-09-25 RX ADMIN — CEFAZOLIN SODIUM 2000 MG: 2 SOLUTION INTRAVENOUS at 21:59

## 2022-09-25 RX ADMIN — CEFAZOLIN SODIUM 2000 MG: 2 SOLUTION INTRAVENOUS at 13:38

## 2022-09-25 RX ADMIN — FOLIC ACID 1 MG: 1 TABLET ORAL at 09:11

## 2022-09-25 RX ADMIN — CEFAZOLIN SODIUM 2000 MG: 2 SOLUTION INTRAVENOUS at 05:22

## 2022-09-25 RX ADMIN — LACTULOSE 20 G: 10 SOLUTION ORAL at 22:00

## 2022-09-25 NOTE — ASSESSMENT & PLAN NOTE
Alcoholic cirrhosis secondary to alcohol abuse  Recent CT scan showed findings consistent of increasing portal hypertension  Patient has history of thrombocytopenia and rectal bleeding  Recently underwent EGD and colonoscopy  INR 1 43, total bili 3 53, ammonia 62-> 70; increased lactulose to t i d    Ammonia decreased to 60; down to 53  Titrate to 3 BMs daily  · Consult GI  · Continue PPI, nadolol, lactulose  · Avoid antiplatelets and DVT prophylaxis given thrombocytopenia  · Continue to discuss the importance of alcohol cessation  · Trend LFTs and T bili

## 2022-09-25 NOTE — PLAN OF CARE
Problem: MOBILITY - ADULT  Goal: Maintain or return to baseline ADL function  Description: INTERVENTIONS:  -  Assess patient's ability to carry out ADLs; assess patient's baseline for ADL function and identify physical deficits which impact ability to perform ADLs (bathing, care of mouth/teeth, toileting, grooming, dressing, etc )  - Assess/evaluate cause of self-care deficits   - Assess range of motion  - Assess patient's mobility; develop plan if impaired  - Assess patient's need for assistive devices and provide as appropriate  - Encourage maximum independence but intervene and supervise when necessary  - Involve family in performance of ADLs  - Assess for home care needs following discharge   - Consider OT consult to assist with ADL evaluation and planning for discharge  - Provide patient education as appropriate  Outcome: Progressing  Goal: Maintains/Returns to pre admission functional level  Description: INTERVENTIONS:  - Perform BMAT or MOVE assessment daily    - Set and communicate daily mobility goal to care team and patient/family/caregiver  - Collaborate with rehabilitation services on mobility goals if consulted  - Perform Range of Motion 3 times a day  - Reposition patient every 2 hours    - Dangle patient 3 times a day  - Stand patient 3 times a day  - Ambulate patient 3 times a day  - Out of bed to chair 3 times a day   - Out of bed for meals 3 times a day  - Out of bed for toileting  - Record patient progress and toleration of activity level   Outcome: Progressing     Problem: Potential for Falls  Goal: Patient will remain free of falls  Description: INTERVENTIONS:  - Educate patient/family on patient safety including physical limitations  - Instruct patient to call for assistance with activity   - Consult OT/PT to assist with strengthening/mobility   - Keep Call bell within reach  - Keep bed low and locked with side rails adjusted as appropriate  - Keep care items and personal belongings within reach  - Initiate and maintain comfort rounds  - Make Fall Risk Sign visible to staff  - Offer Toileting every 2 Hours, in advance of need  - Initiate/Maintain bed alarm  - Obtain necessary fall risk management equipment:   - Apply yellow socks and bracelet for high fall risk patients  - Consider moving patient to room near nurses station  Outcome: Progressing     Problem: PAIN - ADULT  Goal: Verbalizes/displays adequate comfort level or baseline comfort level  Description: Interventions:  - Encourage patient to monitor pain and request assistance  - Assess pain using appropriate pain scale  - Administer analgesics based on type and severity of pain and evaluate response  - Implement non-pharmacological measures as appropriate and evaluate response  - Consider cultural and social influences on pain and pain management  - Notify physician/advanced practitioner if interventions unsuccessful or patient reports new pain  Outcome: Progressing     Problem: INFECTION - ADULT  Goal: Absence or prevention of progression during hospitalization  Description: INTERVENTIONS:  - Assess and monitor for signs and symptoms of infection  - Monitor lab/diagnostic results  - Monitor all insertion sites, i e  indwelling lines, tubes, and drains  - Monitor endotracheal if appropriate and nasal secretions for changes in amount and color  - [place] appropriate cooling/warming therapies per order  - Administer medications as ordered  - Instruct and encourage patient and family to use good hand hygiene technique  - Identify and instruct in appropriate isolation precautions for identified infection/condition  Outcome: Progressing  Goal: Absence of fever/infection during neutropenic period  Description: INTERVENTIONS:  - Monitor WBC    Outcome: Progressing     Problem: SAFETY ADULT  Goal: Maintain or return to baseline ADL function  Description: INTERVENTIONS:  -  Assess patient's ability to carry out ADLs; assess patient's baseline for ADL function and identify physical deficits which impact ability to perform ADLs (bathing, care of mouth/teeth, toileting, grooming, dressing, etc )  - Assess/evaluate cause of self-care deficits   - Assess range of motion  - Assess patient's mobility; develop plan if impaired  - Assess patient's need for assistive devices and provide as appropriate  - Encourage maximum independence but intervene and supervise when necessary  - Involve family in performance of ADLs  - Assess for home care needs following discharge   - Consider OT consult to assist with ADL evaluation and planning for discharge  - Provide patient education as appropriate  Outcome: Progressing  Goal: Maintains/Returns to pre admission functional level  Description: INTERVENTIONS:  - Perform BMAT or MOVE assessment daily    - Set and communicate daily mobility goal to care team and patient/family/caregiver  - Collaborate with rehabilitation services on mobility goals if consulted  - Perform Range of Motion 3 times a day  - Reposition patient every 2 hours    - Dangle patient 3 times a day  - Stand patient 3 times a day  - Ambulate patient 3 times a day  - Out of bed to chair 3 times a day   - Out of bed for meals 3 times a day  - Out of bed for toileting  - Record patient progress and toleration of activity level   Outcome: Progressing     Problem: DISCHARGE PLANNING  Goal: Discharge to home or other facility with appropriate resources  Description: INTERVENTIONS:  - Identify barriers to discharge w/patient and caregiver  - Arrange for needed discharge resources and transportation as appropriate  - Identify discharge learning needs (meds, wound care, etc )  - Arrange for interpretive services to assist at discharge as needed  - Refer to Case Management Department for coordinating discharge planning if the patient needs post-hospital services based on physician/advanced practitioner order or complex needs related to functional status, cognitive ability, or social support system  Outcome: Progressing     Problem: Knowledge Deficit  Goal: Patient/family/caregiver demonstrates understanding of disease process, treatment plan, medications, and discharge instructions  Description: Complete learning assessment and assess knowledge base    Interventions:  - Provide teaching at level of understanding  - Provide teaching via preferred learning methods  Outcome: Progressing

## 2022-09-25 NOTE — ASSESSMENT & PLAN NOTE
Presents with tremors in his upper extremities  States he drinks a large glass of vodka twice a week  States his last drink was day prior to admission  · Currently on Librium 25 mg p o  Q 8 hours  · Monitor on CIWA  · Folic acid, thiamine, multivitamin  · Ongoing discussion about the importance of alcohol cessation  · Would reach out to crisis in a m   To provide patient with list of resources, patient reported he does not like to use AA "because all they do is talk about alcohol and then I want to drink

## 2022-09-25 NOTE — ASSESSMENT & PLAN NOTE
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  Mild gastric erythema, possible portal hypertensive gastropathy "  Colonoscopy 9/16/22: "Large, nonbleeding internal hemorrhoids  Likely small rectal varices "  At that time, GI recommended EGD in several months for re-evaluation and possible esophageal biopsy or banding  Repeat colonoscopy in 3 years due to an inadequate bowel bowel prep   Would avoid hemorrhoidal banding at this time due to likely presence of rectal varices   · Suspecting hemorrhoid bleeding   · Spoke with GI   · Monitoring low-sodium diet  · Obtain bleeding scan for any signs of bleeding; no signs of bleeding  · Continue PPI  · Start Anusol suppository BID and Hydrocortisone cream QID PRN   · Monitor HGB

## 2022-09-25 NOTE — ASSESSMENT & PLAN NOTE
Presents with complaints of right lower extremity swelling, bruising, and pain  Left lower extremity duplex 9/20/22: which revealed a  well-defined hypoechoic non vascularized cystic-type structure noted in the popliteal fossa and a hyperechoic structure noted from the proximal calf extending to the mid/distal calf  Right lower extremity 9/20/22:  Diffuse soft tissue swelling without evidence of hematoma  Fluid tracking along the gastrocnemius tendon without evidence of mild tendinitis tear  Bones intact    · Now with severe pain, swelling, and difficulty ambulating  · Repeat left lower extremity venous duplex to rule out DVT; follow-up on results  · Repeat CT lower extremity showed heterogenous collection within the medial head gastrocnemius muscle suspicious for intramuscular hematoma as per radiology report  · Consulted acute care surgery recommend monitoring at this time, no surgical intervention  · Recommend leg elevation, heat and compression the Ace wrap for support  · Tylenol, Percocet, and Dilaudid as needed for pain  · Unable to use NSAIDs given liver disease   · 9/24 leg improving, less swelling noted, patient reports less pain and less tension  · Continue to encourage elevation as when patient has leg down it does have a tendency to swell

## 2022-09-25 NOTE — PROGRESS NOTES
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  Mild gastric erythema, possible portal hypertensive gastropathy "  Colonoscopy 9/16/22: "Large, nonbleeding internal hemorrhoids  Likely small rectal varices "  At that time, GI recommended EGD in several months for re-evaluation and possible esophageal biopsy or banding  Repeat colonoscopy in 3 years due to an inadequate bowel bowel prep  Would avoid hemorrhoidal banding at this time due to likely presence of rectal varices   · Suspecting hemorrhoid bleeding   · Spoke with GI   · Monitoring low-sodium diet  · Obtain bleeding scan for any signs of bleeding; no signs of bleeding  · Continue PPI  · Start Anusol suppository BID and Hydrocortisone cream QID PRN   · Monitor HGB     Pain and swelling of right lower extremity  Assessment & Plan  Presents with complaints of right lower extremity swelling, bruising, and pain  Left lower extremity duplex 9/20/22: which revealed a  well-defined hypoechoic non vascularized cystic-type structure noted in the popliteal fossa and a hyperechoic structure noted from the proximal calf extending to the mid/distal calf  Right lower extremity 9/20/22:  Diffuse soft tissue swelling without evidence of hematoma  Fluid tracking along the gastrocnemius tendon without evidence of mild tendinitis tear  Bones intact    · Now with severe pain, swelling, and difficulty ambulating  · Repeat left lower extremity venous duplex to rule out DVT; follow-up on results  · Repeat CT lower extremity showed heterogenous collection within the medial head gastrocnemius muscle suspicious for intramuscular hematoma as per radiology report  · Consulted acute care surgery recommend monitoring at this time, no surgical intervention  · Recommend leg elevation, heat and compression the Ace wrap for support  · Tylenol, Percocet, and Dilaudid as needed for pain  · Unable to use NSAIDs given liver disease   · 9/24 leg improving, less swelling noted, patient reports less pain and less tension  · Continue to encourage elevation as when patient has leg down it does have a tendency to swell      Hypomagnesemia  Assessment & Plan  Magnesium level 1 8  · Repeat Mag in a m  Alcoholic cirrhosis of liver (HCC)  Assessment & Plan  Alcoholic cirrhosis secondary to alcohol abuse  Recent CT scan showed findings consistent of increasing portal hypertension  Patient has history of thrombocytopenia and rectal bleeding  Recently underwent EGD and colonoscopy  INR 1 43, total bili 3 53, ammonia 62-> 70; increased lactulose to t i d  Ammonia decreased to 60; down to 53  Titrate to 3 BMs daily  · Consult GI  · Continue PPI, nadolol, lactulose  · Avoid antiplatelets and DVT prophylaxis given thrombocytopenia  · Continue to discuss the importance of alcohol cessation  · Trend LFTs and T bili    Thrombocytopenia (HCC)  Assessment & Plan  Chronic secondary to alcohol cirrhosis  · Trend counts daily  · Will likely need platelet transfusion prior to any procedures/intervention    Alcohol withdrawal syndrome without complication [facility])  Assessment & Plan  Presents with tremors in his upper extremities  States he drinks a large glass of vodka twice a week    States his last drink was day prior to admission  · Currently on Librium 25 mg p o  Q 8 hours  · Monitor on CIWA  · Folic acid, thiamine, multivitamin  · Ongoing discussion about the importance of alcohol cessation  · Would reach out to crisis in a m  To provide patient with list of resources, patient reported he does not like to use AA "because all they do is talk about alcohol and then I want to drink    Tobacco abuse  Assessment & Plan  · Nicotine patch  · Smoking cessation education and counseling          VTE Pharmacologic Prophylaxis:   Pharmacologic: Pharmacologic VTE Prophylaxis contraindicated due to low platelets  Mechanical VTE Prophylaxis in Place: Yes    Patient Centered Rounds: I have performed bedside rounds with nursing staff today  Discussions with Specialists or Other Care Team Provider: Yes  Education and Discussions with Family / Patient:Yes  Time Spent for Care: 30 minutes  More than 50% of total time spent on counseling and coordination of care as described above  Current Length of Stay: 2 day(s)  Current Patient Status: Inpatient     Discharge Plan: Not stable for discharge today    Code Status: Level 1 - Full Code      Subjective:   Patient seen sitting up on side of the bed is requesting to be discharged today  We did discuss how his ammonia level is still up, he is still on Librium and would benefit from staying  We did discuss having him being seen by GI, and have Crisis see patient to give a list of resources to help him abstain from alcohol      Objective:     Vitals:   Temp (24hrs), Av 1 °F (36 7 °C), Min:97 8 °F (36 6 °C), Max:98 5 °F (36 9 °C)    Temp:  [97 8 °F (36 6 °C)-98 5 °F (36 9 °C)] 98 1 °F (36 7 °C)  HR:  [59-66] 59  Resp:  [18-20] 18  BP: (110-116)/(66-80) 116/70  SpO2:  [93 %-100 %] 96 %  Body mass index is 27 31 kg/m²  Input and Output Summary (last 24 hours): Intake/Output Summary (Last 24 hours) at 2022 1558  Last data filed at 2022 1532  Gross per 24 hour   Intake 2875 83 ml   Output 250 ml   Net 2625 83 ml        Physical Exam:     Physical Exam  Vitals and nursing note reviewed     Constitutional:       General: He is not in acute distress  HENT:      Head: Normocephalic  Nose: Nose normal       Mouth/Throat:      Mouth: Mucous membranes are moist    Eyes:      General: Scleral icterus present  Extraocular Movements: Extraocular movements intact  Pupils: Pupils are equal, round, and reactive to light  Cardiovascular:      Rate and Rhythm: Normal rate and regular rhythm  Pulses: Normal pulses  Heart sounds: Normal heart sounds  Pulmonary:      Effort: Pulmonary effort is normal       Breath sounds: Normal breath sounds  Abdominal:      General: Bowel sounds are normal  There is no distension  Palpations: Abdomen is soft  Tenderness: There is no abdominal tenderness  Genitourinary:     Comments: Voiding spontaneously  Musculoskeletal:      Cervical back: Normal range of motion  Comments: Right lower extremity ecchymotic from back of top of thigh down to foot, less swollen than previous exam   Tremors present bilateral hands   Skin:     Capillary Refill: Capillary refill takes less than 2 seconds  Findings: Bruising present  Neurological:      General: No focal deficit present  Mental Status: He is oriented to person, place, and time     Psychiatric:         Mood and Affect: Mood normal          Behavior: Behavior normal          Additional Data:     Labs:    Results from last 7 days   Lab Units 09/25/22  0528 09/24/22  0500 09/23/22  0538 09/22/22  1236 09/20/22  1154   WBC Thousand/uL 5 44 3 94* 2 79* 2 80* 2 75*   HEMOGLOBIN g/dL 10 6* 11 0* 10 3* 9 9* 10 8*   HEMATOCRIT % 32 6* 33 3* 30 8* 31 2* 33 4*   PLATELETS Thousands/uL 48* 44* 38* 41* 45*   NEUTROS PCT %  --   --  56 54 49     Results from last 7 days   Lab Units 09/25/22  0528 09/25/22  0428 09/24/22  0500 09/23/22  0538   SODIUM mmol/L 140  --  137 138   POTASSIUM mmol/L 3 9  --  4 1 3 8   CHLORIDE mmol/L 106  --  104 103   CO2 mmol/L 27  --  28 29   BUN mg/dL 16  --  12 11   CREATININE mg/dL 1 11  --  0 89 0 92   CALCIUM mg/dL 7 8*  --  7 7* 7 8*   TOTAL BILIRUBIN mg/dL  --  3 62* 4 32* 5 57*   ALK PHOS U/L 106  --  108 98   ALT U/L 29  --  33 34   AST U/L 55*  --  64* 77*     Results from last 7 days   Lab Units 09/24/22  0500 09/22/22  1236 09/20/22  1154   INR  1 60* 1 43* 1 35*         No results found for: HGBA1C      Results from last 7 days   Lab Units 09/22/22  1236   LACTIC ACID mmol/L 1 5       * I Have Reviewed All Lab Data Listed Above  * Additional Pertinent Lab Tests Reviewed: All Labs Within Last 24 Hours Reviewed    Imaging:     VAS lower limb venous duplex study, unilateral/limited   Final Result by Deuce Dowell MD (09/23 1840)      CT lower extremity w contrast right   Final Result by Fitz Potter MD (09/23 1202)   1  Heterogeneous collection within the medial head gastrocnemius muscle suspicious for intramuscular hematoma  Close clinical follow-up is recommended  Follow-up CT in 4-6 weeks is recommended to confirm resolution and exclude an underlying soft    tissue mass  2   Diffuse subcutaneous edema about the right leg, ankle and foot suspicious for cellulitis  The study was marked in EPIC for significant notification  Workstation performed: [id number]           Imaging Reports Reviewed by myself    Cultures:   Blood Culture:   Lab Results   Component Value Date    BLOODCX No Growth After 5 Days  12/05/2020    BLOODCX No Growth After 5 Days   12/05/2020     Urine Culture: No results found for: URINECX  Sputum Culture: No components found for: SPUTUMCX  Wound Culture: No results found for: WOUNDCULT    Last 24 Hours Medication List:   Current Facility-Administered Medications   Medication Dose Route Frequency Provider Last Rate    acetaminophen  650 mg Oral Q8H PRN TIMMY Nava      calcium carbonate  1,000 mg Oral Daily PRN TIMMY Nava      cefazolin  2,000 mg Intravenous [address] StreetTIM[address] 2,000 mg (09/25/22 7743)   Ernesto Luque chlordiazePOXIDE  25 mg Oral [facility] [address] [place], [address]      folic acid  1 mg Oral Daily Rollen Smiles, [address]      hydrocortisone   Topical 4x Daily PRN Rollen Smiles, CRNP      hydrocortisone  25 mg Rectal BID Rollen Smiles, CRNP      HYDROmorphone  0 5 mg Intravenous Q4H PRN Clementina Campos, CRNP      lactulose  20 g Oral TID Clementina Campos, JAVIERNP      LORazepam  0 5 mg Oral Q6H PRN Rollen Smiles, CRNP      magnesium oxide  400 mg Oral BID Clementina Concrete, CRNP      multi-electrolyte  50 mL/hr Intravenous Continuous Lianne Ortiz MD 50 mL/hr (09/25/22 1532)    multivitamin-minerals  1 tablet Oral Daily Rollen Smiles, CRNP      nadolol  20 mg Oral Daily Rollen Smiles, [address]      nicotine  14 mg Transdermal Daily Rollen Smiles, CRNP      ondansetron  4 mg Intravenous Q6H PRN Rollen Smiles, CRNP      oxyCODONE-acetaminophen  1 tablet Oral Q8H PRN Rollen Smiles, CRNP      pantoprazole  40 mg Oral Daily Rollen Smiles, CRNP      polyethylene glycol  17 g Oral Daily PRN Rollen Smiles, CRNP      prednisoLONE  40 mg Oral Daily Emily Babio, CRNP      thiamine  100 mg Oral Daily Rollen Smiles, CRNP          Today, Patient Was Seen By: TIMMY Brown    ** Please Note: Dragon 360 Dictation voice to text software may have been used in the creation of this document   **

## 2022-09-26 VITALS
RESPIRATION RATE: 18 BRPM | BODY MASS INDEX: 27.1 KG/M2 | HEART RATE: 56 BPM | HEIGHT: 71 IN | TEMPERATURE: 98.6 F | OXYGEN SATURATION: 99 % | DIASTOLIC BLOOD PRESSURE: 71 MMHG | WEIGHT: 193.6 LBS | SYSTOLIC BLOOD PRESSURE: 118 MMHG

## 2022-09-26 PROBLEM — E83.42 HYPOMAGNESEMIA: Status: RESOLVED | Noted: 2022-09-22 | Resolved: 2022-09-26

## 2022-09-26 PROBLEM — L03.115 CELLULITIS OF RIGHT LOWER EXTREMITY: Status: RESOLVED | Noted: 2022-09-26 | Resolved: 2022-09-26

## 2022-09-26 PROBLEM — L03.115 CELLULITIS OF RIGHT LOWER EXTREMITY: Status: ACTIVE | Noted: 2022-09-26

## 2022-09-26 PROBLEM — T14.8XXA HEMATOMA: Status: ACTIVE | Noted: 2022-09-26

## 2022-09-26 LAB
ALBUMIN SERPL BCP-MCNC: 2.3 G/DL (ref 3.5–5)
ALP SERPL-CCNC: 97 U/L (ref 46–116)
ALT SERPL W P-5'-P-CCNC: 29 U/L (ref 12–78)
AMMONIA PLAS-SCNC: 33 UMOL/L (ref 11–35)
ANION GAP SERPL CALCULATED.3IONS-SCNC: 5 MMOL/L (ref 4–13)
AST SERPL W P-5'-P-CCNC: 54 U/L (ref 5–45)
BILIRUB SERPL-MCNC: 4.08 MG/DL (ref 0.2–1)
BUN SERPL-MCNC: 14 MG/DL (ref 5–25)
CALCIUM ALBUM COR SERPL-MCNC: 9.1 MG/DL (ref 8.3–10.1)
CALCIUM SERPL-MCNC: 7.7 MG/DL (ref 8.3–10.1)
CHLORIDE SERPL-SCNC: 106 MMOL/L (ref 96–108)
CO2 SERPL-SCNC: 29 MMOL/L (ref 21–32)
CREAT SERPL-MCNC: 0.89 MG/DL (ref 0.6–1.3)
ERYTHROCYTE [DISTWIDTH] IN BLOOD BY AUTOMATED COUNT: 15.5 % (ref 11.6–15.1)
GFR SERPL CREATININE-BSD FRML MDRD: 95 ML/MIN/1.73SQ M
GLUCOSE SERPL-MCNC: 88 MG/DL (ref 65–140)
HCT VFR BLD AUTO: 36.2 % (ref 36.5–49.3)
HGB BLD-MCNC: 11.8 G/DL (ref 12–17)
MAGNESIUM SERPL-MCNC: 1.9 MG/DL (ref 1.6–2.6)
MCH RBC QN AUTO: 34.5 PG (ref 26.8–34.3)
MCHC RBC AUTO-ENTMCNC: 32.6 G/DL (ref 31.4–37.4)
MCV RBC AUTO: 106 FL (ref 82–98)
PLATELET # BLD AUTO: 54 THOUSANDS/UL (ref 149–390)
PMV BLD AUTO: 11.8 FL (ref 8.9–12.7)
POTASSIUM SERPL-SCNC: 4.1 MMOL/L (ref 3.5–5.3)
PROT SERPL-MCNC: 6.7 G/DL (ref 6.4–8.4)
RBC # BLD AUTO: 3.42 MILLION/UL (ref 3.88–5.62)
SODIUM SERPL-SCNC: 140 MMOL/L (ref 135–147)
WBC # BLD AUTO: 5.15 THOUSAND/UL (ref 4.31–10.16)

## 2022-09-26 PROCEDURE — 82140 ASSAY OF AMMONIA: CPT | Performed by: NURSE PRACTITIONER

## 2022-09-26 PROCEDURE — 85027 COMPLETE CBC AUTOMATED: CPT | Performed by: NURSE PRACTITIONER

## 2022-09-26 PROCEDURE — 97110 THERAPEUTIC EXERCISES: CPT

## 2022-09-26 PROCEDURE — 83735 ASSAY OF MAGNESIUM: CPT | Performed by: NURSE PRACTITIONER

## 2022-09-26 PROCEDURE — 80053 COMPREHEN METABOLIC PANEL: CPT | Performed by: NURSE PRACTITIONER

## 2022-09-26 PROCEDURE — 99232 SBSQ HOSP IP/OBS MODERATE 35: CPT | Performed by: INTERNAL MEDICINE

## 2022-09-26 PROCEDURE — 99239 HOSP IP/OBS DSCHRG MGMT >30: CPT | Performed by: NURSE PRACTITIONER

## 2022-09-26 RX ORDER — CHLORDIAZEPOXIDE HYDROCHLORIDE 25 MG/1
25 CAPSULE, GELATIN COATED ORAL EVERY 12 HOURS SCHEDULED
Status: DISCONTINUED | OUTPATIENT
Start: 2022-09-26 | End: 2022-09-26 | Stop reason: HOSPADM

## 2022-09-26 RX ORDER — PREDNISOLONE SODIUM PHOSPHATE 15 MG/5ML
40 SOLUTION ORAL DAILY
Qty: 332.5 ML | Refills: 0 | Status: ON HOLD | OUTPATIENT
Start: 2022-09-27 | End: 2022-10-05 | Stop reason: SDUPTHER

## 2022-09-26 RX ORDER — HYDROCORTISONE ACETATE 25 MG/1
25 SUPPOSITORY RECTAL 2 TIMES DAILY PRN
Qty: 12 SUPPOSITORY | Refills: 0 | Status: SHIPPED | OUTPATIENT
Start: 2022-09-26 | End: 2022-10-05

## 2022-09-26 RX ORDER — NICOTINE 21 MG/24HR
1 PATCH, TRANSDERMAL 24 HOURS TRANSDERMAL DAILY
Qty: 28 PATCH | Refills: 0 | Status: SHIPPED | OUTPATIENT
Start: 2022-09-27 | End: 2022-10-05

## 2022-09-26 RX ORDER — OXYCODONE HYDROCHLORIDE AND ACETAMINOPHEN 5; 325 MG/1; MG/1
1 TABLET ORAL EVERY 6 HOURS PRN
Qty: 10 TABLET | Refills: 0 | Status: SHIPPED | OUTPATIENT
Start: 2022-09-26 | End: 2022-10-05

## 2022-09-26 RX ORDER — HYDROCORTISONE ACETATE 25 MG/1
25 SUPPOSITORY RECTAL 2 TIMES DAILY PRN
Status: DISCONTINUED | OUTPATIENT
Start: 2022-09-26 | End: 2022-09-26 | Stop reason: HOSPADM

## 2022-09-26 RX ORDER — LANOLIN ALCOHOL/MO/W.PET/CERES
100 CREAM (GRAM) TOPICAL DAILY
Qty: 30 TABLET | Refills: 0 | Status: SHIPPED | OUTPATIENT
Start: 2022-09-26 | End: 2022-10-05

## 2022-09-26 RX ORDER — FOLIC ACID 1 MG/1
1 TABLET ORAL DAILY
Qty: 30 TABLET | Refills: 0 | Status: SHIPPED | OUTPATIENT
Start: 2022-09-26 | End: 2022-10-05

## 2022-09-26 RX ORDER — CHLORDIAZEPOXIDE HYDROCHLORIDE 10 MG/1
10 CAPSULE, GELATIN COATED ORAL EVERY 12 HOURS SCHEDULED
Qty: 4 CAPSULE | Refills: 0 | Status: SHIPPED | OUTPATIENT
Start: 2022-09-26 | End: 2022-10-05

## 2022-09-26 RX ORDER — OXYCODONE HYDROCHLORIDE AND ACETAMINOPHEN 5; 325 MG/1; MG/1
1 TABLET ORAL EVERY 6 HOURS PRN
Status: DISCONTINUED | OUTPATIENT
Start: 2022-09-26 | End: 2022-09-26 | Stop reason: HOSPADM

## 2022-09-26 RX ORDER — PANTOPRAZOLE SODIUM 40 MG/1
40 TABLET, DELAYED RELEASE ORAL DAILY
Qty: 30 TABLET | Refills: 0 | Status: SHIPPED | OUTPATIENT
Start: 2022-09-26

## 2022-09-26 RX ORDER — NADOLOL 20 MG/1
10 TABLET ORAL DAILY
Qty: 15 TABLET | Refills: 0 | Status: SHIPPED | OUTPATIENT
Start: 2022-09-27 | End: 2022-10-05

## 2022-09-26 RX ORDER — LACTULOSE 20 G/30ML
20 SOLUTION ORAL 2 TIMES DAILY
Qty: 237 ML | Refills: 0 | Status: SHIPPED | OUTPATIENT
Start: 2022-09-26 | End: 2022-10-26

## 2022-09-26 RX ADMIN — THIAMINE HCL TAB 100 MG 100 MG: 100 TAB at 08:14

## 2022-09-26 RX ADMIN — NADOLOL 20 MG: 20 TABLET ORAL at 08:16

## 2022-09-26 RX ADMIN — CHLORDIAZEPOXIDE HYDROCHLORIDE 25 MG: 25 CAPSULE ORAL at 05:21

## 2022-09-26 RX ADMIN — PREDNISOLONE SODIUM PHOSPHATE 40 MG: 15 SOLUTION ORAL at 08:16

## 2022-09-26 RX ADMIN — HYDROMORPHONE HYDROCHLORIDE 0.5 MG: 1 INJECTION, SOLUTION INTRAMUSCULAR; INTRAVENOUS; SUBCUTANEOUS at 08:14

## 2022-09-26 RX ADMIN — LACTULOSE 20 G: 10 SOLUTION ORAL at 08:14

## 2022-09-26 RX ADMIN — Medication 1 TABLET: at 08:14

## 2022-09-26 RX ADMIN — CEFAZOLIN SODIUM 2000 MG: 2 SOLUTION INTRAVENOUS at 12:31

## 2022-09-26 RX ADMIN — PANTOPRAZOLE SODIUM 40 MG: 40 TABLET, DELAYED RELEASE ORAL at 08:14

## 2022-09-26 RX ADMIN — FOLIC ACID 1 MG: 1 TABLET ORAL at 08:14

## 2022-09-26 RX ADMIN — CEFAZOLIN SODIUM 2000 MG: 2 SOLUTION INTRAVENOUS at 05:21

## 2022-09-26 NOTE — ASSESSMENT & PLAN NOTE
Alcoholic cirrhosis secondary to alcohol abuse  Recent CT scan showed findings consistent of increasing portal hypertension  Patient has history of thrombocytopenia and rectal bleeding  Recently underwent EGD and colonoscopy    · GI following, appreciate input  · Started Prednisolone 40 mg daily  · Labs improving  · Plan to discharge on Prednisolone and repeat CMP and PT/INR in 4 days   · Follow-up with GI in 2 weeks   · Continue PPI, nadolol, lactulose TID  · Ammonia improved to normal limits   · Avoid antiplatelets and DVT prophylaxis given thrombocytopenia  · Discussed the importance of alcohol cessation

## 2022-09-26 NOTE — ASSESSMENT & PLAN NOTE
CT RLE showed edema about the right leg, ankle, and foot suspicious for cellulitis   · Received Ancef for 3 days  · No signs of infection, will discontinue ABX

## 2022-09-26 NOTE — PHYSICAL THERAPY NOTE
PT TREATMENT     09/26/22 1125   Note Type   Note Type Treatment   Pain Assessment   Pain Assessment Tool Magallanes-Baker FACES   Magallanes-Baker FACES Pain Rating 4  (RLE tightness and pain)   Restrictions/Precautions   Other Precautions Fall Risk;Pain   General   Chart Reviewed Yes   Family/Caregiver Present No   Cognition   Arousal/Participation Cooperative   Subjective   Subjective patient states improving RLE discomfort and walking ability   Bed Mobility   Supine to Sit 7  Independent   Sit to Supine 7  Independent   Transfers   Sit to Stand 7  Independent   Stand to Sit 7  Independent   Ambulation/Elevation   Gait Assistance 5  Supervision   Additional items Verbal cues   Assistive Device Rolling walker   Distance 50feet with change in direction, verbal cuing for safety with roller walker usage due to walking on a flexed R knee  Antalgic type patterningc   Balance   Static Sitting Good   Dynamic Sitting Fair +   Static Standing Fair   Dynamic Standing Fair   Ambulatory Fair   Activity Tolerance   Activity Tolerance Patient tolerated treatment well;Patient limited by fatigue   Nurse Made Aware yes   Exercises   Hip Flexion Sitting;10 reps   Hip Abduction Sitting;10 reps   Knee AROM Long Arc Quad Sitting;10 reps   Ankle Pumps Sitting;10 reps   Bridging Standing;20 reps   Balance training  sidestepping and backward walking completed as well as alternating LE exercise including hip flex, ext, abd, add   Assessment   Assessment patient is demonstrating improved gait balance and endurance with decreased LE pain and edema  Patient understanding of use of roller walker for safety and fall prevention  The patient's AM-[facility] Basic Mobility Inpatient Short Form Raw Score is 23  A Raw score of greater than 16 suggests the patient may benefit from discharge to home  Please also refer to the recommendation of the Physical Therapist for safe discharge planning           AM-PAC Basic Mobility Inpatient   Turning in Bed Without Bedrails 4   Lying on Back to Sitting on Edge of Flat Bed 4   Moving Bed to Chair 4   Standing Up From Chair 4   Walk in Room 4   Climb 3-5 Stairs 3   Basic Mobility Inpatient Raw Score 23   Basic Mobility Standardized Score 50 88   Highest Level Of Mobility   JH-HLM Goal 7: Walk 25 feet or more   JH-HLM Achieved 7: Walk 25 feet or more   Education   Patient Demonstrates acceptance/verbal understanding;Explanation/teachback used;Demonstrates verbal understanding   Licensure   [place] License Number  Brittni Sanchez PT [id number]

## 2022-09-26 NOTE — ASSESSMENT & PLAN NOTE
Presents with tremors in his upper extremities  States he drinks a large glass of vodka twice a week    States his last drink was day prior to admission  · Currently on Librium 25 mg po q8h; will taper and discharge with Librium 10 mg q12h for 2 more days   · Folic acid, thiamine, multivitamin  · Ongoing discussion about the importance of alcohol cessation  · Will provide contact information for Family Guidance

## 2022-09-26 NOTE — DISCHARGE INSTR - AVS FIRST PAGE
Alcoholic cirrhosis:  Take prednisolone 40 mg daily   Check blood work on Friday with a CMP and PT/INR  Follow-up with GI in 2 weeks  Absolutely avoid alcohol     Hematoma of right leg:  Elevate leg  Wrap with an ACE bandage for comfort  Take percocet if needed for severe pain     Alcohol use:  Absolutely avoid alcohol  Take Librium 10 mg every 12 hours for 2 more days to prevent withdrawal

## 2022-09-26 NOTE — ASSESSMENT & PLAN NOTE
Chronic secondary to alcohol cirrhosis  · Will likely need platelet transfusion prior to any procedures/intervention

## 2022-09-26 NOTE — CASE MANAGEMENT
Case Management Discharge Planning Note    Patient name Devtiny Player  Location [address] [facility]-* MRN [id number]  : 1965 Date 2022       Current Admission Date: 2022  Current Admission Diagnosis:Hematochezia   Patient Active Problem List    Diagnosis Date Noted    Cellulitis of right lower extremity 2022    Hematoma 2022    Hematochezia 2022    Pain and swelling of right lower extremity 2022    Head trauma 2022    Bullae 2022    Hematemesis 2022    Alcohol withdrawal syndrome with complication ([address] )     Marijuana use 2022    Anterior cervical lymphadenopathy 2022    Pancytopenia ([address] )     Alcoholic cirrhosis of liver ([address] ) 2022    COVID-19 2022    Right hip pain 2021    Decompensated hepatic cirrhosis ([address] ) 2021    Liver mass 2021    Closed fracture of nasal bone 2021    Open wound of tongue due to bite 2021    Electrolyte abnormality 2021    Tobacco abuse 2020    Alcohol withdrawal syndrome without complication ([address] )     Alcohol use disorder, severe, dependence ([address] ) 2020    Transaminitis 2020    Hyperbilirubinemia 2020    Thrombocytopenia ([address] ) 2020      LOS (days): 3  Geometric Mean LOS (GMLOS) (days):   Days to GMLOS:     OBJECTIVE:  Risk of Unplanned Readmission Score: 35 35         Current admission status: Inpatient   Preferred Pharmacy:   [id number], [address] [place] [address] ([address] [address]) [id number] [address] [place] [address] [phone]  [facility]  Phone: [phone] Fax: [phone]    Primary Care Provider: Milly Jaime MD    Primary Insurance: [address] [place] [address]  Secondary Insurance:     DISCHARGE DETAILS:    Contacts  Patient Contacts: Patient  Contact Method:  In Person    Other Referral/Resources/Interventions Provided:  Interventions: Declines resources  Referral Comments: CM offered substance abuse resources to pt at bedside, pt currently declining, states he has a good support system with his family at home

## 2022-09-26 NOTE — PROGRESS NOTES
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 collection within the medial head gastrocnemius muscle measuring 6 3 x 5 5 x 10 cm (AP by transverse by craniocaudal)  SOFT TISSUES:  There is a small popliteal cyst present  OTHER PERTINENT FINDINGS:  There is subcutaneous soft tissue stranding and edema about the leg, ankle and foot  Impression: 1  Heterogeneous collection within the medial head gastrocnemius muscle suspicious for intramuscular hematoma  Close clinical follow-up is recommended  Follow-up CT in 4-6 weeks is recommended to confirm resolution and exclude an underlying soft tissue mass  2   Diffuse subcutaneous edema about the right leg, ankle and foot suspicious for cellulitis  The study was marked in EPIC for significant notification   Workstation performed: [id number]     CT lower extremity w contrast right    Result Date: 9/20/2022  Narrative: CT right lower extremity with IV contrast INDICATION: hematoma/swelling/pain  52-year-old male with history of thrombocytopenia  Swelling and redness in the region of the right calf  Evaluate for hematoma  COMPARISON: None  TECHNIQUE: CT examination of the right lower extremity from knee through the foot was performed  This examination, like all CT scans performed in the [facility], was performed utilizing techniques to minimize radiation dose exposure, including the use of iterative reconstruction and automated exposure control software  Sagittal and coronal two dimensional reconstructed images were also submitted for interpretation  IV Contrast: 100 mL of iohexol (OMNIPAQUE) Rad dose  642 mGy-cm FINDINGS: OSSEOUS STRUCTURES:  No fracture, dislocation or destructive osseous lesion  JOINTS:  Small knee joint effusion  MUSCLES AND TENDONS:  Muscles intact  Fluid tracking along the gastrocnemius tendon without evidence of tendon tear  Soleus muscle and Achilles tendon intact  SOFT TISSUES:  Subcutaneous edema in the right lower leg, ankle and foot  No discrete fluid collection  OTHER PERTINENT FINDINGS:  None  Impression: 1  Diffuse soft tissue swelling without evidence of hematoma  2   Fluid tracking along the gastrocnemius tendon without evidence of myotendinous tear  3   Bones intact  Workstation performed: [email or web address]      Please Note: "This note has been constructed using a voice recognition system  Therefore there may be syntax, spelling, and/or grammatical errors   Please call if you have any questions  "**

## 2022-09-26 NOTE — CASE MANAGEMENT
Case Management Discharge Planning Note    Patient name Alayna Gutierres  Location [address] [place] [address] [address] [facility]-* MRN [id number]  : 1965 Date 2022       Current Admission Date: 2022  Current Admission Diagnosis:Alcoholic cirrhosis of liver [facility])   Patient Active Problem List    Diagnosis Date Noted    Hematoma 2022    Hematochezia 2022    Pain and swelling of right lower extremity 2022    Head trauma 2022    Bullae 2022    Hematemesis 2022    Alcohol withdrawal syndrome with complication ([facility]     Marijuana use 2022    Anterior cervical lymphadenopathy 2022    Pancytopenia ([facility]     Alcoholic cirrhosis of liver ([facility] ) 2022    COVID-19 2022    Right hip pain 2021    Decompensated hepatic cirrhosis ([facility] ) 2021    Liver mass 2021    Closed fracture of nasal bone 2021    Open wound of tongue due to bite 2021    Electrolyte abnormality 2021    Tobacco abuse 2020    Alcohol withdrawal syndrome without complication ([facility]     Alcohol use disorder, severe, dependence ([facility] ) 2020    Transaminitis 2020    Hyperbilirubinemia 2020    Thrombocytopenia ([facility] ) 2020      LOS (days): 3  Geometric Mean LOS (GMLOS) (days):   Days to GMLOS:     OBJECTIVE:  Risk of Unplanned Readmission Score: 35 35         Current admission status: Inpatient   Preferred Pharmacy:   [address] [place] [address] [id number] [facility], [id number] [facility] [id number] [address] [place] [address] [address] ([address] [id number])  [id number] [address] [place] [address] [address] ([address] [address])  [facility]  Phone: [phone] Fax: [phone]    Primary Care Provider: Leobardo Scales MD    Primary Insurance: [address] [place], [place] [id number]  Secondary Insurance:     DISCHARGE DETAILS:    Transport at Discharge : Other (Comment)  Dispatcher Contacted: Yes  Number/Name of Dispatcher: SLETS  Transported by [employer] and Unit #):  Marie  ETA of Transport (Date): 22  ETA of Transport (Time): 1600     Additional Comments: CM arranged [employer] for 4pm p/u   CM informed RN-Meli

## 2022-09-26 NOTE — ASSESSMENT & PLAN NOTE
Presents with complaints of right lower extremity swelling, bruising, and pain  Left lower extremity duplex 9/20/22: which revealed a  well-defined hypoechoic non vascularized cystic-type structure noted in the popliteal fossa and a hyperechoic structure noted from the proximal calf extending to the mid/distal calf  Right lower extremity 9/20/22:  Diffuse soft tissue swelling without evidence of hematoma  Fluid tracking along the gastrocnemius tendon without evidence of mild tendinitis tear  Bones intact    · Now with severe pain, swelling, and difficulty ambulating  · Repeat left lower extremity venous duplex negative for DVT  · Repeat CT lower extremity showed heterogenous collection within the medial head gastrocnemius muscle suspicious for intramuscular hematoma as well as cellulitis   · Consulted acute care surgery recommend monitoring at this time, no surgical intervention  · Recommend leg elevation, heat and compression the Ace wrap for support  · Tylenol and Percocet as needed for pain  · Unable to use NSAIDs given liver disease

## 2022-09-26 NOTE — PLAN OF CARE
Problem: MOBILITY - ADULT  Goal: Maintain or return to baseline ADL function  Description: INTERVENTIONS:  -  Assess patient's ability to carry out ADLs; assess patient's baseline for ADL function and identify physical deficits which impact ability to perform ADLs (bathing, care of mouth/teeth, toileting, grooming, dressing, etc )  - Assess/evaluate cause of self-care deficits   - Assess range of motion  - Assess patient's mobility; develop plan if impaired  - Assess patient's need for assistive devices and provide as appropriate  - Encourage maximum independence but intervene and supervise when necessary  - Involve family in performance of ADLs  - Assess for home care needs following discharge   - Consider OT consult to assist with ADL evaluation and planning for discharge  - Provide patient education as appropriate  Outcome: Progressing  Goal: Maintains/Returns to pre admission functional level  Description: INTERVENTIONS:  - Perform BMAT or MOVE assessment daily    - Set and communicate daily mobility goal to care team and patient/family/caregiver  - Collaborate with rehabilitation services on mobility goals if consulted  - Perform Range of Motion 3 times a day  - Reposition patient every 2 hours    - Dangle patient 3 times a day  - Stand patient 3 times a day  - Ambulate patient 3 times a day  - Out of bed to chair 3 times a day   - Out of bed for meals 3 times a day  - Out of bed for toileting  - Record patient progress and toleration of activity level   Outcome: Progressing     Problem: Potential for Falls  Goal: Patient will remain free of falls  Description: INTERVENTIONS:  - Educate patient/family on patient safety including physical limitations  - Instruct patient to call for assistance with activity   - Consult OT/PT to assist with strengthening/mobility   - Keep Call bell within reach  - Keep bed low and locked with side rails adjusted as appropriate  - Keep care items and personal belongings within reach  - Initiate and maintain comfort rounds  - Make Fall Risk Sign visible to staff  - Apply yellow socks and bracelet for high fall risk patients  - Consider moving patient to room near nurses station  Outcome: Progressing     Problem: PAIN - ADULT  Goal: Verbalizes/displays adequate comfort level or baseline comfort level  Description: Interventions:  - Encourage patient to monitor pain and request assistance  - Assess pain using appropriate pain scale  - Administer analgesics based on type and severity of pain and evaluate response  - Implement non-pharmacological measures as appropriate and evaluate response  - Consider cultural and social influences on pain and pain management  - Notify physician/advanced practitioner if interventions unsuccessful or patient reports new pain  Outcome: Progressing     Problem: INFECTION - ADULT  Goal: Absence or prevention of progression during hospitalization  Description: INTERVENTIONS:  - Assess and monitor for signs and symptoms of infection  - Monitor lab/diagnostic results  - Monitor all insertion sites, i e  indwelling lines, tubes, and drains  - Monitor endotracheal if appropriate and nasal secretions for changes in amount and color  - [place] appropriate cooling/warming therapies per order  - Administer medications as ordered  - Instruct and encourage patient and family to use good hand hygiene technique  - Identify and instruct in appropriate isolation precautions for identified infection/condition  Outcome: Progressing  Goal: Absence of fever/infection during neutropenic period  Description: INTERVENTIONS:  - Monitor WBC    Outcome: Progressing     Problem: SAFETY ADULT  Goal: Maintain or return to baseline ADL function  Description: INTERVENTIONS:  -  Assess patient's ability to carry out ADLs; assess patient's baseline for ADL function and identify physical deficits which impact ability to perform ADLs (bathing, care of mouth/teeth, toileting, grooming, dressing, etc )  - Assess/evaluate cause of self-care deficits   - Assess range of motion  - Assess patient's mobility; develop plan if impaired  - Assess patient's need for assistive devices and provide as appropriate  - Encourage maximum independence but intervene and supervise when necessary  - Involve family in performance of ADLs  - Assess for home care needs following discharge   - Consider OT consult to assist with ADL evaluation and planning for discharge  - Provide patient education as appropriate  Outcome: Progressing  Goal: Maintains/Returns to pre admission functional level  Description: INTERVENTIONS:  - Perform BMAT or MOVE assessment daily    - Set and communicate daily mobility goal to care team and patient/family/caregiver  - Collaborate with rehabilitation services on mobility goals if consulted  - Perform Range of Motion 3 times a day  - Reposition patient every 2 hours    - Dangle patient 3 times a day  - Stand patient 3 times a day  - Ambulate patient 3 times a day  - Out of bed to chair 3 times a day   - Out of bed for meals 3 times a day  - Out of bed for toileting  - Record patient progress and toleration of activity level   Outcome: Progressing  Goal: Patient will remain free of falls  Description: INTERVENTIONS:  - Educate patient/family on patient safety including physical limitations  - Instruct patient to call for assistance with activity   - Consult OT/PT to assist with strengthening/mobility   - Keep Call bell within reach  - Keep bed low and locked with side rails adjusted as appropriate  - Keep care items and personal belongings within reach  - Initiate and maintain comfort rounds  - Make Fall Risk Sign visible to staff  - Apply yellow socks and bracelet for high fall risk patients  - Consider moving patient to room near nurses station  Outcome: Progressing     Problem: DISCHARGE PLANNING  Goal: Discharge to home or other facility with appropriate resources  Description: INTERVENTIONS:  - Identify barriers to discharge w/patient and caregiver  - Arrange for needed discharge resources and transportation as appropriate  - Identify discharge learning needs (meds, wound care, etc )  - Arrange for interpretive services to assist at discharge as needed  - Refer to Case Management Department for coordinating discharge planning if the patient needs post-hospital services based on physician/advanced practitioner order or complex needs related to functional status, cognitive ability, or social support system  Outcome: Progressing     Problem: Knowledge Deficit  Goal: Patient/family/caregiver demonstrates understanding of disease process, treatment plan, medications, and discharge instructions  Description: Complete learning assessment and assess knowledge base    Interventions:  - Provide teaching at level of understanding  - Provide teaching via preferred learning methods  Outcome: Progressing

## 2022-09-26 NOTE — ASSESSMENT & PLAN NOTE
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  Mild gastric erythema, possible portal hypertensive gastropathy "  Colonoscopy 9/16/22: "Large, nonbleeding internal hemorrhoids  Likely small rectal varices "  At that time, GI recommended EGD in several months for re-evaluation and possible esophageal biopsy or banding  Repeat colonoscopy in 3 years due to an inadequate bowel bowel prep   Would avoid hemorrhoidal banding at this time due to likely presence of rectal varices   · Suspecting hemorrhoid bleeding   · No further bleeding; HGB stable   · Continue PPI  · Changed Anusol suppository to PRN as patent has been refusing scheduled dosing

## 2022-09-26 NOTE — PLAN OF CARE
Problem: MOBILITY - ADULT  Goal: Maintain or return to baseline ADL function  Description: INTERVENTIONS:  -  Assess patient's ability to carry out ADLs; assess patient's baseline for ADL function and identify physical deficits which impact ability to perform ADLs (bathing, care of mouth/teeth, toileting, grooming, dressing, etc )  - Assess/evaluate cause of self-care deficits   - Assess range of motion  - Assess patient's mobility; develop plan if impaired  - Assess patient's need for assistive devices and provide as appropriate  - Encourage maximum independence but intervene and supervise when necessary  - Involve family in performance of ADLs  - Assess for home care needs following discharge   - Consider OT consult to assist with ADL evaluation and planning for discharge  - Provide patient education as appropriate  Outcome: Progressing  Goal: Maintains/Returns to pre admission functional level  Description: INTERVENTIONS:  - Perform BMAT or MOVE assessment daily    - Set and communicate daily mobility goal to care team and patient/family/caregiver  - Collaborate with rehabilitation services on mobility goals if consulted  - Perform Range of Motion 3 times a day  - Reposition patient every 2 hours    - Dangle patient 3 times a day  - Stand patient 3 times a day  - Ambulate patient 3 times a day  - Out of bed to chair 3 times a day   - Out of bed for meals 3 times a day  - Out of bed for toileting  - Record patient progress and toleration of activity level   Outcome: Progressing     Problem: Potential for Falls  Goal: Patient will remain free of falls  Description: INTERVENTIONS:  - Educate patient/family on patient safety including physical limitations  - Instruct patient to call for assistance with activity   - Consult OT/PT to assist with strengthening/mobility   - Keep Call bell within reach  - Keep bed low and locked with side rails adjusted as appropriate  - Keep care items and personal belongings within reach  - Initiate and maintain comfort rounds  - Make Fall Risk Sign visible to staff  - Offer Toileting every 2 Hours, in advance of need  - Initiate/Maintain bed alarm  - Obtain necessary fall risk management equipment: yellow socks  - Apply yellow socks and bracelet for high fall risk patients  - Consider moving patient to room near nurses station  Outcome: Progressing     Problem: PAIN - ADULT  Goal: Verbalizes/displays adequate comfort level or baseline comfort level  Description: Interventions:  - Encourage patient to monitor pain and request assistance  - Assess pain using appropriate pain scale  - Administer analgesics based on type and severity of pain and evaluate response  - Implement non-pharmacological measures as appropriate and evaluate response  - Consider cultural and social influences on pain and pain management  - Notify physician/advanced practitioner if interventions unsuccessful or patient reports new pain  Outcome: Progressing     Problem: INFECTION - ADULT  Goal: Absence or prevention of progression during hospitalization  Description: INTERVENTIONS:  - Assess and monitor for signs and symptoms of infection  - Monitor lab/diagnostic results  - Monitor all insertion sites, i e  indwelling lines, tubes, and drains  - Monitor endotracheal if appropriate and nasal secretions for changes in amount and color  - [place] appropriate cooling/warming therapies per order  - Administer medications as ordered  - Instruct and encourage patient and family to use good hand hygiene technique  - Identify and instruct in appropriate isolation precautions for identified infection/condition  Outcome: Progressing  Goal: Absence of fever/infection during neutropenic period  Description: INTERVENTIONS:  - Monitor WBC    Outcome: Progressing     Problem: SAFETY ADULT  Goal: Maintain or return to baseline ADL function  Description: INTERVENTIONS:  -  Assess patient's ability to carry out ADLs; assess patient's baseline for ADL function and identify physical deficits which impact ability to perform ADLs (bathing, care of mouth/teeth, toileting, grooming, dressing, etc )  - Assess/evaluate cause of self-care deficits   - Assess range of motion  - Assess patient's mobility; develop plan if impaired  - Assess patient's need for assistive devices and provide as appropriate  - Encourage maximum independence but intervene and supervise when necessary  - Involve family in performance of ADLs  - Assess for home care needs following discharge   - Consider OT consult to assist with ADL evaluation and planning for discharge  - Provide patient education as appropriate  Outcome: Progressing  Goal: Maintains/Returns to pre admission functional level  Description: INTERVENTIONS:  - Perform BMAT or MOVE assessment daily    - Set and communicate daily mobility goal to care team and patient/family/caregiver  - Collaborate with rehabilitation services on mobility goals if consulted  - Perform Range of Motion 3 times a day  - Reposition patient every 2 hours    - Dangle patient 3 times a day  - Stand patient 3 times a day  - Ambulate patient 3 times a day  - Out of bed to chair 3 times a day   - Out of bed for meals 3 times a day  - Out of bed for toileting  - Record patient progress and toleration of activity level   Outcome: Progressing  Goal: Patient will remain free of falls  Description: INTERVENTIONS:  - Educate patient/family on patient safety including physical limitations  - Instruct patient to call for assistance with activity   - Consult OT/PT to assist with strengthening/mobility   - Keep Call bell within reach  - Keep bed low and locked with side rails adjusted as appropriate  - Keep care items and personal belongings within reach  - Initiate and maintain comfort rounds  - Make Fall Risk Sign visible to staff  - Offer Toileting every 2 Hours, in advance of need  - Initiate/Maintain bed alarm  - Obtain necessary fall risk management equipment: yellow socks  - Apply yellow socks and bracelet for high fall risk patients  - Consider moving patient to room near nurses station  Outcome: Progressing     Problem: DISCHARGE PLANNING  Goal: Discharge to home or other facility with appropriate resources  Description: INTERVENTIONS:  - Identify barriers to discharge w/patient and caregiver  - Arrange for needed discharge resources and transportation as appropriate  - Identify discharge learning needs (meds, wound care, etc )  - Arrange for interpretive services to assist at discharge as needed  - Refer to Case Management Department for coordinating discharge planning if the patient needs post-hospital services based on physician/advanced practitioner order or complex needs related to functional status, cognitive ability, or social support system  Outcome: Progressing     Problem: Knowledge Deficit  Goal: Patient/family/caregiver demonstrates understanding of disease process, treatment plan, medications, and discharge instructions  Description: Complete learning assessment and assess knowledge base    Interventions:  - Provide teaching at level of understanding  - Provide teaching via preferred learning methods  Outcome: Progressing

## 2022-09-26 NOTE — DISCHARGE SUMMARY
[address]  Discharge- Twila Marques 1965, 65 y o  male MRN: [id number]  Unit/Bed#: [address] [place] [address] Encounter: [id number]  Primary Care Provider: Kojo Devries MD   Date and time admitted to hospital: 9/22/2022 01:66 AM    * Alcoholic cirrhosis of liver ([facility] [address] )  Assessment & Plan  Alcoholic cirrhosis secondary to alcohol abuse  Recent CT scan showed findings consistent of increasing portal hypertension  Patient has history of thrombocytopenia and rectal bleeding  Recently underwent EGD and colonoscopy  · GI following, appreciate input  · Started Prednisolone 40 mg daily  · Labs improving  · Plan to discharge on Prednisolone and repeat CMP and PT/INR in 4 days   · Follow-up with GI in 2 weeks   · Continue PPI, nadolol, lactulose TID  · Ammonia improved to normal limits   · Avoid antiplatelets and DVT prophylaxis given thrombocytopenia  · Discussed the importance of alcohol cessation    Hematoma  Assessment & Plan  CT RLE showed "Heterogeneous collection within the medial head gastrocnemius muscle suspicious for intramuscular hematoma"  · Surgery following  · Indicated calf is soft and compressible   · OK for elevation and ACE wrap for compression     Pain and swelling of right lower extremity  Assessment & Plan  Presents with complaints of right lower extremity swelling, bruising, and pain  Left lower extremity duplex 9/20/22: which revealed a  well-defined hypoechoic non vascularized cystic-type structure noted in the popliteal fossa and a hyperechoic structure noted from the proximal calf extending to the mid/distal calf  Right lower extremity 9/20/22:  Diffuse soft tissue swelling without evidence of hematoma  Fluid tracking along the gastrocnemius tendon without evidence of mild tendinitis tear  Bones intact    · Now with severe pain, swelling, and difficulty ambulating  · Repeat left lower extremity venous duplex negative for DVT  · Repeat CT lower extremity showed heterogenous collection within the medial head gastrocnemius muscle suspicious for intramuscular hematoma as well as cellulitis   · Consulted acute care surgery recommend monitoring at this time, no surgical intervention  · Recommend leg elevation, heat and compression the Ace wrap for support  · Tylenol and Percocet as needed for pain  · Unable to use NSAIDs given liver disease     Alcohol withdrawal syndrome without complication [facility])  Assessment & Plan  Presents with tremors in his upper extremities  States he drinks a large glass of vodka twice a week  States his last drink was day prior to admission  · Currently on Librium 25 mg po q8h; will taper and discharge with Librium 10 mg q12h for 2 more days   · Folic acid, thiamine, multivitamin  · Ongoing discussion about the importance of alcohol cessation  · Will provide contact information for [facility]     Hematochezia  Assessment & Plan  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  Mild gastric erythema, possible portal hypertensive gastropathy "  Colonoscopy 9/16/22: "Large, nonbleeding internal hemorrhoids  Likely small rectal varices "  At that time, GI recommended EGD in several months for re-evaluation and possible esophageal biopsy or banding  Repeat colonoscopy in 3 years due to an inadequate bowel bowel prep   Would avoid hemorrhoidal banding at this time due to likely presence of rectal varices   · Suspecting hemorrhoid bleeding   · No further bleeding; HGB stable   · Continue PPI  · Changed Anusol suppository to PRN as patent has been refusing scheduled dosing     Cellulitis of right lower extremity-resolved as of 9/26/2022  Assessment & Plan  CT RLE showed edema about the right leg, ankle, and foot suspicious for cellulitis   · Received Ancef for 3 days  · No signs of infection, will discontinue ABX    Tobacco abuse  Assessment & Plan  · Nicotine patch  · Smoking cessation education and counseling    Thrombocytopenia ([address] )  Assessment & Plan  Chronic secondary to alcohol cirrhosis  · Will likely need platelet transfusion prior to any procedures/intervention    Hypomagnesemia-resolved as of 9/26/2022  Assessment & Plan  Magnesium level 1 8 -> 1 9    Medical Problems             Resolved Problems  Date Reviewed: 9/26/2022          Resolved    Hypomagnesemia 9/26/2022     Resolved by  TIMMY Guzmán    Cellulitis of right lower extremity 9/26/2022     Resolved by  Bernabe Darnell, [address]              Discharging Physician / Practitioner: TIMMY Guzmán  PCP: Gracy Vernon MD  Admission Date:   Admission Orders (From admission, onward)     Ordered        09/23/22 1626  Inpatient Admission  Once            09/22/22 1425  Place in Observation  Once                      Discharge Date: 09/26/22    Consultations During Hospital Stay:  · Gastroenterology  · General surgery     Procedures Performed:   · CT right lower extremity: 1  Heterogeneous collection within the medial head gastrocnemius muscle suspicious for intramuscular hematoma  Close clinical follow-up is recommended  Follow-up CT in 4-6 weeks is recommended to confirm resolution and exclude an underlying soft tissue mass  2   Diffuse subcutaneous edema about the right leg, ankle and foot suspicious for cellulitis  · LEVD: No evidence of DVT    Significant Findings / Test Results:   · Alcohol withdrawal  · Right calf hematoma   · Anemia     Incidental Findings:   · None      Test Results Pending at Discharge (will require follow up):    · None     Outpatient Tests Requested:  · CMP and PT/INR in 4 days     Complications:  None     Reason for Admission: Anemia, right leg swelling and pain     Hospital Course:   Ronny Rankin Price Casarez is a 65 y o  male patient who originally presented to the hospital on 9/22/2022 due to right leg swelling, bruising, and pain  Patient had a Baker's cyst rupture and given his thrombocytopenia, he had significant bruising  Patient also reported blood in his toilet bowl with worsening anemia noted on lab work  Patient was admitted for further evaluation  He was evaluated by GI and surgery  Surgery recommended elevation of the right lower extremity and ACE bandage for compression  GI recommended treatment for hemorrhoids  No need for EGD colonoscopy as patient had both within the past month  Patient was started on Prednisolone for decompensated liver cirrhosis  Spoke with GI, patient is stable for discharge home  He will be discharge on Prednisolone with plan for CMP and PT/INR in 4 days to monitor for improvement  If patient's blood work shows improvement then he will need a full 30-days of treatment  Patient will need to follow-up with GI in 2 weeks  Please see above list of diagnoses and related plan for additional information  Condition at Discharge: stable    Discharge Day Visit / Exam:   Subjective:  Patient seen and examined at bedside  He is ambulating the hallways  He reports intermittent pain in the right leg  States it is severe in nature  He reports much improvement in swelling  Feels like his RLE is softer than admission  He is elevated his leg when he is not in bed  Denies HA, dizziness, CP, or SOB  Wants to go home  Vitals: Blood Pressure: 118/71 (09/26/22 0742)  Pulse: 56 (09/26/22 0742)  Temperature: 98 6 °F (37 °C) (09/26/22 0742)  Temp Source: Oral (09/26/22 0742)  Respirations: 18 (09/26/22 0742)  Height: 5' 11" (180 3 cm) (09/23/22 0500)  Weight - Scale: 87 8 kg (193 lb 9 6 oz) (09/26/22 0600)  SpO2: 99 % (09/26/22 0742)  Exam:   Physical Exam  Vitals and nursing note reviewed  Constitutional:       General: He is not in acute distress       Appearance: He is not toxic-appearing or diaphoretic  Comments: pleasant gentleman seen ambulating hallways on room air    HENT:      Head: Normocephalic  Mouth/Throat:      Mouth: Mucous membranes are moist    Eyes:      Conjunctiva/sclera: Conjunctivae normal    Cardiovascular:      Rate and Rhythm: Normal rate  Pulmonary:      Effort: Pulmonary effort is normal       Breath sounds: Normal breath sounds  No wheezing, rhonchi or rales  Abdominal:      General: Bowel sounds are normal  There is no distension  Palpations: Abdomen is soft  Tenderness: There is no abdominal tenderness  Musculoskeletal:         General: Normal range of motion  Cervical back: Normal range of motion  Right lower leg: Edema (trace) present  Left lower leg: No edema  Skin:     General: Skin is warm and dry  Capillary Refill: Capillary refill takes less than 2 seconds  Findings: Bruising (extensive RLE) present  Neurological:      Mental Status: He is alert and oriented to person, place, and time  Mental status is at baseline  Motor: No weakness  Psychiatric:         Mood and Affect: Mood normal          Behavior: Behavior normal          Thought Content: Thought content normal          Judgment: Judgment normal           Discussion with Family: Patient declined call to   Discharge instructions/Information to patient and family:   See after visit summary for information provided to patient and family  Provisions for Follow-Up Care:  See after visit summary for information related to follow-up care and any pertinent home health orders  Disposition:   Home    Planned Readmission: None      Discharge Statement:  I spent > 30 minutes discharging the patient  This time was spent on the day of discharge  I had direct contact with the patient on the day of discharge   Greater than 50% of the total time was spent examining patient, answering all patient questions, arranging and discussing plan of care with patient as well as directly providing post-discharge instructions  Additional time then spent on discharge activities  Discharge Medications:  See after visit summary for reconciled discharge medications provided to patient and/or family        **Please Note: This note may have been constructed using a voice recognition system**

## 2022-09-26 NOTE — ASSESSMENT & PLAN NOTE
CT RLE showed "Heterogeneous collection within the medial head gastrocnemius muscle suspicious for intramuscular hematoma"  · Surgery following  · Indicated calf is soft and compressible   · OK for elevation and ACE wrap for compression

## 2022-09-28 NOTE — UTILIZATION REVIEW
Notification of Discharge   This is a Notification of Discharge from our facility [address]  Please be advised that this patient has been discharge from our facility  Below you will find the admission and discharge date and time including the patients disposition  UTILIZATION REVIEW CONTACT:  Yesi Vela  Utilization   Network Utilization Review Department  Phone: [phone] x carefully listen to the prompts  All voicemails are confidential   Email: [email or web address]     PHYSICIAN ADVISORY SERVICES:  FOR [id number] REVIEW - MEDICAL NECESSITY DENIAL  Phone: [phone]  Fax: [phone]  Email: [email or web address]     PRESENTATION DATE: 9/22/2022 11:13 AM  OBERVATION ADMISSION DATE:   INPATIENT ADMISSION DATE: 9/23/22  4:26 PM   DISCHARGE DATE: 9/26/2022  4:24 PM  DISPOSITION: Home/Self Care Home/Self Care      IMPORTANT INFORMATION:  Send all requests for admission clinical reviews, approved or denied determinations and any other requests to dedicated fax number below belonging to the campus where the patient is receiving treatment   List of dedicated fax numbers:  [address] DENIALS (Administrative/Medical Necessity) [phone]   [address] (Maternity/NICU/Pediatrics) [phone]   Karishma Esquivel [phone]   [address] [facility] [phone]   [address] [place] [address] [phone]   [address] [phone]   [facility]  [phone]   [address] [place], [place] [address] [address] [phone]

## 2022-09-29 ENCOUNTER — APPOINTMENT (EMERGENCY)
Dept: RADIOLOGY | Facility: HOSPITAL | Age: 57
End: 2022-09-29
Payer: COMMERCIAL

## 2022-09-29 ENCOUNTER — HOSPITAL ENCOUNTER (EMERGENCY)
Facility: HOSPITAL | Age: 57
End: 2022-09-29
Attending: EMERGENCY MEDICINE
Payer: COMMERCIAL

## 2022-09-29 ENCOUNTER — HOSPITAL ENCOUNTER (INPATIENT)
Facility: HOSPITAL | Age: 57
LOS: 6 days | Discharge: HOME | End: 2022-10-05
Attending: EMERGENCY MEDICINE | Admitting: EMERGENCY MEDICINE
Payer: COMMERCIAL

## 2022-09-29 ENCOUNTER — APPOINTMENT (OUTPATIENT)
Dept: RADIOLOGY | Facility: HOSPITAL | Age: 57
End: 2022-09-29
Payer: COMMERCIAL

## 2022-09-29 VITALS
HEIGHT: 72 IN | BODY MASS INDEX: 27.09 KG/M2 | DIASTOLIC BLOOD PRESSURE: 70 MMHG | HEART RATE: 90 BPM | RESPIRATION RATE: 20 BRPM | SYSTOLIC BLOOD PRESSURE: 126 MMHG | WEIGHT: 200 LBS | TEMPERATURE: 98.9 F | OXYGEN SATURATION: 91 %

## 2022-09-29 DIAGNOSIS — K72.90 DECOMPENSATED HEPATIC CIRRHOSIS (HCC): ICD-10-CM

## 2022-09-29 DIAGNOSIS — F10.10 ALCOHOL ABUSE: Primary | ICD-10-CM

## 2022-09-29 DIAGNOSIS — K74.60 CIRRHOSIS (HCC): ICD-10-CM

## 2022-09-29 DIAGNOSIS — K74.60 DECOMPENSATED HEPATIC CIRRHOSIS (HCC): ICD-10-CM

## 2022-09-29 DIAGNOSIS — F10.20 ALCOHOL USE DISORDER, SEVERE, DEPENDENCE (HCC): Primary | ICD-10-CM

## 2022-09-29 DIAGNOSIS — D69.6 THROMBOCYTOPENIA (HCC): ICD-10-CM

## 2022-09-29 LAB
ALBUMIN SERPL BCP-MCNC: 2.8 G/DL (ref 3.5–5)
ALP SERPL-CCNC: 114 U/L (ref 46–116)
ALT SERPL W P-5'-P-CCNC: 39 U/L (ref 12–78)
AMMONIA PLAS-SCNC: 18 UMOL/L (ref 11–35)
ANION GAP SERPL CALCULATED.3IONS-SCNC: 6 MMOL/L (ref 4–13)
APTT PPP: 32 SECONDS (ref 23–37)
AST SERPL W P-5'-P-CCNC: 88 U/L (ref 5–45)
ATRIAL RATE: 70 BPM
BACTERIA UR QL AUTO: NORMAL /HPF
BASOPHILS # BLD AUTO: 0.07 THOUSANDS/ΜL (ref 0–0.1)
BASOPHILS NFR BLD AUTO: 2 % (ref 0–1)
BILIRUB SERPL-MCNC: 3.64 MG/DL (ref 0.2–1)
BILIRUB UR QL STRIP: NEGATIVE
BUN SERPL-MCNC: 10 MG/DL (ref 5–25)
CALCIUM ALBUM COR SERPL-MCNC: 9.1 MG/DL (ref 8.3–10.1)
CALCIUM SERPL-MCNC: 8.1 MG/DL (ref 8.3–10.1)
CARDIAC TROPONIN I PNL SERPL HS: 4 NG/L
CHLORIDE SERPL-SCNC: 106 MMOL/L (ref 96–108)
CLARITY UR: CLEAR
CO2 SERPL-SCNC: 34 MMOL/L (ref 21–32)
COLOR UR: ABNORMAL
CREAT SERPL-MCNC: 0.92 MG/DL (ref 0.6–1.3)
EOSINOPHIL # BLD AUTO: 0.19 THOUSAND/ΜL (ref 0–0.61)
EOSINOPHIL NFR BLD AUTO: 5 % (ref 0–6)
ERYTHROCYTE [DISTWIDTH] IN BLOOD BY AUTOMATED COUNT: 15.8 % (ref 11.6–15.1)
ETHANOL SERPL-MCNC: 244 MG/DL (ref 0–3)
GFR SERPL CREATININE-BSD FRML MDRD: 92 ML/MIN/1.73SQ M
GLUCOSE SERPL-MCNC: 99 MG/DL (ref 65–140)
GLUCOSE UR STRIP-MCNC: NEGATIVE MG/DL
HCT VFR BLD AUTO: 40.1 % (ref 36.5–49.3)
HGB BLD-MCNC: 12.9 G/DL (ref 12–17)
HGB UR QL STRIP.AUTO: ABNORMAL
IMM GRANULOCYTES # BLD AUTO: 0.02 THOUSAND/UL (ref 0–0.2)
IMM GRANULOCYTES NFR BLD AUTO: 1 % (ref 0–2)
INR PPP: 1.46 (ref 0.84–1.19)
KETONES UR STRIP-MCNC: NEGATIVE MG/DL
LEUKOCYTE ESTERASE UR QL STRIP: NEGATIVE
LYMPHOCYTES # BLD AUTO: 0.82 THOUSANDS/ΜL (ref 0.6–4.47)
LYMPHOCYTES NFR BLD AUTO: 20 % (ref 14–44)
MCH RBC QN AUTO: 34.2 PG (ref 26.8–34.3)
MCHC RBC AUTO-ENTMCNC: 32.2 G/DL (ref 31.4–37.4)
MCV RBC AUTO: 106 FL (ref 82–98)
MONOCYTES # BLD AUTO: 0.46 THOUSAND/ΜL (ref 0.17–1.22)
MONOCYTES NFR BLD AUTO: 11 % (ref 4–12)
NEUTROPHILS # BLD AUTO: 2.54 THOUSANDS/ΜL (ref 1.85–7.62)
NEUTS SEG NFR BLD AUTO: 61 % (ref 43–75)
NITRITE UR QL STRIP: NEGATIVE
NON-SQ EPI CELLS URNS QL MICRO: NORMAL /HPF
NRBC BLD AUTO-RTO: 0 /100 WBCS
P AXIS: -19 DEGREES
PH UR STRIP.AUTO: 6.5 [PH]
PLATELET # BLD AUTO: 61 THOUSANDS/UL (ref 149–390)
PMV BLD AUTO: 10.6 FL (ref 8.9–12.7)
POTASSIUM SERPL-SCNC: 3.8 MMOL/L (ref 3.5–5.3)
PR INTERVAL: 176 MS
PROT SERPL-MCNC: 7.7 G/DL (ref 6.4–8.4)
PROT UR STRIP-MCNC: NEGATIVE MG/DL
PROTHROMBIN TIME: 17.9 SECONDS (ref 11.6–14.5)
QRS AXIS: 26 DEGREES
QRSD INTERVAL: 96 MS
QT INTERVAL: 442 MS
QTC INTERVAL: 477 MS
RBC # BLD AUTO: 3.77 MILLION/UL (ref 3.88–5.62)
RBC #/AREA URNS AUTO: NORMAL /HPF
SARS-COV-2 RNA RESP QL NAA+PROBE: NEGATIVE
SODIUM SERPL-SCNC: 146 MMOL/L (ref 135–147)
SP GR UR STRIP.AUTO: 1.01 (ref 1–1.03)
T WAVE AXIS: 13 DEGREES
UROBILINOGEN UR QL STRIP.AUTO: 4 E.U./DL
VENTRICULAR RATE: 70 BPM
WBC # BLD AUTO: 4.1 THOUSAND/UL (ref 4.31–10.16)
WBC #/AREA URNS AUTO: NORMAL /HPF

## 2022-09-29 PROCEDURE — 80053 COMPREHEN METABOLIC PANEL: CPT | Performed by: EMERGENCY MEDICINE

## 2022-09-29 PROCEDURE — 99221 1ST HOSP IP/OBS SF/LOW 40: CPT

## 2022-09-29 PROCEDURE — G1004 CDSM NDSC: HCPCS

## 2022-09-29 PROCEDURE — 82140 ASSAY OF AMMONIA: CPT | Performed by: EMERGENCY MEDICINE

## 2022-09-29 PROCEDURE — 71045 X-RAY EXAM CHEST 1 VIEW: CPT

## 2022-09-29 PROCEDURE — 81001 URINALYSIS AUTO W/SCOPE: CPT | Performed by: EMERGENCY MEDICINE

## 2022-09-29 PROCEDURE — 93010 ELECTROCARDIOGRAM REPORT: CPT | Performed by: INTERNAL MEDICINE

## 2022-09-29 PROCEDURE — 70450 CT HEAD/BRAIN W/O DYE: CPT

## 2022-09-29 PROCEDURE — 85730 THROMBOPLASTIN TIME PARTIAL: CPT | Performed by: EMERGENCY MEDICINE

## 2022-09-29 PROCEDURE — HZ2ZZZZ DETOXIFICATION SERVICES FOR SUBSTANCE ABUSE TREATMENT: ICD-10-PCS | Performed by: EMERGENCY MEDICINE

## 2022-09-29 PROCEDURE — 85025 COMPLETE CBC W/AUTO DIFF WBC: CPT | Performed by: EMERGENCY MEDICINE

## 2022-09-29 PROCEDURE — U0005 INFEC AGEN DETEC AMPLI PROBE: HCPCS | Performed by: EMERGENCY MEDICINE

## 2022-09-29 PROCEDURE — 99285 EMERGENCY DEPT VISIT HI MDM: CPT

## 2022-09-29 PROCEDURE — 85610 PROTHROMBIN TIME: CPT | Performed by: EMERGENCY MEDICINE

## 2022-09-29 PROCEDURE — 99285 EMERGENCY DEPT VISIT HI MDM: CPT | Performed by: EMERGENCY MEDICINE

## 2022-09-29 PROCEDURE — U0003 INFECTIOUS AGENT DETECTION BY NUCLEIC ACID (DNA OR RNA); SEVERE ACUTE RESPIRATORY SYNDROME CORONAVIRUS 2 (SARS-COV-2) (CORONAVIRUS DISEASE [COVID-19]), AMPLIFIED PROBE TECHNIQUE, MAKING USE OF HIGH THROUGHPUT TECHNOLOGIES AS DESCRIBED BY CMS-2020-01-R: HCPCS | Performed by: EMERGENCY MEDICINE

## 2022-09-29 PROCEDURE — 82077 ASSAY SPEC XCP UR&BREATH IA: CPT | Performed by: EMERGENCY MEDICINE

## 2022-09-29 PROCEDURE — 36415 COLL VENOUS BLD VENIPUNCTURE: CPT | Performed by: EMERGENCY MEDICINE

## 2022-09-29 PROCEDURE — 84484 ASSAY OF TROPONIN QUANT: CPT | Performed by: EMERGENCY MEDICINE

## 2022-09-29 PROCEDURE — 93005 ELECTROCARDIOGRAM TRACING: CPT

## 2022-09-29 RX ORDER — PANTOPRAZOLE SODIUM 40 MG/1
40 TABLET, DELAYED RELEASE ORAL DAILY
Status: DISCONTINUED | OUTPATIENT
Start: 2022-09-30 | End: 2022-10-05 | Stop reason: HOSPADM

## 2022-09-29 RX ORDER — ONDANSETRON 2 MG/ML
4 INJECTION INTRAMUSCULAR; INTRAVENOUS EVERY 6 HOURS PRN
Status: DISCONTINUED | OUTPATIENT
Start: 2022-09-29 | End: 2022-10-05 | Stop reason: HOSPADM

## 2022-09-29 RX ORDER — ACETAMINOPHEN 325 MG/1
650 TABLET ORAL EVERY 6 HOURS PRN
Status: DISCONTINUED | OUTPATIENT
Start: 2022-09-29 | End: 2022-10-05 | Stop reason: HOSPADM

## 2022-09-29 RX ORDER — SODIUM CHLORIDE 9 MG/ML
75 INJECTION, SOLUTION INTRAVENOUS CONTINUOUS
Status: DISCONTINUED | OUTPATIENT
Start: 2022-09-29 | End: 2022-09-30

## 2022-09-29 RX ORDER — PREDNISOLONE SODIUM PHOSPHATE 15 MG/5ML
40 SOLUTION ORAL DAILY
Status: DISCONTINUED | OUTPATIENT
Start: 2022-09-30 | End: 2022-10-05 | Stop reason: HOSPADM

## 2022-09-29 RX ORDER — IBUPROFEN 600 MG/1
600 TABLET ORAL EVERY 6 HOURS PRN
Status: DISCONTINUED | OUTPATIENT
Start: 2022-09-29 | End: 2022-09-29

## 2022-09-29 RX ORDER — LACTULOSE 20 G/30ML
20 SOLUTION ORAL 2 TIMES DAILY
Status: DISCONTINUED | OUTPATIENT
Start: 2022-09-29 | End: 2022-10-05 | Stop reason: HOSPADM

## 2022-09-29 RX ORDER — ENOXAPARIN SODIUM 100 MG/ML
40 INJECTION SUBCUTANEOUS DAILY
Status: DISCONTINUED | OUTPATIENT
Start: 2022-09-30 | End: 2022-09-29

## 2022-09-29 RX ORDER — TRAZODONE HYDROCHLORIDE 50 MG/1
50 TABLET ORAL
Status: DISCONTINUED | OUTPATIENT
Start: 2022-09-29 | End: 2022-10-05 | Stop reason: HOSPADM

## 2022-09-29 RX ORDER — HYDROCORTISONE ACETATE 25 MG/1
25 SUPPOSITORY RECTAL 2 TIMES DAILY PRN
Status: DISCONTINUED | OUTPATIENT
Start: 2022-09-29 | End: 2022-10-05 | Stop reason: HOSPADM

## 2022-09-29 RX ORDER — ACETAMINOPHEN 325 MG/1
975 TABLET ORAL EVERY 6 HOURS PRN
Status: DISCONTINUED | OUTPATIENT
Start: 2022-09-29 | End: 2022-10-05 | Stop reason: HOSPADM

## 2022-09-29 RX ADMIN — LACTULOSE 20 G: 10 SOLUTION ORAL at 20:41

## 2022-09-29 RX ADMIN — SODIUM CHLORIDE 75 ML/HR: 0.9 INJECTION, SOLUTION INTRAVENOUS at 20:41

## 2022-09-29 RX ADMIN — IBUPROFEN 600 MG: 600 TABLET ORAL at 20:51

## 2022-09-29 NOTE — EMTALA/ACUTE CARE TRANSFER
[facility]  [facility] [address] [place] [id number]  Dept: [phone]      EMTALA TRANSFER CONSENT    NAME Twila Marques                                         1965                              MRN [id number]    I have been informed of my rights regarding examination, treatment, and transfer   by Dr aLtrice Rodriguez MD    Benefits: Specialized equipment and/or services available at the receiving facility (Include comment)________________________    Risks: Potential for delay in receiving treatment      Transfer Request   I acknowledge that my medical condition has been evaluated and explained to me by the emergency department physician or other qualified medical person and/or my attending physician who has recommended and offered to me further medical examination and treatment  I understand the Hospital's obligation with respect to the treatment and stabilization of my emergency medical condition  I nevertheless request to be transferred  I release the Hospital, the doctor, and any other persons caring for me from all responsibility or liability for any injury or ill effects that may result from my transfer and agree to accept all responsibility for the consequences of my choice to transfer, rather than receive stabilizing treatment at the Hospital  I understand that because the transfer is my request, my insurance may not provide reimbursement for the services  The Hospital will assist and direct me and my family in how to make arrangements for transfer, but the hospital is not liable for any fees charged by the transport service  In spite of this understanding, I refuse to consent to further medical examination and treatment which has been offered to me, and request transfer to  Tahmina Archer Name, [address] : [facility]   I authorize the performance of emergency medical procedures and treatments upon me in both transit and upon arrival at the receiving facility  Additionally, I authorize the release of any and all medical records to the receiving facility and request they be transported with me, if possible  I authorize the performance of emergency medical procedures and treatments upon me in both transit and upon arrival at the receiving facility  Additionally, I authorize the release of any and all medical records to the receiving facility and request they be transported with me, if possible  I understand that the safest mode of transportation during a medical emergency is an ambulance and that the Hospital advocates the use of this mode of transport  Risks of traveling to the receiving facility by car, including absence of medical control, life sustaining equipment, such as oxygen, and medical personnel has been explained to me and I fully understand them  (TODD CORRECT BOX BELOW)  [  ]  I consent to the stated transfer and to be transported by ambulance/helicopter  [  ]  I consent to the stated transfer, but refuse transportation by ambulance and accept full responsibility for my transportation by car  I understand the risks of non-ambulance transfers and I exonerate the Hospital and its staff from any deterioration in my condition that results from this refusal     X___________________________________________    DATE  22  TIME________  Signature of patient or legally responsible individual signing on patient behalf           RELATIONSHIP TO PATIENT_________________________          Provider Certification    NAME Sonja Aldridge                                        [facility] 1965                              MRN [id number]    A medical screening exam was performed on the above named patient  Based on the examination:    Condition Necessitating Transfer The primary encounter diagnosis was Alcohol abuse  Diagnoses of Cirrhosis ([facility] [address] ) and Thrombocytopenia ([facility] [address] ) were also pertinent to this visit      Patient Condition: The patient has been stabilized such that within reasonable medical probability, no material deterioration of the patient condition or the condition of the unborn child(rolo) is likely to result from the transfer    Reason for Transfer: Level of Care needed not available at this facility    Transfer Requirements: [address] [place]   · Space available and qualified personnel available for treatment as acknowledged by    · Agreed to accept transfer and to provide appropriate medical treatment as acknowledged by       Dr Lela Newman  · Appropriate medical records of the examination and treatment of the patient are provided at the time of transfer   [facility], [address] _______  · Transfer will be performed by qualified personnel from    and appropriate transfer equipment as required, including the use of necessary and appropriate life support measures  Provider Certification: I have examined the patient and explained the following risks and benefits of being transferred/refusing transfer to the patient/family:  General risk, such as traffic hazards, adverse weather conditions, rough terrain or turbulence, possible failure of equipment (including vehicle or aircraft), or consequences of actions of persons outside the control of the transport personnel      Based on these reasonable risks and benefits to the patient and/or the unborn child(rolo), and based upon the information available at the time of the patients examination, I certify that the medical benefits reasonably to be expected from the provision of appropriate medical treatments at another medical facility outweigh the increasing risks, if any, to the individuals medical condition, and in the case of labor to the unborn child, from effecting the transfer      X____________________________________________ DATE 09/29/22        TIME_______      ORIGINAL - SEND TO MEDICAL RECORDS   COPY - SEND WITH PATIENT DURING TRANSFER

## 2022-09-29 NOTE — ED NOTES
Transfer papers physically handed to transport        Genoveva Dae, [address] [facility]  09/29/22 4152

## 2022-09-29 NOTE — ED PROVIDER NOTES
History  Chief Complaint   Patient presents with    Fall     Stated he fell about 1/2 hour ago and cousin was told he wasn't responding  Pt remembers falling and states he hit his forehead  cousin states he is not his "norm"     Patient has a history of alcohol abuse with decompensated cirrhosis, known thrombocytopenia  Patient has been in and out of the hospital multiple times this month  Family member states he started drinking again last night  This morning about 2 hours prior to arrival, he was found on the floor of his room unconscious  Patient was able to be aroused but was still lethargic and confused, not answering questions  Patient had no recall for the event  He arrives awake and alert, is somewhat giddy and making jokes  He has multiple areas of bruising about the body including the forehead and nose  Has no active bleeding  Prior to Admission Medications   Prescriptions Last Dose Informant Patient Reported? Taking?    Multiple Vitamin (multivitamin) tablet   No No   Sig: Take 1 tablet by mouth daily   chlordiazePOXIDE (LIBRIUM) 10 mg capsule   No No   Sig: Take 1 capsule (10 mg total) by mouth every 12 (twelve) hours for 2 days   folic acid (FOLVITE) 1 mg tablet   No No   Sig: Take 1 tablet (1 mg total) by mouth daily   hydrocortisone (ANUSOL-HC) 25 mg suppository   No No   Sig: Insert 1 suppository (25 mg total) into the rectum 2 (two) times a day as needed for hemorrhoids   lactulose 20 g/30 mL   No No   Sig: Take 30 mL (20 g total) by mouth 2 (two) times a day   lidocaine (LIDODERM) 5 %   No No   Sig: Apply 1 patch topically daily Remove & Discard patch within 12 hours or as directed by MD   nadolol (CORGARD) 20 mg tablet   No No   Sig: Take 0 5 tablets (10 mg total) by mouth daily   nicotine (NICODERM CQ) 14 mg/24hr TD 24 hr patch   No No   Sig: Place 1 patch on the skin daily   oxyCODONE-acetaminophen (PERCOCET) 5-325 mg per tablet   No No   Sig: Take 1 tablet by mouth every 6 (six) hours as needed for severe pain for up to 10 doses Max Daily Amount: 4 tablets   pantoprazole (PROTONIX) 40 mg tablet   No No   Sig: Take 1 tablet (40 mg total) by mouth daily   prednisoLONE (ORAPRED) 15 mg/5 mL oral solution   No No   Sig: Take 13 3 mL (40 mg total) by mouth daily for 25 days   thiamine 100 MG tablet   No No   Sig: Take 1 tablet (100 mg total) by mouth daily      Facility-Administered Medications: None       Past Medical History:   Diagnosis Date    ETOH abuse        Past Surgical History:   Procedure Laterality Date    GALLBLADDER SURGERY         Family History   Problem Relation Age of Onset    Heart disease Mother     Heart disease Father      I have reviewed and agree with the history as documented  E-Cigarette/Vaping    E-Cigarette Use Never User      E-Cigarette/Vaping Substances    Nicotine No     THC No     CBD No     Flavoring No     Other No     Unknown No      Social History     Tobacco Use    Smoking status: Never Smoker    Smokeless tobacco: Current User   Vaping Use    Vaping Use: Never used   Substance Use Topics    Alcohol use: Yes     Alcohol/week: 16 0 standard drinks     Types: 8 Cans of beer, 8 Shots of liquor per week     Comment: not drinking daily, drinks twice a week or if he has pain    Drug use: Not Currently     Types: Marijuana       Review of Systems   Constitutional: Negative for chills and fever  HENT: Negative for congestion and sore throat  Eyes: Negative for visual disturbance  Respiratory: Negative for cough and shortness of breath  Cardiovascular: Negative for chest pain  Gastrointestinal: Negative for abdominal pain, nausea and vomiting  Genitourinary: Negative for dysuria  Musculoskeletal: Negative for arthralgias and back pain  Skin: Positive for color change  Negative for rash  Bruises on extremities trunk including head   Neurological: Positive for syncope and weakness  Negative for headaches  Hematological: Bruises/bleeds easily  Psychiatric/Behavioral: Negative for confusion  All other systems reviewed and are negative  Physical Exam  Physical Exam  Vitals and nursing note reviewed  Constitutional:       Appearance: Normal appearance  HENT:      Head:      Comments: Bruising noted on the forehead and nose  No active bleeding     Right Ear: External ear normal       Left Ear: External ear normal       Nose: Nose normal       Mouth/Throat:      Pharynx: Oropharynx is clear  Eyes:      Conjunctiva/sclera: Conjunctivae normal    Cardiovascular:      Rate and Rhythm: Normal rate and regular rhythm  Pulses: Normal pulses  Pulmonary:      Effort: Pulmonary effort is normal       Breath sounds: Normal breath sounds  Abdominal:      Palpations: Abdomen is soft  Tenderness: There is no abdominal tenderness  Musculoskeletal:         General: No tenderness  Normal range of motion  Cervical back: Normal range of motion and neck supple  Skin:     General: Skin is warm and dry  Capillary Refill: Capillary refill takes less than 2 seconds  Findings: Bruising present  Neurological:      General: No focal deficit present  Mental Status: He is alert and oriented to person, place, and time     Psychiatric:         Mood and Affect: Mood normal          Behavior: Behavior normal          Vital Signs  ED Triage Vitals [09/29/22 0948]   Temperature Pulse Respirations Blood Pressure SpO2   (!) 97 3 °F (36 3 °C) 80 18 123/72 98 %      Temp src Heart Rate Source Patient Position - Orthostatic VS BP Location FiO2 (%)   -- -- -- -- --      Pain Score       --           Vitals:    09/29/22 0948   BP: 123/72   Pulse: 80         Visual Acuity      ED Medications  Medications - No data to display    Diagnostic Studies  Results Reviewed     Procedure Component Value Units Date/Time    Urine Microscopic [[id number]]  (Normal) Collected: 09/29/22 1219    Lab Status: Final result Specimen: Urine, Other Updated: 09/29/22 1243     RBC, UA 2-4 /hpf      WBC, UA None Seen /hpf      Epithelial Cells None Seen /hpf      Bacteria, UA None Seen /hpf     UA (URINE) with reflex to Scope [[id number]]  (Abnormal) Collected: 09/29/22 1219    Lab Status: Final result Specimen: Urine, Other Updated: 09/29/22 1233     Color, UA Germaine     Clarity, UA Clear     Specific Gravity, UA 1 015     pH, UA 6 5     Leukocytes, UA Negative     Nitrite, UA Negative     Protein, UA Negative mg/dl      Glucose, UA Negative mg/dl      Ketones, UA Negative mg/dl      Urobilinogen, UA 4 0 E U /dl      Bilirubin, UA Negative     Occult Blood, UA Trace-lysed    Protime-INR [[id number]]  (Abnormal) Collected: 09/29/22 1200    Lab Status: Final result Specimen: Blood from Arm, Left Updated: 09/29/22 1218     Protime 17 9 seconds      INR 1 46    APTT [[id number]]  (Normal) Collected: 09/29/22 1200    Lab Status: Final result Specimen: Blood from Arm, Left Updated: 09/29/22 1218     PTT 32 seconds     CBC and differential [[id number]]  (Abnormal) Collected: 09/29/22 1116    Lab Status: Final result Specimen: Blood from Arm, Right Updated: 09/29/22 1209     WBC 4 10 Thousand/uL      RBC 3 77 Million/uL      Hemoglobin 12 9 g/dL      Hematocrit 40 1 %       fL      MCH 34 2 pg      MCHC 32 2 g/dL      RDW 15 8 %      MPV 10 6 fL      Platelets 61 Thousands/uL      nRBC 0 /100 WBCs      Neutrophils Relative 61 %      Immat GRANS % 1 %      Lymphocytes Relative 20 %      Monocytes Relative 11 %      Eosinophils Relative 5 %      Basophils Relative 2 %      Neutrophils Absolute 2 54 Thousands/µL      Immature Grans Absolute 0 02 Thousand/uL      Lymphocytes Absolute 0 82 Thousands/µL      Monocytes Absolute 0 46 Thousand/µL      Eosinophils Absolute 0 19 Thousand/µL      Basophils Absolute 0 07 Thousands/µL     HS Troponin 0hr (reflex protocol) [[id number]]  (Normal) Collected: 09/29/22 1116    Lab Status: Final result Specimen: Blood Updated: 09/29/22 1156     hs TnI 0hr 4 ng/L     Comprehensive metabolic panel [[id number]]  (Abnormal) Collected: 09/29/22 1116    Lab Status: Final result Specimen: Blood from Arm, Right Updated: 09/29/22 1148     Sodium 146 mmol/L      Potassium 3 8 mmol/L      Chloride 106 mmol/L      CO2 34 mmol/L      ANION GAP 6 mmol/L      BUN 10 mg/dL      Creatinine 0 92 mg/dL      Glucose 99 mg/dL      Calcium 8 1 mg/dL      Corrected Calcium 9 1 mg/dL      AST 88 U/L      ALT 39 U/L      Alkaline Phosphatase 114 U/L      Total Protein 7 7 g/dL      Albumin 2 8 g/dL      Total Bilirubin 3 64 mg/dL      eGFR 92 ml/min/1 73sq m     Narrative:      [place] guidelines for Chronic Kidney Disease (CKD):     Stage 1 with normal or high GFR (GFR > 90 mL/min/1 73 square meters)    Stage 2 Mild CKD (GFR = 60-89 mL/min/1 73 square meters)    Stage 3A Moderate CKD (GFR = 45-59 mL/min/1 73 square meters)    Stage 3B Moderate CKD (GFR = 30-44 mL/min/1 73 square meters)    Stage 4 Severe CKD (GFR = 15-29 mL/min/1 73 square meters)    Stage 5 End Stage CKD (GFR <15 mL/min/1 73 square meters)  Note: GFR calculation is accurate only with a steady state creatinine    Ethanol [[id number]]  (Abnormal) Collected: 09/29/22 1116    Lab Status: Final result Specimen: Blood from Arm, Right Updated: 09/29/22 1146     Ethanol Lvl 244 mg/dL     Ammonia [[id number]]  (Normal) Collected: 09/29/22 1116    Lab Status: Final result Specimen: Blood from Arm, Right Updated: 09/29/22 1145     Ammonia 18 umol/L                  XR chest 1 view portable   Final Result by Kasandra Curran MD (09/29 1208)      No acute cardiopulmonary disease  Workstation performed: [id number]         CT head without contrast   Final Result by Kasandra Curran MD (09/29 1208)      No acute intracranial abnormality                    Workstation performed: [id number]                    Procedures  ECG 12 Lead Documentation Only    Date/Time: 9/29/2022 11:09 AM  Performed by: Anitha Wilcox MD  Authorized by: Anitha Wilcox MD     Indications / Diagnosis:  Head injury  ECG reviewed by me, the ED Provider: yes    Patient location:  ED  Interpretation:     Interpretation: normal    Rate:     ECG rate:  70    ECG rate assessment: normal    Rhythm:     Rhythm: sinus rhythm    Ectopy:     Ectopy: none    QRS:     QRS axis:  Normal    QRS intervals:  Normal  Conduction:     Conduction: normal    ST segments:     ST segments:  Normal  T waves:     T waves: normal               ED Course                                             MDM  Number of Diagnoses or Management Options  Diagnosis management comments: Patient has a presumed head injury with loss of consciousness and has known coagulopathy secondary to end-stage cirrhosis    Will CT scan the head      Disposition  Final diagnoses:   Alcohol abuse   Cirrhosis ([facility] )   Thrombocytopenia ([facility] )     Time reflects when diagnosis was documented in both MDM as applicable and the Disposition within this note     Time User Action Codes Description Comment    9/29/2022  1:34 PM Valeria Zeeshan Add [F10 10] Alcohol abuse     9/29/2022  1:35 PM Nicholes Ford A Add [K74 60] Cirrhosis ([facility] )     9/29/2022  1:35 PM Nicholes Ford A Add [D69 6] Thrombocytopenia [facility])       ED Disposition     ED Disposition   Transfer to Another Facility-In Network    Condition   --    Date/Time   Thu Sep 29, 2022  1:34 PM    Comment   [facility]  Most Recent Value   Patient Condition The patient has been stabilized such that within reasonable medical probability, no material deterioration of the patient condition or the condition of the unborn child(rolo) is likely to result from the transfer   Reason for Transfer Level of Care needed not available at this facility   Benefits of Transfer Specialized equipment and/or services available at the receiving facility (Include comment)________________________   Risks of Transfer Potential for delay in receiving treatment   Accepting Physician Dr Karli Gill Name, Lieutenant Reza Joyner   Provider Certification General risk, such as traffic hazards, adverse weather conditions, rough terrain or turbulence, possible failure of equipment (including vehicle or aircraft), or consequences of actions of persons outside the control of the transport personnel      RN Documentation    [address] Henny Strange Name, Joceline [facility]      Follow-up Information    None         Patient's Medications   Discharge Prescriptions    No medications on file       No discharge procedures on file      PDMP Review       Value Time User    PDMP Reviewed  Yes 9/17/2022  1:32 PM Anroldo Sousa MD          ED Provider  Electronically Signed by           Jc Vergara MD  09/29/22 Dion Bolden MD  09/29/22 0081

## 2022-09-29 NOTE — ED NOTES
Pt will be deemed clinically sober by 5 pm per ethanol result  CIWA to begin at that time        Mery Whitten RN  09/29/22 0250

## 2022-09-30 LAB
ALBUMIN SERPL BCP-MCNC: 2.7 G/DL (ref 3.5–5)
ALP SERPL-CCNC: 101 U/L (ref 43–122)
ALT SERPL W P-5'-P-CCNC: 35 U/L
ANION GAP SERPL CALCULATED.3IONS-SCNC: 3 MMOL/L (ref 5–14)
AST SERPL W P-5'-P-CCNC: 86 U/L (ref 17–59)
BILIRUB SERPL-MCNC: 3.86 MG/DL (ref 0.2–1)
BUN SERPL-MCNC: 15 MG/DL (ref 5–25)
CALCIUM ALBUM COR SERPL-MCNC: 9.1 MG/DL (ref 8.3–10.1)
CALCIUM SERPL-MCNC: 8.1 MG/DL (ref 8.4–10.2)
CHLORIDE SERPL-SCNC: 104 MMOL/L (ref 96–108)
CO2 SERPL-SCNC: 31 MMOL/L (ref 21–32)
CREAT SERPL-MCNC: 0.64 MG/DL (ref 0.7–1.5)
ERYTHROCYTE [DISTWIDTH] IN BLOOD BY AUTOMATED COUNT: 15.5 % (ref 11.6–15.1)
GFR SERPL CREATININE-BSD FRML MDRD: 109 ML/MIN/1.73SQ M
GLUCOSE SERPL-MCNC: 112 MG/DL (ref 70–99)
HCT VFR BLD AUTO: 34.2 % (ref 36.5–49.3)
HGB BLD-MCNC: 11.4 G/DL (ref 12–17)
MAGNESIUM SERPL-MCNC: 1.6 MG/DL (ref 1.6–2.3)
MCH RBC QN AUTO: 34.8 PG (ref 26.8–34.3)
MCHC RBC AUTO-ENTMCNC: 33.3 G/DL (ref 31.4–37.4)
MCV RBC AUTO: 104 FL (ref 82–98)
PLATELET # BLD AUTO: 52 THOUSANDS/UL (ref 149–390)
PMV BLD AUTO: 11.9 FL (ref 8.9–12.7)
POTASSIUM SERPL-SCNC: 3.6 MMOL/L (ref 3.5–5.3)
PROT SERPL-MCNC: 6.6 G/DL (ref 6.4–8.4)
RBC # BLD AUTO: 3.28 MILLION/UL (ref 3.88–5.62)
SODIUM SERPL-SCNC: 138 MMOL/L (ref 135–147)
WBC # BLD AUTO: 3.58 THOUSAND/UL (ref 4.31–10.16)

## 2022-09-30 PROCEDURE — 99232 SBSQ HOSP IP/OBS MODERATE 35: CPT | Performed by: EMERGENCY MEDICINE

## 2022-09-30 PROCEDURE — 80053 COMPREHEN METABOLIC PANEL: CPT

## 2022-09-30 PROCEDURE — 83735 ASSAY OF MAGNESIUM: CPT

## 2022-09-30 PROCEDURE — 85027 COMPLETE CBC AUTOMATED: CPT

## 2022-09-30 PROCEDURE — 97163 PT EVAL HIGH COMPLEX 45 MIN: CPT

## 2022-09-30 RX ORDER — PHENOBARBITAL 64.8 MG/1
64.8 TABLET ORAL ONCE
Status: COMPLETED | OUTPATIENT
Start: 2022-09-30 | End: 2022-09-30

## 2022-09-30 RX ORDER — DIAZEPAM 5 MG/1
10 TABLET ORAL ONCE
Status: COMPLETED | OUTPATIENT
Start: 2022-09-30 | End: 2022-09-30

## 2022-09-30 RX ORDER — PHENOBARBITAL 32.4 MG/1
64.8 TABLET ORAL ONCE
Status: COMPLETED | OUTPATIENT
Start: 2022-09-30 | End: 2022-09-30

## 2022-09-30 RX ADMIN — PHENOBARBITAL 64.8 MG: 64.8 TABLET ORAL at 20:27

## 2022-09-30 RX ADMIN — ACETAMINOPHEN 975 MG: 325 TABLET ORAL at 20:27

## 2022-09-30 RX ADMIN — DIAZEPAM 10 MG: 5 TABLET ORAL at 09:18

## 2022-09-30 RX ADMIN — PHENOBARBITAL 64.8 MG: 64.8 TABLET ORAL at 09:18

## 2022-09-30 RX ADMIN — LACTULOSE 20 G: 10 SOLUTION ORAL at 08:47

## 2022-09-30 RX ADMIN — PHENOBARBITAL 64.8 MG: 32.4 TABLET ORAL at 11:57

## 2022-09-30 RX ADMIN — LACTULOSE 20 G: 10 SOLUTION ORAL at 18:08

## 2022-09-30 RX ADMIN — PREDNISOLONE SODIUM PHOSPHATE 40 MG: 15 SOLUTION ORAL at 08:47

## 2022-09-30 RX ADMIN — FOLIC ACID: 5 INJECTION, SOLUTION INTRAMUSCULAR; INTRAVENOUS; SUBCUTANEOUS at 08:47

## 2022-09-30 RX ADMIN — PANTOPRAZOLE SODIUM 40 MG: 40 TABLET, DELAYED RELEASE ORAL at 08:48

## 2022-09-30 NOTE — ASSESSMENT & PLAN NOTE
Recent Labs     09/29/22  1116 09/30/22  0450   PLT 61* 52*     · Thrombocytopenia in the setting  setting of chronic alcohol use and cirrhosis  · Platelets upon admission 66  · Hold anticoagulation due to thrombocytopenia  · No active signs of bleeding  · Continue to monitor  · F/U CBC, PT/INR

## 2022-09-30 NOTE — PROGRESS NOTES
09/30/22 0841   Referral Data   Referral Source Patient   Referral Name Self   Referral Reason Drug/Alcohol [facility]   Readmission Root Cause   30 Day Readmission No   Patient Information   Mental Status Alert   Primary Caregiver Self   Accompanied by/Relationship Self   Support System Extended family;Immediate family   Legal Information   Legal Issues Pt denies   Activities of Daily Living Prior to Admission   Functional Status Independent   Assistive Device No device needed   Living Arrangement Lives with someone;House   Ambulation Independent   Access to Firearms   Access to Firearms No  (pt denies)   Income Information   Income Source Employed   [employer] Liaison   Means of Transport to Appts:   (rides bike)     Pt is a 65year old single male who was admitted to the detox unit for alcohol withdrawal  Pt had previously been on the unit 1/2022 and 4/2022  Pt's name, date of birth, home address, and telephone number were verified  Pt was informed of case management role and the purpose of the completion of intake with case management  Pt presented as cooperative during intake  Pt reports that he maintained approximately 4 months of sobriety after his most recent discharge from the detox unit; pt reports that he consumed "a small glass full of vodka" on 9/28/2022 in an attempt to "self-medicate" physical pain that he was in  Pt reports first use at age 16  Pt reports he smokes marijuana about once every couple of months with last use 9/28/2022 and first use at age 26   Pt denies any cocaine use; last use 4/17/2022 and first use at age 25  Pt reports his longest period of abstinence was four months  Pt reports previous inpatient treatment, about 1 year ago, previous outpatient, and previous 12 step meeting attendance   Pt denies current withdrawal symptoms; pt reports historical withdrawal symptoms of tremors, ambulatory dysfunction, sweats, vomiting, nausea, Dt's, hallucinations, and seizures  Pt reports a family history of substance use issues, father alcohol      AUDIT: No score   PAWSS: 7  UDS: Not completed  Ethanol Lvl in ED: 0 244     Pt denied any mental health diagnosis or history  Pt denied SI or HI, denied current AH/VH  Pt denied any history of abuse or trauma  Pt denied access to firearms  Pt denied any family history of mental illness      Pt has current medical condition of Alcoholic cirrhosis of liver ([address] )  Pt reports he currently does not have a PCP  Pt was provided with a referral for [facility] family practice during his last stay but did not follow through  Pt signed ASIM for [facility]; CM set up appointment for follow up care  Pt has medical insurance of [employer], ASIM signed  Pt has preferred pharmacy as [facility]      Pt denied current legal issues but reports history of 2 DUI's      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 [place]  Pt reports he has a high school diploma  Pt states he does not have a license or car and relies on walking or public transportation for linkage to appointments   Pt denies [employer] service and denied any cultural and religous request      Pt reports he is currently residing with his cousin  Pt signed ASIM for his cousin, Jose Brooks ([phone])  CM contacted pt's cousin who stated that pt is not welcome to return home unless he complete inpatient substance use treatment; HANNAH relayed this to pt  Pt reports that he is agreeable to this plan however, he is concerned that he may lose his job  CM provided pt with contact information for pt's employer so that he can contact them to request possible medical leave  Pt and Cm completed relapse prevention plan   Pt and Cm signed plan and pt received a copy of this plan   Pt indicates that he is interested in inpatient treatment at this time and gave verbal consent for both [facility] and [facility] for possible referral for inpatient treatment at this time  Pt clinical presentation indicates with some  some struggles regarding his ability to recognize the severity of his use and expresses that much of his use is due to his loneliness and feelings of worthlessness due to loss of job and home  Pt appears to rely on family and can benefit from increasing this connection to other sober supports, such as 12 step group  Pt can benefit from continued detox and referral to further treatment after detox  Pt's goals for pt during his stay at detox will be continuing to receive medical care and developing a structured, comprehesive aftercare plan   Pt presents in the contemplation stage of change at this time

## 2022-09-30 NOTE — ASSESSMENT & PLAN NOTE
· Pt with a h/o chronic heavy alcohol use  Patient has previously been admitted to medical detox unit in January 2022 and April 2022  · Patient reports that he was recently hospitalized at [address] [place] [address] for 6 days, reports he started drinking for 1 day prior to medical detox admission    Reports drinking 8-10 shots of vodka  · H/o withdrawal seizures  · Withdrawal management as above  · Continue IVFs, daily thiamine/folic acid supplementation, and MVI  · Consult case management for assistance with rehab resources  · Patient is interested in inpatient drug and alcohol at this time

## 2022-09-30 NOTE — ASSESSMENT & PLAN NOTE
· Patient reports recent rupture of Baker's cyst with subsequent hematoma right calf  · Venous duplex on 09/20:  Revealed a well-defined hypoechoic nonvascular lies cystic type structure noted in the popliteal fossa and hyperechoic structure noted with a proximal calf extending to mid distal calf  · Acute Care surgery was consulted during last hospital admission no intervention needed at this time  · Recommended elevation of right lower extremity and Ace bandage for compression  · On physical examination:  Trace edema right lower leg, bruising noted on a posterior calf and upper right thigh  Patient has full range of motion  · Patient reports increased pain and difficulty with ambulation that has been unchanged over the last 7 days    · Patient was being prescribed Tylenol and Percocet p r n  as needed for pain  · Plan  · Continue to elevate lower extremity  · Pain medication as needed  · PT consult

## 2022-09-30 NOTE — PHYSICAL THERAPY NOTE
Physical Therapy Evaluation    Patient's Name: Allan Frias    Admitting Diagnosis  alcohol abuse    Problem List  Patient Active Problem List   Diagnosis    Alcohol withdrawal syndrome with complication, with unspecified complication (HCC)    Alcohol use disorder, severe, dependence (HCC)    Transaminitis    Hyperbilirubinemia    Thrombocytopenia (HCC)    Tobacco abuse    Open wound of tongue due to bite    Electrolyte abnormality    Closed fracture of nasal bone    Decompensated hepatic cirrhosis (HCC)    Liver mass    Right hip pain    Alcoholic cirrhosis of liver (HCC)    COVID-19    Pancytopenia (HCC)    Alcohol withdrawal syndrome with complication (HCC)    Marijuana use    Anterior cervical lymphadenopathy    Head trauma    Bullae    Hematemesis    Hematochezia    Pain and swelling of right lower extremity    Hematoma       Past Medical History  Past Medical History:   Diagnosis Date    ETOH abuse        Past Surgical History  Past Surgical History:   Procedure Laterality Date    GALLBLADDER SURGERY         Recent Imaging  No orders to display       Recent Vital Signs  Vitals:    09/30/22 0706 09/30/22 1019 09/30/22 1056 09/30/22 1510   BP: 130/81 122/71 128/75 139/83   BP Location: Right arm Right arm Right arm Right arm   Pulse: 62 68 71 65   Resp: 20 20 20 20   Temp: (!) 97 2 °F (36 2 °C) 98 6 °F (37 °C) 98 5 °F (36 9 °C) 97 9 °F (36 6 °C)   TempSrc: Temporal Temporal Temporal Temporal   SpO2: 97% 97% 97% 95%   Weight:       Height:            09/30/22 1105   PT Last Visit   PT Visit Date 09/30/22   Note Type   Note type Evaluation   Pain Assessment   Pain Assessment Tool 0-10   Pain Score 7   Pain Location/Orientation Orientation: Right;Location: Leg   Restrictions/Precautions   Other Precautions Pain   Home Living   Type of Home House   Home Layout Multi-level;Bed/bath upstairs; Laundry in basement   [employer] Additional Comments occassional use of walls/furniture due to knee pain   Prior Function   Level of [place] Independent with ADLs and functional mobility   Lives With [facility] Help From Family   ADL Assistance Independent   IADLs Independent   Falls in the last 6 months 0   Vocational Full time employment   General   Family/Caregiver Present No   Cognition   Overall Cognitive Status WFL   Arousal/Participation Alert   Orientation Level Oriented X4   Memory Within functional limits   Following Commands Follows all commands and directions without difficulty   Strength RLE   R Hip Flexion 4/5   R Knee Flexion 3-/5   R Knee Extension 3-/5   LLE Assessment   LLE Assessment WFL   Coordination   Movements are Fluid and Coordinated 0   Coordination and Movement Description antalgic RLE   Sensation X   Light Touch   RLE Light Touch Impaired   RLE Light Touch Comments numbness and tingling in the foot   LLE Light Touch Grossly intact   Bed Mobility   Supine to Sit 6  Modified independent   Additional items Increased time required   Sit to Supine 6  Modified independent   Additional items Increased time required   Transfers   Sit to Stand 6  Modified independent   Additional items Assist x 1; Armrests; Increased time required;Verbal cues   Stand to Sit 6  Modified independent   Additional items Assist x 1; Armrests; Increased time required;Verbal cues   Additional Comments with RW   Ambulation/Elevation   Gait pattern Step through pattern;Decreased toe off;Decreased heel strike;Decreased R stance;Decreased foot clearance; Improper Weight shift; Short stride   Gait Assistance 6  Modified independent   Additional items Assist x 1;Verbal cues; Tactile cues   Assistive Device Rolling walker   Distance 200ft   Stair Management Assistance Not tested  (pt with increased pain will re attempt stairs)   Balance   Static Sitting Fair +   Dynamic Sitting Fair +   Static Standing Fair   Dynamic Standing [address],Floors 3,4, & 5 - Endurance Deficit   Endurance Deficit Yes   Endurance Deficit Description knee pain on the R   Activity Tolerance   Activity Tolerance Patient limited by fatigue;Patient limited by pain   Medical Staff Made Aware spoke to CM   Nurse Made Aware spoke to RN   Assessment   Prognosis Fair   Problem List Decreased strength;Decreased range of motion;Decreased endurance; Impaired balance;Decreased mobility; Decreased coordination;Pain; Impaired sensation   Barriers to Discharge Inaccessible home environment;Decreased caregiver support   Goals   Patient Goals to get to rehab and so I can get back to work   STG Expiration Date 10/05/22   Short Term Goal #1 see eval note   Plan   Treatment/Interventions ADL retraining;Functional transfer training;LE strengthening/ROM; Elevations; Therapeutic exercise; Endurance training;Patient/family training;Equipment eval/education; Bed mobility;Gait training;Spoke to nursing;Spoke to case management   PT Frequency 2-3x/wk   Recommendation   PT Discharge Recommendation Home with outpatient rehabilitation  (when finished with ETOH rehab treatment if continued knee pain)   Equipment Recommended Walker;Cane  (RW vs SPC TBD next treatment)   AM-PAC Basic Mobility Inpatient   Turning in Bed Without Bedrails 4   Lying on Back to Sitting on Edge of Flat Bed 4   Moving Bed to Chair 4   Standing Up From Chair 3   Walk in Room 3   Climb 3-5 Stairs 3   Basic Mobility Inpatient Raw Score 21   Basic Mobility Standardized Score 45 55   Highest Level Of Mobility   JH-HLM Goal 6: Walk 10 steps or more   JH-HLM Achieved 7: Walk 25 feet or more   End of Consult   Patient Position at End of Consult Seated edge of bed; All needs within reach         ASSESSMENT                                                                                                                     Giuliano Dwyer is a 65 y o  male admitted to [facility] on 0/67/7595 for Alcoholic cirrhosis of liver ([facility] [address] )   Pt  has a past medical history of ETOH abuse  PT was consulted and pt was seen on 9/30/2022 for mobility assessment and d/c planning  Pt presents supine in bed alert and agreeable to therapy  He has pain in the posterior aspect of R knee, bruising to L knee, calf and lateral thigh due to burst baker cyst  ROM limited to the R knee into ext and flexion unable to fully ext knee during gait using step to pattern most of time  No LOB, able to tolerate household distances  Will return for further treatment including evaluation for use of SPC as well as ability to complete stairs  Impairments limiting pt at this time include impaired balance, decreased endurance, decreased coordination, increased fall risk, new onset of impairment of functional mobility, decreased ADLS, decreased IADLS, pain, decreased activity tolerance, decreased sensation, and decreased strength  Pt is currently functioning at a modified independent assistance level for bed mobility, modified independent assistance level for transfers, modified independent assistance level for ambulation with Rolling Walker, and needs assessed elevations  The patient's AM-PAC Basic Mobility Inpatient Short Form Raw Score is 21  A Raw score of greater than 16 suggests the patient may benefit from discharge to home  Please also refer to the recommendation of the Physical Therapist for safe discharge planning  Goals                                                                                                                                    1) Bed mobility skills with independent to facilitate safe return to previous living environment 2) Functional transfers with modified independent assistance to facilitate safe return to previous living environment  3) Ambulation with least restrictive AD modified independent assistance without LOB and stable vitals for safe ambulation home/ community distances   4) Stair training up/down flight 12 step/s with appropriate rail/s  and modified independent assistance for safe access to previous living environment  5) Improve balance grades to fair + to reduce risk of falls  6)Improve LE strength grades by 1 to increase independence w/ transfers and gait  7) PT for ongoing pt and family education; DME needs and D/C planning to promote highest level of function in least restrictive environment  Recommendations                                                                                                              Pt will benefit from continued skilled IP PT to address the above mentioned impairments in order to maximize recovery and increase functional independence when completing mobility and ADLs  See flow sheet for goals and POC       DME: 5 [facility]    Discharge Disposition:  Home with Outpatient Physical Therapy       Luis Carlos Lennon PT, DPT

## 2022-09-30 NOTE — ASSESSMENT & PLAN NOTE
· H/o chronic daily alcohol consumption and prior reports of alcohol withdrawal seizures  · Relapsed on alcohol 9/28/22 and per report was drinking 8-10 shots of vodka  · Last drink:  09/29/2022  · Ethanol lvl:  244 9/29/22 at 11:00 a m   · SEWS protocol with symptom-triggered phenobarbital for medically-assisted alcohol withdrawal  · Received 194 4 mg total phenobarbital; last dose administered 9/30/2022 2027  · Appears stable off phenobarbital- VSS, baseline tremor   · Acute withdrawal resolved

## 2022-09-30 NOTE — DISCHARGE INSTR - OTHER ORDERS
[address] Drug and alcohol resources:            [address] health resources:    611 [facility]  24-hour crisis hotline [phone]  The [address] (CIU/ES) program provides emergency mental health services to [address] [place] [address] residents who are experiencing a mental health crisis, their families and caregivers  The primary goals of the CIU/ES program are to prevent suicide, homicide and other violent and self-destructive behaviors or dangerous situations from occurring as the result of an individual’s untreated mental illness or as a result of a family experiencing a mental health crisis      [address] Department [facility] 901 [facility] Angelique Marin 108 [phone] / 6 [address] [phone]      Integrated Case Management Services [facility] [address] [phone]      [address] of [address] [place] [address] Nelly Daniels 48 [phone]      102 [address] [facility] of [address] [place] [address] Reynaldo Delgado [address] [phone]      155 [facility] of [address] [place] [address] Reynaldo Delgado [address] [phone]      Residential Services [address] [phone]      [facility] 129 [facility] [address] Ana Zuñiga, [address] [facility] [phone]         Deaf Enhanced [address] [facility] 127 [facility], 8102 [facility] [phone] or [phone]      Homeless Services ([address]) [address] [phone]      Intensive Family Support Services [facility], [address] [phone]      Intensive Outpatient Treatment and Support Services IOP Program at [facility] [address] Nelly Julio Jaydon Elly Koroma 48 [phone]      102 [address] [facility] 7900  1826 [facility] [address] [phone]      PRIMARY SCREENING CENTER for [facility] Yovana Patel 6 HOTLINE:  [phone]      Program of [address] [place] [address] (PACT) [address], [address] Anuj, [address] [phone] (PACT IV)      Self-Help Center [facility] 12[address] [facility], [address] [phone]    Supported [address] [facility], [address] [phone] 500 [facility] 759 [facility] TomasAngelique 108 [phone]      [address] of [address], [address], [address] [facility] [phone]      3[facility] [address] Nella Gaucher, [address] [place] [address] [phone]

## 2022-09-30 NOTE — ASSESSMENT & PLAN NOTE
· Patient with history of alcoholic cirrhosis secondary to chronic alcohol use  · Recently hospitalized at [facility] from 9/22/22- 9/26/22  · During this admission patient was followed by GI- Started on Prednisolone 40 mg daily, Continue PPI and lactulose TID  · Continue to monitor blood pressures and restart naldolol for variceal prophylaxis when appropriate  · CT abdomen and pelvis 09/14/2022  · Hepatic cirrhosis with signs of increasing portal hypertension  In addition to splenomegaly there are enlarging varices inclusive of prominent periumbilical varices which are significantly increasing in size when compared to March 2021  · Pertinent labs upon admission  · Platelets 61  · AST 88, ALT 39  · Albumin 2 8  · Total bilirubin 3 64  · Ammonia 18  · PT 17 6, INR 1 46  · On physical examination:  + scleral icterus, + caput medusa, + hepatomegaly  · Plan:  · Continue to trend labs, monitor PT/INR  If improvement PT INR after starting prednisone due to decompensated liver cirrhosis    Will continue prednisone during inpatient  · Recommended outpatient GI follow-up upon discharge  · Continue current medication regiment of PPI and lactulose  · Continue to monitor blood pressures and restart naldolol for variceal prophylaxis when appropriate

## 2022-09-30 NOTE — ASSESSMENT & PLAN NOTE
· Patient with history of alcoholic cirrhosis secondary to chronic alcohol use  · Recently hospitalized at [facility] from 9/22/22- 9/26/22  · During this admission patient was followed by GI- Started on Prednisolone 40 mg daily for suspected alcoholic hepatitis, continue  Continue PPI and lactulose TID  · Resume nadolol  · CT abdomen and pelvis 09/14/2022: Hepatic cirrhosis with signs of increasing portal hypertension  In addition to splenomegaly there are enlarging varices inclusive of prominent periumbilical varices which are significantly increasing in size when compared to March 2021  · INR elevated but stable, LFTs stable overall (mild elevation of AST)  · On physical examination:  + scleral icterus, + caput medusa, + hepatomegaly  · Plan:  · Continue to trend labs, monitor PT/INR    · Recommended outpatient GI follow-up upon discharge  · Continue current medication regimen as above

## 2022-09-30 NOTE — ASSESSMENT & PLAN NOTE
· Pt with a h/o chronic heavy alcohol use  Patient has previously been admitted to medical detox unit in January 2022 and April 2022  · Patient reports that he was recently hospitalized for 6 days, reports he started drinking for 1 day prior to medical detox admission    Reports drinking 8-10 shots of vodka  · H/o withdrawal seizures  · Withdrawal management as above  · Initiate IVFs, daily thiamine/folic acid supplementation, and MVI  · Consult case management for assistance with rehab resources - pt interested in inpatient drug and alcohol at this time

## 2022-09-30 NOTE — ASSESSMENT & PLAN NOTE
· Patient reports recent rupture of Baker's cyst with subsequent hematoma right calf  · Venous duplex on 09/20:  Revealed a well-defined hypoechoic nonvascular lies cystic type structure noted in the popliteal fossa and hyperechoic structure noted with a proximal calf extending to mid distal calf  · Acute Care surgery was consulted during last hospital admission no intervention needed at this time  · Recommended elevation of right lower extremity and Ace bandage for compression  · On physical examination:  Trace edema right lower leg, bruising noted on a posterior calf and upper right thigh  Patient has full range of motion  · Patient reports increased pain and difficulty with ambulation that has been unchanged over the last 7 days    · Patient was being prescribed Tylenol and Percocet p r n  as needed for pain  · Plan  · Continue to elevate lower extremity  · Pain medication as needed  · PT consulted and recommendations appreciated

## 2022-09-30 NOTE — PROGRESS NOTES
[facility]  Progress Note - Phillip Body 1965, 65 y o  male MRN: [id number]  Unit/Bed#: 5T DETOX 398-06 Encounter: [id number]  Primary Care Provider: Adolfo Flynn MD   Date and time admitted to hospital: 9/29/2022  7:27 PM    [facility], LEVEL 4  Department of Medical Toxicology  Reason for Admission/Principal Problem: Alcohol Detoxification  Rounding Provider: Dr Ana Laura Kendall MD     Alcohol withdrawal syndrome with complication, with unspecified complication [facility])  Assessment & Plan  · H/o chronic daily alcohol consumption and prior reports of alcohol withdrawal seizures  · Relapsed on alcohol 9/28/22 and per report was drinking 8 - 10 shots of vodka  · Last drink:  09/29/2022  · Ethanol lvl:  244 9/29/22 at 11:00 a m  · Follow SEWS protocol with symptom-triggered phenobarbital for medically-assisted alcohol withdrawal  · SEWS score upon admission 0  · Most recent SEWS score this morning at 8:00 AM was 2 and the patient has received 65 mg of phenobarbital and 10 mg of valium this morning at 9:18 AM   · Continue IV thiamine, folic acid, and multivitamin supplementation   · Continue to monitor vitals, symptoms  · Continous pulse oximetry and telemetry monitoring    Alcohol use disorder, severe, dependence ([facility] [address] )  Assessment & Plan  · Pt with a h/o chronic heavy alcohol use  Patient has previously been admitted to medical detox unit in January 2022 and April 2022  · Patient reports that he was recently hospitalized at [address] [place] [address] for 6 days, reports he started drinking for 1 day prior to medical detox admission    Reports drinking 8-10 shots of vodka  · H/o withdrawal seizures  · Withdrawal management as above  · Continue IVFs, daily thiamine/folic acid supplementation, and MVI  · Consult case management for assistance with rehab resources  · Patient is interested in inpatient drug and alcohol at this time      * Alcoholic cirrhosis of liver [facility])  Assessment & Plan  · Patient with history of alcoholic cirrhosis secondary to chronic alcohol use  · Recently hospitalized at [facility], [place], operated by [facility] from 9/22/22- 9/26/22  · During this admission patient was followed by GI- Started on Prednisolone 40 mg daily, Continue PPI and lactulose TID  · Continue to monitor blood pressures and restart naldolol for variceal prophylaxis when appropriate  · CT abdomen and pelvis 09/14/2022  · Hepatic cirrhosis with signs of increasing portal hypertension  In addition to splenomegaly there are enlarging varices inclusive of prominent periumbilical varices which are significantly increasing in size when compared to March 2021  · Pertinent labs upon admission  · Platelets 61  · AST 88, ALT 39  · Albumin 2 8  · Total bilirubin 3 64  · Ammonia 18  · PT 17 6, INR 1 46  · On physical examination:  + scleral icterus, + caput medusa, + hepatomegaly  · Plan:  · Continue to trend labs, monitor PT/INR  If improvement PT INR after starting prednisone due to decompensated liver cirrhosis  Will continue prednisone during inpatient  · Recommended outpatient GI follow-up upon discharge  · Continue current medication regiment of PPI and lactulose  · Continue to monitor blood pressures and restart naldolol for variceal prophylaxis when appropriate    Pain and swelling of right lower extremity  Assessment & Plan  · Patient reports recent rupture of Baker's cyst with subsequent hematoma right calf  · Venous duplex on 09/20:  Revealed a well-defined hypoechoic nonvascular lies cystic type structure noted in the popliteal fossa and hyperechoic structure noted with a proximal calf extending to mid distal calf     · Acute Care surgery was consulted during last hospital admission no intervention needed at this time  · Recommended elevation of right lower extremity and Ace bandage for compression  · On physical examination:  Trace edema right lower leg, bruising noted on a posterior calf and upper right thigh  Patient has full range of motion  · Patient reports increased pain and difficulty with ambulation that has been unchanged over the last 7 days  · Patient was being prescribed Tylenol and Percocet p r n  as needed for pain  · Plan  · Continue to elevate lower extremity  · Pain medication as needed  · PT consulted and recommendations appreciated    Thrombocytopenia [facility])  Assessment & Plan  Recent Labs     09/29/22  1116 09/30/22  0450   PLT 61* 52*     · Thrombocytopenia in the setting  setting of chronic alcohol use and cirrhosis  · Platelets upon admission 66  · Hold anticoagulation due to thrombocytopenia  · No active signs of bleeding  · Continue to monitor  · F/U CBC, PT/INR    VTE Pharmacologic Prophylaxis:   Pharmacologic: Pharmacologic VTE Prophylaxis contraindicated due to pancytopenia  Mechanical VTE Prophylaxis in Place: yes    Code Status: Level 1 - Full Code    Patient Centered Rounds: I have performed bedside rounds with nursing staff today  Discussions with Specialists or Other Care Team Provider: Case Management   Education and Discussions with Family / Patient: At this time I discussed the plan for ongoing medication management for alcohol withdrawal treatment and symptom control with benzodiazepines (valium) and phenobarbital     Time Spent for Care: 30 minutes  More than 50% of total time spent on counseling and coordination of care as described above  Current Length of Stay: 1 day(s)    Current Patient Status: Inpatient     Certification Statement: The patient will continue to require additional inpatient hospital stay due to alcohol withdrawal syndrome with complication Discharge Plan: At this time the plan is to continue to treat the patient for alcohol withdrawal utilizing the SEWS protocol for symptom triggered phenobarbital administration  At this time physical therapy is following due to ambulatory dysfunction and to assess for physical rehab      Total time spent today 30 minutes  Greater than 50% of total time was spent with the patient and / or family counseling and / or coordination of care  A description of the counseling / coordination of care: At this time I discussed the plan for ongoing medication management for alcohol withdrawal treatment and symptom control with benzodiazepines (valium) and phenobarbital     Subjective: This is a progress note for Milly Bowen, a 65year old male with a sPMHx of severe alcohol use disorder complicated by decompensated alcoholic cirrhosis, thrombocytopenia, and pancytopenia  Beto Goodwin has a history of alcohol withdrawal seizures and has been previously treated on the detox unit in January 2022 and April 2022 for alcohol withdrawal      Per report, the patient was recently at the [address] from 9/22/22 to 9/26/22 due to right leg swelling and bloody bowel movements  During his stay at [facility] he was found to have a ruptured Baker's cyst and subsequent development of a right lower extremity hematoma (suspected to be an intramuscular gastrocnemius hematoma) and conservative management was recommended  He was also found to have decompensated rectal varices, alcoholic cirrhosis (with a concern for alcoholic hepatitis) and was started on prednisolone by GI  The patient was discharged from the hospital and relapsed on alcohol yesterday 9/28/22 and per report was drinking 8 - 10 shots of vodka  Serum ethanol of 244 obtained in the ED  Patient was admitted to the detox unit 9/29/22 and was started on the SEWS protocol for alcohol withdrawal  At the time presentation to the detox unit SEWS score was 0  Most recent SEWS score this morning at 8:00 AM was 2 and the patient has received 65 mg of phenobarbital and 10 mg of valium this morning at 9:18 AM     Patient was seen and examined  On assessment patient is oriented to person, place, and time   However, patient appears to be confused about the situation surrounding his current hospitalization and perseverates on the circumstances that caused his admission to [place]  Today on assessment he states that in the last two weeks he has consumed only one glass of vodka  When asked why he presented to the hospital, he states that he got up out of bed and developed severe leg pain and swelling  Today on assessment Kamari reports that he is currently experiencing persistent pain in his right lower extremity that is exacerbated by standing up (he states it throbs when he stands up)  He reports worsening tremulousness and abdominal pain at the site of his epigastric veins  At the time of assessment Kamari denies fevers, chills, chest pain, shortness of breath, nausea, and vomiting  He reports that he has been eating well in the hospital  He reports that his sleep has been improving  He has been up and out of bed, but feels unsteady on his feet due to his leg pain          Objective:     Clinical Opiate Withdrawal Scale  Pulse: 71    SEWS Total Score: 0 (9/30/2022 12:50 PM)    Last 24 Hours Medication List:   Current Facility-Administered Medications   Medication Dose Route Frequency Provider Last Rate   • acetaminophen  650 mg Oral Q6H PRN Gar Suze PA-C     • acetaminophen  650 mg Oral Q6H PRN Gar Suze PA-C     • acetaminophen  975 mg Oral Q6H PRN Gar Suze PA-C     • folic acid 1 mg, thiamine 100 mg in 0 9% sodium chloride 100 mL IVPB   Intravenous Daily Giancarlo Arciniega PA-C Stopped (09/30/22 9744)   • hydrocortisone  25 mg Rectal BID PRN Gar Suze PA-C     • lactulose  20 g Oral BID Giancarlo Arciniega PA-C     • ondansetron  4 mg Intravenous Q6H PRN Giancarlo Arciniega PA-C     • pantoprazole  40 mg Oral Daily Gar Suze PA-C     • prednisoLONE  40 mg Oral Daily Gar Suze, PA-C     • sodium chloride  75 mL/hr Intravenous Continuous Giancarlo Arciniega PA-C Stopped (09/30/22 0927)   • traZODone 50 mg Oral HS PRN Leonardo Ramos PA-C           Vitals:   Temp (24hrs), Av 4 °F (36 9 °C), Min:97 2 °F (36 2 °C), Max:98 9 °F (37 2 °C)    Temp:  [97 2 °F (36 2 °C)-98 9 °F (37 2 °C)] 98 5 °F (36 9 °C)  HR:  [62-91] 71  Resp:  [20] 20  BP: (120-130)/(70-81) 128/75  SpO2:  [91 %-97 %] 97 %  Body mass index is 25 71 kg/m²  Input and Output Summary (last 24 hours): Intake/Output Summary (Last 24 hours) at 2022 1405  Last data filed at 2022 0951  Gross per 24 hour   Intake 580 ml   Output 300 ml   Net 280 ml       Physical Exam:   Physical Exam  Vitals and nursing note reviewed  Constitutional:       General: He is not in acute distress  Appearance: He is ill-appearing  He is not diaphoretic  Comments: Patient awake, alert, sitting upright in bed eating breakfast    HENT:      Head: Normocephalic and atraumatic  Nose: Nose normal       Mouth/Throat:      Mouth: Mucous membranes are moist       Pharynx: Oropharynx is clear  Comments: Tongue fasciculations noted on exam  Eyes:      General: Scleral icterus present  Pupils: Pupils are equal, round, and reactive to light  Cardiovascular:      Rate and Rhythm: Normal rate and regular rhythm  Pulses: Normal pulses  Pulmonary:      Effort: Pulmonary effort is normal  No respiratory distress  Breath sounds: No wheezing  Abdominal:      General: Bowel sounds are normal       Tenderness: There is abdominal tenderness in the periumbilical area  There is no right CVA tenderness or left CVA tenderness  Comments: Large, palpable periumbilical varices, caput medusae present  Tender to palpation  Musculoskeletal:      Right lower leg: No edema  Left lower leg: No edema  Lymphadenopathy:      Cervical: Cervical adenopathy present  Skin:     General: Skin is warm and dry  Capillary Refill: Capillary refill takes less than 2 seconds  Coloration: Skin is jaundiced        Findings: Bruising (Large patch of ecchymosis noted along the right lateral leg ) and erythema present  Neurological:      Mental Status: He is alert and oriented to person, place, and time  Sensory: Sensory deficit (lower extremities  ) present  Motor: No tremor or abnormal muscle tone  Psychiatric:         Attention and Perception: He is inattentive  Mood and Affect: Affect is blunt  Behavior: Behavior is slowed  Behavior is cooperative  Cognition and Memory: He exhibits impaired recent memory  Additional Data:     Labs: keep all most recent labs as listed on admission templates   Results from last 7 days   Lab Units 09/30/22  0450 09/29/22  1116   WBC Thousand/uL 3 58* 4 10*   HEMOGLOBIN g/dL 11 4* 12 9   HEMATOCRIT % 34 2* 40 1   PLATELETS Thousands/uL 52* 61*   NEUTROS PCT %  --  61   LYMPHS PCT %  --  20   MONOS PCT %  --  11   EOS PCT %  --  5      Results from last 7 days   Lab Units 09/30/22  0450   SODIUM mmol/L 138   POTASSIUM mmol/L 3 6   CHLORIDE mmol/L 104   CO2 mmol/L 31   BUN mg/dL 15   CREATININE mg/dL 0 64*   ANION GAP mmol/L 3*   CALCIUM mg/dL 8 1*   ALBUMIN g/dL 2 7*   TOTAL BILIRUBIN mg/dL 3 86*   ALK PHOS U/L 101   ALT U/L 35   AST U/L 86*   GLUCOSE RANDOM mg/dL 112*      Results from last 7 days   Lab Units 09/29/22  1200   INR  1 46*                         * I Have Reviewed All Lab Data Listed Above  * Additional Pertinent Lab Tests Reviewed: All Labs Within Last 24 Hours Reviewed      Imaging Studies: I have personally reviewed pertinent reports  Recent Cultures (last 7 days): Today, Patient Was Seen By: Wilfred Marin    ** Please Note: Dictation voice to text software may have been used in the creation of this document   **

## 2022-09-30 NOTE — H&P
HISTORY & PHYSICAL EXAM  DEPARTMENT OF MEDICAL TOXICOLOGY  LEVEL 4 MEDICAL DETOX UNIT  Elta Day 65 y o  male MRN: [id number]  Unit/Bed#:  DETOX 504-01 Encounter: [id number]      Reason for Admission/Principal Problem: Ethanol withdrawal, Ethanol use disorder  Admitting Provider: Jackson Travis  Attending Provider: Yeison Hand MD   9/29/2022  7:27 PM        Alcohol withdrawal syndrome with complication, with unspecified complication [facility])  Assessment & Plan  · H/o chronic daily alcohol consumption, denies h/o withdrawal seizures  · Last drink:  09/29/2022  · Ethanol lvl:  244 at 11:00 a m  ·  Follow SEWS protocol with symptom-triggered phenobarbital for medically-assisted alcohol withdrawal  · Patient does not currently display symptoms of withdrawal at this time  · SEWS score upon admission 0  · Will initiate IV thiamine, folic acid, and multivitamin supplementation   · Continue to monitor vitals, symptoms  · Continous pulse oximetry and telemetry monitoring          Alcohol use disorder, severe, dependence ([facility] [address] )  Assessment & Plan  · Pt with a h/o chronic heavy alcohol use  Patient has previously been admitted to medical detox unit in January 2022 and April 2022  · Patient reports that he was recently hospitalized for 6 days, reports he started drinking for 1 day prior to medical detox admission    Reports drinking 8-10 shots of vodka  · H/o withdrawal seizures  · Withdrawal management as above  · Initiate IVFs, daily thiamine/folic acid supplementation, and MVI  · Consult case management for assistance with rehab resources - pt interested in inpatient drug and alcohol at this time      * Alcoholic cirrhosis of liver [facility])  Assessment & Plan  · Patient with history of alcoholic cirrhosis secondary to chronic alcohol use  · Recently hospitalized at [facility] from 9/22/22- 9/26/22  · During this admission patient was followed by GI- Started on Prednisolone 40 mg daily, Continue PPI, nadolol, and lactulose TID  · CT abdomen and pelvis 09/14/2022  · Hepatic cirrhosis with signs of increasing portal hypertension  In addition to splenomegaly there are enlarging varices inclusive of prominent periumbilical varices which are significantly increasing in size when compared to March 2021  · Pertinent labs upon admission  · Platelets 61  · AST 88, ALT 39  · Albumin 2 8  · Total bilirubin 3 64  · Ammonia 18  · PT 17 6, INR 1 46  · On physical examination:  + scleral icterus, + caput medusa, + hepatomegaly  · Plan:  · Continue to trend labs, monitor PT/INR  If improvement PT INR after starting prednisone due to decompensated liver cirrhosis  Will continue prednisone during inpatient  · Recommended outpatient GI follow-up upon discharge  · Continue current medication regiment of PPI, nadolol,  lactulose    Pain and swelling of right lower extremity  Assessment & Plan  · Patient reports recent rupture of Baker's cyst with subsequent hematoma right calf  · Venous duplex on 09/20:  Revealed a well-defined hypoechoic nonvascular lies cystic type structure noted in the popliteal fossa and hyperechoic structure noted with a proximal calf extending to mid distal calf  · Acute Care surgery was consulted during last hospital admission no intervention needed at this time  · Recommended elevation of right lower extremity and Ace bandage for compression  · On physical examination:  Trace edema right lower leg, bruising noted on a posterior calf and upper right thigh  Patient has full range of motion  · Patient reports increased pain and difficulty with ambulation that has been unchanged over the last 7 days    · Patient was being prescribed Tylenol and Percocet p r n  as needed for pain  · Plan  · Continue to elevate lower extremity  · Pain medication as needed  · PT consult    Thrombocytopenia (HCC)  Assessment & Plan  · Thrombocytopenia setting of chronic alcohol use and cirrhosis  · Platelets upon admission 66  · Hold anticoagulation due to thrombocytopenia  · No active signs of bleeding  · Continue to monitor             VTE Prophylaxis: Pharmacologic VTE Prophylaxis contraindicated due to Thrombocytopenia  / sequential compression device   Code Status:  Full code per patient      Anticipated Length of Stay:  Patient will be admitted on an Inpatient basis with an anticipated length of stay of  2 midnights  Justification for Hospital Stay:  Alcohol withdrawal    For any questions or concerns, please Tiger Text the advanced practitioner in the role of [facility]-DETOX-AP On Call      This patient qualifies for Level IV medically managed intensive inpatient services under the criteria set by the American Society of Addiction Medicine, including dimensions 1-3  The patient is in withdrawal (or is intoxicated with high risk of withdrawal), with severe and unstable medical and/or psychiatric (dual diagnosis) problems, requiring requires 24-hour medical and nursing care and the full resources of a [address] patient to medical detox unit and continue supportive care and stabilization of acute ethanol withdrawal per medical toxicology/detox treatment pathway  Monitor ethanol withdrawal severity via the Severity of Ethanol Withdrawal Scale (SEWS) Q4 hours and then hourly if/when SEWS > 6  Treat withdrawal per pathway and reassess Q30-60 minutes  Mild SEWS Score 1-6  Administer medications* (IV or PO; PO preferred):  • If initial SEWS score: diazepam 10mg PO/IV x 1 AND phenobarbital 65 mg PO/IV x 1  • If repeat SEWS score 1-6: phenobarbital 65 mg PO/IV q1 hour x 5 doses maximum   Reassessment:   • SEWS q1 hour after each dose until SEWS 0 x 2 hours  • VS q1 hours (until SEWS 0, then q4 hours)  • Notify provider for bedside evaluation if 5-dose maximum is reached, RASS of -3 to -5, or SEWS score escalates to moderate or severe     Moderate SEWS Score 7-12  Administer medications* (IV):  • If initial SEWS score: diazepam 10mg IV x 1 AND phenobarbital 260 mg IV x 1  • If repeat SEWS score 7-12 or score escalated from mild: phenobarbital 130 mg IV q30 minutes x 5 doses maximum   Reassessment:  • SEWS q30 minutes after each dose until SEWS < 7 (then hourly until SEWS 0 x 2 hours)  • VS q30 minutes until SEWS < 7 (then hourly until SEWS 0, then q4 hours)  • Notify provider for bedside evaluation if 5-dose maximum is reached, RASS of -3 to -5, or SEWS score escalates to severe  Severe SEWS Score ? 13  Administer medications* (IV):  • If initial SEWS score: Diazepam 10 mg IV x 1 AND phenobarbital 650 mg IV piggyback x 1 over 15-30 minutes  • If repeat SEWS score ? 13 or score escalated from mild or moderate: phenobarbital 130 mg IV q30 minutes x 5 doses maximum   Reassessment:  • SEWS q30 minutes after each dose until SEWS < 7 (then hourly until SEWS 0 x 2 hours)   • VS q30 minutes until SEWS < 7 (then hourly until SEWS 0, then q4 hours)  • Notify provider for bedside evaluation if 5-dose maximum is reached or RASS of -3 to -5   *Hold medications and notify provider if CNS depression, respirations < 10/min, or RASS of -3 to -5  Medications to be administered adjunctively if more than 2 grams of phenobarbital is needed for stabilization of withdrawal; require attending approval    • Dexmedetomidine infusion 0 1-1mcg/kg/hr IV infusion, titratable to reduced agitation (Goal: RASS -2)  • Ketamine   o Acute agitated delirium: 1-2 mg/kg IV or 4-5 mg/kg IM  o Refractory withdrawal: 0 1-1mg/kg/hr IV infusion, titratable to reduced agitation (Goal: RASS -2)    Further evaluation, screening and treatment:  Evaluate complete metabolic panel, transaminases, INR, and lipase  Assess hepatic ultrasound for any sign of alcoholic liver disease or cirrhosis, and ultimately refer for further hepatic evaluation and care as/if indicated      Additional medications for ethanol associated malnutrition: Thiamine 100 mg IV daily, increase to 500 mg TID for signs/symptoms of Wernicke's Encephalopathy or Wernicke Korsakoff Syndrome   Folic acid 1 mg IV daily   Multivitamin PO daily      Will offer first monthly injection of Naltrexone 380 mg IM, once patient is stabilized, as it has been shown to assist in decreasing cravings for ethanol  Evaluate and treat for coexisting substance use, such as opioids and nicotine  Discuss risk factors for infectious disease, such as history of intravenous drug abuse, and offer hepatitis and HIV screening if indicated  Case management consultation to assist with coordination of subsequent treatment after discharge  Hx and PE limited by:  None    HPI: Twila Marques is a 65y o  year old male who presents with acute alcohol withdrawal   Patient has a past medical history significant for alcohol use disorder, alcoholic cirrhosis, thrombocytopenia, and pancytopenia  Patient was initially reported to [address] [place] [address] ED due to alcohol intoxication, he was found by his cousin with altered mental status  Upon arrival to the ED patient had bruising to forehead, CT imaging of head was obtained  No intracranial abnormalities were found  Patient reports that he was recently discharged from [place] [id number] after a 6 day admission for alcoholic cirrhosis and pain of right lower extremity  During this admission patient was seen by GI who recommended initiation of prednisolone for decompensated hepatic cirrhosis  Patient was also seen by acute care surgery due to bruising of right lower extremity and pain on ambulation  Surgery recommended no immediate intervention needed to elevate leg ACE wrap for compression  Patient endorses  pain and therefore was started on Tylenol and oxycodone acetaminophen 5-325 mg every 6 hours p r n  He was discharged with 10 day supply    Patient reports that he was unable to fill pain medication and therefore he relapsed on alcohol yesterday  Reported drinking 8-10 shots of Vodka  Serum ethanol level obtained in ED to 47 at 11:00 a m  Patient has prior history of medical  i detox admissions mostly recently in January 2020 to in April 2022  His alcohol withdrawal history is complicated by withdrawal seizures, hepatic encephalopathy, and alcoholic cirrhosis  Case Management will be consulted for aftercare planning as patient is expressing interest in patient drug and alcohol rehab upon discharge  Preferred alcoholic beverage(s): Vodka   Use of any ethanol substitutes (toxic alcohols): no  Date/Time of last alcohol intake: 9/29/22 AM- 8-10 shots of vodka after not drinking for 1 week  Current signs and symptoms of ethanol withdrawal: tremor (intention)     SEWS Total Score: 0 (9/29/2022  8:00 PM)      Ethanol Withdrawal History  Previous ethanol withdrawal? yes  Prior inpatient treatment for ethanol withdrawal? yes  Prior outpatient treatment for ethanol withdrawal? yes  History of seizures with prior ethanol withdrawal? yes  Prior treatment with naltrexone (Vivitrol)? no  Current treatment with naltrexone (Vivitrol)? no  Other current treatment for ethanol use disorder? no  Co-existing substance use? no    Review of PDMP: yes     Social History     Substance and Sexual Activity   Alcohol Use Yes   • Alcohol/week: 16 0 standard drinks   • Types: 8 Cans of beer, 8 Shots of liquor per week    Comment: not drinking daily, drinks twice a week or if he has pain     Social History     Substance and Sexual Activity   Drug Use Not Currently   • Types: Marijuana     Social History     Tobacco Use   Smoking Status Never Smoker   Smokeless Tobacco Current User       Review of Systems   Constitutional: Negative for chills and fever  Eyes: Negative for visual disturbance  Gastrointestinal: Negative for blood in stool, diarrhea, nausea and vomiting  Genitourinary: Negative      Musculoskeletal: Positive for myalgias  Skin: Positive for color change  Neurological: Positive for tremors (intention) and headaches  Psychiatric/Behavioral: The patient is nervous/anxious  Historical Information   Past Medical History:   Diagnosis Date   • ETOH abuse      Past Surgical History:   Procedure Laterality Date   • GALLBLADDER SURGERY       Family History   Problem Relation Age of Onset   • Heart disease Mother    • Heart disease Father      Social History   Marital Status: Single   Occupation: unemployed  Patient Pre-hospital Living Situation: Lives with cousin  Patient Pre-hospital Level of Mobility: Independent  Patient Pre-hospital Diet Restrictions: None     No Known Allergies    Prior to Admission medications    Medication Sig Start Date End Date Taking?  Authorizing Provider   folic acid (FOLVITE) 1 mg tablet Take 1 tablet (1 mg total) by mouth daily 9/26/22  Yes TIMMY Farrar   hydrocortisone (ANUSOL-HC) 25 mg suppository Insert 1 suppository (25 mg total) into the rectum 2 (two) times a day as needed for hemorrhoids 9/26/22  Yes TIMMY Farrar   lactulose 20 g/30 mL Take 30 mL (20 g total) by mouth 2 (two) times a day 9/26/22 10/26/22 Yes TIMMY Farrar   Multiple Vitamin (multivitamin) tablet Take 1 tablet by mouth daily 12/8/20  Yes Rebecca Rome DO   oxyCODONE-acetaminophen (PERCOCET) 5-325 mg per tablet Take 1 tablet by mouth every 6 (six) hours as needed for severe pain for up to 10 doses Max Daily Amount: 4 tablets 9/26/22  Yes TIMMY Farrar   prednisoLONE (ORAPRED) 15 mg/5 mL oral solution Take 13 3 mL (40 mg total) by mouth daily for 25 days 9/27/22 10/22/22 Yes TIMMY Rivera   thiamine 100 MG tablet Take 1 tablet (100 mg total) by mouth daily 9/26/22  Yes TIMMY Farrar   chlordiazePOXIDE (LIBRIUM) 10 mg capsule Take 1 capsule (10 mg total) by mouth every 12 (twelve) hours for 2 days 9/26/22 9/28/22  [place] TIMMY Luther   lidocaine (LIDODERM) 5 % Apply 1 patch topically daily Remove & Discard patch within 12 hours or as directed by MD  Patient not taking: Reported on 9/29/2022 9/18/22   Tyrese Manuel MD   nadolol (CORGARD) 20 mg tablet Take 0 5 tablets (10 mg total) by mouth daily  Patient not taking: Reported on 9/29/2022 9/27/22   TIMMY Ferguson   nicotine (NICODERM CQ) 14 mg/24hr TD 24 hr patch Place 1 patch on the skin daily  Patient not taking: Reported on 9/29/2022 9/27/22   TIMMY Ferguson   pantoprazole (PROTONIX) 40 mg tablet Take 1 tablet (40 mg total) by mouth daily  Patient not taking: Reported on 9/29/2022 9/26/22   TIMMY Ferguson       Current Facility-Administered Medications   Medication Dose Route Frequency   • acetaminophen (TYLENOL) tablet 650 mg  650 mg Oral Q6H PRN   • acetaminophen (TYLENOL) tablet 650 mg  650 mg Oral Q6H PRN   • [START ON 4/12/5456] folic acid 1 mg, thiamine (VITAMIN B1) 100 mg in sodium chloride 0 9 % 100 mL IV piggyback   Intravenous Daily   • hydrocortisone (ANUSOL-HC) rectal suppository 25 mg  25 mg Rectal BID PRN   • ibuprofen (MOTRIN) tablet 600 mg  600 mg Oral Q6H PRN   • lactulose oral solution 20 g  20 g Oral BID   • ondansetron (ZOFRAN) injection 4 mg  4 mg Intravenous Q6H PRN   • [START ON 9/30/2022] pantoprazole (PROTONIX) EC tablet 40 mg  40 mg Oral Daily   • [START ON 9/30/2022] prednisoLONE (ORAPRED) oral solution 40 mg  40 mg Oral Daily   • sodium chloride 0 9 % infusion  75 mL/hr Intravenous Continuous   • traZODone (DESYREL) tablet 50 mg  50 mg Oral HS PRN       Continuous Infusions:sodium chloride, 75 mL/hr, Last Rate: 75 mL/hr (09/29/22 2041)             Objective     No intake or output data in the 24 hours ending 09/29/22 2226    Invasive Devices:   Peripheral IV 09/29/22 Left Antecubital (Active)   Site Assessment WDL; Clean;Dry; Intact 09/29/22 2000   Dressing Type Transparent 09/29/22 2000   Line Status Flushed;Saline locked 09/29/22 2000   Dressing Status Clean;Dry; Intact 09/29/22 2000       Vitals   Vitals:    09/29/22 1929   BP: 122/71   TempSrc: Temporal   Pulse: 91   Resp: 20   Temp: 98 7 °F (37 1 °C)       Physical Exam  Constitutional:       Appearance: He is not toxic-appearing or diaphoretic  HENT:      Head:     Eyes:      General: Scleral icterus present  Extraocular Movements:      Right eye: No nystagmus  Left eye: No nystagmus  Pupils: Pupils are equal, round, and reactive to light  Cardiovascular:      Rate and Rhythm: Normal rate and regular rhythm  Heart sounds: No murmur heard  Abdominal:      General: Bowel sounds are normal       Palpations: There is hepatomegaly  Tenderness: There is no abdominal tenderness  Comments: + Caput medusa    Musculoskeletal:      Right lower leg: Tenderness present  Edema present  Legs:    Skin:     Coloration: Skin is jaundiced  Findings: Bruising (RLE) present  Neurological:      Mental Status: He is alert  Psychiatric:         Mood and Affect: Mood is anxious  Data:    EKG, Pathology, and Other Studies: I have personally reviewed pertinent reports      EKG:   Normal sinus rhythm  Normal ECG  When compared with ECG of 14-SEP-2022 08:26,  No significant change was found  Confirmed by Carlos Chen ([id number]) on 9/29/2022 8:26:17 PM      Lab Results:  CBC ETOH     Lab Results   Component Value Date    WBC 4 10 (L) 09/29/2022    RBC 3 77 (L) 09/29/2022    HGB 12 9 09/29/2022    HCT 40 1 09/29/2022     (H) 09/29/2022    MCH 34 2 09/29/2022    MCHC 32 2 09/29/2022    RDW 15 8 (H) 09/29/2022    PLT 61 (L) 09/29/2022    MPV 10 6 09/29/2022      Lab Results   Component Value Date    LACTICACID 1 5 09/22/2022      CMP UA         Component Value Date/Time    K 3 8 09/29/2022 1116     09/29/2022 1116    CO2 34 (H) 09/29/2022 1116    BUN 10 09/29/2022 1116    CREATININE 0 92 09/29/2022 1116 Component Value Date/Time    CALCIUM 8 1 (L) 09/29/2022 1116    ALKPHOS 114 09/29/2022 1116    AST 88 (H) 09/29/2022 1116    ALT 39 09/29/2022 1116      Lab Results   Component Value Date    CLARITYU Clear 09/29/2022    COLORU Germaine 09/29/2022    SPECGRAV 1 015 09/29/2022    PHUR 6 5 09/29/2022    GLUCOSEU Negative 09/29/2022    KETONESU Negative 09/29/2022    BLOODU Trace-lysed (A) 09/29/2022    PROTEIN UA Negative 09/29/2022    NITRITE Negative 09/29/2022    BILIRUBINUR Negative 09/29/2022    UROBILINOGEN 4 0 (A) 09/29/2022    LEUKOCYTESUR Negative 09/29/2022    WBCUA None Seen 09/29/2022    RBCUA 2-4 09/29/2022    BACTERIA None Seen 09/29/2022    EPIS None Seen 09/29/2022        Liver Function Test: ASA     Lab Results   Component Value Date    TBILI 3 64 (H) 09/29/2022    BILIDIR 2 77 (H) 09/22/2022    ALKPHOS 114 09/29/2022    AST 88 (H) 09/29/2022    ALT 39 09/29/2022    TP 7 7 09/29/2022    ALB 2 8 (L) 09/29/2022      Lab Results   Component Value Date    SALICYLATE <3 (L) 17/80/4033      Troponin APAP     Lab Results   Component Value Date    TROPONINI <0 02 12/05/2020      Lab Results   Component Value Date    ACTMNPHEN <2 (L) 03/23/2021      VBG HCG     No results found for: PHVEN, FOT3EQB, PO2VEN, VWK4ZWT, BEVEN, K3UBWJCWF, G6ZIVVH   No results found for: HCGQUANT   ABG Urine Drug Screen     No results found for: PHART, JAE1NAR, PO2ART, VYX6JAP, BEART, K0DVOUJDO, O2HGB, SOURC, SAVANNA, VTAC, ACRATE, INSPIREDAIR, PEEP   Lab Results   Component Value Date    AMPMETHUR Negative 04/18/2022    BARBTUR Negative 04/18/2022    BDZUR Negative 04/18/2022    COCAINEUR Positive (A) 04/18/2022    METHADONEUR Negative 04/18/2022    OPIATEUR Negative 04/18/2022    PCPUR Negative 04/18/2022    THCUR Positive (A) 04/18/2022    OXYCODONEUR Negative 04/18/2022      Lactate INR     Lab Results   Component Value Date    LACTICACID 1 5 09/22/2022      Lab Results   Component Value Date    INR 1 46 (H) 09/29/2022      PTT Protime     Lab Results   Component Value Date/Time    PTT 32 09/29/2022 12:00 PM        Lab Results   Component Value Date/Time    PROTIME 17 9 (H) 09/29/2022 12:00 PM              Imaging Studies: I have personally reviewed pertinent reports  Counseling / Coordination of Care  Total floor / unit time spent today 60 minutes  Greater than 50% of total time was spent with the patient and / or family counseling and / or coordination of care  ** Please Note: This note has been constructed using a voice recognition system   **

## 2022-09-30 NOTE — PLAN OF CARE
Problem: PHYSICAL THERAPY ADULT  Goal: Performs mobility at highest level of function for planned discharge setting  See evaluation for individualized goals  Description: Treatment/Interventions: ADL retraining, Functional transfer training, LE strengthening/ROM, Elevations, Therapeutic exercise, Endurance training, Patient/family training, Equipment eval/education, Bed mobility, Gait training, Spoke to nursing, Spoke to case management  Equipment Recommended: Nasrin Eid (RW vs SPC TBD next treatment)       See flowsheet documentation for full assessment, interventions and recommendations  Outcome: Progressing  Note: Prognosis: Fair  Problem List: Decreased strength, Decreased range of motion, Decreased endurance, Impaired balance, Decreased mobility, Decreased coordination, Pain, Impaired sensation     Barriers to Discharge: Inaccessible home environment, Decreased caregiver support     PT Discharge Recommendation: Home with outpatient rehabilitation (when finished with ETOH rehab treatment if continued knee pain)    See flowsheet documentation for full assessment

## 2022-09-30 NOTE — ASSESSMENT & PLAN NOTE
· Patient with history of alcoholic cirrhosis secondary to chronic alcohol use  · Recently hospitalized at [facility] from 9/22/22- 9/26/22  · During this admission patient was followed by GI- Started on Prednisolone 40 mg daily, Continue PPI, nadolol, and lactulose TID  · CT abdomen and pelvis 09/14/2022  · Hepatic cirrhosis with signs of increasing portal hypertension  In addition to splenomegaly there are enlarging varices inclusive of prominent periumbilical varices which are significantly increasing in size when compared to March 2021  · Pertinent labs upon admission  · Platelets 61  · AST 88, ALT 39  · Albumin 2 8  · Total bilirubin 3 64  · Ammonia 18  · PT 17 6, INR 1 46  · On physical examination:  + scleral icterus, + caput medusa, + hepatomegaly  · Plan:  · Continue to trend labs, monitor PT/INR  If improvement PT INR after starting prednisone due to decompensated liver cirrhosis    Will continue prednisone during inpatient  · Recommended outpatient GI follow-up upon discharge  · Continue current medication regiment of PPI, nadolol,  lactulose

## 2022-09-30 NOTE — ASSESSMENT & PLAN NOTE
· Thrombocytopenia setting of chronic alcohol use and cirrhosis  · Platelets upon admission 66  · Hold anticoagulation due to thrombocytopenia  · No active signs of bleeding  · Continue to monitor

## 2022-09-30 NOTE — ASSESSMENT & PLAN NOTE
· H/o chronic daily alcohol consumption and prior reports of alcohol withdrawal seizures  · Relapsed on alcohol 9/28/22 and per report was drinking 8 - 10 shots of vodka  · Last drink:  09/29/2022  · Ethanol lvl:  244 9/29/22 at 11:00 a m    · Follow SEWS protocol with symptom-triggered phenobarbital for medically-assisted alcohol withdrawal  · SEWS score upon admission 0  · Most recent SEWS score this morning at 8:00 AM was 2 and the patient has received 65 mg of phenobarbital and 10 mg of valium this morning at 9:18 AM   · Continue IV thiamine, folic acid, and multivitamin supplementation   · Continue to monitor vitals, symptoms  · Continous pulse oximetry and telemetry monitoring

## 2022-09-30 NOTE — ASSESSMENT & PLAN NOTE
· H/o chronic daily alcohol consumption, denies h/o withdrawal seizures  · Last drink:  09/29/2022  · Ethanol lvl:  244 at 11:00 a m    ·  Follow SEWS protocol with symptom-triggered phenobarbital for medically-assisted alcohol withdrawal  · Patient does not currently display symptoms of withdrawal at this time  · SEWS score upon admission 0  · Will initiate IV thiamine, folic acid, and multivitamin supplementation   · Continue to monitor vitals, symptoms  · Continous pulse oximetry and telemetry monitoring

## 2022-09-30 NOTE — ASSESSMENT & PLAN NOTE
· Pt with a h/o chronic heavy alcohol use  · Patient has previously been admitted to medical detox unit in January 2022 and April 2022  · Patient reports that he was recently hospitalized at [address] [place] [address] for 6 days, reports he started drinking for 1 day prior to medical detox admission      · Reports drinking 8-10 shots of vodka  · H/o withdrawal seizures  · Withdrawal management as above  · Continue daily thiamine/folic acid supplementation, and MVI  · Consult case management for assistance with rehab resources- is interested in inpatient drug and alcohol rehab at this time

## 2022-09-30 NOTE — UTILIZATION REVIEW
Initial Clinical Review    Pt initially presented to [address] [place] [address] ED  Pt was transferred by EMS to [address] [place] [address] for its Level IV medically managed intensive inpatient detox unit, not available at [address] [place] [address]  Admission: Date/Time/Statement:   Admission Orders (From admission, onward)     Ordered        09/29/22 1933  Inpatient Admission  Once                      Orders Placed This Encounter   Procedures   • Inpatient Admission     Standing Status:   Standing     Number of Occurrences:   1     Order Specific Question:   Level of Care     Answer:   Med Surg [16]     Order Specific Question:   Estimated length of stay     Answer:   More than 2 Midnights     Order Specific Question:   Certification     Answer:   I certify that inpatient services are medically necessary for this patient for a duration of greater than two midnights  See H&P and MD Progress Notes for additional information about the patient's course of treatment  Initial Presentation: 65 y o  male who initially presented to [address] [place] [address], was then transferred by EMS to [address] [place] [address] as a direct admission to medical detox  Inpatient admission for evaluation and treatment of alcohol withdrawal syndrome  PMHx: AUD  Presented w/ request for detox from alcohol  Serum ETOH: 244  Reports 8-10 shots of vodka after 1 week of no alcohol, last drink on 9/29 morning  Has prior inpatient & outpatient treatment for withdrawal  Reports hx of withdrawal seizures  On exam, tremors, scleral icterus, hepatomegaly, caput medusa, RLE edema and tender, jaundiced  Endorses anxiety  SEWS 0  Plt 61  Plan: SEWS monitoring w/ phenobarbital management, IV thiamine/folic acid supplement, IVF, telemetry, continuous pulse ox, continue home meds, trend labs, replete electrolytes as needed  Date: 09/30/22       Day 2: Pt reports interest in inpatient rehab at discharge  On exam, tremors   SEWS 2   Plan: continue SEWS monitoring w/ phenobarbital management, IV thiamine/folic acid supplement, telemetry, continuous pulse ox, continue home meds, trend labs, replete electrolytes as needed  Wt Readings from Last 1 Encounters:   09/29/22 86 kg (189 lb 9 6 oz)     Vital Signs:   Date/Time Temp Pulse Resp BP MAP (mmHg) SpO2 O2 Device   09/30/22 1100 97 2 °F (36 2 °C) Abnormal  -- 2 Abnormal  -- -- -- --   09/30/22 1056 98 5 °F (36 9 °C) 71 20 128/75 92 97 % None (Room air)   09/30/22 1019 98 6 °F (37 °C) 68 20 122/71 88 97 % None (Room air)   09/30/22 0706 97 2 °F (36 2 °C) Abnormal  62 20 130/81 97 97 % None (Room air)   09/30/22 0400 98 4 °F (36 9 °C) 80 20 120/70 -- 94 % None (Room air)   09/29/22 1929 98 7 °F (37 1 °C) 91 20 122/71 88 92 % None (Room air)        09/30/22 1015 09/30/22 0900 09/30/22 0400 09/30/22 0000 09/29/22 2000   Severity of Ethanol Withdrawal Scale (SEWS)   ANXIETY: Do you feel that something bad is about to happen to you right now? 0  -LL 0  -LL 0  -NR 0  -NR 0  -NR   NAUSEA and DRY HEAVES or VOMITING? 0  -LL 0  -LL 0  -NR 0  -NR 0  -NR   SWEATING: (includes moist palms, sweating now)? Score 0 or 2 0  -LL 0  -LL 0  -NR 0  -NR 0  -NR   TREMOR: with arms extended eyes closed? 0  -LL 2  -LL 0  -NR 0  -NR 0  -NR   AGITATION: Fidgety, restless, pacing?  0  -LL 0  -LL 0  -NR 0  -NR 0  -NR   DISORIENTATION: 0  -LL 0  -LL 0  -NR 0  -NR 0  -NR   HALLUCINATIONS: 0  -LL 0  -LL 0  -NR 0  -NR 0  -NR   VITAL SIGNS: ANY (Pulse >276, Diastolic BP >21, Temp >35 3) 0  -LL 0  -LL 0  -NR 0  -NR 0  -NR   SEWS Total Score 0  -LL 2  -LL 0  -NR 0  -NR 0  -NR         Pertinent Labs/Diagnostic Test Results:   Results from last 7 days   Lab Units 09/29/22  1415   SARS-COV-2  Negative     Results from last 7 days   Lab Units 09/30/22  0450 09/29/22  1116 09/26/22  0532 09/25/22  0528 09/24/22  0500   WBC Thousand/uL 3 58* 4 10* 5 15 5 44 3 94*   HEMOGLOBIN g/dL 11 4* 12 9 11 8* 10 6* 11 0*   HEMATOCRIT % 34 2* 40 1 36 2* 32 6* 33 3*   PLATELETS Thousands/uL 52* 61* 54* 48* 44*   NEUTROS ABS Thousands/µL  --  2 54  --   --   --          Results from last 7 days   Lab Units 09/30/22  0450 09/29/22  1116 09/26/22  0532 09/25/22  0528 09/24/22  0500   SODIUM mmol/L 138 146 140 140 137   POTASSIUM mmol/L 3 6 3 8 4 1 3 9 4 1   CHLORIDE mmol/L 104 106 106 106 104   CO2 mmol/L 31 34* 29 27 28   ANION GAP mmol/L 3* 6 5 7 5   BUN mg/dL 15 10 14 16 12   CREATININE mg/dL 0 64* 0 92 0 89 1 11 0 89   EGFR ml/min/1 73sq m 109 92 95 73 95   CALCIUM mg/dL 8 1* 8 1* 7 7* 7 8* 7 7*   MAGNESIUM mg/dL 1 6  --  1 9 1 8 1 9     Results from last 7 days   Lab Units 09/30/22  0450 09/29/22  1116 09/26/22  0532 09/25/22  1016 09/25/22  0528 09/25/22  0428 09/24/22  0500   AST U/L 86* 88* 54*  --  55*  --  64*   ALT U/L 35 39 29  --  29  --  33   ALK PHOS U/L 101 114 97  --  106  --  108   TOTAL PROTEIN g/dL 6 6 7 7 6 7  --  6 1*  --  6 8   ALBUMIN g/dL 2 7* 2 8* 2 3*  --  2 1*  --  2 3*   TOTAL BILIRUBIN mg/dL 3 86* 3 64* 4 08*  --   --  3 62* 4 32*   AMMONIA umol/L  --  18 33 53*  --   --  60*         Results from last 7 days   Lab Units 09/30/22  0450 09/29/22  1116 09/26/22  0532 09/25/22  0528 09/24/22  0500   GLUCOSE RANDOM mg/dL 112* 99 88 132 138     Results from last 7 days   Lab Units 09/29/22  1116   HS TNI 0HR ng/L 4         Results from last 7 days   Lab Units 09/29/22  1200 09/24/22  0500   PROTIME seconds 17 9* 19 1*   INR  1 46* 1 60*   PTT seconds 32  --        Results from last 7 days   Lab Units 09/29/22  1219   CLARITY UA  Clear   COLOR UA  Germaine   SPEC GRAV UA  1 015   PH UA  6 5   GLUCOSE UA mg/dl Negative   KETONES UA mg/dl Negative   BLOOD UA  Trace-lysed*   PROTEIN UA mg/dl Negative   NITRITE UA  Negative   BILIRUBIN UA  Negative   UROBILINOGEN UA E U /dl 4 0*   LEUKOCYTES UA  Negative   WBC UA /hpf None Seen   RBC UA /hpf 2-4   BACTERIA UA /hpf None Seen   EPITHELIAL CELLS WET PREP /hpf None Seen     Results from last 7 days   Lab Units 09/29/22  1116 ETHANOL LVL mg/dL 244*         Past Medical History:   Diagnosis Date   • ETOH abuse      Present on Admission:  • Alcohol withdrawal syndrome with complication, with unspecified complication ([facility] [address] )  • Alcohol use disorder, severe, dependence (HCC)  • Thrombocytopenia (HCC)  • Pain and swelling of right lower extremity  • Alcoholic cirrhosis of liver (HCC)      Admitting Diagnosis: alcohol abuse  Age/Sex: 65 y o  male  Admission Orders:  Regular Diet  SCDs  Fall & Seizure Precautions  SEWS monitoring  Telemetry & Continuous Pulse Ox  Scheduled Medications:  folic acid 1 mg, thiamine 100 mg in 0 9% sodium chloride 100 mL IVPB, , Intravenous, Daily  lactulose, 20 g, Oral, BID  pantoprazole, 40 mg, Oral, Daily  prednisoLONE, 40 mg, Oral, Daily    Continuous IV Infusions:  sodium chloride, 75 mL/hr, Intravenous, Continuous    PRN Meds:  acetaminophen, 650 mg, Oral, Q6H PRN  acetaminophen, 975 mg, Oral, Q6H PRN  diazepam, 10 mg, Intravenous; 9/30 x1  hydrocortisone, 25 mg, Rectal, BID PRN  ibuprofen, 600 mg, Oral; 9/29 x1  ondansetron, 4 mg, Intravenous, Q6H PRN  phenobarbital, 64 8 mg, Oral; 9/30 x1  traZODone, 50 mg, Oral, HS PRN        IP CONSULT TO CASE MANAGEMENT    Network Utilization Review Department  ATTENTION: Please call with any questions or concerns to [phone] and carefully listen to the prompts so that you are directed to the right person  All voicemails are confidential   Argenis Skipper all requests for admission clinical reviews, approved or denied determinations and any other requests to dedicated fax number below belonging to the campus where the patient is receiving treatment   List of dedicated fax numbers for the Facilities:  FACILITY NAME UR FAX NUMBER   ADMISSION DENIALS (Administrative/Medical Necessity) [phone]   [address] (Maternity/NICU/Pediatrics) Henny Cortes 172 [phone]   ST  Chasity Funez [address] [facility] 77 [phone]   [address] [facility] [place] [address] [address] Louise Garcia 28 [phone]   [address] Rakesh Pimentel [facility] [place] [address] 815 [facility] [phone]

## 2022-10-01 PROBLEM — F10.939 ALCOHOL WITHDRAWAL SYNDROME WITH COMPLICATION, WITH UNSPECIFIED COMPLICATION (HCC): Status: RESOLVED | Noted: 2020-12-05 | Resolved: 2022-10-01

## 2022-10-01 LAB
ALBUMIN SERPL BCP-MCNC: 2.8 G/DL (ref 3.5–5)
ALP SERPL-CCNC: 98 U/L (ref 43–122)
ALT SERPL W P-5'-P-CCNC: 34 U/L
ANION GAP SERPL CALCULATED.3IONS-SCNC: 3 MMOL/L (ref 5–14)
AST SERPL W P-5'-P-CCNC: 72 U/L (ref 17–59)
BASOPHILS # BLD AUTO: 0.07 THOUSANDS/ΜL (ref 0–0.1)
BASOPHILS NFR BLD AUTO: 2 % (ref 0–1)
BILIRUB DIRECT SERPL-MCNC: 1.86 MG/DL (ref 0–0.2)
BILIRUB SERPL-MCNC: 4.31 MG/DL (ref 0.2–1)
BUN SERPL-MCNC: 11 MG/DL (ref 5–25)
CALCIUM ALBUM COR SERPL-MCNC: 9 MG/DL (ref 8.3–10.1)
CALCIUM SERPL-MCNC: 8 MG/DL (ref 8.4–10.2)
CHLORIDE SERPL-SCNC: 105 MMOL/L (ref 96–108)
CO2 SERPL-SCNC: 29 MMOL/L (ref 21–32)
CREAT SERPL-MCNC: 0.62 MG/DL (ref 0.7–1.5)
EOSINOPHIL # BLD AUTO: 0.24 THOUSAND/ΜL (ref 0–0.61)
EOSINOPHIL NFR BLD AUTO: 6 % (ref 0–6)
ERYTHROCYTE [DISTWIDTH] IN BLOOD BY AUTOMATED COUNT: 14.6 % (ref 11.6–15.1)
GFR SERPL CREATININE-BSD FRML MDRD: 110 ML/MIN/1.73SQ M
GLUCOSE SERPL-MCNC: 101 MG/DL (ref 70–99)
HCT VFR BLD AUTO: 35.1 % (ref 36.5–49.3)
HGB BLD-MCNC: 12 G/DL (ref 12–17)
IMM GRANULOCYTES # BLD AUTO: 0.01 THOUSAND/UL (ref 0–0.2)
IMM GRANULOCYTES NFR BLD AUTO: 0 % (ref 0–2)
INR PPP: 1.47 (ref 0.84–1.19)
LYMPHOCYTES # BLD AUTO: 0.89 THOUSANDS/ΜL (ref 0.6–4.47)
LYMPHOCYTES NFR BLD AUTO: 21 % (ref 14–44)
MAGNESIUM SERPL-MCNC: 1.6 MG/DL (ref 1.6–2.3)
MCH RBC QN AUTO: 34.5 PG (ref 26.8–34.3)
MCHC RBC AUTO-ENTMCNC: 34.2 G/DL (ref 31.4–37.4)
MCV RBC AUTO: 101 FL (ref 82–98)
MONOCYTES # BLD AUTO: 0.42 THOUSAND/ΜL (ref 0.17–1.22)
MONOCYTES NFR BLD AUTO: 10 % (ref 4–12)
NEUTROPHILS # BLD AUTO: 2.53 THOUSANDS/ΜL (ref 1.85–7.62)
NEUTS SEG NFR BLD AUTO: 61 % (ref 43–75)
NRBC BLD AUTO-RTO: 0 /100 WBCS
PLATELET # BLD AUTO: 48 THOUSANDS/UL (ref 149–390)
PMV BLD AUTO: 11.8 FL (ref 8.9–12.7)
POTASSIUM SERPL-SCNC: 3.6 MMOL/L (ref 3.5–5.3)
PROT SERPL-MCNC: 6.7 G/DL (ref 6.4–8.4)
PROTHROMBIN TIME: 18.2 SECONDS (ref 11.6–14.5)
RBC # BLD AUTO: 3.48 MILLION/UL (ref 3.88–5.62)
SODIUM SERPL-SCNC: 137 MMOL/L (ref 135–147)
WBC # BLD AUTO: 4.16 THOUSAND/UL (ref 4.31–10.16)

## 2022-10-01 PROCEDURE — 99232 SBSQ HOSP IP/OBS MODERATE 35: CPT | Performed by: PHYSICIAN ASSISTANT

## 2022-10-01 PROCEDURE — 82248 BILIRUBIN DIRECT: CPT

## 2022-10-01 PROCEDURE — 80053 COMPREHEN METABOLIC PANEL: CPT

## 2022-10-01 PROCEDURE — NC001 PR NO CHARGE: Performed by: PHYSICIAN ASSISTANT

## 2022-10-01 PROCEDURE — 85025 COMPLETE CBC W/AUTO DIFF WBC: CPT

## 2022-10-01 PROCEDURE — 83735 ASSAY OF MAGNESIUM: CPT

## 2022-10-01 PROCEDURE — 85610 PROTHROMBIN TIME: CPT

## 2022-10-01 RX ORDER — LANOLIN ALCOHOL/MO/W.PET/CERES
100 CREAM (GRAM) TOPICAL DAILY
Status: DISCONTINUED | OUTPATIENT
Start: 2022-10-02 | End: 2022-10-05 | Stop reason: HOSPADM

## 2022-10-01 RX ORDER — NADOLOL 20 MG/1
10 TABLET ORAL DAILY
Status: DISCONTINUED | OUTPATIENT
Start: 2022-10-01 | End: 2022-10-05 | Stop reason: HOSPADM

## 2022-10-01 RX ORDER — FOLIC ACID 1 MG/1
1 TABLET ORAL DAILY
Status: DISCONTINUED | OUTPATIENT
Start: 2022-10-02 | End: 2022-10-05 | Stop reason: HOSPADM

## 2022-10-01 RX ADMIN — NADOLOL 10 MG: 20 TABLET ORAL at 16:45

## 2022-10-01 RX ADMIN — LACTULOSE 20 G: 10 SOLUTION ORAL at 17:02

## 2022-10-01 RX ADMIN — PREDNISOLONE SODIUM PHOSPHATE 40 MG: 15 SOLUTION ORAL at 08:36

## 2022-10-01 RX ADMIN — LACTULOSE 20 G: 10 SOLUTION ORAL at 08:36

## 2022-10-01 RX ADMIN — FOLIC ACID: 5 INJECTION, SOLUTION INTRAMUSCULAR; INTRAVENOUS; SUBCUTANEOUS at 08:37

## 2022-10-01 RX ADMIN — PANTOPRAZOLE SODIUM 40 MG: 40 TABLET, DELAYED RELEASE ORAL at 08:36

## 2022-10-01 RX ADMIN — ACETAMINOPHEN 650 MG: 325 TABLET ORAL at 10:57

## 2022-10-01 NOTE — CASE MANAGEMENT
Cm received a call from Evaristo Gonzalez at [address] [facility] indicating pt could not be accepted at their inpatient [facility] rehab due to pt's cirrhosis and ambulation issues  Evaristo Gonzalez recommended a high level of care  Cm again attempted to contact pt's preferred provider of [facility]  inpatient [facility] rehab with no response  A message was left requesting a return call  Cm met with pt and discussed pt's needs  Pt signed an ASIM for [facility]  Cm contacted [facility] at [phone] and was informed the referral could be accepted but their may be no availability until Monday  Referral was faxed to [phone]

## 2022-10-01 NOTE — DISCHARGE SUMMARY
[facility]  Discharge- Betsy Marx 1965, 65 y o  male MRN: [id number]  Unit/Bed#: 5T DETOX 504-01 Encounter: [id number]  Primary Care Provider: No Lee MD   Date and time admitted to hospital: 9/29/2022  7:27 PM    [address], LEVEL 4  Department of Medical Toxicology  Reason for Admission/Principal Problem: alcohol withdrawal  Rounding Provider: [facility] Amie Luz*     Alcohol withdrawal syndrome with complication, with unspecified complication (HCC)-resolved as of 10/1/2022  Assessment & Plan  · H/o chronic daily alcohol consumption and reports of alcohol withdrawal seizures  · Relapsed 9/28/22 and per report was drinking 8-10 shots of vodka  · Last drink:  09/29/2022  · Ethanol lvl:  244 (9/29/22 11:00 a m )  · SEWS protocol with symptom-triggered phenobarbital for medically-assisted alcohol withdrawal  · Received 194 4 mg total phenobarbital; last dose administered 9/30/2022 2027  · Appears stable off phenobarbital- VSS, baseline tremor   · Acute withdrawal resolved     Alcohol use disorder, severe, dependence ([facility] [address] )  Assessment & Plan  · Pt with a h/o chronic heavy alcohol use  · Patient has previously been admitted to medical detox unit in January 2022 and April 2022  · Patient reports that he was recently hospitalized at [facility] for 6 days, reports he started drinking for 1 day prior to medical detox admission      · Reports drinking 8-10 shots of vodka  · H/o withdrawal seizures  · Withdrawal management as above  · Continue daily thiamine/folic acid supplementation, and MVI  · Consult case management for assistance with rehab resources- is interested in inpatient drug and alcohol rehab at this time    * Alcoholic cirrhosis of liver [facility])  Assessment & Plan  · Patient with history of alcoholic cirrhosis and esophageal varices secondary to chronic alcohol use  · Recently hospitalized at [facility] from 9/22/22- 9/26/22  · During this admission patient was followed by GI- Started on Prednisolone 40 mg daily for suspected alcoholic hepatitis, continue  Continue PPI and lactulose TID  · Continue nadolol  · CT abdomen and pelvis 09/14/2022: Hepatic cirrhosis with signs of increasing portal hypertension  In addition to splenomegaly there are enlarging varices inclusive of prominent periumbilical varices which are significantly increasing in size when compared to March 2021  · INR elevated but stable, LFTs stable overall (mild elevation of AST)  · On physical examination:  + scleral icterus, + caput medusa, + hepatomegaly  · Plan:  · Continue to trend labs, monitor PT/INR  · Recommended outpatient GI follow-up upon discharge  · Continue current medication regimen as above     Pancytopenia (HCC)  Assessment & Plan  · CBC this AM revealed overall stable pancytopenia  · Likely 2/2 myelosuppression from chronic alcohol use, cirrhosis   · Currently afebrile, no evidence of acute bleeding  · Continue to hold pharmacologic anticoagulation for VTE prophylaxis given thrombocytopenia, encourage SCDs  · Continue thiamine and folic acid supplementation  · Continue routine monitoring  · Encourage alcohol cessation    Pain and swelling of right lower extremity  Assessment & Plan  · Patient reports recent rupture of Baker's cyst with subsequent hematoma right calf  · CT RLE 9/22/2022:  Heterogeneous collection within the medial head gastrocnemius muscle suspicious for intramuscular hematoma  · VAS venous duplex U/S 9/23/2022: No evidence of acute or chronic deep vein thrombosis  · Acute Care surgery was consulted during last hospital admission no intervention needed at this time  · Recommended elevation of right lower extremity and Ace bandage for compression  · On physical examination: swelling of RLE has decreased today, compartments soft; Trace edema right lower leg, bruising noted on a posterior calf and upper right thigh   Patient has full range of motion, neurovascularly intact   · Patient reports improved pain and ambulation today  · Plan  · Continue to elevate lower extremity, continue compression  · Pain medication as needed  · PT consulted and recommendations appreciated- patient cleared, ambulation with cane       VTE Pharmacologic Prophylaxis:   Pharmacologic: Pharmacologic VTE Prophylaxis contraindicated due to thrombocytopenia  Mechanical VTE Prophylaxis in Place: yes    Code Status: Level 1 - Full Code    Patient Centered Rounds: I have performed bedside rounds with nursing staff today  Discussions with Specialists or Other Care Team Provider: Dr Emma Haynes, [facility]   Education and Discussions with Family / Patient: I personally did not discuss patient with family at this time  Discussed current plan with patient, answered all questions to best of my ability  Time Spent for Care: 20 minutes  More than 50% of total time spent on counseling and coordination of care as described above  Current Length of Stay: 3 day(s)    Current Patient Status: Inpatient     Certification Statement: The patient will continue to require additional inpatient hospital stay due to awaiting inpatient rehab placement  Discharge Plan: medically clear for discharge to inpatient rehab       Total time spent today 20 minutes  Greater than 50% of total time was spent with the patient and / or family counseling and / or coordination of care  A description of the counseling / coordination of care: alcohol use, cirrhosis, thrombocytopenia     Subjective:   Patient seen and examined bedside this morning  Reports that he continues to feel better today, right leg pain improved to 3/10 severity this morning, ambulating independently  Currently denies headaches, lightheadedness/dizziness, coughing/sneezing/congestion/rhinorrhea, chest pain, SOB/dyspnea, abdominal pain, N/V/C, dysuria/hematuria, hallucinations  Remains interested in inpatient rehab at this time  Objective:     Clinical Opiate Withdrawal Scale  Pulse: 61    SEWS Total Score: 0 (10/1/2022  3:30 AM)        Last 24 Hours Medication List:   Current Facility-Administered Medications   Medication Dose Route Frequency Provider Last Rate   • acetaminophen  650 mg Oral Q6H PRN Mana Pancoast, PA-C     • acetaminophen  650 mg Oral Q6H PRN Mana Pancoast, PA-C     • acetaminophen  975 mg Oral Q6H PRN Mana Pancoast, PA-C     • folic acid  1 mg Oral Daily Nolvia Defrancisco, PA-C     • hydrocortisone  25 mg Rectal BID PRN Mana Pancoast, PA-C     • lactulose  20 g Oral BID Mana Pancoast, PA-C     • nadolol  10 mg Oral Daily Nolvia Defrancisco, PA-C     • ondansetron  4 mg Intravenous Q6H PRN Mana Pancoast, PA-C     • pantoprazole  40 mg Oral Daily Mana Pancoast, PA-C     • prednisoLONE  40 mg Oral Daily Mana Pancoast, PA-C     • thiamine  100 mg Oral Daily Nolvia Defrancisco, PA-C     • traZODone  50 mg Oral HS PRN Mana Pancoast, PA-C           Vitals:   Temp (24hrs), Av 3 °F (36 8 °C), Min:97 8 °F (36 6 °C), Max:98 8 °F (37 1 °C)    Temp:  [97 8 °F (36 6 °C)-98 8 °F (37 1 °C)] 98 8 °F (37 1 °C)  HR:  [58-64] 61  Resp:  [18] 18  BP: (109-131)/(63-84) 119/68  SpO2:  [90 %-96 %] 90 %  Body mass index is 25 71 kg/m²  Input and Output Summary (last 24 hours): Intake/Output Summary (Last 24 hours) at 10/2/2022 1639  Last data filed at 10/2/2022 0848  Gross per 24 hour   Intake 360 ml   Output --   Net 360 ml       Physical Exam:   Physical Exam  Vitals and nursing note reviewed  Constitutional:       General: He is not in acute distress  Appearance: He is well-developed and normal weight  He is ill-appearing (chronically ill)  He is not diaphoretic  HENT:      Head: Normocephalic and atraumatic  Eyes:      General: Scleral icterus present  Extraocular Movements: Extraocular movements intact  Right eye: No nystagmus  Left eye: No nystagmus  Conjunctiva/sclera: Conjunctivae normal       Pupils: Pupils are equal, round, and reactive to light  Cardiovascular:      Rate and Rhythm: Normal rate and regular rhythm  Pulses: Normal pulses  Dorsalis pedis pulses are 2+ on the right side and 2+ on the left side  Posterior tibial pulses are 2+ on the right side and 2+ on the left side  Heart sounds: Normal heart sounds  No murmur heard  No friction rub  No gallop  Pulmonary:      Effort: Pulmonary effort is normal  No respiratory distress  Breath sounds: Normal breath sounds  No wheezing, rhonchi or rales  Abdominal:      General: Abdomen is flat  Bowel sounds are normal  There is no distension  Palpations: Abdomen is soft  Tenderness: There is no abdominal tenderness  There is no guarding  Comments: Caput medusae present    Musculoskeletal:         General: Swelling and tenderness present  Normal range of motion  Cervical back: Normal range of motion  Right lower leg: Swelling (decreased from yesterday, RLE measures 39 cm) and tenderness (right medial calf) present  Edema present  Left lower leg: No swelling (LLE measure 36 cm)  No edema  Comments: RLE- compartments soft   Skin:     General: Skin is warm and dry  Capillary Refill: Capillary refill takes less than 2 seconds  Neurological:      General: No focal deficit present  Mental Status: He is alert and oriented to person, place, and time  Mental status is at baseline  Sensory: No sensory deficit  Motor: Tremor (trace intention tremor b/l UEs, chronic ) present  Coordination: Finger-Nose-Finger Test normal       Gait: Gait normal    Psychiatric:         Attention and Perception: Attention normal          Mood and Affect: Mood normal          Speech: Speech normal          Behavior: Behavior is cooperative           Additional Data:     Labs: keep all most recent labs as listed on admission templates   Results from last 7 days   Lab Units 10/02/22  0500 10/01/22  0557   WBC Thousand/uL 5 04 4 16*   HEMOGLOBIN g/dL 12 3 12 0   HEMATOCRIT % 36 3* 35 1*   PLATELETS Thousands/uL 45* 48*   NEUTROS PCT %  --  61   LYMPHS PCT %  --  21   MONOS PCT %  --  10   EOS PCT %  --  6      Results from last 7 days   Lab Units 10/02/22  0500   SODIUM mmol/L 138   POTASSIUM mmol/L 3 8   CHLORIDE mmol/L 106   CO2 mmol/L 28   BUN mg/dL 14   CREATININE mg/dL 0 66*   ANION GAP mmol/L 4*   CALCIUM mg/dL 8 1*   ALBUMIN g/dL 2 8*   TOTAL BILIRUBIN mg/dL 4 01*   ALK PHOS U/L 97   ALT U/L 35   AST U/L 65*   GLUCOSE RANDOM mg/dL 93      Results from last 7 days   Lab Units 10/01/22  0557   INR  1 47*        * I Have Reviewed All Lab Data Listed Above  * Additional Pertinent Lab Tests Reviewed: All Labs Within Last 24 Hours Reviewed      Imaging Studies: I have personally reviewed pertinent reports  Recent Cultures (last 7 days): Today, Patient Was Seen By: Shasha Luz    ** Please Note: Dictation voice to text software may have been used in the creation of this document   **

## 2022-10-01 NOTE — CASE MANAGEMENT
Cm contacted pt's two preferred inpatient substance use treatment facilities, [facility] and [address] indicated they do not have an inpatient rehab but rather just a detox  There was no response at [place] [address]  Cm met with pt and discussed other possible inpatient treatment placements in [place]  Pt signed an ASIM for [facility] and a referral was made for [facility] [address]

## 2022-10-01 NOTE — CASE MANAGEMENT
Cm contacted for possible inpatient rehab availability:    [facility]: closed for admissions weekends  [facility]: closed for admission weekends  [facility]: message left  [facility]: only a detox facility    Cm explained to pt ongoing difficulty with placement and indicated most likely no inpatient [facility] rehab placement today

## 2022-10-01 NOTE — PROGRESS NOTES
[facility]  Progress Note - Betsy Marx 1965, 65 y o  male MRN: [id number]  Unit/Bed#: 5T DETOX 504-01 Encounter: [id number]  Primary Care Provider: No Lee MD   Date and time admitted to hospital: 9/29/2022  7:27 PM    [facility], LEVEL 4  Department of Medical Toxicology  Reason for Admission/Principal Problem: alcohol withdrawal   Rounding Provider: Jalil Luz PA-C, Amie ELMORE*     Alcohol withdrawal syndrome with complication, with unspecified complication (HCC)-resolved as of 10/1/2022  Assessment & Plan  · H/o chronic daily alcohol consumption and prior reports of alcohol withdrawal seizures  · Relapsed on alcohol 9/28/22 and per report was drinking 8-10 shots of vodka  · Last drink:  09/29/2022  · Ethanol lvl:  244 9/29/22 at 11:00 a m   · SEWS protocol with symptom-triggered phenobarbital for medically-assisted alcohol withdrawal  · Received 194 4 mg total phenobarbital; last dose administered 9/30/2022 2027  · Appears stable off phenobarbital- VSS, baseline tremor   · Acute withdrawal resolved     Alcohol use disorder, severe, dependence ([facility] [address] )  Assessment & Plan  · Pt with a h/o chronic heavy alcohol use  · Patient has previously been admitted to medical detox unit in January 2022 and April 2022  · Patient reports that he was recently hospitalized at [address] [place] [address] for 6 days, reports he started drinking for 1 day prior to medical detox admission      · Reports drinking 8-10 shots of vodka  · H/o withdrawal seizures  · Withdrawal management as above  · Continue daily thiamine/folic acid supplementation, and MVI  · Consult case management for assistance with rehab resources- is interested in inpatient drug and alcohol rehab at this time    * Alcoholic cirrhosis of liver [facility])  Assessment & Plan  · Patient with history of alcoholic cirrhosis secondary to chronic alcohol use  · Recently hospitalized at [facility] from 9/22/22- 9/26/22  · During this admission patient was followed by GI- Started on Prednisolone 40 mg daily for suspected alcoholic hepatitis, continue  Continue PPI and lactulose TID  · Resume nadolol  · CT abdomen and pelvis 09/14/2022: Hepatic cirrhosis with signs of increasing portal hypertension  In addition to splenomegaly there are enlarging varices inclusive of prominent periumbilical varices which are significantly increasing in size when compared to March 2021  · INR elevated but stable, LFTs stable overall (mild elevation of AST)  · On physical examination:  + scleral icterus, + caput medusa, + hepatomegaly  · Plan:  · Continue to trend labs, monitor PT/INR  · Recommended outpatient GI follow-up upon discharge  · Continue current medication regimen as above     Pancytopenia (HCC)  Assessment & Plan  · CBC this AM revealed stable pancytopenia  · Likely 2/2 myelosuppression from chronic alcohol use, cirrhosis   · Currently afebrile, no evidence of acute bleeding  · Continue to hold pharmacologic anticoagulation for VTE prophylaxis given thrombocytopenia, encourage SCDs  · Continue thiamine and folic acid supplementation  · Continue routine monitoring  · Encourage alcohol cessation    Pain and swelling of right lower extremity  Assessment & Plan  · Patient reports recent rupture of Baker's cyst with subsequent hematoma right calf  · CT RLE 9/22/2022:  Heterogeneous collection within the medial head gastrocnemius muscle suspicious for intramuscular hematoma  · VAS venous duplex U/S 9/23/2022: No evidence of acute or chronic deep vein thrombosis  · Acute Care surgery was consulted during last hospital admission no intervention needed at this time  · Recommended elevation of right lower extremity and Ace bandage for compression  · On physical examination:  Trace edema right lower leg, bruising noted on a posterior calf and upper right thigh   Patient has full range of motion, neurovascularly intact   · Patient reports improved pain and ambulation today  · Plan  · Continue to elevate lower extremity, continue compression  · Pain medication as needed  · PT consulted and recommendations appreciated- patient cleared, ambulation with cane       VTE Pharmacologic Prophylaxis:   Pharmacologic: Pharmacologic VTE Prophylaxis contraindicated due to thrombocytopenia  Mechanical VTE Prophylaxis in Place: yes    Code Status: Level 1 - Full Code    Patient Centered Rounds: I have performed bedside rounds with nursing staff today  Discussions with Specialists or Other Care Team Provider: Dr Mili Levine, [facility]    Education and Discussions with Family / Patient: I personally did not discuss patient with family at this time  Discussed current plan with patient, answered all questions to best of my ability  Time Spent for Care: 30 minutes  More than 50% of total time spent on counseling and coordination of care as described above  Current Length of Stay: 2 day(s)    Current Patient Status: Inpatient     Certification Statement: The patient will continue to require additional inpatient hospital stay due to awaiting placement for inpatient alcohol use rehab  Discharge Plan: medically clear for discharge to inpatient alcohol use rehab       Total time spent today 30 minutes  Greater than 50% of total time was spent with the patient and / or family counseling and / or coordination of care  A description of the counseling / coordination of care: alcohol use, cirrhosis    Subjective:   Patient seen and examined bedside this morning  Reports that he feels better today, right leg pain improved to 4/10 severity this morning, ambulating independently  Currently denies headaches, lightheadedness/dizziness, coughing/sneezing/congestion/rhinorrhea, chest pain, SOB/dyspnea, abdominal pain, N/V/C, dysuria/hematuria, hallucinations  Remains interested in inpatient rehab at this time       Objective:     Clinical Opiate Withdrawal Scale  Pulse: 75    SEWS Total Score: 0 (10/1/2022  3:30 AM)        Last 24 Hours Medication List:   Current Facility-Administered Medications   Medication Dose Route Frequency Provider Last Rate   • acetaminophen  650 mg Oral Q6H PRN Deecindy Bryan PA-C     • acetaminophen  650 mg Oral Q6H PRN Dee MIRTHA Bryan-C     • acetaminophen  975 mg Oral Q6H PRN DeeMIRTHA Murillo-C     • [START ON ] folic acid  1 mg Oral Daily Nolvia MIRTHA Luz-C     • hydrocortisone  25 mg Rectal BID PRN Dee Randi PA-C     • lactulose  20 g Oral BID Dee MIRTHA Bryan-C     • nadolol  10 mg Oral Daily Nolvia DefpatrickciMIRTHA bruce-C     • ondansetron  4 mg Intravenous Q6H PRN Dee MIRTHA Bryan-C     • pantoprazole  40 mg Oral Daily Dee MIRTHA Bryan-C     • prednisoLONE  40 mg Oral Daily Dee MIRTHA Bryan-C     • [START ON 10/2/2022] thiamine  100 mg Oral Daily Nolvia LUCIE Luz     • traZODone  50 mg Oral HS PRN MIRTHA Brenner-C           Vitals:   Temp (24hrs), Av °F (36 7 °C), Min:97 9 °F (36 6 °C), Max:98 1 °F (36 7 °C)    Temp:  [97 9 °F (36 6 °C)-98 1 °F (36 7 °C)] 98 °F (36 7 °C)  HR:  [60-75] 75  Resp:  [18-20] 18  BP: (118-139)/(76-83) 124/80  SpO2:  [95 %-99 %] 99 %  Body mass index is 25 71 kg/m²  Input and Output Summary (last 24 hours): Intake/Output Summary (Last 24 hours) at 10/1/2022 1428  Last data filed at 10/1/2022 1014  Gross per 24 hour   Intake 580 ml   Output --   Net 580 ml       Physical Exam:   Physical Exam  Vitals and nursing note reviewed  Constitutional:       Appearance: He is well-developed and normal weight  He is ill-appearing  HENT:      Head: Normocephalic and atraumatic  Eyes:      General: Scleral icterus (trace ) present  Extraocular Movements: Extraocular movements intact  Right eye: No nystagmus  Left eye: No nystagmus        Conjunctiva/sclera: Conjunctivae normal       Pupils: Pupils are equal, round, and reactive to light  Cardiovascular:      Rate and Rhythm: Normal rate and regular rhythm  Pulses: Normal pulses  Dorsalis pedis pulses are 2+ on the right side and 2+ on the left side  Heart sounds: Normal heart sounds  No murmur heard  No friction rub  No gallop  Pulmonary:      Effort: Pulmonary effort is normal  No respiratory distress  Breath sounds: Normal breath sounds  No wheezing, rhonchi or rales  Abdominal:      General: Abdomen is flat  Bowel sounds are normal  There is no distension  Palpations: Abdomen is soft  Tenderness: There is no abdominal tenderness  There is no guarding  Comments: Capchidi hopkins    Musculoskeletal:         General: Swelling and tenderness (right medial calf ) present  Normal range of motion  Cervical back: Normal range of motion  Right lower leg: Tenderness present  Edema present  Left lower leg: No edema  Right ankle: Normal range of motion  Left ankle: Normal range of motion  Comments: Right calf/shin swollen 2/2 hematoma, skin taut but compartments soft, neurovascularly intact  RLE measures 41 cm, LLE measures 38 cm  Skin:     General: Skin is warm and dry  Coloration: Skin is jaundiced  Findings: Bruising (RLE- lateral thigh, calf/shin) present  Neurological:      General: No focal deficit present  Mental Status: He is alert and oriented to person, place, and time  Mental status is at baseline  Sensory: No sensory deficit  Motor: Tremor (trace intention tremor b/l UEs, chronic) present  Coordination: Finger-Nose-Finger Test abnormal       Comments: No asterixis   Psychiatric:         Attention and Perception: Attention normal          Mood and Affect: Mood normal          Speech: Speech normal          Behavior: Behavior is cooperative           Additional Data:     Labs: keep all most recent labs as listed on admission templates   Results from last 7 days   Lab Units 10/01/22  0557   WBC Thousand/uL 4 16*   HEMOGLOBIN g/dL 12 0   HEMATOCRIT % 35 1*   PLATELETS Thousands/uL 48*   NEUTROS PCT % 61   LYMPHS PCT % 21   MONOS PCT % 10   EOS PCT % 6      Results from last 7 days   Lab Units 10/01/22  0557   SODIUM mmol/L 137   POTASSIUM mmol/L 3 6   CHLORIDE mmol/L 105   CO2 mmol/L 29   BUN mg/dL 11   CREATININE mg/dL 0 62*   ANION GAP mmol/L 3*   CALCIUM mg/dL 8 0*   ALBUMIN g/dL 2 8*   TOTAL BILIRUBIN mg/dL 4 31*   ALK PHOS U/L 98   ALT U/L 34   AST U/L 72*   GLUCOSE RANDOM mg/dL 101*      Results from last 7 days   Lab Units 10/01/22  0557   INR  1 47*        * I Have Reviewed All Lab Data Listed Above  * Additional Pertinent Lab Tests Reviewed: All Labs Within Last 24 Hours Reviewed      Imaging Studies: I have personally reviewed pertinent reports  Recent Cultures (last 7 days): Today, Patient Was Seen By: Louis Luz PA-C    ** Please Note: Dictation voice to text software may have been used in the creation of this document   **

## 2022-10-01 NOTE — ASSESSMENT & PLAN NOTE
· CBC this AM revealed stable pancytopenia  · Likely 2/2 myelosuppression from chronic alcohol use, cirrhosis   · Currently afebrile, no evidence of acute bleeding  · Continue to hold pharmacologic anticoagulation for VTE prophylaxis given thrombocytopenia, encourage SCDs  · Continue thiamine and folic acid supplementation  · Continue routine monitoring  · Encourage alcohol cessation

## 2022-10-02 PROBLEM — D53.9 MACROCYTIC ANEMIA: Status: ACTIVE | Noted: 2022-10-02

## 2022-10-02 LAB
ALBUMIN SERPL BCP-MCNC: 2.8 G/DL (ref 3.5–5)
ALP SERPL-CCNC: 97 U/L (ref 43–122)
ALT SERPL W P-5'-P-CCNC: 35 U/L
ANION GAP SERPL CALCULATED.3IONS-SCNC: 4 MMOL/L (ref 5–14)
AST SERPL W P-5'-P-CCNC: 65 U/L (ref 17–59)
BILIRUB SERPL-MCNC: 4.01 MG/DL (ref 0.2–1)
BUN SERPL-MCNC: 14 MG/DL (ref 5–25)
CALCIUM ALBUM COR SERPL-MCNC: 9.1 MG/DL (ref 8.3–10.1)
CALCIUM SERPL-MCNC: 8.1 MG/DL (ref 8.4–10.2)
CHLORIDE SERPL-SCNC: 106 MMOL/L (ref 96–108)
CO2 SERPL-SCNC: 28 MMOL/L (ref 21–32)
CREAT SERPL-MCNC: 0.66 MG/DL (ref 0.7–1.5)
ERYTHROCYTE [DISTWIDTH] IN BLOOD BY AUTOMATED COUNT: 14.4 % (ref 11.6–15.1)
GFR SERPL CREATININE-BSD FRML MDRD: 108 ML/MIN/1.73SQ M
GLUCOSE SERPL-MCNC: 93 MG/DL (ref 70–99)
HCT VFR BLD AUTO: 36.3 % (ref 36.5–49.3)
HGB BLD-MCNC: 12.3 G/DL (ref 12–17)
MAGNESIUM SERPL-MCNC: 1.7 MG/DL (ref 1.6–2.3)
MCH RBC QN AUTO: 34.3 PG (ref 26.8–34.3)
MCHC RBC AUTO-ENTMCNC: 33.9 G/DL (ref 31.4–37.4)
MCV RBC AUTO: 101 FL (ref 82–98)
PLATELET # BLD AUTO: 45 THOUSANDS/UL (ref 149–390)
PMV BLD AUTO: 12 FL (ref 8.9–12.7)
POTASSIUM SERPL-SCNC: 3.8 MMOL/L (ref 3.5–5.3)
PROT SERPL-MCNC: 6.8 G/DL (ref 6.4–8.4)
RBC # BLD AUTO: 3.59 MILLION/UL (ref 3.88–5.62)
SODIUM SERPL-SCNC: 138 MMOL/L (ref 135–147)
WBC # BLD AUTO: 5.04 THOUSAND/UL (ref 4.31–10.16)

## 2022-10-02 PROCEDURE — 83735 ASSAY OF MAGNESIUM: CPT | Performed by: PHYSICIAN ASSISTANT

## 2022-10-02 PROCEDURE — 85027 COMPLETE CBC AUTOMATED: CPT | Performed by: PHYSICIAN ASSISTANT

## 2022-10-02 PROCEDURE — NC001 PR NO CHARGE: Performed by: PHYSICIAN ASSISTANT

## 2022-10-02 PROCEDURE — 80053 COMPREHEN METABOLIC PANEL: CPT | Performed by: PHYSICIAN ASSISTANT

## 2022-10-02 PROCEDURE — 99232 SBSQ HOSP IP/OBS MODERATE 35: CPT | Performed by: PHYSICIAN ASSISTANT

## 2022-10-02 RX ORDER — GABAPENTIN 400 MG/1
400 CAPSULE ORAL 3 TIMES DAILY PRN
Status: DISCONTINUED | OUTPATIENT
Start: 2022-10-02 | End: 2022-10-05 | Stop reason: HOSPADM

## 2022-10-02 RX ADMIN — PANTOPRAZOLE SODIUM 40 MG: 40 TABLET, DELAYED RELEASE ORAL at 08:34

## 2022-10-02 RX ADMIN — GABAPENTIN 400 MG: 400 CAPSULE ORAL at 21:23

## 2022-10-02 RX ADMIN — ACETAMINOPHEN 975 MG: 325 TABLET ORAL at 05:21

## 2022-10-02 RX ADMIN — THIAMINE HCL TAB 100 MG 100 MG: 100 TAB at 08:34

## 2022-10-02 RX ADMIN — PREDNISOLONE SODIUM PHOSPHATE 40 MG: 15 SOLUTION ORAL at 08:35

## 2022-10-02 RX ADMIN — NADOLOL 10 MG: 20 TABLET ORAL at 08:34

## 2022-10-02 RX ADMIN — FOLIC ACID 1 MG: 1 TABLET ORAL at 08:34

## 2022-10-02 RX ADMIN — TRAZODONE HYDROCHLORIDE 50 MG: 50 TABLET ORAL at 21:23

## 2022-10-02 RX ADMIN — ACETAMINOPHEN 975 MG: 325 TABLET ORAL at 21:23

## 2022-10-02 NOTE — PROGRESS NOTES
[facility]  Progress Note - Esperanza Summers 1965, 65 y o  male MRN: [id number]  Unit/Bed#: 5T DETOX 504-01 Encounter: [id number]  Primary Care Provider: Dajuan Oconnor MD   Date and time admitted to hospital: 9/29/2022  7:27 PM    [facility], LEVEL 4  Department of Medical Toxicology  Reason for Admission/Principal Problem: alcohol withdrawal   Rounding Provider: Louis Luz PA-C, Amie ELMORE*     Alcohol withdrawal syndrome with complication, with unspecified complication (HCC)-resolved as of 10/1/2022  Assessment & Plan  · H/o chronic daily alcohol consumption and reports of alcohol withdrawal seizures  · Relapsed 9/28/22 and per report was drinking 8-10 shots of vodka  · Last drink:  09/29/2022  · Ethanol lvl:  244 (9/29/22 11:00 a m )  · SEWS protocol with symptom-triggered phenobarbital for medically-assisted alcohol withdrawal  · Received 194 4 mg total phenobarbital; last dose administered 9/30/2022 2027  · Appears stable off phenobarbital- VSS, baseline tremor   · Acute withdrawal resolved     Alcohol use disorder, severe, dependence ([facility] [address] )  Assessment & Plan  · Pt with a h/o chronic heavy alcohol use  · Patient has previously been admitted to medical detox unit in January 2022 and April 2022  · Patient reports that he was recently hospitalized at [address] [place] [address] for 6 days, reports he started drinking for 1 day prior to medical detox admission      · Reports drinking 8-10 shots of vodka  · H/o withdrawal seizures  · Withdrawal management as above  · Continue daily thiamine/folic acid supplementation, and MVI  · Consult case management for assistance with rehab resources- is interested in inpatient drug and alcohol rehab at this time    * Alcoholic cirrhosis of liver ([facility] [address] )  Assessment & Plan  · Patient with history of alcoholic cirrhosis and esophageal varices secondary to chronic alcohol use  · Recently hospitalized at [facility] from 9/22/22- 9/26/22  · During this admission patient was followed by GI- Started on Prednisolone 40 mg daily for suspected alcoholic hepatitis, continue  Continue PPI and lactulose TID  · Continue nadolol  · CT abdomen and pelvis 09/14/2022: Hepatic cirrhosis with signs of increasing portal hypertension  In addition to splenomegaly there are enlarging varices inclusive of prominent periumbilical varices which are significantly increasing in size when compared to March 2021  · INR elevated but stable, LFTs stable overall (mild elevation of AST)  · On physical examination:  + scleral icterus, + caput medusa, + hepatomegaly  · Plan:  · Continue to trend labs, monitor PT/INR  · Recommended outpatient GI follow-up upon discharge  · Continue current medication regimen as above     Pancytopenia (HCC)  Assessment & Plan  · CBC this AM revealed overall stable pancytopenia  · Likely 2/2 myelosuppression from chronic alcohol use, cirrhosis   · Currently afebrile, no evidence of acute bleeding  · Continue to hold pharmacologic anticoagulation for VTE prophylaxis given thrombocytopenia, encourage SCDs  · Continue thiamine and folic acid supplementation  · Continue routine monitoring  · Encourage alcohol cessation    Pain and swelling of right lower extremity  Assessment & Plan  · Patient reports recent rupture of Baker's cyst with subsequent hematoma right calf  · CT RLE 9/22/2022:  Heterogeneous collection within the medial head gastrocnemius muscle suspicious for intramuscular hematoma  · VAS venous duplex U/S 9/23/2022: No evidence of acute or chronic deep vein thrombosis  · Acute Care surgery was consulted during last hospital admission no intervention needed at this time  · Recommended elevation of right lower extremity and Ace bandage for compression  · On physical examination: swelling of RLE has decreased today, compartments soft; Trace edema right lower leg, bruising noted on a posterior calf and upper right thigh  Patient has full range of motion, neurovascularly intact   · Patient reports improved pain and ambulation today  · Plan  · Continue to elevate lower extremity, continue compression  · Pain medication as needed  · PT consulted and recommendations appreciated- patient cleared, ambulation with cane     VTE Pharmacologic Prophylaxis:   Pharmacologic: Pharmacologic VTE Prophylaxis contraindicated due to thrombocytopenia  Mechanical VTE Prophylaxis in Place: yes    Code Status: Level 1 - Full Code    Patient Centered Rounds: I have performed bedside rounds with nursing staff today  Discussions with Specialists or Other Care Team Provider: Dr Jose Ron, [facility]    Education and Discussions with Family / Patient: I personally did not discuss patient with family at this time  Discussed current plan with patient, answered all questions to best of my ability  Time Spent for Care: 20 minutes  More than 50% of total time spent on counseling and coordination of care as described above  Current Length of Stay: 3 day(s)    Current Patient Status: Inpatient     Certification Statement: The patient will continue to require additional inpatient hospital stay due to awaiting inpatient rehab placement Discharge Plan: medically clear for discharge to inpatient rehab       Total time spent today 20 minutes  Greater than 50% of total time was spent with the patient and / or family counseling and / or coordination of care  A description of the counseling / coordination of care: alcohol use, cirrhosis, thrombocytopenia     Subjective:   Patient seen and examined bedside this morning   Reports that he continues to feel better today, right leg pain improved to 3/10 severity this morning, ambulating independently  Currently denies headaches, lightheadedness/dizziness, coughing/sneezing/congestion/rhinorrhea, chest pain, SOB/dyspnea, abdominal pain, N/V/C, dysuria/hematuria, hallucinations  Remains interested in inpatient rehab at this time      Objective:     Clinical Opiate Withdrawal Scale  Pulse: 61    SEWS Total Score: 0 (10/1/2022  3:30 AM)        Last 24 Hours Medication List:   Current Facility-Administered Medications   Medication Dose Route Frequency Provider Last Rate   • acetaminophen  650 mg Oral Q6H PRN Drury Jeans, PA-C     • acetaminophen  650 mg Oral Q6H PRN Drury Jeans, PA-C     • acetaminophen  975 mg Oral Q6H PRN Drury Jeans, PA-C     • folic acid  1 mg Oral Daily Nolvia Luz PA-C     • hydrocortisone  25 mg Rectal BID PRN Drury Jeans, PA-C     • lactulose  20 g Oral BID Drury Jeans, PA-C     • nadolol  10 mg Oral Daily Nolvia Luz PA-C     • ondansetron  4 mg Intravenous Q6H PRN Drury Jeans, PA-C     • pantoprazole  40 mg Oral Daily Drury Jeans, PA-C     • prednisoLONE  40 mg Oral Daily Drury Jeans, PA-C     • thiamine  100 mg Oral Daily Nolvia Luz PA-C     • traZODone  50 mg Oral HS PRN Drury Jeans, PA-C           Vitals:   Temp (24hrs), Av 3 °F (36 8 °C), Min:97 8 °F (36 6 °C), Max:98 8 °F (37 1 °C)    Temp:  [97 8 °F (36 6 °C)-98 8 °F (37 1 °C)] 98 8 °F (37 1 °C)  HR:  [58-64] 61  Resp:  [18] 18  BP: (109-131)/(63-84) 119/68  SpO2:  [90 %-96 %] 90 %  Body mass index is 25 71 kg/m²  Input and Output Summary (last 24 hours): Intake/Output Summary (Last 24 hours) at 10/2/2022 1653  Last data filed at 10/2/2022 0848  Gross per 24 hour   Intake 360 ml   Output --   Net 360 ml       Physical Exam:   Physical Exam  Vitals and nursing note reviewed  Constitutional:       General: He is not in acute distress  Appearance: He is well-developed and normal weight  He is ill-appearing (appears chronically ill)  He is not diaphoretic  HENT:      Head: Normocephalic and atraumatic  Eyes:      General: Scleral icterus (trace) present  Extraocular Movements: Extraocular movements intact        Right eye: No nystagmus  Left eye: No nystagmus  Conjunctiva/sclera: Conjunctivae normal       Pupils: Pupils are equal, round, and reactive to light  Cardiovascular:      Rate and Rhythm: Normal rate and regular rhythm  Pulses:           Dorsalis pedis pulses are 2+ on the right side and 2+ on the left side  Posterior tibial pulses are 2+ on the right side and 2+ on the left side  Heart sounds: Normal heart sounds  No murmur heard  No friction rub  No gallop  Pulmonary:      Effort: Pulmonary effort is normal  No respiratory distress  Breath sounds: Normal breath sounds  No wheezing, rhonchi or rales  Abdominal:      General: Abdomen is flat  Bowel sounds are normal  There is no distension  Palpations: Abdomen is soft  Tenderness: There is no abdominal tenderness  There is no guarding  Musculoskeletal:         General: Tenderness (right medial calf) present  Normal range of motion  Cervical back: Normal range of motion  Right lower leg: Swelling (decreased from yesterday, RLE measures 39 cm ) present  Edema (trace) present  Left lower leg: No swelling (LLE measures 36 cm)  No edema  Comments: RLE- compartments soft    Skin:     General: Skin is warm and dry  Capillary Refill: Capillary refill takes less than 2 seconds  Findings: Bruising (right lateral thigh, RLE) present  Neurological:      General: No focal deficit present  Mental Status: He is alert and oriented to person, place, and time  Mental status is at baseline  Sensory: No sensory deficit  Motor: Tremor (trace intention tremor b/l UEs, chronic) present  Coordination: Finger-Nose-Finger Test normal       Gait: Gait normal    Psychiatric:         Attention and Perception: Attention normal          Mood and Affect: Mood normal          Speech: Speech normal          Behavior: Behavior is cooperative           Additional Data:     Labs: keep all most recent labs as listed on admission templates   Results from last 7 days   Lab Units 10/02/22  0500 10/01/22  0557   WBC Thousand/uL 5 04 4 16*   HEMOGLOBIN g/dL 12 3 12 0   HEMATOCRIT % 36 3* 35 1*   PLATELETS Thousands/uL 45* 48*   NEUTROS PCT %  --  61   LYMPHS PCT %  --  21   MONOS PCT %  --  10   EOS PCT %  --  6      Results from last 7 days   Lab Units 10/02/22  0500   SODIUM mmol/L 138   POTASSIUM mmol/L 3 8   CHLORIDE mmol/L 106   CO2 mmol/L 28   BUN mg/dL 14   CREATININE mg/dL 0 66*   ANION GAP mmol/L 4*   CALCIUM mg/dL 8 1*   ALBUMIN g/dL 2 8*   TOTAL BILIRUBIN mg/dL 4 01*   ALK PHOS U/L 97   ALT U/L 35   AST U/L 65*   GLUCOSE RANDOM mg/dL 93      Results from last 7 days   Lab Units 10/01/22  0557   INR  1 47*        * I Have Reviewed All Lab Data Listed Above  * Additional Pertinent Lab Tests Reviewed: All Labs Within Last 24 Hours Reviewed      Imaging Studies: I have personally reviewed pertinent reports  Recent Cultures (last 7 days): Today, Patient Was Seen By: Genesis Luz PA-C     ** Please Note: Dictation voice to text software may have been used in the creation of this document   **

## 2022-10-02 NOTE — ASSESSMENT & PLAN NOTE
· H/o chronic daily alcohol consumption and reports of alcohol withdrawal seizures  · Relapsed 9/28/22 and per report was drinking 8-10 shots of vodka  · Last drink:  09/29/2022  · Ethanol lvl:  244 (9/29/22 11:00 a m )  · SEWS protocol with symptom-triggered phenobarbital for medically-assisted alcohol withdrawal  · Received 194 4 mg total phenobarbital; last dose administered 9/30/2022 2027  · Appears stable off phenobarbital- VSS, baseline tremor   · Acute withdrawal resolved

## 2022-10-02 NOTE — PLAN OF CARE
Problem: MOBILITY - ADULT  Goal: Maintain or return to baseline ADL function  Description: INTERVENTIONS:  -  Assess patient's ability to carry out ADLs; assess patient's baseline for ADL function and identify physical deficits which impact ability to perform ADLs (bathing, care of mouth/teeth, toileting, grooming, dressing, etc )  - Assess/evaluate cause of self-care deficits   - Assess range of motion  - Assess patient's mobility; develop plan if impaired  - Assess patient's need for assistive devices and provide as appropriate  - Encourage maximum independence but intervene and supervise when necessary  - Involve family in performance of ADLs  - Assess for home care needs following discharge   - Consider OT consult to assist with ADL evaluation and planning for discharge  - Provide patient education as appropriate  Outcome: Progressing  Goal: Maintains/Returns to pre admission functional level  Description: INTERVENTIONS:  - Perform BMAT or MOVE assessment daily    - Set and communicate daily mobility goal to care team and patient/family/caregiver     - Collaborate with rehabilitation services on mobility goals if consulted  - Out of bed for toileting  - Record patient progress and toleration of activity level   Outcome: Progressing       Problem: SUBSTANCE USE/ABUSE  Goal: By discharge, will develop insight into their chemical dependency and sustain motivation to continue in recovery  Description: INTERVENTIONS:  - Attends all daily group sessions and scheduled AA groups  - Actively practices coping skills through participation in the therapeutic community and adherence to program rules  - Reviews and completes assignments from individual treatment plan  - Assist patient development of understanding of their personal cycle of addiction and relapse triggers  Outcome: Progressing  Goal: By discharge, patient will have ongoing treatment plan addressing chemical dependency  Description: INTERVENTIONS:  - Assist patient with resources and/or appointments for ongoing recovery based living  Outcome: Progressing

## 2022-10-02 NOTE — ASSESSMENT & PLAN NOTE
· CBC this AM revealed overall stable pancytopenia  · Likely 2/2 myelosuppression from chronic alcohol use, cirrhosis   · Currently afebrile, no evidence of acute bleeding  · Continue to hold pharmacologic anticoagulation for VTE prophylaxis given thrombocytopenia, encourage SCDs  · Continue thiamine and folic acid supplementation  · Continue routine monitoring  · Encourage alcohol cessation

## 2022-10-02 NOTE — ASSESSMENT & PLAN NOTE
CBC this AM revealed stable macrocytic anemia  Likely 2/2 myelosuppression due to chronic alcohol use  No current evidence of active bleeding   Continue daily thiamine/folic acid supplementation  Continue routine monitoring   Encourage alcohol cessation

## 2022-10-02 NOTE — ASSESSMENT & PLAN NOTE
· Pt with a h/o chronic heavy alcohol use  · Patient has previously been admitted to medical detox unit in January 2022 and April 2022  · Patient reports that he was recently hospitalized at [address] [place] [address] for 6 days, reports he started drinking for 1 day prior to medical detox admission      · Reports drinking 8-10 shots of vodka  · H/o withdrawal seizures  · Withdrawal management as above  · Continue daily thiamine/folic acid supplementation, and MVI  · Consult case management for assistance with rehab resources- is interested in inpatient drug and alcohol rehab at this time

## 2022-10-02 NOTE — CASE MANAGEMENT
HANNAH contacted [address] [place] [address] regarding pt's referral status; admissions staff stated that they received pt's referral however, he is on the waitlist for an inpatient rehab bed  HANNAH contacted [facility] inpatient treatment center and [address] [place] [address] and left [facility] requesting call back regarding bed availability  CM provided update to pt

## 2022-10-02 NOTE — ASSESSMENT & PLAN NOTE
· Patient with history of alcoholic cirrhosis and esophageal varices secondary to chronic alcohol use  · Recently hospitalized at [facility] from 9/22/22- 9/26/22  · During this admission patient was followed by GI- Started on Prednisolone 40 mg daily for suspected alcoholic hepatitis, continue  Continue PPI and lactulose TID  · Continue nadolol  · CT abdomen and pelvis 09/14/2022: Hepatic cirrhosis with signs of increasing portal hypertension  In addition to splenomegaly there are enlarging varices inclusive of prominent periumbilical varices which are significantly increasing in size when compared to March 2021  · INR elevated but stable, LFTs stable overall (mild elevation of AST)  · On physical examination:  + scleral icterus, + caput medusa, + hepatomegaly  · Plan:  · Continue to trend labs, monitor PT/INR    · Recommended outpatient GI follow-up upon discharge  · Continue current medication regimen as above

## 2022-10-03 PROBLEM — S80.11XA HEMATOMA OF RIGHT LOWER LEG: Status: ACTIVE | Noted: 2022-09-22

## 2022-10-03 LAB
ALBUMIN SERPL BCP-MCNC: 2.7 G/DL (ref 3.5–5)
ALP SERPL-CCNC: 98 U/L (ref 43–122)
ALT SERPL W P-5'-P-CCNC: 34 U/L
ANION GAP SERPL CALCULATED.3IONS-SCNC: 5 MMOL/L (ref 5–14)
AST SERPL W P-5'-P-CCNC: 54 U/L (ref 17–59)
BILIRUB SERPL-MCNC: 3.1 MG/DL (ref 0.2–1)
BUN SERPL-MCNC: 16 MG/DL (ref 5–25)
CALCIUM ALBUM COR SERPL-MCNC: 9 MG/DL (ref 8.3–10.1)
CALCIUM SERPL-MCNC: 8 MG/DL (ref 8.4–10.2)
CHLORIDE SERPL-SCNC: 106 MMOL/L (ref 96–108)
CO2 SERPL-SCNC: 28 MMOL/L (ref 21–32)
CREAT SERPL-MCNC: 0.71 MG/DL (ref 0.7–1.5)
ERYTHROCYTE [DISTWIDTH] IN BLOOD BY AUTOMATED COUNT: 14.5 % (ref 11.6–15.1)
GFR SERPL CREATININE-BSD FRML MDRD: 104 ML/MIN/1.73SQ M
GLUCOSE SERPL-MCNC: 86 MG/DL (ref 70–99)
HCT VFR BLD AUTO: 38.3 % (ref 36.5–49.3)
HGB BLD-MCNC: 12.3 G/DL (ref 12–17)
INR PPP: 1.57 (ref 0.84–1.19)
MAGNESIUM SERPL-MCNC: 1.6 MG/DL (ref 1.6–2.3)
MCH RBC QN AUTO: 33.6 PG (ref 26.8–34.3)
MCHC RBC AUTO-ENTMCNC: 32.1 G/DL (ref 31.4–37.4)
MCV RBC AUTO: 105 FL (ref 82–98)
PLATELET # BLD AUTO: 43 THOUSANDS/UL (ref 149–390)
PMV BLD AUTO: 11.8 FL (ref 8.9–12.7)
POTASSIUM SERPL-SCNC: 3.6 MMOL/L (ref 3.5–5.3)
PROT SERPL-MCNC: 6.7 G/DL (ref 6.4–8.4)
PROTHROMBIN TIME: 19.2 SECONDS (ref 11.6–14.5)
RBC # BLD AUTO: 3.66 MILLION/UL (ref 3.88–5.62)
SODIUM SERPL-SCNC: 139 MMOL/L (ref 135–147)
WBC # BLD AUTO: 5.65 THOUSAND/UL (ref 4.31–10.16)

## 2022-10-03 PROCEDURE — 80053 COMPREHEN METABOLIC PANEL: CPT | Performed by: PHYSICIAN ASSISTANT

## 2022-10-03 PROCEDURE — 85027 COMPLETE CBC AUTOMATED: CPT | Performed by: PHYSICIAN ASSISTANT

## 2022-10-03 PROCEDURE — 99232 SBSQ HOSP IP/OBS MODERATE 35: CPT | Performed by: PHYSICIAN ASSISTANT

## 2022-10-03 PROCEDURE — NC001 PR NO CHARGE: Performed by: PHYSICIAN ASSISTANT

## 2022-10-03 PROCEDURE — 83735 ASSAY OF MAGNESIUM: CPT | Performed by: PHYSICIAN ASSISTANT

## 2022-10-03 PROCEDURE — 85610 PROTHROMBIN TIME: CPT | Performed by: PHYSICIAN ASSISTANT

## 2022-10-03 RX ADMIN — FOLIC ACID 1 MG: 1 TABLET ORAL at 08:27

## 2022-10-03 RX ADMIN — PREDNISOLONE SODIUM PHOSPHATE 40 MG: 15 SOLUTION ORAL at 08:30

## 2022-10-03 RX ADMIN — GABAPENTIN 400 MG: 400 CAPSULE ORAL at 19:39

## 2022-10-03 RX ADMIN — TRAZODONE HYDROCHLORIDE 50 MG: 50 TABLET ORAL at 19:39

## 2022-10-03 RX ADMIN — THIAMINE HCL TAB 100 MG 100 MG: 100 TAB at 08:27

## 2022-10-03 RX ADMIN — PANTOPRAZOLE SODIUM 40 MG: 40 TABLET, DELAYED RELEASE ORAL at 08:27

## 2022-10-03 RX ADMIN — LACTULOSE 20 G: 10 SOLUTION ORAL at 17:47

## 2022-10-03 RX ADMIN — LACTULOSE 20 G: 10 SOLUTION ORAL at 08:27

## 2022-10-03 NOTE — CASE MANAGEMENT
Cm contacted multiple inpatient rehabs in [place] in attempt to secure pt inpatient [facility] rehab placements  [facility] [phone]- no response, message left    [facility]- [phone]  [phone]  Referral faxed phone interview completed by pt  Most likely no placement for today  [facility] [phone] (V) [phone] referral faxed admissions will return call to complete pt interview  Most likely no placement for today  [facility]  [phone] no beds for three weeks  [facility] [phone] No beds will call cm if bed becomes available

## 2022-10-03 NOTE — ASSESSMENT & PLAN NOTE
· Pt with a h/o chronic heavy alcohol use  · Patient has previously been admitted to medical detox unit in January 2022 and April 2022  · Patient reports that he was recently hospitalized at [address] [place] [address] for 6 days, reports he started drinking for 1 day prior to medical detox admission      · Reports drinking 8-10 shots of vodka  · H/o withdrawal seizures  · Withdrawal management as above  · Continue daily thiamine/folic acid supplementation, and MVI  · Consult case management for assistance with rehab resources- is interested in inpatient drug and alcohol rehab at this time

## 2022-10-03 NOTE — PROGRESS NOTES
[facility]  Progress Note - Akash Lord 1965, 65 y o  male MRN: [id number]  Unit/Bed#: 5T DETOX 504-01 Encounter: [id number]  Primary Care Provider: Billy Osuna MD   Date and time admitted to hospital: 9/29/2022  7:27 PM    [facility], LEVEL 4  Department of Medical Toxicology  Reason for Admission/Principal Problem: alcohol withdrawal  Rounding Provider: Caryn Yu PA-C, Kristin Strickland MD     Alcohol withdrawal syndrome with complication, with unspecified complication (HCC)-resolved as of 10/1/2022  Assessment & Plan  · H/o chronic daily alcohol consumption and reports of alcohol withdrawal seizures  · Relapsed 9/28/22 and per report was drinking 8-10 shots of vodka  · Last drink:  09/29/2022  · Ethanol lvl:  244 (9/29/22 11:00 a m )  · SEWS protocol with symptom-triggered phenobarbital for medically-assisted alcohol withdrawal  · Received 194 4 mg total phenobarbital; last dose administered 9/30/2022 2027  · Remains stable off phenobarbital- VSS, baseline trace tremor   · Acute withdrawal resolved     Alcohol use disorder, severe, dependence ([facility] [address] [address] )  Assessment & Plan  · Pt with a h/o chronic heavy alcohol use  · Patient has previously been admitted to medical detox unit in January 2022 and April 2022  · Patient reports that he was recently hospitalized at [address] [place] [address] for 6 days, reports he started drinking for 1 day prior to medical detox admission      · Reports drinking 8-10 shots of vodka  · H/o withdrawal seizures  · Withdrawal management as above  · Continue daily thiamine/folic acid supplementation, and MVI  · Consult case management for assistance with rehab resources- is interested in inpatient drug and alcohol rehab at this time    * Alcoholic cirrhosis of liver ([facility] [address] )  Assessment & Plan  · Patient with history of alcoholic cirrhosis and esophageal varices secondary to chronic alcohol use  · Recently hospitalized at [facility] from 9/22/22- 9/26/22  · During this admission patient was followed by GI- Started on Prednisolone 40 mg daily for suspected alcoholic hepatitis, continue  Continue PPI and lactulose TID  · Continue nadolol  · CT abdomen and pelvis 09/14/2022: Hepatic cirrhosis with signs of increasing portal hypertension  In addition to splenomegaly there are enlarging varices inclusive of prominent periumbilical varices which are significantly increasing in size when compared to March 2021    · INR elevated but stable, LFTs stable overall  · On physical examination:  + scleral icterus, + caput medusa, + hepatomegaly  · Plan:  · Continue routine monitoring of labs during admission  · Recommended outpatient GI follow-up upon discharge  · Continue current medication regimen as above     Pancytopenia (HCC)  Assessment & Plan  · CBC this AM revealed overall stable chronic pancytopenia  · WBCs improved  · Stable macrocytic anemia  · Chronic thrombocytopenia, stable  · Likely 2/2 myelosuppression from chronic alcohol use, cirrhosis   · Currently afebrile, no evidence of acute bleeding  · Continue to hold pharmacologic anticoagulation for VTE prophylaxis given thrombocytopenia, encourage SCDs  · Continue thiamine and folic acid supplementation  · Continue routine monitoring  · Fall precautions  · Encourage alcohol cessation    Hematoma of right lower leg  Assessment & Plan  · Patient reports recent rupture of Baker's cyst with subsequent hematoma right calf  · CT RLE 9/22/2022:  Heterogeneous collection within the medial head gastrocnemius muscle suspicious for intramuscular hematoma  · VAS venous duplex U/S 9/23/2022: No evidence of acute or chronic deep vein thrombosis  · Acute Care surgery was consulted during last hospital admission no intervention needed at this time  · Recommended elevation of right lower extremity and Ace bandage for compression  · On physical examination: swelling of RLE is stable today, compartments soft; Trace edema right lower leg, bruising noted on a posterior calf and upper right lateral thigh; bruising appears stable (no worsening)  Patient has full range of motion, neurovascularly intact   · Patient reports improved pain and ambulation today  · Plan  · Continue to elevate lower extremity, continue compression  · Pain medication as needed  · PT consulted and recommendations appreciated- patient cleared, ambulation with cane     VTE Pharmacologic Prophylaxis:   Pharmacologic: Pharmacologic VTE Prophylaxis contraindicated due to thrombocytopenia   Mechanical VTE Prophylaxis in Place: yes    Code Status: Level 1 - Full Code    Patient Centered Rounds: I have performed bedside rounds with nursing staff today  Discussions with Specialists or Other Care Team Provider: Dr Cezar Pinto, [facility]   Education and Discussions with Family / Patient: I personally did not discuss patient with family at this time  Discussed current plan with patient, answered all questions to best of my ability  Time Spent for Care: 20 minutes  More than 50% of total time spent on counseling and coordination of care as described above  Current Length of Stay: 4 day(s)    Current Patient Status: Inpatient     Certification Statement: The patient will continue to require additional inpatient hospital stay due to awaiting inpatient rehab placement  Discharge Plan: medically clear for inpatient rehab       Total time spent today 20  minutes  Greater than 50% of total time was spent with the patient and / or family counseling and / or coordination of care  A description of the counseling / coordination of care: alcohol use, cirrhosis     Subjective:   Patient seen and examined bedside this morning   Reports that he feels fine today, right leg pain stable, 3/10 severity this morning, ambulating independently  Currently denies headaches, lightheadedness/dizziness, coughing/sneezing/congestion/rhinorrhea, chest pain, SOB/dyspnea, abdominal pain, N/V/C, dysuria/hematuria, hallucinations  Remains interested in inpatient rehab at this time  Objective:     Clinical Opiate Withdrawal Scale  Pulse: 67    SEWS Total Score: 0 (10/1/2022  3:30 AM)        Last 24 Hours Medication List:   Current Facility-Administered Medications   Medication Dose Route Frequency Provider Last Rate   • acetaminophen  650 mg Oral Q6H PRN Miko Number, PA-C     • acetaminophen  650 mg Oral Q6H PRN [place] Number, PA-C     • acetaminophen  975 mg Oral Q6H PRN Miko Number, PA-C     • folic acid  1 mg Oral Daily Nolvia Defrancisco, PA-C     • gabapentin  400 mg Oral TID PRN TIMMY Jaime     • hydrocortisone  25 mg Rectal BID PRN Miko Number, PA-C     • lactulose  20 g Oral BID [place] Number, PA-C     • nadolol  10 mg Oral Daily Nolvia Defrancisco, PA-C     • ondansetron  4 mg Intravenous Q6H PRN [place] Number, PA-C     • pantoprazole  40 mg Oral Daily Miko Number, PA-C     • prednisoLONE  40 mg Oral Daily Miko Number, PA-C     • thiamine  100 mg Oral Daily Nolvia Defrancisco, PA-C     • traZODone  50 mg Oral HS PRN [place] Number, PA-C           Vitals:   Temp (24hrs), Av 7 °F (37 1 °C), Min:97 8 °F (36 6 °C), Max:99 4 °F (37 4 °C)    Temp:  [97 8 °F (36 6 °C)-99 4 °F (37 4 °C)] 99 4 °F (37 4 °C)  HR:  [61-67] 67  Resp:  [18] 18  BP: (104-137)/(57-85) 106/57  SpO2:  [90 %-100 %] 94 %  Body mass index is 25 71 kg/m²  Input and Output Summary (last 24 hours):No intake or output data in the 24 hours ending 10/03/22 1508    Physical Exam:   Physical Exam  Vitals and nursing note reviewed  Constitutional:       General: He is not in acute distress  Appearance: He is well-developed and normal weight  He is ill-appearing (appears chronically ill)  He is not diaphoretic  HENT:      Head: Normocephalic and atraumatic  Eyes:      General: Scleral icterus (trace ) present        Extraocular Movements: Extraocular movements intact  Right eye: No nystagmus  Left eye: No nystagmus  Conjunctiva/sclera: Conjunctivae normal       Pupils: Pupils are equal, round, and reactive to light  Cardiovascular:      Rate and Rhythm: Normal rate and regular rhythm  Pulses: Normal pulses  Dorsalis pedis pulses are 2+ on the right side and 2+ on the left side  Posterior tibial pulses are 2+ on the right side and 2+ on the left side  Heart sounds: Normal heart sounds  No murmur heard  No friction rub  No gallop  Pulmonary:      Effort: Pulmonary effort is normal  No respiratory distress  Breath sounds: Normal breath sounds  No wheezing, rhonchi or rales  Abdominal:      General: Abdomen is flat  Bowel sounds are normal  There is no distension  Palpations: Abdomen is soft  There is hepatomegaly  Tenderness: There is no abdominal tenderness  There is no guarding  Comments: Esvin hopkins    Musculoskeletal:         General: Swelling present  Normal range of motion  Cervical back: Normal range of motion  Right lower leg: Swelling (RLE measures 39 cm, stable; compartments soft ) and tenderness (right medial calf ) present  Edema (trace) present  Left lower leg: No swelling (LLE measures 36 cm)  No edema  Right ankle: Normal range of motion  Left ankle: Normal range of motion  Skin:     General: Skin is warm and dry  Capillary Refill: Capillary refill takes less than 2 seconds  Findings: Bruising (RLE: lateral thigh, calf, foot; unchanged) present  Neurological:      General: No focal deficit present  Mental Status: He is alert and oriented to person, place, and time  Mental status is at baseline  Sensory: No sensory deficit  Motor: Tremor (trace intention tremor, chronic ) present        Coordination: Finger-Nose-Finger Test normal       Gait: Gait normal    Psychiatric:         Attention and Perception: Attention normal          Mood and Affect: Mood is anxious  Speech: Speech normal          Behavior: Behavior is cooperative  Additional Data:     Labs: keep all most recent labs as listed on admission templates   Results from last 7 days   Lab Units 10/03/22  0500 10/02/22  0500 10/01/22  0557   WBC Thousand/uL 5 65   < > 4 16*   HEMOGLOBIN g/dL 12 3   < > 12 0   HEMATOCRIT % 38 3   < > 35 1*   PLATELETS Thousands/uL 43*   < > 48*   NEUTROS PCT %  --   --  61   LYMPHS PCT %  --   --  21   MONOS PCT %  --   --  10   EOS PCT %  --   --  6    < > = values in this interval not displayed  Results from last 7 days   Lab Units 10/03/22  0500   SODIUM mmol/L 139   POTASSIUM mmol/L 3 6   CHLORIDE mmol/L 106   CO2 mmol/L 28   BUN mg/dL 16   CREATININE mg/dL 0 71   ANION GAP mmol/L 5   CALCIUM mg/dL 8 0*   ALBUMIN g/dL 2 7*   TOTAL BILIRUBIN mg/dL 3 10*   ALK PHOS U/L 98   ALT U/L 34   AST U/L 54   GLUCOSE RANDOM mg/dL 86      Results from last 7 days   Lab Units 10/03/22  0500   INR  1 57*          * I Have Reviewed All Lab Data Listed Above  * Additional Pertinent Lab Tests Reviewed: All Labs Within Last 24 Hours Reviewed      Imaging Studies: I have personally reviewed pertinent reports  Recent Cultures (last 7 days): Today, Patient Was Seen By: Sandra Luz PA-C    ** Please Note: Dictation voice to text software may have been used in the creation of this document   **

## 2022-10-03 NOTE — CASE MANAGEMENT
Cm received a call from [address] [place] [address] regarding pt referral  Cm informed pt was not accepted due to medical reasons  Cm contacted [facility]  to receive an update and was informed no rehab beds for today

## 2022-10-03 NOTE — ASSESSMENT & PLAN NOTE
· Patient with history of alcoholic cirrhosis and esophageal varices secondary to chronic alcohol use  · Recently hospitalized at [facility] from 9/22/22- 9/26/22  · During this admission patient was followed by GI- Started on Prednisolone 40 mg daily for suspected alcoholic hepatitis, continue  Continue PPI and lactulose TID  · Continue nadolol  · CT abdomen and pelvis 09/14/2022: Hepatic cirrhosis with signs of increasing portal hypertension  In addition to splenomegaly there are enlarging varices inclusive of prominent periumbilical varices which are significantly increasing in size when compared to March 2021    · INR elevated but stable, LFTs stable overall (mild elevation of AST)  · On physical examination:  + scleral icterus, + caput medusa, + hepatomegaly  · Plan:  · Continue routine monitoring of labs during admission  · Recommended outpatient GI follow-up upon discharge  · Continue current medication regimen as above

## 2022-10-03 NOTE — CASE MANAGEMENT
Cm contacted [facility] admission at [phone] and informed no decisions had been made regarding pt referral

## 2022-10-03 NOTE — ASSESSMENT & PLAN NOTE
· CBC this AM revealed overall stable chronic pancytopenia  · WBCs improved  · Stable macrocytic anemia  · Chronic thrombocytopenia, stable  · Likely 2/2 myelosuppression from chronic alcohol use, cirrhosis   · Currently afebrile, no evidence of acute bleeding  · Continue to hold pharmacologic anticoagulation for VTE prophylaxis given thrombocytopenia, encourage SCDs  · Continue thiamine and folic acid supplementation  · Continue routine monitoring  · Fall precautions  · Encourage alcohol cessation

## 2022-10-03 NOTE — ASSESSMENT & PLAN NOTE
· Patient with history of alcoholic cirrhosis and esophageal varices secondary to chronic alcohol use  · Recently hospitalized at [facility] from 9/22/22- 9/26/22  · During this admission patient was followed by GI- Started on Prednisolone 40 mg daily for suspected alcoholic hepatitis, continue  Continue PPI and lactulose TID  · Continue nadolol  · CT abdomen and pelvis 09/14/2022: Hepatic cirrhosis with signs of increasing portal hypertension  In addition to splenomegaly there are enlarging varices inclusive of prominent periumbilical varices which are significantly increasing in size when compared to March 2021    · INR elevated but stable, LFTs stable overall   · On physical examination:  + scleral icterus, + caput medusa, + hepatomegaly  · Plan:  · Continue routine monitoring of labs during admission  · Recommended outpatient GI follow-up upon discharge  · Continue current medication regimen as above

## 2022-10-03 NOTE — ASSESSMENT & PLAN NOTE
· Patient reports recent rupture of Baker's cyst with subsequent hematoma right calf  · CT RLE 9/22/2022:  Heterogeneous collection within the medial head gastrocnemius muscle suspicious for intramuscular hematoma  · VAS venous duplex U/S 9/23/2022: No evidence of acute or chronic deep vein thrombosis  · Acute Care surgery was consulted during last hospital admission no intervention needed at this time  · Recommended elevation of right lower extremity and Ace bandage for compression  · On physical examination: swelling of RLE is stable today, compartments soft; Trace edema right lower leg, bruising noted on a posterior calf and upper right lateral thigh; bruising appears stable (no worsening)   Patient has full range of motion, neurovascularly intact   · Patient reports improved pain and ambulation today  · Plan  · Continue to elevate lower extremity, continue compression  · Pain medication as needed  · PT consulted and recommendations appreciated- patient cleared, ambulation with cane

## 2022-10-03 NOTE — PLAN OF CARE
Problem: SUBSTANCE USE/ABUSE  Goal: By discharge, will develop insight into their chemical dependency and sustain motivation to continue in recovery  Description: INTERVENTIONS:  - Attends all daily group sessions and scheduled AA groups  - Actively practices coping skills through participation in the therapeutic community and adherence to program rules  - Reviews and completes assignments from individual treatment plan  - Assist patient development of understanding of their personal cycle of addiction and relapse triggers  Outcome: Progressing  Goal: By discharge, patient will have ongoing treatment plan addressing chemical dependency  Description: INTERVENTIONS:  - Assist patient with resources and/or appointments for ongoing recovery based living  Outcome: Progressing     Problem: Potential for Falls  Goal: Patient will remain free of falls  Description: INTERVENTIONS:  - Educate patient/family on patient safety including physical limitations  - Instruct patient to call for assistance with activity   - Consult OT/PT to assist with strengthening/mobility   - Keep Call bell within reach  - Keep bed low and locked with side rails adjusted as appropriate  - Keep care items and personal belongings within reach  - Initiate and maintain comfort rounds  - Make Fall Risk Sign visible to staff  - Apply yellow socks and bracelet for high fall risk patients  - Consider moving patient to room near nurses station  Outcome: Progressing     Problem: MOBILITY - ADULT  Goal: Maintain or return to baseline ADL function  Description: INTERVENTIONS:  -  Assess patient's ability to carry out ADLs; assess patient's baseline for ADL function and identify physical deficits which impact ability to perform ADLs (bathing, care of mouth/teeth, toileting, grooming, dressing, etc )  - Assess/evaluate cause of self-care deficits   - Assess range of motion  - Assess patient's mobility; develop plan if impaired  - Assess patient's need for assistive devices and provide as appropriate  - Encourage maximum independence but intervene and supervise when necessary  - Involve family in performance of ADLs  - Assess for home care needs following discharge   - Consider OT consult to assist with ADL evaluation and planning for discharge  - Provide patient education as appropriate  Outcome: Progressing  Goal: Maintains/Returns to pre admission functional level  Description: INTERVENTIONS:  - Perform BMAT or MOVE assessment daily    - Set and communicate daily mobility goal to care team and patient/family/caregiver     - Collaborate with rehabilitation services on mobility goals if consulted  - Out of bed for toileting  - Record patient progress and toleration of activity level   Outcome: Progressing

## 2022-10-04 LAB
AMMONIA PLAS-SCNC: 15 UMOL/L (ref 9–33)
ANION GAP SERPL CALCULATED.3IONS-SCNC: 6 MMOL/L (ref 5–14)
BUN SERPL-MCNC: 17 MG/DL (ref 5–25)
CALCIUM SERPL-MCNC: 8.2 MG/DL (ref 8.4–10.2)
CHLORIDE SERPL-SCNC: 105 MMOL/L (ref 96–108)
CO2 SERPL-SCNC: 27 MMOL/L (ref 21–32)
CREAT SERPL-MCNC: 0.79 MG/DL (ref 0.7–1.5)
ERYTHROCYTE [DISTWIDTH] IN BLOOD BY AUTOMATED COUNT: 14.6 % (ref 11.6–15.1)
GFR SERPL CREATININE-BSD FRML MDRD: 100 ML/MIN/1.73SQ M
GLUCOSE SERPL-MCNC: 100 MG/DL (ref 70–99)
HCT VFR BLD AUTO: 37.8 % (ref 36.5–49.3)
HGB BLD-MCNC: 12.1 G/DL (ref 12–17)
INR PPP: 1.5 (ref 0.84–1.19)
MAGNESIUM SERPL-MCNC: 1.7 MG/DL (ref 1.6–2.3)
MCH RBC QN AUTO: 33.1 PG (ref 26.8–34.3)
MCHC RBC AUTO-ENTMCNC: 32 G/DL (ref 31.4–37.4)
MCV RBC AUTO: 103 FL (ref 82–98)
PLATELET # BLD AUTO: 46 THOUSANDS/UL (ref 149–390)
PMV BLD AUTO: 11.4 FL (ref 8.9–12.7)
POTASSIUM SERPL-SCNC: 3.6 MMOL/L (ref 3.5–5.3)
PROTHROMBIN TIME: 18.5 SECONDS (ref 11.6–14.5)
RBC # BLD AUTO: 3.66 MILLION/UL (ref 3.88–5.62)
SODIUM SERPL-SCNC: 138 MMOL/L (ref 135–147)
WBC # BLD AUTO: 5.65 THOUSAND/UL (ref 4.31–10.16)

## 2022-10-04 PROCEDURE — 82140 ASSAY OF AMMONIA: CPT

## 2022-10-04 PROCEDURE — 83735 ASSAY OF MAGNESIUM: CPT | Performed by: PHYSICIAN ASSISTANT

## 2022-10-04 PROCEDURE — 85027 COMPLETE CBC AUTOMATED: CPT | Performed by: PHYSICIAN ASSISTANT

## 2022-10-04 PROCEDURE — 99232 SBSQ HOSP IP/OBS MODERATE 35: CPT

## 2022-10-04 PROCEDURE — 85610 PROTHROMBIN TIME: CPT | Performed by: PHYSICIAN ASSISTANT

## 2022-10-04 PROCEDURE — 80048 BASIC METABOLIC PNL TOTAL CA: CPT | Performed by: PHYSICIAN ASSISTANT

## 2022-10-04 RX ADMIN — TRAZODONE HYDROCHLORIDE 50 MG: 50 TABLET ORAL at 19:35

## 2022-10-04 RX ADMIN — THIAMINE HCL TAB 100 MG 100 MG: 100 TAB at 08:25

## 2022-10-04 RX ADMIN — GABAPENTIN 400 MG: 400 CAPSULE ORAL at 19:36

## 2022-10-04 RX ADMIN — PREDNISOLONE SODIUM PHOSPHATE 40 MG: 15 SOLUTION ORAL at 08:27

## 2022-10-04 RX ADMIN — LACTULOSE 20 G: 10 SOLUTION ORAL at 08:25

## 2022-10-04 RX ADMIN — FOLIC ACID 1 MG: 1 TABLET ORAL at 08:25

## 2022-10-04 RX ADMIN — LACTULOSE 20 G: 10 SOLUTION ORAL at 17:55

## 2022-10-04 RX ADMIN — PANTOPRAZOLE SODIUM 40 MG: 40 TABLET, DELAYED RELEASE ORAL at 08:25

## 2022-10-04 NOTE — PROGRESS NOTES
Progress Note - Medical Toxicology    Willam Valdovinos 65 y o  male MRN: [id number]  Unit/Bed#: 5T DETOX 724-57 Encounter: [id number]  MEDICAL DETOX UNIT, LEVEL 4  Department of Medical Toxicology  Reason for Admission/Principal Problem: alcohol withdrawal, alcohol use disorder   Rounding Provider: Javi Rey MD         Alcohol use disorder, severe, dependence ([facility] [address] )  Assessment & Plan  · Pt with a h/o chronic heavy alcohol use  · Patient has previously been admitted to medical detox unit in January 2022 and April 2022  · Patient reports that he was recently hospitalized at [address] [place] [address] for 6 days, reports he started drinking for 1 day prior to medical detox admission  · Reports drinking 8-10 shots of vodka  · H/o withdrawal seizures  · Withdrawal management as above  · Continue daily thiamine/folic acid supplementation, and MVI  · Consult case management for assistance with rehab resources- is interested in inpatient drug and alcohol rehab at this time    * Alcoholic cirrhosis of liver [facility])  Assessment & Plan  · Patient with history of alcoholic cirrhosis and esophageal varices secondary to chronic alcohol use  · Recently hospitalized at [facility] from 9/22/22- 9/26/22  · During this admission patient was followed by GI- Started on Prednisolone 40 mg daily for suspected alcoholic hepatitis, continue  Continue PPI and lactulose TID  · Continue nadolol  · CT abdomen and pelvis 09/14/2022: Hepatic cirrhosis with signs of increasing portal hypertension  In addition to splenomegaly there are enlarging varices inclusive of prominent periumbilical varices which are significantly increasing in size when compared to March 2021    · INR elevated but stable, LFTs stable overall   · On physical examination:  + scleral icterus, + caput medusa, + hepatomegaly  · Plan:  · Continue routine monitoring of labs during admission  · Recommended outpatient GI follow-up upon discharge  · Continue current medication regimen as above     Pancytopenia (HCC)  Assessment & Plan  · CBC this AM revealed overall stable chronic pancytopenia  · WBCs improved  · Stable macrocytic anemia  · Chronic thrombocytopenia, stable  · Likely 2/2 myelosuppression from chronic alcohol use, cirrhosis   · Currently afebrile, no evidence of acute bleeding  · Continue to hold pharmacologic anticoagulation for VTE prophylaxis given thrombocytopenia, encourage SCDs  · Continue thiamine and folic acid supplementation  · Continue routine monitoring  · Fall precautions  · Encourage alcohol cessation            VTE Pharmacologic Prophylaxis:   Pharmacologic: Pharmacologic VTE Prophylaxis contraindicated due to thrombocytopenia   Mechanical VTE Prophylaxis in Place: yes    Code Status: Level 1 - Full Code    Patient Centered Rounds: I have performed bedside rounds with nursing staff today  Discussions with Specialists or Other Care Team Provider: Case Management    Education and Discussions with Family / Patient:  I personally answered all of the patient's questions to the best of my ability     Time Spent for Care: 20 minutes  More than 50% of total time spent on counseling and coordination of care as described above  Current Length of Stay: 5 day(s)    Current Patient Status: Inpatient     Certification Statement: The patient will continue to require additional inpatient hospital stay due to pending inpatient drug and alcohol placement  Discharge Plan: Inpatient drug and alcohol rehab placement       Subjective:   Patient seen and examined at bedside  Reports improvement in ambulation has been walking around the unit without any difficulty  Currently denies headaches, lightheadedness/dizziness,  chest pain, SOB/dyspnea, abdominal pain, N/V/C, dysuria/hematuria, hallucinations  Continues to express interest in inpatient drug and alcohol rehab at this time      Objective:     Clinical Opiate Withdrawal Scale  Pulse: 63    SEWS Total Score: 0 (10/1/2022  3:30 AM)        Last 24 Hours Medication List:   Current Facility-Administered Medications   Medication Dose Route Frequency Provider Last Rate   • acetaminophen  650 mg Oral Q6H PRN MIRTHA Valentin-C     • acetaminophen  650 mg Oral Q6H PRN Gar Suze PA-C     • acetaminophen  975 mg Oral Q6H PRN Gar Suze PA-C     • folic acid  1 mg Oral Daily MIRTHA Muhammad-C     • gabapentin  400 mg Oral TID PRN TIMMY Davis     • hydrocortisone  25 mg Rectal BID PRN Gar Suze PA-C     • lactulose  20 g Oral BID Giancarlo Arciniega PA-C     • nadolol  10 mg Oral Daily MIRTHA Muhammad-C     • ondansetron  4 mg Intravenous Q6H PRN Gar Suze PA-C     • pantoprazole  40 mg Oral Daily Gar Suze PA-C     • prednisoLONE  40 mg Oral Daily Gar MIRTHA Arciniega-C     • thiamine  100 mg Oral Daily NolviaMIRTHA Delgado-C     • traZODone  50 mg Oral HS PRN Gar Suze PA-C           Vitals:   Temp (24hrs), Av 1 °F (36 7 °C), Min:97 9 °F (36 6 °C), Max:98 3 °F (36 8 °C)    Temp:  [97 9 °F (36 6 °C)-98 3 °F (36 8 °C)] 98 3 °F (36 8 °C)  HR:  [55-63] 63  Resp:  [16] 16  BP: (104-129)/(64-83) 129/83  SpO2:  [95 %-96 %] 96 %  Body mass index is 25 71 kg/m²  Input and Output Summary (last 24 hours): Intake/Output Summary (Last 24 hours) at 10/4/2022 1755  Last data filed at 10/4/2022 1300  Gross per 24 hour   Intake 480 ml   Output --   Net 480 ml       Physical Exam:   Physical Exam  Constitutional:       General: He is not in acute distress  Appearance: He is not ill-appearing or diaphoretic  Eyes:      General: Scleral icterus (Trace) present  Extraocular Movements:      Right eye: No nystagmus  Left eye: No nystagmus  Pupils: Pupils are equal, round, and reactive to light     Cardiovascular:      Rate and Rhythm: Normal rate and regular rhythm  Heart sounds: No murmur heard  Pulmonary:      Breath sounds: Normal breath sounds  Abdominal:      Palpations: There is hepatomegaly  Comments: Caput medusae present    Musculoskeletal:      Right lower leg: Edema (trace) present  Left lower leg: No edema  Comments: RLE- measuring 39cm circumference  LLE- measuring 376 cm circumference  No change    Bruising noted on RLE    Neurological:      Mental Status: He is alert and oriented to person, place, and time  Motor: Tremor (baseline intention tremor ) present  Coordination: Coordination is intact  Gait: Gait is intact  Psychiatric:         Mood and Affect: Mood is not anxious or depressed  Additional Data:     Labs: keep all most recent labs as listed on admission templates   Results from last 7 days   Lab Units 10/04/22  0522 10/02/22  0500 10/01/22  0557   WBC Thousand/uL 5 65   < > 4 16*   HEMOGLOBIN g/dL 12 1   < > 12 0   HEMATOCRIT % 37 8   < > 35 1*   PLATELETS Thousands/uL 46*   < > 48*   NEUTROS PCT %  --   --  61   LYMPHS PCT %  --   --  21   MONOS PCT %  --   --  10   EOS PCT %  --   --  6    < > = values in this interval not displayed  Results from last 7 days   Lab Units 10/04/22  0522 10/03/22  0500   SODIUM mmol/L 138 139   POTASSIUM mmol/L 3 6 3 6   CHLORIDE mmol/L 105 106   CO2 mmol/L 27 28   BUN mg/dL 17 16   CREATININE mg/dL 0 79 0 71   ANION GAP mmol/L 6 5   CALCIUM mg/dL 8 2* 8 0*   ALBUMIN g/dL  --  2 7*   TOTAL BILIRUBIN mg/dL  --  3 10*   ALK PHOS U/L  --  98   ALT U/L  --  34   AST U/L  --  54   GLUCOSE RANDOM mg/dL 100* 86      Results from last 7 days   Lab Units 10/04/22  0522   INR  1 50*                         * I Have Reviewed All Lab Data Listed Above  * Additional Pertinent Lab Tests Reviewed: [facility] Admission Reviewed      Imaging Studies: I have personally reviewed pertinent reports  Recent Cultures (last 7 days):           Today, Patient Was Seen By: Brenda Williamson    ** Please Note: Dictation voice to text software may have been used in the creation of this document   **

## 2022-10-04 NOTE — DISCHARGE SUMMARY
MEDICAL DETOX UNIT, LEVEL 4  Department of Medical Toxicology  Reason for Admission/Principal Problem: ETOH withdrawal, ETOH use disorder   Admitting provider: Trinity Demarco MD   9/29/2022  7:27 PM       Discharging Physician / Practitioner: Lore Mondragon  PCP: Rahel Berman MD  Admission Date:   Admission Orders (From admission, onward)     Ordered        09/29/22 1933  Inpatient Admission  Once                      Discharge Date: 10/05/22    Medical Problems             Resolved Problems  Date Reviewed: 9/26/2022          Resolved    Alcohol withdrawal syndrome with complication, with unspecified complication ([facility] [address] ) 57/8/8133     Resolved by  [facility] LUCIE Luz                Alcohol use disorder, severe, dependence ([facility] [address] )  Assessment & Plan  · Pt with a h/o chronic heavy alcohol use  · Patient has previously been admitted to medical detox unit in January 2022 and April 2022  · Patient reports that he was recently hospitalized at [address] [place] [address] for 6 days, reports he started drinking for 1 day prior to medical detox admission  · Reports drinking 8-10 shots of vodka  · H/o withdrawal seizures  · Withdrawal management as above  · Continue daily thiamine/folic acid supplementation, and MVI  · Consult case management for assistance with rehab resources- is interested in inpatient drug and alcohol rehab at this time  Placement found at [facility]  * Alcoholic cirrhosis of liver (HCC)  Assessment & Plan  · Patient with history of alcoholic cirrhosis and esophageal varices secondary to chronic alcohol use  · Recently hospitalized at [facility] from 9/22/22- 9/26/22  · During this admission patient was followed by GI- Started on Prednisolone 40 mg daily for suspected alcoholic hepatitis, continue  Continue PPI and lactulose TID  · Continue nadolol   · CT abdomen and pelvis 09/14/2022: Hepatic cirrhosis with signs of increasing portal hypertension    In addition to splenomegaly there are enlarging varices inclusive of prominent periumbilical varices which are significantly increasing in size when compared to March 2021  · INR elevated but stable, LFTs stable overall   · On physical examination:  + scleral icterus, + caput medusa, + hepatomegaly  · Plan:  · Recommended outpatient GI follow-up upon discharge   · Continue current medication regimen as above     Hematoma of right lower leg  Assessment & Plan  · Patient reports recent rupture of Baker's cyst with subsequent hematoma right calf  · CT RLE 9/22/2022:  Heterogeneous collection within the medial head gastrocnemius muscle suspicious for intramuscular hematoma  · VAS venous duplex U/S 9/23/2022: No evidence of acute or chronic deep vein thrombosis  · Acute Care surgery was consulted during last hospital admission no intervention needed at this time  · Recommended elevation of right lower extremity and Ace bandage for compression  · On physical examination: swelling of RLE is stable today, compartments soft; Trace edema right lower leg, bruising noted on a posterior calf and upper right lateral thigh; bruising appears stable (no worsening)   Patient has full range of motion, neurovascularly intact   · Patient reports improved pain and ambulation today  · Plan  · Continue to elevate lower extremity, continue compression  · Pain medication as needed  · PT consulted and recommendations appreciated- patient cleared, ambulation with cane     Pancytopenia (HCC)  Assessment & Plan  · CBC this AM revealed overall stable chronic pancytopenia  · WBCs improved  · Stable macrocytic anemia  · Chronic thrombocytopenia, stable  · Likely 2/2 myelosuppression from chronic alcohol use, cirrhosis   · Currently afebrile, no evidence of acute bleeding  · Continue to hold pharmacologic anticoagulation for VTE prophylaxis given thrombocytopenia, encourage SCDs        Consultations During Hospital Stay:  · Case Management  · Physical therapy    Procedures Performed:   · None    Significant Findings / Test Results:   · CT chest: no acute cardiopulmonary disease  · CT head without contrast:  No acute intracranial abnormality    Incidental Findings:   · None    Test Results Pending at Discharge (will require follow up): · None     Outpatient Tests Requested:  · None    Complications:  None    Reason for Admission:  ETOH withdrawal, EtOH use disorder    Hospital Course:     Allan Frias is a 65 y o  male patient who originally presented to the hospital on 9/29/2022 due to alcohol withdrawal  Patient initially presented to the [place] ED requesting detoxification from alcohol  Patient was admitted to the [facility] medical detox unit under [facility] protocol for medically assisted alcohol withdrawal and received a total of 195 mg phenobarbital without complication  Patient's alcohol withdrawal symptoms subsequently resolved, and he has remained without objective evidence of alcohol withdrawal at this time  During this hospitalization, patient was found to have pain and swelling of right lower extremity secondary to hematoma of right calf  Acute Care surgery was consulted during his last hospital admission and no intervention was required at that time  Elevation and ace bandage compression was applied along with as needed pain relief which patient tolerated without complication or evidence of compartment syndrome  Case management was consulted for assistance with disposition planning, and patient will be discharged to [address]   Please see above list of diagnoses and related plan for additional information  Condition at Discharge: good     Discharge Day Visit / Exam:     Subjective:  Patient was seen and examined at discharge  He denies further withdrawal symptoms  Reports no acute overnight events  Patient has been ambulating the unit without any difficulty or assistance of cane  He no longer reports any difficulty with ambulation  Patient reports that he is motivated for discharge to [place] to continue with drug and alcohol treatment as he is now stable through withdrawal perspective  Patient denies any chest pain, abdominal pain, shortness of breath, nausea, vomiting, or diarrhea  Vitals: Blood Pressure: 111/77 (10/05/22 0657)  Pulse: 56 (10/05/22 0657)  Temperature: 97 8 °F (36 6 °C) (10/05/22 0657)  Temp Source: Temporal (10/05/22 0657)  Respirations: 18 (10/05/22 0657)  Height: 6' (182 9 cm) (09/29/22 1929)  Weight - Scale: 86 kg (189 lb 9 6 oz) (09/29/22 1929)  SpO2: 97 % (10/05/22 0657)  Exam:   Physical Exam  Eyes:      General: Scleral icterus present  Extraocular Movements:      Right eye: No nystagmus  Left eye: No nystagmus  Cardiovascular:      Rate and Rhythm: Normal rate and regular rhythm  Heart sounds: No murmur heard  Pulmonary:      Effort: No respiratory distress  Breath sounds: Normal breath sounds  No wheezing  Abdominal:      Palpations: There is hepatomegaly  Tenderness: There is no abdominal tenderness  Comments: +caput medusae    Musculoskeletal:      Right lower leg: Edema (trace ) present  Comments: RLE measures circumference 28 cm  LLE measurement circumference  26 cm  + bruising to RLE -multiple stages of healing     Skin:     Coloration: Skin is jaundiced  Neurological:      Mental Status: He is oriented to person, place, and time  Motor: Tremor (intention ) present  Psychiatric:         Mood and Affect: Mood is not anxious or depressed  Discussion with Family:  Patient    Discharge instructions/Information to patient and family:   See after visit summary for information provided to patient and family  Provisions for Follow-Up Care:  See after visit summary for information related to follow-up care and any pertinent home health orders        Disposition:     Home     For Discharges to [facility] SN[facility]:   · Not Applicable to this Patient - Not Applicable to this Patient    Planned Readmission: NA     Discharge Statement:  I spent 35 minutes discharging the patient  This time was spent on the day of discharge  I had direct contact with the patient on the day of discharge  Greater than 50% of the total time was spent examining patient, answering all patient questions, arranging and discussing plan of care with patient as well as directly providing post-discharge instructions  Additional time then spent on discharge activities  Discharge Medications:  See after visit summary for reconciled discharge medications provided to patient and family        ** Please Note: This note has been constructed using a voice recognition system **

## 2022-10-04 NOTE — CASE MANAGEMENT
Cm spoke with pt and pt stated he had spoken with [address] at  [phone] and was told to call back at 4pm to complete assessment with William Ayala Cm contacted [facility] and requested to speak with William Ayala  There was no response and a message was left with pt room phone number left to complete assessment

## 2022-10-04 NOTE — CASE MANAGEMENT
Deniz contacted [address] [place] [address] ([phone]) and informed pt was not accepted due to medical needs  Deniz then contacted [address] [place] [address] Dr kulkarni ([phone]) and cm left a message requesting a return call

## 2022-10-04 NOTE — CASE MANAGEMENT
Deniz contacted [address]  [phone] [place] discuss pt referral  Cm informed pt's referral had not been reviewed as of yet  Cm contacted [facility] regarding possible bed availability  Cm informed there are limited beds available for [facility] residents and there are none available at this time  Deniz spoke with pt regarding a possible program through [facility] in [facility]  Pt was agreeable to speaking with [employer]  Deniz contacted this program at [phone] and this call was transferred into pt room to complete their assessment

## 2022-10-04 NOTE — ASSESSMENT & PLAN NOTE
· Pt with a h/o chronic heavy alcohol use  · Patient has previously been admitted to medical detox unit in January 2022 and April 2022  · Patient reports that he was recently hospitalized at [address] [place] [address] for 6 days, reports he started drinking for 1 day prior to medical detox admission      · Reports drinking 8-10 shots of vodka  · H/o withdrawal seizures  · Withdrawal management as above  · Continue daily thiamine/folic acid supplementation, and MVI  · Consult case management for assistance with rehab resources- is interested in inpatient drug and alcohol rehab at this time

## 2022-10-04 NOTE — CASE MANAGEMENT
Cm contacted [facility] admission at [phone] and informed no decisions had been made regarding pt referral  Cm informed decision would be made after 10:30am

## 2022-10-05 VITALS
BODY MASS INDEX: 25.68 KG/M2 | SYSTOLIC BLOOD PRESSURE: 111 MMHG | HEIGHT: 72 IN | TEMPERATURE: 97.8 F | RESPIRATION RATE: 18 BRPM | OXYGEN SATURATION: 97 % | DIASTOLIC BLOOD PRESSURE: 77 MMHG | WEIGHT: 189.6 LBS | HEART RATE: 56 BPM

## 2022-10-05 LAB
ALBUMIN SERPL BCP-MCNC: 2.8 G/DL (ref 3.5–5)
ALP SERPL-CCNC: 98 U/L (ref 43–122)
ALT SERPL W P-5'-P-CCNC: 38 U/L
ANION GAP SERPL CALCULATED.3IONS-SCNC: 3 MMOL/L (ref 5–14)
AST SERPL W P-5'-P-CCNC: 53 U/L (ref 17–59)
BILIRUB SERPL-MCNC: 2.88 MG/DL (ref 0.2–1)
BUN SERPL-MCNC: 17 MG/DL (ref 5–25)
CALCIUM ALBUM COR SERPL-MCNC: 9.1 MG/DL (ref 8.3–10.1)
CALCIUM SERPL-MCNC: 8.1 MG/DL (ref 8.4–10.2)
CHLORIDE SERPL-SCNC: 105 MMOL/L (ref 96–108)
CO2 SERPL-SCNC: 28 MMOL/L (ref 21–32)
CREAT SERPL-MCNC: 0.69 MG/DL (ref 0.7–1.5)
ERYTHROCYTE [DISTWIDTH] IN BLOOD BY AUTOMATED COUNT: 14.4 % (ref 11.6–15.1)
GFR SERPL CREATININE-BSD FRML MDRD: 106 ML/MIN/1.73SQ M
GLUCOSE SERPL-MCNC: 103 MG/DL (ref 70–99)
HCT VFR BLD AUTO: 36.5 % (ref 36.5–49.3)
HGB BLD-MCNC: 12.4 G/DL (ref 12–17)
INR PPP: 1.47 (ref 0.84–1.19)
MCH RBC QN AUTO: 34 PG (ref 26.8–34.3)
MCHC RBC AUTO-ENTMCNC: 34 G/DL (ref 31.4–37.4)
MCV RBC AUTO: 100 FL (ref 82–98)
PLATELET # BLD AUTO: 42 THOUSANDS/UL (ref 149–390)
PMV BLD AUTO: 11.9 FL (ref 8.9–12.7)
POTASSIUM SERPL-SCNC: 3.8 MMOL/L (ref 3.5–5.3)
PROT SERPL-MCNC: 6.8 G/DL (ref 6.4–8.4)
PROTHROMBIN TIME: 18.2 SECONDS (ref 11.6–14.5)
RBC # BLD AUTO: 3.65 MILLION/UL (ref 3.88–5.62)
SODIUM SERPL-SCNC: 136 MMOL/L (ref 135–147)
WBC # BLD AUTO: 5.74 THOUSAND/UL (ref 4.31–10.16)

## 2022-10-05 PROCEDURE — 80053 COMPREHEN METABOLIC PANEL: CPT

## 2022-10-05 PROCEDURE — 99239 HOSP IP/OBS DSCHRG MGMT >30: CPT

## 2022-10-05 PROCEDURE — 85027 COMPLETE CBC AUTOMATED: CPT

## 2022-10-05 PROCEDURE — 85610 PROTHROMBIN TIME: CPT

## 2022-10-05 RX ORDER — NADOLOL 20 MG/1
10 TABLET ORAL DAILY
Qty: 15 TABLET | Refills: 0 | Status: SHIPPED | OUTPATIENT
Start: 2022-10-06 | End: 2022-11-05

## 2022-10-05 RX ORDER — PREDNISOLONE SODIUM PHOSPHATE 15 MG/5ML
40 SOLUTION ORAL DAILY
Qty: 212.8 ML | Refills: 0 | Status: SHIPPED | OUTPATIENT
Start: 2022-10-05 | End: 2022-10-21

## 2022-10-05 RX ADMIN — PANTOPRAZOLE SODIUM 40 MG: 40 TABLET, DELAYED RELEASE ORAL at 08:05

## 2022-10-05 RX ADMIN — NADOLOL 10 MG: 20 TABLET ORAL at 08:06

## 2022-10-05 RX ADMIN — THIAMINE HCL TAB 100 MG 100 MG: 100 TAB at 08:04

## 2022-10-05 RX ADMIN — LACTULOSE 20 G: 10 SOLUTION ORAL at 08:05

## 2022-10-05 RX ADMIN — FOLIC ACID 1 MG: 1 TABLET ORAL at 08:05

## 2022-10-05 RX ADMIN — PREDNISOLONE SODIUM PHOSPHATE 40 MG: 15 SOLUTION ORAL at 08:06

## 2022-10-05 NOTE — CASE MANAGEMENT
Cm met with pt to discuss the outcome of his interview with [facility]  Pt stated he felt it had gone well and believes he can enter today  Pt signed an ASIM for [facility]

## 2022-10-05 NOTE — PLAN OF CARE
Problem: MOBILITY - ADULT  Goal: Maintain or return to baseline ADL function  Description: INTERVENTIONS:  -  Assess patient's ability to carry out ADLs; assess patient's baseline for ADL function and identify physical deficits which impact ability to perform ADLs (bathing, care of mouth/teeth, toileting, grooming, dressing, etc )  - Assess/evaluate cause of self-care deficits   - Assess range of motion  - Assess patient's mobility; develop plan if impaired  - Assess patient's need for assistive devices and provide as appropriate  - Encourage maximum independence but intervene and supervise when necessary  - Involve family in performance of ADLs  - Assess for home care needs following discharge   - Consider OT consult to assist with ADL evaluation and planning for discharge  - Provide patient education as appropriate  Outcome: Progressing  Goal: Maintains/Returns to pre admission functional level  Description: INTERVENTIONS:  - Perform BMAT or MOVE assessment daily    - Set and communicate daily mobility goal to care team and patient/family/caregiver     - Collaborate with rehabilitation services on mobility goals if consulted  - Out of bed for toileting  - Record patient progress and toleration of activity level   Outcome: Progressing     Problem: Potential for Falls  Goal: Patient will remain free of falls  Description: INTERVENTIONS:  - Educate patient/family on patient safety including physical limitations  - Instruct patient to call for assistance with activity   - Consult OT/PT to assist with strengthening/mobility   - Keep Call bell within reach  - Keep bed low and locked with side rails adjusted as appropriate  - Keep care items and personal belongings within reach  - Initiate and maintain comfort rounds  - Make Fall Risk Sign visible to staff  - Apply yellow socks and bracelet for high fall risk patients  - Consider moving patient to room near nurses station  Outcome: Progressing     Problem: SUBSTANCE USE/ABUSE  Goal: By discharge, will develop insight into their chemical dependency and sustain motivation to continue in recovery  Description: INTERVENTIONS:  - Attends all daily group sessions and scheduled AA groups  - Actively practices coping skills through participation in the therapeutic community and adherence to program rules  - Reviews and completes assignments from individual treatment plan  - Assist patient development of understanding of their personal cycle of addiction and relapse triggers  Outcome: Progressing  Goal: By discharge, patient will have ongoing treatment plan addressing chemical dependency  Description: INTERVENTIONS:  - Assist patient with resources and/or appointments for ongoing recovery based living  Outcome: Progressing

## 2022-10-05 NOTE — ASSESSMENT & PLAN NOTE
· Patient with history of alcoholic cirrhosis and esophageal varices secondary to chronic alcohol use  · Recently hospitalized at [facility] from 9/22/22- 9/26/22  · During this admission patient was followed by GI- Started on Prednisolone 40 mg daily for suspected alcoholic hepatitis, continue  Continue PPI and lactulose TID  · Continue nadolol   · CT abdomen and pelvis 09/14/2022: Hepatic cirrhosis with signs of increasing portal hypertension  In addition to splenomegaly there are enlarging varices inclusive of prominent periumbilical varices which are significantly increasing in size when compared to March 2021    · INR elevated but stable, LFTs stable overall   · On physical examination:  + scleral icterus, + caput medusa, + hepatomegaly  · Plan:  · Recommended outpatient GI follow-up upon discharge   · Continue current medication regimen as above

## 2022-10-05 NOTE — ASSESSMENT & PLAN NOTE
· Pt with a h/o chronic heavy alcohol use  · Patient has previously been admitted to medical detox unit in January 2022 and April 2022  · Patient reports that he was recently hospitalized at [address] [place] [address] for 6 days, reports he started drinking for 1 day prior to medical detox admission  · Reports drinking 8-10 shots of vodka  · H/o withdrawal seizures  · Withdrawal management as above  · Continue daily thiamine/folic acid supplementation, and MVI  · Consult case management for assistance with rehab resources- is interested in inpatient drug and alcohol rehab at this time  Placement found at [facility]

## 2022-10-05 NOTE — PLAN OF CARE
Problem: MOBILITY - ADULT  Goal: Maintain or return to baseline ADL function  Description: INTERVENTIONS:  -  Assess patient's ability to carry out ADLs; assess patient's baseline for ADL function and identify physical deficits which impact ability to perform ADLs (bathing, care of mouth/teeth, toileting, grooming, dressing, etc )  - Assess/evaluate cause of self-care deficits   - Assess range of motion  - Assess patient's mobility; develop plan if impaired  - Assess patient's need for assistive devices and provide as appropriate  - Encourage maximum independence but intervene and supervise when necessary  - Involve family in performance of ADLs  - Assess for home care needs following discharge   - Consider OT consult to assist with ADL evaluation and planning for discharge  - Provide patient education as appropriate  Outcome: Progressing     Problem: Potential for Falls  Goal: Patient will remain free of falls  Description: INTERVENTIONS:  - Educate patient/family on patient safety including physical limitations  - Instruct patient to call for assistance with activity   - Consult OT/PT to assist with strengthening/mobility   - Keep Call bell within reach  - Keep bed low and locked with side rails adjusted as appropriate  - Keep care items and personal belongings within reach  - Initiate and maintain comfort rounds  - Make Fall Risk Sign visible to staff  - Apply yellow socks and bracelet for high fall risk patients  - Consider moving patient to room near nurses station  Outcome: Progressing

## 2022-10-05 NOTE — CASE MANAGEMENT
Cm contacted [facility] and informed by Benedict Gilman pt had been accepted into their [place] and could be transported to their facility today  Cm discussed with pt this transfer and pt was in agreement  Cm attempted to engage pt in a discussion regarding pt acquiring his home medications from family prior at [employer]  Pt struggled to develop a plan for this with dorinda Guaman discussed this need with PA and discussed the possibility of pt receiving these medications from [facility]

## 2022-10-05 NOTE — CASE MANAGEMENT
Case Management Discharge Planning Note    Patient name Rikki Burden  Location 5T DETOX 504/5T DETOX 50* MRN [id number]  : 1965 Date 10/5/2022       Current Admission Date: 2022  Current Admission Diagnosis:Alcoholic cirrhosis of liver [facility])   Patient Active Problem List    Diagnosis Date Noted   • Hematoma 2022   • Hematochezia 2022   • Hematoma of right lower leg 2022   • Head trauma 2022   • Bullae 2022   • Hematemesis 2022   • Alcohol withdrawal syndrome with complication ([facility] )    • Marijuana use 2022   • Anterior cervical lymphadenopathy 2022   • Pancytopenia ([facility] )    • Alcoholic cirrhosis of liver ([facility] ) 2022   • COVID-19 2022   • Right hip pain 2021   • Decompensated hepatic cirrhosis ([facility] ) 2021   • Liver mass 2021   • Closed fracture of nasal bone 2021   • Open wound of tongue due to bite 2021   • Electrolyte abnormality 2021   • Tobacco abuse 2020   • Alcohol use disorder, severe, dependence ([facility] ) 2020   • Transaminitis 2020   • Hyperbilirubinemia 2020   • Thrombocytopenia ([facility] ) 2020      LOS (days): 6  Geometric Mean LOS (GMLOS) (days):   Days to GMLOS:     OBJECTIVE:  Risk of Unplanned Readmission Score: 35 95         Current admission status: Inpatient   Preferred Pharmacy:   [facility] [address], [address] [facility] ([address] [place] [address])  [address] [address] [place] [address] 70 ([address] [place] [address])  [place] [address]  Phone: [phone] Fax: [phone] [address] [address]  Phone: [phone] Fax: [phone]    Primary Care Provider: Sima Norman MD    Primary Insurance:  e [facility]  Secondary Insurance:     DISCHARGE DETAILS: [facility] and completed Financial assistance form after no other resources could be found to provide pt with his medications  Pt signed [employer] release form and pt will transported to [facility] sober living program at [address] [place] [address]  Pt will be discharged to this program today  Discharge planning discussed with[de-identified] patient  Freedom of Choice: Yes                   Contacts  Patient Contacts: [facility]  Relationship to Patient[de-identified] Treatment Provider  Contact Method: Phone  Reason/Outcome: Discharge Planning, Continuity of Care              Other Referral/Resources/Interventions Provided:  Referrals Provided[de-identified] Therapist, Support Group, IOP, Crisis Hotline (inpatient [facility]ASSIMO tx)  Post Acute Placement to[de-identified] Substance Abuse Treatment  Other: Lodging    Would you like to participate in our [address] service program?  : Yes          Transport at Discharge : Automobile  Dispatcher Contacted: Yes  Number/Name of Dispatcher: Yamilex Arias by Isaiah and Unit #):  Marie              Accompanied by: Alone           Family notified[de-identified] Carmen Marrufo( no reponse and no ability to leave VM)

## 2022-10-05 NOTE — ASSESSMENT & PLAN NOTE
· CBC this AM revealed overall stable chronic pancytopenia  · WBCs improved  · Stable macrocytic anemia  · Chronic thrombocytopenia, stable  · Likely 2/2 myelosuppression from chronic alcohol use, cirrhosis   · Currently afebrile, no evidence of acute bleeding  · Continue to hold pharmacologic anticoagulation for VTE prophylaxis given thrombocytopenia, encourage SCDs

## 2022-10-06 NOTE — UTILIZATION REVIEW
Notification of Discharge   This is a Notification of Discharge from our facility [address]  Please be advised that this patient has been discharge from our facility  Below you will find the admission and discharge date and time including the patient’s disposition  UTILIZATION REVIEW CONTACT:  Galina Ruvalcaba MA  Utilization   Network Utilization Review Department  Phone: [phone] x carefully listen to the prompts  All voicemails are confidential   Email: [email or web address]     PHYSICIAN ADVISORY SERVICES:  FOR FMAW-IU-SLRB REVIEW - MEDICAL NECESSITY DENIAL  Phone: [phone]  Fax: [phone]  Email: [email or web address]     PRESENTATION DATE: 9/29/2022  7:27 PM  OBERVATION ADMISSION DATE:   INPATIENT ADMISSION DATE: 9/29/22  7:27 PM   DISCHARGE DATE: 10/5/2022  1:51 PM  DISPOSITION: Home/Self Care Home/Self Care      IMPORTANT INFORMATION:  Send all requests for admission clinical reviews, approved or denied determinations and any other requests to dedicated fax number below belonging to the campus where the patient is receiving treatment   List of dedicated fax numbers:  [address] [place] [address] DENIALS (Administrative/Medical Necessity) [phone]   [address] (Maternity/NICU/Pediatrics) [phone]   [facility] [phone]   [facility] [phone]   [address] [phone]   [facility] [phone]   [address] [facility] [address] [phone]   [address] [place] [address] 119 [phone]   [facility]  [phone]   [facility] [phone]   [address] [place] [address] [address] [facility] [phone]

## 2022-11-13 ENCOUNTER — APPOINTMENT (EMERGENCY)
Dept: RADIOLOGY | Facility: HOSPITAL | Age: 57
End: 2022-11-13

## 2022-11-13 ENCOUNTER — HOSPITAL ENCOUNTER (INPATIENT)
Facility: HOSPITAL | Age: 57
LOS: 2 days | End: 2022-11-18
Attending: EMERGENCY MEDICINE | Admitting: INTERNAL MEDICINE

## 2022-11-13 DIAGNOSIS — E72.20 HYPERAMMONEMIA (HCC): ICD-10-CM

## 2022-11-13 DIAGNOSIS — Z72.0 TOBACCO ABUSE: Chronic | ICD-10-CM

## 2022-11-13 DIAGNOSIS — R45.851 SUICIDAL IDEATION: Primary | ICD-10-CM

## 2022-11-13 DIAGNOSIS — G93.40 ENCEPHALOPATHY: ICD-10-CM

## 2022-11-13 DIAGNOSIS — F10.939 ALCOHOL WITHDRAWAL SYNDROME WITH COMPLICATION (HCC): ICD-10-CM

## 2022-11-13 DIAGNOSIS — F10.929 ALCOHOL INTOXICATION (HCC): ICD-10-CM

## 2022-11-13 LAB
ALBUMIN SERPL BCP-MCNC: 2.6 G/DL (ref 3.5–5)
ALP SERPL-CCNC: 123 U/L (ref 46–116)
ALT SERPL W P-5'-P-CCNC: 35 U/L (ref 12–78)
AMMONIA PLAS-SCNC: 51 UMOL/L (ref 11–35)
AMPHETAMINES SERPL QL SCN: NEGATIVE
ANION GAP SERPL CALCULATED.3IONS-SCNC: 10 MMOL/L (ref 4–13)
BARBITURATES UR QL: NEGATIVE
BASOPHILS # BLD AUTO: 0.03 THOUSANDS/ÂΜL (ref 0–0.1)
BASOPHILS NFR BLD AUTO: 1 % (ref 0–1)
BENZODIAZ UR QL: NEGATIVE
BILIRUB SERPL-MCNC: 2.14 MG/DL (ref 0.2–1)
BUN SERPL-MCNC: 9 MG/DL (ref 5–25)
CALCIUM ALBUM COR SERPL-MCNC: 9.4 MG/DL (ref 8.3–10.1)
CALCIUM SERPL-MCNC: 8.3 MG/DL (ref 8.3–10.1)
CHLORIDE SERPL-SCNC: 108 MMOL/L (ref 96–108)
CO2 SERPL-SCNC: 26 MMOL/L (ref 21–32)
COCAINE UR QL: NEGATIVE
CREAT SERPL-MCNC: 1 MG/DL (ref 0.6–1.3)
EOSINOPHIL # BLD AUTO: 0.25 THOUSAND/ÂΜL (ref 0–0.61)
EOSINOPHIL NFR BLD AUTO: 6 % (ref 0–6)
ERYTHROCYTE [DISTWIDTH] IN BLOOD BY AUTOMATED COUNT: 15.6 % (ref 11.6–15.1)
ETHANOL SERPL-MCNC: 174 MG/DL (ref 0–3)
GFR SERPL CREATININE-BSD FRML MDRD: 83 ML/MIN/1.73SQ M
GLUCOSE SERPL-MCNC: 115 MG/DL (ref 65–140)
HCT VFR BLD AUTO: 37.5 % (ref 36.5–49.3)
HGB BLD-MCNC: 12.6 G/DL (ref 12–17)
IMM GRANULOCYTES # BLD AUTO: 0 THOUSAND/UL (ref 0–0.2)
IMM GRANULOCYTES NFR BLD AUTO: 0 % (ref 0–2)
LYMPHOCYTES # BLD AUTO: 1.32 THOUSANDS/ÂΜL (ref 0.6–4.47)
LYMPHOCYTES NFR BLD AUTO: 31 % (ref 14–44)
MCH RBC QN AUTO: 32.6 PG (ref 26.8–34.3)
MCHC RBC AUTO-ENTMCNC: 33.6 G/DL (ref 31.4–37.4)
MCV RBC AUTO: 97 FL (ref 82–98)
METHADONE UR QL: NEGATIVE
MONOCYTES # BLD AUTO: 0.55 THOUSAND/ÂΜL (ref 0.17–1.22)
MONOCYTES NFR BLD AUTO: 13 % (ref 4–12)
NEUTROPHILS # BLD AUTO: 2.06 THOUSANDS/ÂΜL (ref 1.85–7.62)
NEUTS SEG NFR BLD AUTO: 49 % (ref 43–75)
NRBC BLD AUTO-RTO: 0 /100 WBCS
OPIATES UR QL SCN: NEGATIVE
OXYCODONE+OXYMORPHONE UR QL SCN: NEGATIVE
PCP UR QL: NEGATIVE
PLATELET # BLD AUTO: 49 THOUSANDS/UL (ref 149–390)
PMV BLD AUTO: 10.3 FL (ref 8.9–12.7)
POTASSIUM SERPL-SCNC: 3.6 MMOL/L (ref 3.5–5.3)
PROT SERPL-MCNC: 6.7 G/DL (ref 6.4–8.4)
RBC # BLD AUTO: 3.87 MILLION/UL (ref 3.88–5.62)
SODIUM SERPL-SCNC: 144 MMOL/L (ref 135–147)
THC UR QL: POSITIVE
WBC # BLD AUTO: 4.21 THOUSAND/UL (ref 4.31–10.16)

## 2022-11-14 ENCOUNTER — APPOINTMENT (EMERGENCY)
Dept: RADIOLOGY | Facility: HOSPITAL | Age: 57
End: 2022-11-14

## 2022-11-14 LAB
BACTERIA UR QL AUTO: ABNORMAL /HPF
BILIRUB UR QL STRIP: NEGATIVE
CLARITY UR: CLEAR
COLOR UR: YELLOW
FLUAV RNA RESP QL NAA+PROBE: NEGATIVE
FLUBV RNA RESP QL NAA+PROBE: NEGATIVE
GLUCOSE UR STRIP-MCNC: NEGATIVE MG/DL
HGB UR QL STRIP.AUTO: NEGATIVE
KETONES UR STRIP-MCNC: NEGATIVE MG/DL
LEUKOCYTE ESTERASE UR QL STRIP: NEGATIVE
NITRITE UR QL STRIP: NEGATIVE
NON-SQ EPI CELLS URNS QL MICRO: ABNORMAL /HPF
PH UR STRIP.AUTO: 7.5 [PH]
PROT UR STRIP-MCNC: NEGATIVE MG/DL
RBC #/AREA URNS AUTO: ABNORMAL /HPF
RSV RNA RESP QL NAA+PROBE: NEGATIVE
SARS-COV-2 RNA RESP QL NAA+PROBE: NEGATIVE
SP GR UR STRIP.AUTO: 1.01 (ref 1–1.03)
UROBILINOGEN UR QL STRIP.AUTO: 2 E.U./DL
WBC #/AREA URNS AUTO: ABNORMAL /HPF

## 2022-11-14 RX ORDER — LORAZEPAM 1 MG/1
1 TABLET ORAL ONCE
Status: COMPLETED | OUTPATIENT
Start: 2022-11-14 | End: 2022-11-14

## 2022-11-14 RX ORDER — LACTULOSE 20 G/30ML
30 SOLUTION ORAL ONCE
Status: COMPLETED | OUTPATIENT
Start: 2022-11-14 | End: 2022-11-14

## 2022-11-14 RX ORDER — CHLORDIAZEPOXIDE HYDROCHLORIDE 25 MG/1
25 CAPSULE, GELATIN COATED ORAL EVERY 4 HOURS PRN
Status: DISCONTINUED | OUTPATIENT
Start: 2022-11-14 | End: 2022-11-19 | Stop reason: HOSPADM

## 2022-11-14 RX ADMIN — LACTULOSE 30 G: 20 SOLUTION ORAL at 18:09

## 2022-11-14 RX ADMIN — LORAZEPAM 1 MG: 1 TABLET ORAL at 21:08

## 2022-11-14 RX ADMIN — CHLORDIAZEPOXIDE HYDROCHLORIDE 25 MG: 25 CAPSULE ORAL at 10:18

## 2022-11-14 NOTE — ED NOTES
Pt's ciwa assessed  Pt scored for anxiety and minimal tremors  VSS  Pt states that he only drinks "when he cannot sleep" and is not a daily drinker  States he believes he may have had a seizure previously when detoxing  MD updated  Awaiting orders  Resting on stretcher  Ate 100% of breakfast tray  Virtual monitoring maintained       Jayce Mclean RN  11/14/22 1531

## 2022-11-14 NOTE — ED NOTES
Patient asleep on stretcher with virtual 1:1 in place  Respirations even and unlabored; no distress noted at this time       Cristal Rios RN  11/14/22 4559

## 2022-11-14 NOTE — ED NOTES
Report r/c'd by previous RN  Pt is sleeping on stretcher in [facility] appropriate room  Respirations are unlabored  Pt is on virtual sitter for SI       Nakita Yang RN  11/14/22 4477

## 2022-11-14 NOTE — ED PROVIDER NOTES
History  Chief Complaint   Patient presents with   • Altered Mental Status     Came in tearful  States walked here from the other side of [place], states keeps forgetting things  Family members telling him they just told him something and he forgets right away, denies any head injury     HPI  Patient is a 81-year-old male history of alcohol abuse presenting for evaluation multiple complaints  Patient initially tearful, complaining of problems with his memory which he states have been going on since he was in his teens and feels like they have been getting worse  Patient feels that his family and friends are judgemental of his poor memory and forgetfulness  Patient denies any acute worsening over the past few days or weeks  Patient states that he continues to use alcohol including earlier in the day today however denies daily drinking  Patient states that he has been compliant with lactulose for treatment of hepatic encephalopathy  Patient denies headache, vision changes, nausea, vomiting, focal weakness or numbness  Patient denies any recent falls or head trauma  After discussing this at length with patient, patient additionally stating that he does not want to live anymore and has thought of killing himself and making it look like an accident  When asked to further elaborate on this, patient refuses to make eye contact and does not give additional details  Patient denies auditory or visual hallucinations, homicidal ideation  Prior to Admission Medications   Prescriptions Last Dose Informant Patient Reported? Taking?   lactulose 20 g/30 mL   No No   Sig: Take 30 mL (20 g total) by mouth 2 (two) times a day   nadolol (CORGARD) 20 mg tablet   No No   Sig: Take 0 5 tablets (10 mg total) by mouth daily Do not start before October 6, 2022     pantoprazole (PROTONIX) 40 mg tablet   No No   Sig: Take 1 tablet (40 mg total) by mouth daily   Patient not taking: Reported on 9/29/2022      Facility-Administered Medications: None       Past Medical History:   Diagnosis Date   • ETOH abuse        Past Surgical History:   Procedure Laterality Date   • GALLBLADDER SURGERY     • URETHRA SURGERY         Family History   Problem Relation Age of Onset   • Heart disease Mother    • Heart disease Father      I have reviewed and agree with the history as documented  E-Cigarette/Vaping   • E-Cigarette Use Never User      E-Cigarette/Vaping Substances   • Nicotine No    • THC No    • CBD No    • Flavoring No    • Other No    • Unknown No      Social History     Tobacco Use   • Smoking status: Never Smoker   • Smokeless tobacco: Current User   Vaping Use   • Vaping Use: Never used   Substance Use Topics   • Alcohol use: Yes     Alcohol/week: 16 0 standard drinks     Types: 8 Cans of beer, 8 Shots of liquor per week     Comment: drinks several times a week last drink 3 hours ago   • Drug use: Not Currently     Types: Marijuana     Comment: last use yesterday       Review of Systems   Constitutional: Negative for chills, fatigue and fever  HENT: Negative for congestion, rhinorrhea and sore throat  Eyes: Negative for photophobia and visual disturbance  Respiratory: Negative for chest tightness and shortness of breath  Cardiovascular: Negative for chest pain, palpitations and leg swelling  Gastrointestinal: Negative for abdominal distention, abdominal pain, diarrhea, nausea and vomiting  Endocrine: Negative for polydipsia and polyuria  Genitourinary: Negative for dysuria and hematuria  Musculoskeletal: Negative for arthralgias and myalgias  Skin: Negative for color change, pallor, rash and wound  Neurological: Negative for weakness, numbness and headaches  Psychiatric/Behavioral: Positive for confusion (Decreased memory) and suicidal ideas  Negative for self-injury  The patient is not nervous/anxious  Physical Exam  Physical Exam  Vitals and nursing note reviewed     Constitutional:       General: He is not in acute distress  Appearance: He is well-developed  He is not diaphoretic  Comments: Slightly disheveled appearing but otherwise nontoxic nondistressed   HENT:      Head: Normocephalic and atraumatic  Comments: No external signs of trauma     Right Ear: External ear normal       Left Ear: External ear normal       Nose: Nose normal       Mouth/Throat:      Pharynx: No oropharyngeal exudate  Eyes:      Conjunctiva/sclera: Conjunctivae normal       Pupils: Pupils are equal, round, and reactive to light  Cardiovascular:      Rate and Rhythm: Normal rate and regular rhythm  Heart sounds: Normal heart sounds  No murmur heard  No friction rub  No gallop  Comments: Regular rate and rhythm, no murmurs rubs or gallops  Extremities well perfused with no mottling  Pulmonary:      Effort: Pulmonary effort is normal  No respiratory distress  Breath sounds: Normal breath sounds  No wheezing  Comments: No increased work of breathing  Speaking complete sentences  Lungs clear to auscultation bilaterally wheezes, rales, rhonchi  Satting 96% on room air indicating adequate oxygenation  Chest:      Chest wall: No tenderness  Abdominal:      General: Bowel sounds are normal  There is no distension  Palpations: Abdomen is soft  There is no mass  Tenderness: There is no abdominal tenderness  There is no guarding or rebound  Musculoskeletal:         General: No deformity  Skin:     General: Skin is warm and dry  Capillary Refill: Capillary refill takes less than 2 seconds  Neurological:      Mental Status: He is alert and oriented to person, place, and time  Comments: AAO x3  Depressed affect  Crying     Psychiatric:         Behavior: Behavior normal          Vital Signs  ED Triage Vitals [11/13/22 1734]   Temperature Pulse Respirations Blood Pressure SpO2   98 3 °F (36 8 °C) 103 22 142/80 96 %      Temp Source Heart Rate Source Patient Position - Orthostatic VS BP Location FiO2 (%)   Tympanic Monitor Sitting Right arm --      Pain Score       No Pain           Vitals:    11/13/22 2015 11/13/22 2030 11/13/22 2045 11/13/22 2100   BP:  113/67  139/71   Pulse: 80 79 79 78   Patient Position - Orthostatic VS:             Visual Acuity      ED Medications  Medications - No data to display    Diagnostic Studies  Results Reviewed     Procedure Component Value Units Date/Time    Urinalysis with microscopic [[id number]]     Lab Status: No result Specimen: Urine     Rapid drug screen, urine [[id number]]  (Abnormal) Collected: 11/13/22 2101    Lab Status: Final result Specimen: Urine, Other Updated: 11/13/22 2125     Amph/Meth UR Negative     Barbiturate Ur Negative     Benzodiazepine Urine Negative     Cocaine Urine Negative     Methadone Urine Negative     Opiate Urine Negative     PCP Ur Negative     THC Urine Positive     Oxycodone Urine Negative    Narrative:      Presumptive report  If requested, specimen will be sent to reference lab for confirmation  FOR MEDICAL PURPOSES ONLY  IF CONFIRMATION NEEDED PLEASE CONTACT THE LAB WITHIN 5 DAYS      Drug Screen Cutoff Levels:  AMPHETAMINE/METHAMPHETAMINES  1000 ng/mL  BARBITURATES     200 ng/mL  BENZODIAZEPINES     200 ng/mL  COCAINE      300 ng/mL  METHADONE      300 ng/mL  OPIATES      300 ng/mL  PHENCYCLIDINE     25 ng/mL  THC       50 ng/mL  OXYCODONE      100 ng/mL    CBC and differential [[id number]]  (Abnormal) Collected: 11/13/22 1807    Lab Status: Final result Specimen: Blood from Arm, Left Updated: 11/13/22 1934     WBC 4 21 Thousand/uL      RBC 3 87 Million/uL      Hemoglobin 12 6 g/dL      Hematocrit 37 5 %      MCV 97 fL      MCH 32 6 pg      MCHC 33 6 g/dL      RDW 15 6 %      MPV 10 3 fL      Platelets 49 Thousands/uL      nRBC 0 /100 WBCs      Neutrophils Relative 49 %      Immat GRANS % 0 %      Lymphocytes Relative 31 %      Monocytes Relative 13 %      Eosinophils Relative 6 %      Basophils Relative 1 % Neutrophils Absolute 2 06 Thousands/µL      Immature Grans Absolute 0 00 Thousand/uL      Lymphocytes Absolute 1 32 Thousands/µL      Monocytes Absolute 0 55 Thousand/µL      Eosinophils Absolute 0 25 Thousand/µL      Basophils Absolute 0 03 Thousands/µL     Narrative:      No Clots  This is an appended report  These results have been appended to a previously verified report      Comprehensive metabolic panel [[id number]]  (Abnormal) Collected: 11/13/22 1807    Lab Status: Final result Specimen: Blood from Arm, Left Updated: 11/13/22 1844     Sodium 144 mmol/L      Potassium 3 6 mmol/L      Chloride 108 mmol/L      CO2 26 mmol/L      ANION GAP 10 mmol/L      BUN 9 mg/dL      Creatinine 1 00 mg/dL      Glucose 115 mg/dL      Calcium 8 3 mg/dL      Corrected Calcium 9 4 mg/dL      AST --     ALT 35 U/L      Alkaline Phosphatase 123 U/L      Total Protein 6 7 g/dL      Albumin 2 6 g/dL      Total Bilirubin 2 14 mg/dL      eGFR 83 ml/min/1 73sq m     Narrative:      [place] guidelines for Chronic Kidney Disease (CKD):   •  Stage 1 with normal or high GFR (GFR > 90 mL/min/1 73 square meters)  •  Stage 2 Mild CKD (GFR = 60-89 mL/min/1 73 square meters)  •  Stage 3A Moderate CKD (GFR = 45-59 mL/min/1 73 square meters)  •  Stage 3B Moderate CKD (GFR = 30-44 mL/min/1 73 square meters)  •  Stage 4 Severe CKD (GFR = 15-29 mL/min/1 73 square meters)  •  Stage 5 End Stage CKD (GFR <15 mL/min/1 73 square meters)  Note: GFR calculation is accurate only with a steady state creatinine    Ammonia [[id number]]  (Abnormal) Collected: 11/13/22 1807    Lab Status: Final result Specimen: Blood from Arm, Left Updated: 11/13/22 1833     Ammonia 51 umol/L     Ethanol [[id number]]  (Abnormal) Collected: 11/13/22 1807    Lab Status: Final result Specimen: Blood from Arm, Left Updated: 11/13/22 1832     Ethanol Lvl 174 mg/dL                  CT head without contrast   Final Result by Abraham Mauro MD (11/13 1836)      No acute intracranial abnormality  Workstation performed: [id number]                    Procedures  Procedures         ED Course                                             MDM  Number of Diagnoses or Management Options  Diagnosis management comments: Memory issues, largely chronic, likely multifactorial with components of hepatic encephalopathy, ongoing alcohol abuse  Patient with a nonfocal neurologic exam, fully oriented  Plan to observe until clinically sober then [facility] Guidance evaluation  Patient medically cleared for psychiatric evaluation  Disposition  Final diagnoses:   None     ED Disposition     ED Disposition   Transfer to Behavioral Health Condition   --    Date/Time   Sun Nov 13, 2022 10:20 PM    Comment   Sari Morris should be transferred out to  and has been medically cleared  Follow-up Information    None         Patient's Medications   Discharge Prescriptions    No medications on file       No discharge procedures on file      PDMP Review       Value Time User    PDMP Reviewed  Yes 10/5/2022  9:08 AM Celia Canavan, PA-C          ED Provider  Electronically Signed by           Godwin Steven MD  11/14/22 7291

## 2022-11-14 NOTE — ED NOTES
11/14/22 @ 180:  65year-old male self presented to ED due to increased depressive sympomolgy and suicidal ideations  Patient says, "I can cut my wrists or jump in front of a bus, but I don't want to hurt my family; I didn't think I would do it, but now, I'm not too sure; I got scared so I came to the emergency room "  Patient presents tearful, sad, hopeless, and helpless  Patient says that he has trouble keeping up with conversations and seems to have very low self esteem  Patient also says, "It's very hard to describe how I feel; I forget things all the time; I forget why I walked into a room, but this has been happening to me since I was a child and my parents didn't do anything about it; I just don't feel like I fit in; I don't want to be here anymore "  Patient reports recent stressors which may have precipitated current state, such as loss of home, job, and mother dying, all over this year  PES recommends inpatient treatment and patient is agreeable    Wild Piedra, MS

## 2022-11-14 NOTE — ED NOTES
Received patient from previous shift  Patient calm, cooperative, and pleasant  Patient provided with turkey sandwich box and offers no other complaints at this time       Rogelio Voss, RN  11/14/22 5660

## 2022-11-14 NOTE — ED NOTES
11/14/22 @ 0930:  PES called [facility]; beds available  MS Gwyn    1010: PES faxed information to [facility]; ordered CXR AND EKG, at [facility] request   MS Gwyn    1500: Chart re-faxed to [facility]    Nilsa Urban MS

## 2022-11-14 NOTE — ED CARE HANDOFF
Emergency Department Sign Out Note        Sign out and transfer of care from previous provider See Separate Emergency Department note  The patient, Anne-Marie Lo, was evaluated by the previous provider for psychiatric evaluation    Workup Completed:  Medically clear    ED Course / Workup Pending (followup): Awaiting placement                                     Procedures  MDM        Disposition  Final diagnoses:   Suicidal ideation   Alcohol intoxication ([facility] [address] )     Time reflects when diagnosis was documented in both MDM as applicable and the Disposition within this note     Time User Action Codes Description Comment    11/14/2022  1:33 AM Noel Ramirez [R45 851] Suicidal ideation     11/14/2022  1:33 AM Noel Ramirez [F10 929] Alcohol intoxication [facility])       ED Disposition     ED Disposition   Transfer to [address] [place] [address]   --    Date/Time   Sun Nov 13, 2022 10:20 PM    Comment   Anne-Marie Lo should be transferred out to  and has been medically cleared  Follow-up Information    None       Patient's Medications   Discharge Prescriptions    No medications on file     No discharge procedures on file         ED Provider  Electronically Signed by     Lakshmi Robledo MD  11/14/22 2786

## 2022-11-14 NOTE — ED NOTES
[facility] called around 16:00 - asked if the bed was still needed - yes; @ 16:25 - faxed covid  EKG and chest xray results to [facility]  [facility] called - ammonia level is to high - needs corrected and then fax updated labs  ER staff advised  Chart fax[facility] to [facility] @ 22:35 - called to verify receipt and spoke with Korin Sherman

## 2022-11-15 LAB
AMMONIA PLAS-SCNC: 53 UMOL/L (ref 11–35)
SARS-COV-2 RNA RESP QL NAA+PROBE: NEGATIVE

## 2022-11-15 RX ORDER — LACTULOSE 20 G/30ML
20 SOLUTION ORAL ONCE
Status: COMPLETED | OUTPATIENT
Start: 2022-11-15 | End: 2022-11-15

## 2022-11-15 RX ADMIN — LACTULOSE 20 G: 10 SOLUTION ORAL at 08:25

## 2022-11-15 RX ADMIN — CHLORDIAZEPOXIDE HYDROCHLORIDE 25 MG: 25 CAPSULE ORAL at 19:24

## 2022-11-15 NOTE — ED NOTES
Pt ate 100% of breakfast and is resting in bed at this time  Continual virtual monitoring maintained       Valencia Tipton RN  11/15/22 0999

## 2022-11-15 NOTE — ED CARE HANDOFF
Emergency Department Sign Out Note        Sign out and transfer of care from previous provider  See Separate Emergency Department note  The patient, Anne-Marie Lo, was evaluated by the previous provider for psychiatric evaluation  Workup Completed:  Medically cleared    ED Course / Workup Pending (followup): Awaiting placement                                     Procedures  MDM        Disposition  Final diagnoses:   Suicidal ideation   Alcohol intoxication ([facility] [address] )     Time reflects when diagnosis was documented in both MDM as applicable and the Disposition within this note     Time User Action Codes Description Comment    11/14/2022  1:33 AM Noel Ramirez [R45 851] Suicidal ideation     11/14/2022  1:33 AM Noel Ramirez [F10 929] Alcohol intoxication [facility])       ED Disposition     ED Disposition   Transfer to [address] [place] [address]   --    Date/Time   Sun Nov 13, 2022 10:20 PM    Comment   Anne-Marie Lo should be transferred out to  and has been medically cleared  Follow-up Information    None       Patient's Medications   Discharge Prescriptions    No medications on file     No discharge procedures on file         ED Provider  Electronically Signed by     Lakshmi Robledo MD  11/15/22 6283

## 2022-11-15 NOTE — ED NOTES
11/15/22 @ 1513:  PES called [address] [place] [address] for medical/psych bed and Fiona Brown reports that [address] [place] [address] has one bed and [facility] has several medical beds available  PES forwarded information    Sosa Case MS

## 2022-11-15 NOTE — ED NOTES
Upon arrival to shift pt is sleeping soundly  Respirations regular  No distress noted  Virtual monitoring maintained       Migdalia Mena RN  11/15/22 7199

## 2022-11-15 NOTE — ED NOTES
Pt is sitting up in bed eating breakfast at this time  Pt c/o lower abdominal pain but thinks he may have to have a bowel movement  Pt states he will use the bathroom when he is done eating his breakfast  Continual virtual monitoring maintained       Armond Colon RN  11/15/22 7966

## 2022-11-15 NOTE — ED NOTES
Pt is sleeping soundly  Respirations regular  No distress noted  Continual virtual observation maintained       Kemi Wheeler RN  11/15/22 5921

## 2022-11-15 NOTE — ED NOTES
Pt sleeping soundly  Respirations regular  No distress noted  Continual observation maintained       Logan Kennedy RN  11/15/22 4613

## 2022-11-15 NOTE — ED NOTES
Pt resting comfortably, virtual one to one monitoring remains in place        Manjinder Bone RN  11/15/22 2901

## 2022-11-15 NOTE — ED NOTES
11/15/22 @ 0945:  PES faxed updated Ammonia level to [facility]  Unfortunately, the level actually went up from 51 to 53  PES will discuss with ED MD   Papito Omalley MS    1200: PES faxed CT SCAN head to [facility], who also had questions about ammonia level, which ED MD is currently treating and will take another Ammonia level later this afternoon  Papito Omalley, [address]: Patient declined by [facility] due to medical concerns and need for alcohol detox  PES denied that patient was in need of detox, but there were other concerns  Papito Omalley, [address]: PES provided medical update to [facility]; will notify following next Ammonia check    Papito Omalley, MS

## 2022-11-15 NOTE — ED NOTES
Pt resting comfortably 1:1 monitoring remains in place  All comfort measures met at present time  Pt provided with water        Hanh Perea RN  11/15/22 0134

## 2022-11-15 NOTE — ED NOTES
17:00 - called [facility] and left a voice mail to return the call with an update  17:25 - David called back - not yet looked at case but will present for a [facility] medical/psych bed  18:30 - David called - accepted to [facility] by Lakshmi IVY; Rn to call report to [phone]  Patient signed voluntary consent with PES @ 19:00  [facility] notified of ambulance need @ 19:05  [address] [phone] called for precert about 33:14 - Care Mgr was Bessie Trujillo - 3 day authorization, 11/16 - 11/18/22 with concurrent review on 11/18/22 with Marjorie Patel @ [phone]; authorization#: [id number]  This form with voluntary faxed to [facility] @ 19:35 - called to verify receipt  Called [facility] about 19:50 to discuss transport - still pending  Tiger-text received 20:20 - [place] @ 17:00 on 11/16/22  [facility] advised - requested another covid test be ordered  ER staff advised  Covid results faxed too [facility] about 22:45

## 2022-11-16 PROBLEM — R45.851 SUICIDE IDEATION: Status: ACTIVE | Noted: 2022-11-16

## 2022-11-16 LAB
ALBUMIN SERPL BCP-MCNC: 2.3 G/DL (ref 3.5–5)
ALP SERPL-CCNC: 103 U/L (ref 46–116)
ALT SERPL W P-5'-P-CCNC: 25 U/L (ref 12–78)
AMMONIA PLAS-SCNC: 72 UMOL/L (ref 11–35)
ANION GAP SERPL CALCULATED.3IONS-SCNC: 4 MMOL/L (ref 4–13)
AST SERPL W P-5'-P-CCNC: 35 U/L (ref 5–45)
BILIRUB SERPL-MCNC: 3.17 MG/DL (ref 0.2–1)
BUN SERPL-MCNC: 10 MG/DL (ref 5–25)
CALCIUM ALBUM COR SERPL-MCNC: 9.3 MG/DL (ref 8.3–10.1)
CALCIUM SERPL-MCNC: 7.9 MG/DL (ref 8.3–10.1)
CHLORIDE SERPL-SCNC: 105 MMOL/L (ref 96–108)
CO2 SERPL-SCNC: 26 MMOL/L (ref 21–32)
CREAT SERPL-MCNC: 0.81 MG/DL (ref 0.6–1.3)
ERYTHROCYTE [DISTWIDTH] IN BLOOD BY AUTOMATED COUNT: 15.6 % (ref 11.6–15.1)
GFR SERPL CREATININE-BSD FRML MDRD: 99 ML/MIN/1.73SQ M
GLUCOSE SERPL-MCNC: 166 MG/DL (ref 65–140)
HCT VFR BLD AUTO: 38.7 % (ref 36.5–49.3)
HGB BLD-MCNC: 13 G/DL (ref 12–17)
INR PPP: 1.44 (ref 0.84–1.19)
MCH RBC QN AUTO: 32.7 PG (ref 26.8–34.3)
MCHC RBC AUTO-ENTMCNC: 33.6 G/DL (ref 31.4–37.4)
MCV RBC AUTO: 98 FL (ref 82–98)
PLATELET # BLD AUTO: 37 THOUSANDS/UL (ref 149–390)
PMV BLD AUTO: 10.9 FL (ref 8.9–12.7)
POTASSIUM SERPL-SCNC: 3.5 MMOL/L (ref 3.5–5.3)
PROT SERPL-MCNC: 6.3 G/DL (ref 6.4–8.4)
PROTHROMBIN TIME: 17.6 SECONDS (ref 11.6–14.5)
RBC # BLD AUTO: 3.97 MILLION/UL (ref 3.88–5.62)
SODIUM SERPL-SCNC: 135 MMOL/L (ref 135–147)
WBC # BLD AUTO: 2.96 THOUSAND/UL (ref 4.31–10.16)

## 2022-11-16 RX ORDER — NICOTINE 21 MG/24HR
1 PATCH, TRANSDERMAL 24 HOURS TRANSDERMAL DAILY
Status: DISCONTINUED | OUTPATIENT
Start: 2022-11-16 | End: 2022-11-19 | Stop reason: HOSPADM

## 2022-11-16 RX ORDER — NADOLOL 20 MG/1
10 TABLET ORAL DAILY
Status: DISCONTINUED | OUTPATIENT
Start: 2022-11-16 | End: 2022-11-19 | Stop reason: HOSPADM

## 2022-11-16 RX ORDER — PANTOPRAZOLE SODIUM 40 MG/1
40 TABLET, DELAYED RELEASE ORAL DAILY
Status: DISCONTINUED | OUTPATIENT
Start: 2022-11-16 | End: 2022-11-19 | Stop reason: HOSPADM

## 2022-11-16 RX ORDER — LACTULOSE 20 G/30ML
30 SOLUTION ORAL 2 TIMES DAILY
Status: DISCONTINUED | OUTPATIENT
Start: 2022-11-16 | End: 2022-11-19 | Stop reason: HOSPADM

## 2022-11-16 RX ORDER — LANOLIN ALCOHOL/MO/W.PET/CERES
3 CREAM (GRAM) TOPICAL
Status: DISCONTINUED | OUTPATIENT
Start: 2022-11-16 | End: 2022-11-19 | Stop reason: HOSPADM

## 2022-11-16 RX ORDER — TRAZODONE HYDROCHLORIDE 50 MG/1
TABLET ORAL
COMMUNITY

## 2022-11-16 RX ORDER — POTASSIUM CHLORIDE 20 MEQ/1
40 TABLET, EXTENDED RELEASE ORAL ONCE
Status: COMPLETED | OUTPATIENT
Start: 2022-11-16 | End: 2022-11-16

## 2022-11-16 RX ORDER — MIRTAZAPINE 7.5 MG/1
TABLET, FILM COATED ORAL
COMMUNITY

## 2022-11-16 RX ORDER — LACTULOSE 20 G/30ML
20 SOLUTION ORAL 2 TIMES DAILY
Status: DISCONTINUED | OUTPATIENT
Start: 2022-11-16 | End: 2022-11-16

## 2022-11-16 RX ORDER — LANOLIN ALCOHOL/MO/W.PET/CERES
100 CREAM (GRAM) TOPICAL DAILY
Status: DISCONTINUED | OUTPATIENT
Start: 2022-11-16 | End: 2022-11-19 | Stop reason: HOSPADM

## 2022-11-16 RX ORDER — FOLIC ACID 1 MG/1
1 TABLET ORAL DAILY
Status: DISCONTINUED | OUTPATIENT
Start: 2022-11-16 | End: 2022-11-19 | Stop reason: HOSPADM

## 2022-11-16 RX ORDER — ONDANSETRON 2 MG/ML
4 INJECTION INTRAMUSCULAR; INTRAVENOUS EVERY 6 HOURS PRN
Status: DISCONTINUED | OUTPATIENT
Start: 2022-11-16 | End: 2022-11-19 | Stop reason: HOSPADM

## 2022-11-16 RX ORDER — CALCIUM CARBONATE 200(500)MG
1000 TABLET,CHEWABLE ORAL DAILY PRN
Status: DISCONTINUED | OUTPATIENT
Start: 2022-11-16 | End: 2022-11-19 | Stop reason: HOSPADM

## 2022-11-16 RX ORDER — ACETAMINOPHEN 325 MG/1
650 TABLET ORAL EVERY 8 HOURS PRN
Status: DISCONTINUED | OUTPATIENT
Start: 2022-11-16 | End: 2022-11-19 | Stop reason: HOSPADM

## 2022-11-16 RX ORDER — POLYETHYLENE GLYCOL 3350 17 G/17G
17 POWDER, FOR SOLUTION ORAL DAILY PRN
Status: DISCONTINUED | OUTPATIENT
Start: 2022-11-16 | End: 2022-11-19 | Stop reason: HOSPADM

## 2022-11-16 RX ADMIN — LACTULOSE 30 G: 20 SOLUTION ORAL at 17:05

## 2022-11-16 RX ADMIN — RIFAXIMIN 550 MG: 550 TABLET ORAL at 21:28

## 2022-11-16 RX ADMIN — CHLORDIAZEPOXIDE HYDROCHLORIDE 25 MG: 25 CAPSULE ORAL at 05:55

## 2022-11-16 RX ADMIN — Medication 1 TABLET: at 14:24

## 2022-11-16 RX ADMIN — ACETAMINOPHEN 650 MG: 325 TABLET, FILM COATED ORAL at 20:39

## 2022-11-16 RX ADMIN — NICOTINE 1 PATCH: 14 PATCH, EXTENDED RELEASE TRANSDERMAL at 17:00

## 2022-11-16 RX ADMIN — RIFAXIMIN 550 MG: 550 TABLET ORAL at 14:24

## 2022-11-16 RX ADMIN — POTASSIUM CHLORIDE 40 MEQ: 1500 TABLET, EXTENDED RELEASE ORAL at 17:04

## 2022-11-16 RX ADMIN — FOLIC ACID 1 MG: 1 TABLET ORAL at 14:24

## 2022-11-16 RX ADMIN — PANTOPRAZOLE SODIUM 40 MG: 40 TABLET, DELAYED RELEASE ORAL at 09:58

## 2022-11-16 RX ADMIN — LACTULOSE 30 G: 20 SOLUTION ORAL at 09:58

## 2022-11-16 RX ADMIN — THIAMINE HCL TAB 100 MG 100 MG: 100 TAB at 14:24

## 2022-11-16 RX ADMIN — Medication 3 MG: at 21:27

## 2022-11-16 RX ADMIN — CHLORDIAZEPOXIDE HYDROCHLORIDE 25 MG: 25 CAPSULE ORAL at 14:41

## 2022-11-16 NOTE — ED NOTES
Pt resting quietly  No distress noted at this time  Virtual monitor present  Respirations even       David Eid RN  11/16/22 4656

## 2022-11-16 NOTE — PLAN OF CARE
Problem: Potential for Falls  Goal: Patient will remain free of falls  Description: INTERVENTIONS:  - Educate patient/family on patient safety including physical limitations  - Instruct patient to call for assistance with activity   - Consult OT/PT to assist with strengthening/mobility   - Keep Call bell within reach  - Keep bed low and locked with side rails adjusted as appropriate  - Keep care items and personal belongings within reach  - Initiate and maintain comfort rounds  - Make Fall Risk Sign visible to staff  - Offer Toileting every 2 Hours, in advance of need  - Initiate/Maintain bedalarm  - Obtain necessary fall risk management equipment: bed alarm  - Apply yellow socks and bracelet for high fall risk patients  - Consider moving patient to room near nurses station  Outcome: Progressing     Problem: Prexisting or High Potential for Compromised Skin Integrity  Goal: Skin integrity is maintained or improved  Description: INTERVENTIONS:  - Identify patients at risk for skin breakdown  - Assess and monitor skin integrity  - Assess and monitor nutrition and hydration status  - Monitor labs   - Assess for incontinence   - Turn and reposition patient  - Assist with mobility/ambulation  - Relieve pressure over bony prominences  - Avoid friction and shearing  - Provide appropriate hygiene as needed including keeping skin clean and dry  - Evaluate need for skin moisturizer/barrier cream  - Collaborate with interdisciplinary team   - Patient/family teaching  - Consider wound care consult   Outcome: Progressing     Problem: PAIN - ADULT  Goal: Verbalizes/displays adequate comfort level or baseline comfort level  Description: Interventions:  - Encourage patient to monitor pain and request assistance  - Assess pain using appropriate pain scale  - Administer analgesics based on type and severity of pain and evaluate response  - Implement non-pharmacological measures as appropriate and evaluate response  - Consider cultural and social influences on pain and pain management  - Notify physician/advanced practitioner if interventions unsuccessful or patient reports new pain  Outcome: Progressing

## 2022-11-16 NOTE — ED NOTES
Pt resting quietly  No distress noted at this time  Virtual monitor present  Respirations even       Lamar Enamorado RN  11/16/22 1713

## 2022-11-16 NOTE — ED NOTES
Pt requested a shower, walked patient to room with shower available and pt showered while supervised  Pt now in assigned room resting comfortably, provided water for patient  All comfort needs met at present time        Sara Moreno RN  11/15/22 8459

## 2022-11-16 NOTE — ASSESSMENT & PLAN NOTE
Hst of RLE hematoma from a ruptured Baker's cyst which was treated with compression and leg elevation  · Much improved  · Monitor

## 2022-11-16 NOTE — ED NOTES
Pt resting quietly  No distress noted at this time  Virtual monitor present  Respirations even       Leif Miller, AMELIA  11/16/22 Johnnie Quesada RN  11/16/22 0159

## 2022-11-16 NOTE — ASSESSMENT & PLAN NOTE
History of chronic heavy alcohol use with recent admission to [facility] detox unit in January, April, and October 2022   Patient presented to [facility] ED on 11/13/22 with forgetfulness  At that time he admitted to drinking alcohol earlier in the day     Alcohol level on admission, 174  Urine drug screen (+) for THC   · No signs of withdrawal at this time  · Continue Librium 25 mg po q4h PRN alcohol withdrawal, anxiety  · CIWA protocol   · Thiamine, Folic acid, and MVI   · Planning for discharge to inpatient psychiatry

## 2022-11-16 NOTE — EMTALA/ACUTE CARE TRANSFER
[address] [facility] [address]  [place] [address]  Dept: [phone]      EMTALA TRANSFER CONSENT    NAME Renan GUTIERREZ 1965                              MRN [id number]    I have been informed of my rights regarding examination, treatment, and transfer   by Dr Erica Dunham MD    Benefits:   specialty not available at this facility    Risks:   discomfort during transport      Consent for Transfer:  I acknowledge that my medical condition has been evaluated and explained to me by the emergency department physician or other qualified medical person and/or my attending physician, who has recommended that I be transferred to the service of    at    The above potential benefits of such transfer, the potential risks associated with such transfer, and the probable risks of not being transferred have been explained to me, and I fully understand them  The doctor has explained that, in my case, the benefits of transfer outweigh the risks  I agree to be transferred  I authorize the performance of emergency medical procedures and treatments upon me in both transit and upon arrival at the receiving facility  Additionally, I authorize the release of any and all medical records to the receiving facility and request they be transported with me, if possible  I understand that the safest mode of transportation during a medical emergency is an ambulance and that the Hospital advocates the use of this mode of transport  Risks of traveling to the receiving facility by car, including absence of medical control, life sustaining equipment, such as oxygen, and medical personnel has been explained to me and I fully understand them  (TODD CORRECT BOX BELOW)  [  ]  I consent to the stated transfer and to be transported by ambulance/helicopter    [  ]  I consent to the stated transfer, but refuse transportation by ambulance and accept full responsibility for my transportation by car  I understand the risks of non-ambulance transfers and I exonerate the Hospital and its staff from any deterioration in my condition that results from this refusal     X___________________________________________    DATE  11/15/22  TIME________  Signature of patient or legally responsible individual signing on patient behalf           RELATIONSHIP TO PATIENT_________________________          Provider Certification    NAME Nayan Palma                                         1965                              MRN [id number]    A medical screening exam was performed on the above named patient  Based on the examination:    Condition Necessitating Transfer The primary encounter diagnosis was Suicidal ideation  A diagnosis of Alcohol intoxication ([facility] [address] ) was also pertinent to this visit  Patient Condition:      Reason for Transfer:      Transfer Requirements: Facility     · Space available and qualified personnel available for treatment as acknowledged by    · Agreed to accept transfer and to provide appropriate medical treatment as acknowledged by          · Appropriate medical records of the examination and treatment of the patient are provided at the time of transfer   [address], [address] _______  · Transfer will be performed by qualified personnel from    and appropriate transfer equipment as required, including the use of necessary and appropriate life support measures      Provider Certification: I have examined the patient and explained the following risks and benefits of being transferred/refusing transfer to the patient/family:         Based on these reasonable risks and benefits to the patient and/or the unborn child(rolo), and based upon the information available at the time of the patient’s examination, I certify that the medical benefits reasonably to be expected from the provision of appropriate medical treatments at another medical facility outweigh the increasing risks, if any, to the individual’s medical condition, and in the case of labor to the unborn child, from effecting the transfer      X____________________________________________ DATE 11/15/22        TIME_______      ORIGINAL - SEND TO MEDICAL RECORDS   COPY - SEND WITH PATIENT DURING TRANSFER

## 2022-11-16 NOTE — ASSESSMENT & PLAN NOTE
With esophageal varices   Recently seen by GI and treated with Prednisolone   INR 1 44  T Bili 3 17  LFTs stable   · Continue newly started PPI, Nadolol and Lactulose BID (previously on TID)  · Ammonia level trending down; 51 -> 53 -> 72 -> 43   · Alcohol cessation education and counseling   · Patient is medically stable for discharge to inpatient psych

## 2022-11-16 NOTE — ED NOTES
Pt resting quietly  No distress noted at this time  Virtual monitor present  Respirations even       Sandi David RN  11/16/22 5519

## 2022-11-16 NOTE — ED NOTES
Pt resting quietly  No distress noted at this time  Virtual monitor present  Respirations even       Cristopher Mcclendon RN  11/16/22 6723

## 2022-11-16 NOTE — ED NOTES
11/16/22 @ 0710:  PES notified by ED RN that patient will be medically admitted due to continued increase of his Ammonia  RN tried to [facility] to cancel admission, but was unable to get through  PES will contact admissions    [address], [address]: PES called [facility] admissions and they will contact [facility] to cancel referral   [address], MS

## 2022-11-16 NOTE — ASSESSMENT & PLAN NOTE
Patient presented to [facility] ED on 11/13/22 with forgetfulness  At that time he admitted to drinking alcohol earlier in the day     Alcohol level on admission, 174  Urine drug screen (+) for THC   History of chronic heavy alcohol use with recent admission to [facility] detox unit in January, April, and October 2022   Hst of DUI  · No signs of withdrawal at this time  · Continue Librium 25 mg po q4h PRN alcohol withdrawal, anxiety  · CIWA protocol   · Thiamine, Folic acid, and MVI   · Patient is medically stable for discharge to inpatient psych

## 2022-11-16 NOTE — ASSESSMENT & PLAN NOTE
With esophageal varices   Recently seen by GI and treated with Prednisolone   INR 1 44  T Bili 3 17  LFTs stable   · Continue newly started PPI, Nadolol and Lactulose BID (previously on TID)  · Trend ammonia in the am   · Alcohol cessation education and counseling

## 2022-11-16 NOTE — H&P
Dalia U  67   H&P- Mirta Hall 1965, 65 y o  male MRN: [id number]  Unit/Bed#: [address] [place] [address] Encounter: [id number]  Primary Care Provider: Crystal Moncada MD   Date and time admitted to hospital: 11/13/2022  5:52 PM    * Suicide ideation  Assessment & Plan  Patient presented to the ED on 11/13/22 with depression, SI, and forgetfulness  He was seen by PES who recommended inpatient psychiatry   He was medically stable for discharge to [facility] when his ammonia level started to trend up which prompted admission to the hospital for treatment prior to transfer to [facility]  · Will treat elevated ammonia level with lactulose as above  · Continue 1:1 for SI   · Finger foods     Alcohol use disorder, severe, dependence ([facility] )  Assessment & Plan  History of chronic heavy alcohol use with recent admission to [facility] detox unit in January, April, and October 2022   Patient presented to [facility] ED on 11/13/22 with forgetfulness  At that time he admitted to drinking alcohol earlier in the day     Alcohol level on admission, 174  Urine drug screen (+) for THC   · No signs of withdrawal at this time  · Continue Librium 25 mg po q4h PRN alcohol withdrawal, anxiety  · CIWA protocol   · Thiamine, Folic acid, and MVI   · Planning for discharge to inpatient psychiatry     Alcoholic cirrhosis of liver ([facility] )  Assessment & Plan  With esophageal varices   Recently seen by GI and treated with Prednisolone   INR 1 44  T Bili 3 17  LFTs stable   · Continue newly started PPI, Nadolol and Lactulose BID (previously on TID)  · Trend ammonia in the am   · Alcohol cessation education and counseling     Hematoma of right lower leg  Assessment & Plan  Hst of RLE hematoma from a ruptured Baker's cyst which was treated with compression and leg elevation  · Much improved  · Monitor     Pancytopenia (HCC)  Assessment & Plan  Stable   Likely due to myelosuppression from chronic alcohol use and cirrhosis   · Avoid pharmocological DVT prophylaxis   · Monitor for excessive bruising or bleeding   · SCDs  · Monitor fever curve, CBC/D     Tobacco abuse  Assessment & Plan  Smoking cessation education and counseling   · Nicotine patch     VTE Pharmacologic Prophylaxis: VTE Score: 1 Moderate Risk (Score 3-4) - Pharmacological DVT Prophylaxis Contraindicated  Sequential Compression Devices Ordered  Code Status: Level 1 - Full Code full code  Discussion with family: Patient declined call to   Anticipated Length of Stay: Patient will be admitted on an inpatient basis with an anticipated length of stay of greater than 2 midnights secondary to elevated ammonia level, SI  Total Time for Visit, including Counseling / Coordination of Care: 45 minutes Greater than 50% of this total time spent on direct patient counseling and coordination of care  Chief Complaint: Suicide ideation     History of Present Illness:  Frankie Bailon is a 65 y o  male with a PMH of heavy alcohol abuse, alcoholic liver cirrhosis, RLE hematoma, pancytopenia, and tobacco abuse who presents with depression and suicide ideation  Patient has history of multiple hospitalizations including 3 to the [facility] detox unit this year  He admits to ongoing alcohol abuse  He states he was living with his cousin but isn't sure he can go back there  Patient was seen by PES in the ED and was recommenced for discharge to inpatient psychiatry  Patient was accepted to [facility] for inpatient psychiatry, however, given a continued increase in his ammonia level he will need to be admitted for further treatment  Ammonia level currently, 51 -> 53 -> 72  Patient was getting Lactulose once daily in the ED which is less than his recommended dosage  Currently, patient is alert and oriented  He denies chest pain or shortness of breath  He is hungry and would like to eat lunch  He denies any shaking       Review of Systems:  Review of Systems   Constitutional: Negative for appetite change, chills and fever  HENT: Negative for sore throat  Eyes: Negative for visual disturbance  Respiratory: Negative for cough, shortness of breath and wheezing  Cardiovascular: Negative for chest pain, palpitations and leg swelling  Gastrointestinal: Negative for abdominal distention, abdominal pain, constipation, diarrhea, nausea and vomiting  Genitourinary: Negative for dysuria and hematuria  Musculoskeletal: Negative for arthralgias and back pain  Skin: Negative for rash and wound  Neurological: Positive for weakness  Negative for dizziness and headaches  Psychiatric/Behavioral: Positive for suicidal ideas  The patient is nervous/anxious  All other systems reviewed and are negative  Past Medical and Surgical History:   Past Medical History:   Diagnosis Date   • ETOH abuse        Past Surgical History:   Procedure Laterality Date   • GALLBLADDER SURGERY     • URETHRA SURGERY         Meds/Allergies:  Prior to Admission medications    Medication Sig Start Date End Date Taking? Authorizing Provider   lactulose 20 g/30 mL Take 30 mL (20 g total) by mouth 2 (two) times a day 9/26/22 10/26/22  TIMMY Gallegos   nadolol (CORGARD) 20 mg tablet Take 0 5 tablets (10 mg total) by mouth daily Do not start before October 6, 2022  10/6/22 11/5/22  Celia Canavan, PA-C   pantoprazole (PROTONIX) 40 mg tablet Take 1 tablet (40 mg total) by mouth daily  Patient not taking: Reported on 9/29/2022 9/26/22   TIMMY Gallegos     I have reviewed home medications using recent Epic encounter      Allergies: No Known Allergies    Social History:  Marital Status: Single   Occupation: unemployed   Patient Pre-hospital Living Situation: Alone  Patient Pre-hospital Level of Mobility: walks  Patient Pre-hospital Diet Restrictions: none   Substance Use History:   Social History     Substance and Sexual Activity   Alcohol Use Yes   • Alcohol/week: 16 0 standard drinks   • Types: 8 Cans of beer, 8 Shots of liquor per week    Comment: drinks several times a week last drink 3 hours ago     Social History     Tobacco Use   Smoking Status Never   Smokeless Tobacco Current     Social History     Substance and Sexual Activity   Drug Use Not Currently   • Types: Marijuana    Comment: last use yesterday       Family History:  Family History   Problem Relation Age of Onset   • Heart disease Mother    • Heart disease Father        Physical Exam:     Vitals:   Blood Pressure: 118/66 (11/16/22 1447)  Pulse: 79 (11/16/22 1447)  Temperature: 98 6 °F (37 °C) (11/16/22 1447)  Temp Source: Oral (11/16/22 1447)  Respirations: 20 (11/16/22 1447)  SpO2: 93 % (11/16/22 1447)    Physical Exam  Vitals and nursing note reviewed  Constitutional:       General: He is not in acute distress  Appearance: He is ill-appearing  He is not toxic-appearing or diaphoretic  Comments: Pleasant, talkative gentleman resting in bed on room air    HENT:      Head: Normocephalic  Mouth/Throat:      Mouth: Mucous membranes are moist    Eyes:      Conjunctiva/sclera: Conjunctivae normal    Cardiovascular:      Rate and Rhythm: Normal rate  Pulmonary:      Effort: Pulmonary effort is normal       Breath sounds: Normal breath sounds  No wheezing, rhonchi or rales  Abdominal:      General: Bowel sounds are normal  There is no distension  Palpations: Abdomen is soft  Tenderness: There is no abdominal tenderness  Musculoskeletal:         General: Normal range of motion  Cervical back: Normal range of motion  Right lower leg: No edema  Left lower leg: No edema  Skin:     General: Skin is warm and dry  Capillary Refill: Capillary refill takes less than 2 seconds  Neurological:      Mental Status: He is alert and oriented to person, place, and time  Mental status is at baseline  Motor: Weakness present  Psychiatric:         Mood and Affect: Mood is depressed  Affect is flat           Speech: Speech normal          Behavior: Behavior is cooperative  Judgment: Judgment is inappropriate  Additional Data:     Lab Results:  Results from last 7 days   Lab Units 11/16/22  0950 11/13/22  1807   WBC Thousand/uL 2 96* 4 21*   HEMOGLOBIN g/dL 13 0 12 6   HEMATOCRIT % 38 7 37 5   PLATELETS Thousands/uL 37* 49*   NEUTROS PCT %  --  49   LYMPHS PCT %  --  31   MONOS PCT %  --  13*   EOS PCT %  --  6     Results from last 7 days   Lab Units 11/16/22  0950   SODIUM mmol/L 135   POTASSIUM mmol/L 3 5   CHLORIDE mmol/L 105   CO2 mmol/L 26   BUN mg/dL 10   CREATININE mg/dL 0 81   ANION GAP mmol/L 4   CALCIUM mg/dL 7 9*   ALBUMIN g/dL 2 3*   TOTAL BILIRUBIN mg/dL 3 17*   ALK PHOS U/L 103   ALT U/L 25   AST U/L 35   GLUCOSE RANDOM mg/dL 166*     Results from last 7 days   Lab Units 11/16/22  0950   INR  1 44*                   Imaging: Reviewed radiology reports from this admission including: chest xray  XR chest 1 view portable   Final Result by Franco Munguia MD (11/14 1200)      No acute cardiopulmonary disease  Workstation performed: [id number]         CT head without contrast   Final Result by Kevin Henderson MD (11/13 1836)      No acute intracranial abnormality  Workstation performed: [id number]             EKG and Other Studies Reviewed on Admission:   · EKG: No EKG obtained  ** Please Note: This note has been constructed using a voice recognition system   **

## 2022-11-16 NOTE — ED NOTES
Pt resting comfortably, virtual one to one monitoring remains in place        Romaine Greer RN  11/15/22 1125

## 2022-11-16 NOTE — ED NOTES
Pt tearful and reports being "scared" about where he is going  Pt admits he's also afraid his family will think he's crazy  This RN spoke with patient   Patient has now calmed down and is resting quietly     Gilmer Kearney, [address]  11/16/22 3749

## 2022-11-16 NOTE — ASSESSMENT & PLAN NOTE
Patient presented to the ED on 11/13/22 with depression, SI, and forgetfulness  He was seen by PES who recommended inpatient psychiatry   He was medically stable for discharge to [facility] when his ammonia level started to trend up which prompted admission to the hospital for treatment prior to transfer to [facility]  · Will treat elevated ammonia level with lactulose as above  · Continue 1:1 for SI   · Finger foods

## 2022-11-17 LAB
AMMONIA PLAS-SCNC: 43 UMOL/L (ref 11–35)
ANION GAP SERPL CALCULATED.3IONS-SCNC: 7 MMOL/L (ref 4–13)
BASOPHILS # BLD AUTO: 0.03 THOUSANDS/ÂΜL (ref 0–0.1)
BASOPHILS NFR BLD AUTO: 1 % (ref 0–1)
BUN SERPL-MCNC: 10 MG/DL (ref 5–25)
CALCIUM SERPL-MCNC: 7.8 MG/DL (ref 8.3–10.1)
CHLORIDE SERPL-SCNC: 108 MMOL/L (ref 96–108)
CO2 SERPL-SCNC: 24 MMOL/L (ref 21–32)
CREAT SERPL-MCNC: 0.87 MG/DL (ref 0.6–1.3)
EOSINOPHIL # BLD AUTO: 0.25 THOUSAND/ÂΜL (ref 0–0.61)
EOSINOPHIL NFR BLD AUTO: 8 % (ref 0–6)
ERYTHROCYTE [DISTWIDTH] IN BLOOD BY AUTOMATED COUNT: 15.7 % (ref 11.6–15.1)
GFR SERPL CREATININE-BSD FRML MDRD: 96 ML/MIN/1.73SQ M
GLUCOSE SERPL-MCNC: 117 MG/DL (ref 65–140)
HCT VFR BLD AUTO: 37.7 % (ref 36.5–49.3)
HGB BLD-MCNC: 12.6 G/DL (ref 12–17)
IMM GRANULOCYTES # BLD AUTO: 0.01 THOUSAND/UL (ref 0–0.2)
IMM GRANULOCYTES NFR BLD AUTO: 0 % (ref 0–2)
LYMPHOCYTES # BLD AUTO: 0.91 THOUSANDS/ÂΜL (ref 0.6–4.47)
LYMPHOCYTES NFR BLD AUTO: 29 % (ref 14–44)
MAGNESIUM SERPL-MCNC: 1.7 MG/DL (ref 1.6–2.6)
MCH RBC QN AUTO: 32.7 PG (ref 26.8–34.3)
MCHC RBC AUTO-ENTMCNC: 33.4 G/DL (ref 31.4–37.4)
MCV RBC AUTO: 98 FL (ref 82–98)
MONOCYTES # BLD AUTO: 0.42 THOUSAND/ÂΜL (ref 0.17–1.22)
MONOCYTES NFR BLD AUTO: 13 % (ref 4–12)
NEUTROPHILS # BLD AUTO: 1.53 THOUSANDS/ÂΜL (ref 1.85–7.62)
NEUTS SEG NFR BLD AUTO: 49 % (ref 43–75)
NRBC BLD AUTO-RTO: 0 /100 WBCS
PHOSPHATE SERPL-MCNC: 4.1 MG/DL (ref 2.7–4.5)
PLATELET # BLD AUTO: 38 THOUSANDS/UL (ref 149–390)
PMV BLD AUTO: 10.4 FL (ref 8.9–12.7)
POTASSIUM SERPL-SCNC: 3.9 MMOL/L (ref 3.5–5.3)
RBC # BLD AUTO: 3.85 MILLION/UL (ref 3.88–5.62)
SODIUM SERPL-SCNC: 139 MMOL/L (ref 135–147)
WBC # BLD AUTO: 3.15 THOUSAND/UL (ref 4.31–10.16)

## 2022-11-17 RX ORDER — ESCITALOPRAM OXALATE 10 MG/1
10 TABLET ORAL DAILY
Status: DISCONTINUED | OUTPATIENT
Start: 2022-11-17 | End: 2022-11-19 | Stop reason: HOSPADM

## 2022-11-17 RX ORDER — POTASSIUM CHLORIDE 20 MEQ/1
20 TABLET, EXTENDED RELEASE ORAL ONCE
Status: COMPLETED | OUTPATIENT
Start: 2022-11-17 | End: 2022-11-17

## 2022-11-17 RX ADMIN — PANTOPRAZOLE SODIUM 40 MG: 40 TABLET, DELAYED RELEASE ORAL at 08:38

## 2022-11-17 RX ADMIN — RIFAXIMIN 550 MG: 550 TABLET ORAL at 08:38

## 2022-11-17 RX ADMIN — Medication 1 TABLET: at 08:38

## 2022-11-17 RX ADMIN — Medication 3 MG: at 21:26

## 2022-11-17 RX ADMIN — ESCITALOPRAM OXALATE 10 MG: 10 TABLET ORAL at 12:45

## 2022-11-17 RX ADMIN — THIAMINE HCL TAB 100 MG 100 MG: 100 TAB at 08:38

## 2022-11-17 RX ADMIN — FOLIC ACID 1 MG: 1 TABLET ORAL at 08:38

## 2022-11-17 RX ADMIN — CHLORDIAZEPOXIDE HYDROCHLORIDE 25 MG: 25 CAPSULE ORAL at 12:45

## 2022-11-17 RX ADMIN — POTASSIUM CHLORIDE 20 MEQ: 1500 TABLET, EXTENDED RELEASE ORAL at 07:21

## 2022-11-17 RX ADMIN — CHLORDIAZEPOXIDE HYDROCHLORIDE 25 MG: 25 CAPSULE ORAL at 21:26

## 2022-11-17 RX ADMIN — MAGNESIUM OXIDE TAB 400 MG (241.3 MG ELEMENTAL MG) 400 MG: 400 (241.3 MG) TAB at 07:21

## 2022-11-17 RX ADMIN — RIFAXIMIN 550 MG: 550 TABLET ORAL at 20:27

## 2022-11-17 RX ADMIN — LACTULOSE 30 G: 20 SOLUTION ORAL at 08:41

## 2022-11-17 NOTE — PLAN OF CARE
Problem: Potential for Falls  Goal: Patient will remain free of falls  Description: INTERVENTIONS:  - Educate patient/family on patient safety including physical limitations  - Instruct patient to call for assistance with activity   - Consult OT/PT to assist with strengthening/mobility   - Keep Call bell within reach  - Keep bed low and locked with side rails adjusted as appropriate  - Keep care items and personal belongings within reach  - Initiate and maintain comfort rounds  - Make Fall Risk Sign visible to staff  - Offer Toileting every 2 Hours, in advance of need  - Initiate/Maintain alarm  - Obtain necessary fall risk management equipment  - Apply yellow socks and bracelet for high fall risk patients  - Consider moving patient to room near nurses station  Outcome: Progressing     Problem: Prexisting or High Potential for Compromised Skin Integrity  Goal: Skin integrity is maintained or improved  Description: INTERVENTIONS:  - Identify patients at risk for skin breakdown  - Assess and monitor skin integrity  - Assess and monitor nutrition and hydration status  - Monitor labs   - Assess for incontinence   - Turn and reposition patient  - Assist with mobility/ambulation  - Relieve pressure over bony prominences  - Avoid friction and shearing  - Provide appropriate hygiene as needed including keeping skin clean and dry  - Evaluate need for skin moisturizer/barrier cream  - Collaborate with interdisciplinary team   - Patient/family teaching  - Consider wound care consult   Outcome: Progressing     Problem: PAIN - ADULT  Goal: Verbalizes/displays adequate comfort level or baseline comfort level  Description: Interventions:  - Encourage patient to monitor pain and request assistance  - Assess pain using appropriate pain scale  - Administer analgesics based on type and severity of pain and evaluate response  - Implement non-pharmacological measures as appropriate and evaluate response  - Consider cultural and social influences on pain and pain management  - Notify physician/advanced practitioner if interventions unsuccessful or patient reports new pain  Outcome: Progressing     Problem: INFECTION - ADULT  Goal: Absence or prevention of progression during hospitalization  Description: INTERVENTIONS:  - Assess and monitor for signs and symptoms of infection  - Monitor lab/diagnostic results  - Monitor all insertion sites, i e  indwelling lines, tubes, and drains  - Monitor endotracheal if appropriate and nasal secretions for changes in amount and color  - [place] appropriate cooling/warming therapies per order  - Administer medications as ordered  - Instruct and encourage patient and family to use good hand hygiene technique  - Identify and instruct in appropriate isolation precautions for identified infection/condition  Outcome: Progressing  Goal: Absence of fever/infection during neutropenic period  Description: INTERVENTIONS:  - Monitor WBC    Outcome: Progressing     Problem: SAFETY ADULT  Goal: Patient will remain free of falls  Description: INTERVENTIONS:  - Educate patient/family on patient safety including physical limitations  - Instruct patient to call for assistance with activity   - Consult OT/PT to assist with strengthening/mobility   - Keep Call bell within reach  - Keep bed low and locked with side rails adjusted as appropriate  - Keep care items and personal belongings within reach  - Initiate and maintain comfort rounds  - Make Fall Risk Sign visible to staff  - Offer Toileting every 2 Hours, in advance of need  - Initiate/Maintain alarm  - Obtain necessary fall risk management equipment  - Apply yellow socks and bracelet for high fall risk patients  - Consider moving patient to room near nurses station  Outcome: Progressing  Goal: Maintain or return to baseline ADL function  Description: INTERVENTIONS:  -  Assess patient's ability to carry out ADLs; assess patient's baseline for ADL function and identify physical deficits which impact ability to perform ADLs (bathing, care of mouth/teeth, toileting, grooming, dressing, etc )  - Assess/evaluate cause of self-care deficits   - Assess range of motion  - Assess patient's mobility; develop plan if impaired  - Assess patient's need for assistive devices and provide as appropriate  - Encourage maximum independence but intervene and supervise when necessary  - Involve family in performance of ADLs  - Assess for home care needs following discharge   - Consider OT consult to assist with ADL evaluation and planning for discharge  - Provide patient education as appropriate  Outcome: Progressing  Goal: Maintains/Returns to pre admission functional level  Description: INTERVENTIONS:  - Perform BMAT or MOVE assessment daily    - Set and communicate daily mobility goal to care team and patient/family/caregiver  - Collaborate with rehabilitation services on mobility goals if consulted  - Perform Range of Motion 3 times a day  - Reposition patient every 2 hours    - Dangle patient 3 times a day  - Stand patient 3 times a day  - Ambulate patient 3 times a day  - Out of bed to chair 3 times a day   - Out of bed for meals 3 times a day  - Out of bed for toileting  - Record patient progress and toleration of activity level   Outcome: Progressing     Problem: DISCHARGE PLANNING  Goal: Discharge to home or other facility with appropriate resources  Description: INTERVENTIONS:  - Identify barriers to discharge w/patient and caregiver  - Arrange for needed discharge resources and transportation as appropriate  - Identify discharge learning needs (meds, wound care, etc )  - Arrange for interpretive services to assist at discharge as needed  - Refer to Case Management Department for coordinating discharge planning if the patient needs post-hospital services based on physician/advanced practitioner order or complex needs related to functional status, cognitive ability, or social support system  Outcome: Progressing     Problem: Knowledge Deficit  Goal: Patient/family/caregiver demonstrates understanding of disease process, treatment plan, medications, and discharge instructions  Description: Complete learning assessment and assess knowledge base    Interventions:  - Provide teaching at level of understanding  - Provide teaching via preferred learning methods  Outcome: Progressing     Problem: METABOLIC, FLUID AND ELECTROLYTES - ADULT  Goal: Electrolytes maintained within normal limits  Description: INTERVENTIONS:  - Monitor labs and assess patient for signs and symptoms of electrolyte imbalances  - Administer electrolyte replacement as ordered  - Monitor response to electrolyte replacements, including repeat lab results as appropriate  - Instruct patient on fluid and nutrition as appropriate  Outcome: Progressing  Goal: Fluid balance maintained  Description: INTERVENTIONS:  - Monitor labs   - Monitor I/O and WT  - Instruct patient on fluid and nutrition as appropriate  - Assess for signs & symptoms of volume excess or deficit  Outcome: Progressing

## 2022-11-17 NOTE — ED NOTES
11/17/22 @ 1021:  PES notified by CATA Lima, that patient's Ammonia level improved to 43 and is medically cleared  PES requested note stating that and will proceed with referral to [address] [place] ,[address] [place] [address]: PES spoke to [facility]; forwarded all information for review at [facility]  Patient will also need an updated COVID-19 if accepted    Santos [facility] [place], [place]

## 2022-11-17 NOTE — ASSESSMENT & PLAN NOTE
Patient presented to the ED on 11/13/22 with depression, SI, and forgetfulness  · He was seen by PES in ED who recommended inpatient psychiatry   · He was medically stable for discharge to [facility] when his ammonia level started to trend up which prompted admission to the hospital for treatment prior to transfer to [facility]  · Ammonia level has improved with Lactulose BID   · Patient was re-evaluated by psychiatry per Crisis request  · Psychiatry continues to recommend inpatient psychiatry treatment   · Patient is medically stable for discharge to inpatient psych  · Crisis aware; pending placement   · Continue 1:1 for SI   · Finger foods

## 2022-11-17 NOTE — UTILIZATION REVIEW
Initial Clinical Review    Admission: Date/Time/Statement:   Admission Orders (From admission, onward)     Ordered        11/16/22 0648  INPATIENT ADMISSION  Once                      Orders Placed This Encounter   Procedures   • INPATIENT ADMISSION     Standing Status:   Standing     Number of Occurrences:   1     Order Specific Question:   Level of Care     Answer:   Med Surg [16]     Order Specific Question:   Estimated length of stay     Answer:   More than 2 Midnights     Order Specific Question:   Certification     Answer:   I certify that inpatient services are medically necessary for this patient for a duration of greater than two midnights  See H&P and MD Progress Notes for additional information about the patient's course of treatment  ED Arrival Information     Expected   -    Arrival   11/13/2022 17:19    Acuity   Urgent            Means of arrival   Walk-In    Escorted by   Self    Service   Hospitalist    Admission type   Emergency            Arrival complaint   Altered Mental State           Chief Complaint   Patient presents with   • Altered Mental Status     Came in tearful  States walked here from the other side of [place], states keeps forgetting things  Family members telling him they just told him something and he forgets right away, denies any head injury       Initial Presentation:   63F with history of alcoholic liver cirrhosis with associated portal hypertension, pancytopenia, hepatic encephalopathy, varices, alcohol abuse/withdrawal, nicotine dependence, depression initially presented to ED on 11/13/2022 with depression, forgetfulness and suicidal ideation  Patient was evaluated by PES in ED and was referred for inpatient psychiatric treatment awaiting disposition  While in ED patient was noted to have increasing ammonia level and was referred to hospitalization  Admitted to inpatient status for suicidal ideations with increased ammonia level      Date: 11/17/22   Day 2: Remains on 1:1 at bedside for suicide ideations  Ammonia levels from 72 to 43 today  CIWA score 3 for tremors & tactile disturbances  Psyche consulted, recommending IP psyche admission  Await medical clearance for IP psyche placement  Per psyche: Severe depression with suicidal ideation and alcohol intoxication marijuana abuse  Mental Status Exam:  65years old white male is alert awake oriented to place and person memory intact affect flat withdrawn psychomotor retardation speech slow and low in tone pessimistic negative reports feeling severely depressed and he looks depressed chronic insomnia anxiety and severe depression mostly due to unable to stop abusing alcohol and drugs not having a job being homeless  Patient is feeling suicidal and he was having thoughts to jump of the bridge or cut his wrist or jump in front of the bus but he wants to get better  Patient denies homicidal ideation  No evidence of auditory or visual hallucinations  Vázquez labile racing thoughts''sometime I cannot think clearly''no paranoia no delusion elucidated poor concentration  Insight and judgments are adequate patient is fully aware of his problems and his addiction and he is able to recognize that he needs help to get better  Plan:  I will recommend inpatient psychiatric care preferably dual diagnostic unit for further treatment and stabilization  Patient is being severely depressed and unable to stop alcohol and drugs and having suicidal thoughts patient will not survive in the community without inpatient psychiatric care patient will benefit to stabilize his depression and substance abuse  Started on Lexapro, continue Librium prn      ED Triage Vitals [11/13/22 1734]   Temperature Pulse Respirations Blood Pressure SpO2   98 3 °F (36 8 °C) 103 22 142/80 96 %      Temp Source Heart Rate Source Patient Position - Orthostatic VS BP Location FiO2 (%)   Tympanic Monitor Sitting Right arm --      Pain Score       No Pain Wt Readings from Last 1 Encounters:   11/17/22 88 6 kg (195 lb 5 2 oz)     Additional Vital Signs:   11/17/22 0728 98 °F (36 7 °C) 64 18 107/60 76 95 % None (Room air) Lying   11/17/22 0400 -- 63 -- 111/63 -- -- -- --   11/16/22 2308 98 4 °F (36 9 °C) 76 18 102/56 76 95 % None (Room air) Lying   11/16/22 1900 98 4 °F (36 9 °C) 83 18 113/63 82 96 % None (Room air) Lying   11/16/22 1447 98 6 °F (37 °C) 79 20 118/66 87 93 % None (Room air) Lying   11/16/22 0900 -- -- -- -- -- 93 % -- --   11/16/22 0826 98 5 °F (36 9 °C) 77 20 104/72 83 -- -- Sitting   11/16/22 0653 97 7 °F (36 5 °C) 67 20 117/68 -- 95 % None (Room air) Lying   11/15/22 1757 97 8 °F (36 6 °C) 86 18 137/91 -- 96 % None (Room air) Sitting   11/15/22 0645 -- 65 18 130/63 87 94 % None (Room air) Lying       Pertinent Labs/Diagnostic Test Results:   XR chest 1 view portable   Final Result  (11/14 1200)      No acute cardiopulmonary disease  CT head without contrast   Final Result  (11/13 1836)      No acute intracranial abnormality       Results from last 7 days   Lab Units 11/15/22  2048 11/14/22  1449   SARS-COV-2  Negative Negative     Results from last 7 days   Lab Units 11/17/22  0610 11/16/22  0950 11/13/22  1807   WBC Thousand/uL 3 15* 2 96* 4 21*   HEMOGLOBIN g/dL 12 6 13 0 12 6   HEMATOCRIT % 37 7 38 7 37 5   PLATELETS Thousands/uL 38* 37* 49*   NEUTROS ABS Thousands/µL 1 53*  --  2 06     Results from last 7 days   Lab Units 11/17/22  0610 11/16/22  0950 11/13/22  1807   SODIUM mmol/L 139 135 144   POTASSIUM mmol/L 3 9 3 5 3 6   CHLORIDE mmol/L 108 105 108   CO2 mmol/L 24 26 26   ANION GAP mmol/L 7 4 10   BUN mg/dL 10 10 9   CREATININE mg/dL 0 87 0 81 1 00   EGFR ml/min/1 73sq m 96 99 83   CALCIUM mg/dL 7 8* 7 9* 8 3   MAGNESIUM mg/dL 1 7  --   --    PHOSPHORUS mg/dL 4 1  --   --      Results from last 7 days   Lab Units 11/17/22  0610 11/16/22  0950 11/16/22  0611 11/15/22  0637 11/13/22  1807   AST U/L  --  35  --   --   --    ALT U/L --  25  --   --  35   ALK PHOS U/L  --  103  --   --  123*   TOTAL PROTEIN g/dL  --  6 3*  --   --  6 7   ALBUMIN g/dL  --  2 3*  --   --  2 6*   TOTAL BILIRUBIN mg/dL  --  3 17*  --   --  2 14*   AMMONIA umol/L 43*  --  72* 53* 51*     Results from last 7 days   Lab Units 11/17/22  0610 11/16/22  0950 11/13/22  1807   GLUCOSE RANDOM mg/dL 117 166* 115     Results from last 7 days   Lab Units 11/16/22  0950   PROTIME seconds 17 6*   INR  1 44*     Results from last 7 days   Lab Units 11/14/22  1115   CLARITY UA  Clear   COLOR UA  Yellow   SPEC GRAV UA  1 015   PH UA  7 5   GLUCOSE UA mg/dl Negative   KETONES UA mg/dl Negative   BLOOD UA  Negative   PROTEIN UA mg/dl Negative   NITRITE UA  Negative   BILIRUBIN UA  Negative   UROBILINOGEN UA E U /dl 2 0*   LEUKOCYTES UA  Negative   WBC UA /hpf None Seen   RBC UA /hpf None Seen   BACTERIA UA /hpf None Seen   EPITHELIAL CELLS WET PREP /hpf None Seen     Results from last 7 days   Lab Units 11/14/22  1449   INFLUENZA A PCR  Negative   INFLUENZA B PCR  Negative   RSV PCR  Negative     Results from last 7 days   Lab Units 11/13/22  2101   AMPH/METH  Negative   BARBITURATE UR  Negative   BENZODIAZEPINE UR  Negative   COCAINE UR  Negative   METHADONE URINE  Negative   OPIATE UR  Negative   PCP UR  Negative   THC UR  Positive*     Results from last 7 days   Lab Units 11/13/22  1807   ETHANOL LVL mg/dL 174*     ED Treatment:   Medication Administration from 11/13/2022 1718 to 11/16/2022 9161       Date/Time Order Dose Route Action     11/16/2022 0555 EST chlordiazePOXIDE (LIBRIUM) capsule 25 mg 25 mg Oral Given     11/15/2022 1924 EST chlordiazePOXIDE (LIBRIUM) capsule 25 mg 25 mg Oral Given     11/14/2022 1018 EST chlordiazePOXIDE (LIBRIUM) capsule 25 mg 25 mg Oral Given     11/14/2022 1809 EST lactulose oral solution 30 g 30 g Oral Given     11/14/2022 2108 EST LORazepam (ATIVAN) tablet 1 mg 1 mg Oral Given     11/15/2022 0825 EST lactulose oral solution 20 g 20 g Oral Given Past Medical History:   Diagnosis Date   • ETOH abuse      Present on Admission:  • Alcohol use disorder, severe, dependence ([facility] )  • Alcoholic cirrhosis of liver (HCC)  • Hematoma of right lower leg  • Tobacco abuse  • Pancytopenia (HCC)    Admitting Diagnosis: Suicidal ideation [R45 851]  Alcohol intoxication ([facility] ) [F10 929]  Hyperammonemia ([facility] ) [E72 20]  Altered mental status [R41 82]  Encephalopathy [G93 40]  Age/Sex: 65 y o  male  Admission Orders:  1:1 observation  CIWA protocol  Aspiration precautions  Seizure precautions    Scheduled Medications:  escitalopram (LEXAPRO) tablet 10 mg, Daily, start 00/31  folic acid, 1 mg, Oral, Daily  lactulose, 30 g, Oral, BID  melatonin, 3 mg, Oral, HS  multivitamin-minerals, 1 tablet, Oral, Daily  nadolol, 10 mg, Oral, Daily  nicotine, 1 patch, Transdermal, Daily  pantoprazole, 40 mg, Oral, Daily  rifaximin, 550 mg, Oral, Q12H [address]  thiamine, 100 mg, Oral, Daily    PRN Meds:  acetaminophen, 650 mg, Oral, Q8H PRN  calcium carbonate, 1,000 mg, Oral, Daily PRN  chlordiazePOXIDE, 25 mg, Oral, Q4H PRN  ondansetron, 4 mg, Intravenous, Q6H PRN  polyethylene glycol, 17 g, Oral, Daily PRN    Network Utilization Review Department  ATTENTION: Please call with any questions or concerns to [phone] and carefully listen to the prompts so that you are directed to the right person  All voicemails are confidential   Jordan Benitez all requests for admission clinical reviews, approved or denied determinations and any other requests to dedicated fax number below belonging to the campus where the patient is receiving treatment   List of dedicated fax numbers for the Facilities:  [address] [place] [address] DENIALS (Administrative/Medical Necessity) [phone]   [address] (Maternity/NICU/Pediatrics) [phone]   [address] [phone]   [facility] [phone] [address] [place] [address] Louise Archer [facility] 28 [address] [facility] 310 [facility] [address] [phone]

## 2022-11-17 NOTE — ED NOTES
11/17/22 @ 1116:  James Coronado from [place] reports that patient was declined because Ammonia level is still not WNL  PES says, "he was accepted yesterday with Ammonia level @ 52, but it's declining @ 43, when high normal is 35 "  James Coronado says the MD doesn't want to take the chance that it will go back up while on the unit  [place], [place]    1120: PES notified Jan Weiner and requested psych consult, as patient might not require inpatient treatment any longer  Price Baca: PES notes that Dr Maulik Ferrari consulted with patient and recommended inpatient treatment  PES requested updated note of medical clearance from Jan Piedra, [place]

## 2022-11-17 NOTE — ED CARE HANDOFF
Emergency Department Sign Out Note        Sign out and transfer of care from prior provider  See Separate Emergency Department note  The patient, Jaron Sorensen, was evaluated by the previous provider for AMS, SI  Workup Completed:  Results Reviewed     Procedure Component Value Units Date/Time    Ammonia [[id number]]  (Abnormal) Collected: 11/16/22 0611    Lab Status: Final result Specimen: Blood from Arm, Left Updated: 11/16/22 0631     Ammonia 72 umol/L     COVID only [[id number]]  (Normal) Collected: 11/15/22 2048    Lab Status: Final result Specimen: Nares from Nose Updated: 11/15/22 2134     SARS-CoV-2 Negative    Narrative:      FOR PEDIATRIC PATIENTS - copy/paste COVID Guidelines URL to browser: [email or web address]  [email or web address]    SARS-CoV-2 assay is a Nucleic Acid Amplification assay intended for the  qualitative detection of nucleic acid from SARS-CoV-2 in nasopharyngeal  swabs  Results are for the presumptive identification of SARS-CoV-2 RNA  Positive results are indicative of infection with SARS-CoV-2, the virus  causing COVID-19, but do not rule out bacterial infection or co-infection  with other viruses  Laboratories within the [place] and its  territories are required to report all positive results to the appropriate  public health authorities  Negative results do not preclude SARS-CoV-2  infection and should not be used as the sole basis for treatment or other  patient management decisions  Negative results must be combined with  clinical observations, patient history, and epidemiological information  This test has not been FDA cleared or approved  This test has been authorized by FDA under an Emergency Use Authorization  (EUA)   This test is only authorized for the duration of time the  declaration that circumstances exist justifying the authorization of the  emergency use of an in vitro diagnostic tests for detection of SARS-CoV-2  virus and/or diagnosis of COVID-19 infection under section 564(b)(1) of  the Act, 21 U  S C  107QRC-5(T)(5), unless the authorization is terminated  or revoked sooner  The test has been validated but independent review by FDA  and CLIA is pending  Test performed using [employer] Gene[employer]pert: This RT-PCR assay targets N2,  a region unique to SARS-CoV-2  A conserved region in the E-gene was chosen  for pan-Sarbecovirus detection which includes SARS-CoV-2  According to CMS-2020-01-R, this platform meets the definition of high-[employer] technology  Ammonia [[id number]]  (Abnormal) Collected: 11/15/22 0637    Lab Status: Final result Specimen: Blood from Arm, Left Updated: 11/15/22 0709     Ammonia 53 umol/L     FLU/RSV/COVID - if FLU/RSV clinically relevant [[id number]]  (Normal) Collected: 11/14/22 1449    Lab Status: Final result Specimen: Nares from Nose Updated: 11/14/22 1531     SARS-CoV-2 Negative     INFLUENZA A PCR Negative     INFLUENZA B PCR Negative     RSV PCR Negative    Narrative:      FOR PEDIATRIC PATIENTS - copy/paste COVID Guidelines URL to browser: [email or web address]  [email or web address]    SARS-CoV-2 assay is a Nucleic Acid Amplification assay intended for the  qualitative detection of nucleic acid from SARS-CoV-2 in nasopharyngeal  swabs  Results are for the presumptive identification of SARS-CoV-2 RNA  Positive results are indicative of infection with SARS-CoV-2, the virus  causing COVID-19, but do not rule out bacterial infection or co-infection  with other viruses  Laboratories within the [place] and its  territories are required to report all positive results to the appropriate  public health authorities  Negative results do not preclude SARS-CoV-2  infection and should not be used as the sole basis for treatment or other  patient management decisions   Negative results must be combined with  clinical observations, patient history, and epidemiological information  This test has not been FDA cleared or approved  This test has been authorized by FDA under an Emergency Use Authorization  (EUA)  This test is only authorized for the duration of time the  declaration that circumstances exist justifying the authorization of the  emergency use of an in vitro diagnostic tests for detection of SARS-CoV-2  virus and/or diagnosis of COVID-19 infection under section 564(b)(1) of  the Act, 21 U  S C  770PKO-2(Q)(6), unless the authorization is terminated  or revoked sooner  The test has been validated but independent review by FDA  and CLIA is pending  Test performed using [employer] GeneXpert: This RT-PCR assay targets N2,  a region unique to SARS-CoV-2  A conserved region in the E-gene was chosen  for pan-Sarbecovirus detection which includes SARS-CoV-2  According to CMS-2020-01-R, this platform meets the definition of high-throughput technology      Urinalysis with microscopic [[id number]]  (Abnormal) Collected: 11/14/22 1115    Lab Status: Final result Specimen: Urine, Clean Catch Updated: 11/14/22 1140     Color, UA Yellow     Clarity, UA Clear     Specific Gravity, UA 1 015     pH, UA 7 5     Leukocytes, UA Negative     Nitrite, UA Negative     Protein, UA Negative mg/dl      Glucose, UA Negative mg/dl      Ketones, UA Negative mg/dl      Urobilinogen, UA 2 0 E U /dl      Bilirubin, UA Negative     Occult Blood, UA Negative     RBC, UA None Seen /hpf      WBC, UA None Seen /hpf      Epithelial Cells None Seen /hpf      Bacteria, UA None Seen /hpf     Rapid drug screen, urine [[id number]]  (Abnormal) Collected: 11/13/22 2101    Lab Status: Final result Specimen: Urine, Other Updated: 11/13/22 2125     Amph/Meth UR Negative     Barbiturate Ur Negative     Benzodiazepine Urine Negative     Cocaine Urine Negative     Methadone Urine Negative     Opiate Urine Negative     PCP Ur Negative     THC Urine Positive     Oxycodone Urine Negative    Narrative:      Presumptive report  If requested, specimen will be sent to reference lab for confirmation  FOR MEDICAL PURPOSES ONLY  IF CONFIRMATION NEEDED PLEASE CONTACT THE LAB WITHIN 5 DAYS  Drug Screen Cutoff Levels:  AMPHETAMINE/METHAMPHETAMINES  1000 ng/mL  BARBITURATES     200 ng/mL  BENZODIAZEPINES     200 ng/mL  COCAINE      300 ng/mL  METHADONE      300 ng/mL  OPIATES      300 ng/mL  PHENCYCLIDINE     25 ng/mL  THC       50 ng/mL  OXYCODONE      100 ng/mL    CBC and differential [[id number]]  (Abnormal) Collected: 11/13/22 1807    Lab Status: Final result Specimen: Blood from Arm, Left Updated: 11/13/22 1934     WBC 4 21 Thousand/uL      RBC 3 87 Million/uL      Hemoglobin 12 6 g/dL      Hematocrit 37 5 %      MCV 97 fL      MCH 32 6 pg      MCHC 33 6 g/dL      RDW 15 6 %      MPV 10 3 fL      Platelets 49 Thousands/uL      nRBC 0 /100 WBCs      Neutrophils Relative 49 %      Immat GRANS % 0 %      Lymphocytes Relative 31 %      Monocytes Relative 13 %      Eosinophils Relative 6 %      Basophils Relative 1 %      Neutrophils Absolute 2 06 Thousands/µL      Immature Grans Absolute 0 00 Thousand/uL      Lymphocytes Absolute 1 32 Thousands/µL      Monocytes Absolute 0 55 Thousand/µL      Eosinophils Absolute 0 25 Thousand/µL      Basophils Absolute 0 03 Thousands/µL     Narrative:      No Clots  This is an appended report  These results have been appended to a previously verified report      Comprehensive metabolic panel [[id number]]  (Abnormal) Collected: 11/13/22 1807    Lab Status: Final result Specimen: Blood from Arm, Left Updated: 11/13/22 1844     Sodium 144 mmol/L      Potassium 3 6 mmol/L      Chloride 108 mmol/L      CO2 26 mmol/L      ANION GAP 10 mmol/L      BUN 9 mg/dL      Creatinine 1 00 mg/dL      Glucose 115 mg/dL      Calcium 8 3 mg/dL      Corrected Calcium 9 4 mg/dL      AST --     ALT 35 U/L      Alkaline Phosphatase 123 U/L      Total Protein 6 7 g/dL      Albumin 2 6 g/dL      Total Bilirubin 2 14 mg/dL eGFR 83 ml/min/1 73sq m     Narrative:      [place] guidelines for Chronic Kidney Disease (CKD):   •  Stage 1 with normal or high GFR (GFR > 90 mL/min/1 73 square meters)  •  Stage 2 Mild CKD (GFR = 60-89 mL/min/1 73 square meters)  •  Stage 3A Moderate CKD (GFR = 45-59 mL/min/1 73 square meters)  •  Stage 3B Moderate CKD (GFR = 30-44 mL/min/1 73 square meters)  •  Stage 4 Severe CKD (GFR = 15-29 mL/min/1 73 square meters)  •  Stage 5 End Stage CKD (GFR <15 mL/min/1 73 square meters)  Note: GFR calculation is accurate only with a steady state creatinine    Ammonia [[id number]]  (Abnormal) Collected: 11/13/22 1807    Lab Status: Final result Specimen: Blood from Arm, Left Updated: 11/13/22 1833     Ammonia 51 umol/L     Ethanol [[id number]]  (Abnormal) Collected: 11/13/22 1807    Lab Status: Final result Specimen: Blood from Arm, Left Updated: 11/13/22 1832     Ethanol Lvl 174 mg/dL           XR chest 1 view portable   Final Result by Ming Crockett MD (11/14 1200)      No acute cardiopulmonary disease  Workstation performed: [id number]         CT head without contrast   Final Result by Caroline Rubio MD (11/13 1836)      No acute intracranial abnormality  Workstation performed: [id number]               ED Course / Workup Pending (followup): Patient had previously elevated ammonia  Part of my signout care was to recheck ammonia in the AM in preparation for placement, after having received lactulose  Stools remain infrequent and ammonia increased, 2/2 alcoholic cirrhosis  I re evaluate the patient and he is bizarre and confused, so will be admitted for hyperammonemia                                     ED Course as of 11/16/22 2157   Tue Nov 15, 2022   0041 AM repeat ammonia     Procedures  MDM        Disposition  Final diagnoses:   Suicidal ideation   Alcohol intoxication ([address] )   Encephalopathy   Hyperammonemia ([facility] )     Time reflects when diagnosis was documented in both MDM as applicable and the Disposition within this note     Time User Action Codes Description Comment    11/14/2022  1:33 AM Ruthe Rho Add [R45 851] Suicidal ideation     11/14/2022  1:33 AM Ruthe Rho Add [F10 929] Alcohol intoxication ([facility] )     11/16/2022  6:49 AM Alesia Sell Add [G93 40] Encephalopathy     11/16/2022  6:49 AM Alesia Sell Add [E72 20] Hyperammonemia [facility])       ED Disposition     ED Disposition   Admit    Condition   Stable    Date/Time   Wed Nov 16, 2022  6:49 AM    Comment   Cornell Grande should be transferred out to  and has been medically cleared  Follow-up Information    None       Current Discharge Medication List      CONTINUE these medications which have NOT CHANGED    Details   mirtazapine (REMERON) 7 5 MG tablet Take by mouth daily at bedtime Pt doesn't know how many mg      traZODone (DESYREL) 50 mg tablet Take by mouth daily at bedtime Pt doesn't know how many mg      lactulose 20 g/30 mL Take 30 mL (20 g total) by mouth 2 (two) times a day  Qty: 237 mL, Refills: 0    Associated Diagnoses: Decompensated hepatic cirrhosis (HCC)      nadolol (CORGARD) 20 mg tablet Take 0 5 tablets (10 mg total) by mouth daily Do not start before October 6, 2022  Qty: 15 tablet, Refills: 0    Associated Diagnoses: Decompensated hepatic cirrhosis (HCC)      pantoprazole (PROTONIX) 40 mg tablet Take 1 tablet (40 mg total) by mouth daily  Qty: 30 tablet, Refills: 0    Associated Diagnoses: Alcoholic cirrhosis of liver without ascites ([facility] )           No discharge procedures on file         ED Provider  Electronically Signed by     Vitaliy Ugarte MD  11/16/22 9086

## 2022-11-17 NOTE — ED NOTES
Note of medical clearance in the chart @ 15:55  Called [facility] who reported that [place] had discussed this case with them earlier today and the patient was denied  Patient can not be referred to a free standing psychiatric facility due to the medical concerns of this case  Called [facility] @ 16:00 - they are willing to look @ referral - faxed about 16:05  Called [facility] @ 18:10 for an update - spoke with [place] - chart is still being reviewed; they will call PES back with an update

## 2022-11-17 NOTE — ASSESSMENT & PLAN NOTE
No longer smoking but admits to chewing tobacco   · Tobacco cessation education and counseling   · Nicotine patch if needed

## 2022-11-17 NOTE — CONSULTS
Consultation - Daphne Rm 65 y o  male MRN: [id number]    Unit/Bed#: [address] [place] [address] Encounter: [id number]      Identifying Data:  65years old white male came to the emergency room on November 13, 2022 intoxicated and having suicidal thoughts  Patient was accepted at psychiatric hospital but his ammonia level went up and he needed the medical admissions so patient is admitted  Crisis worker requested updated psychiatric evaluation by the psychiatrist before they re-evaluate the patient  Spoke with the nurse practitioner Kalina Parham she requested in-person psychiatric evaluation  Patient examined spoke with the nurse history physical medications labs reviewed and noted  Crisis worker's notes reviewed and noted  Patient's alcohol level was 174 and drug screen was positive with THC during this admission patient also had alcohol level was 244 and drug screen was positive with THC and positive with cocaine on April 18, 2022  Patient is cooperative and he admits drinking alcohol and using drugs  Patient is happy to see me and he reports that he is very depressed because he lost his home 2 years ago he is not working he has no money he lost his  license due to DUI he cannot return with his cousin anymore he is homeless and he is severely depressed I specifically asked him about having suicidal thoughts he reports that he was thinking to cut his wrist or jump off the bridge or jump in front of the bus but he wants to get better and look for a job so he can take care of his life he is willing to go for inpatient psychiatric care  Currently patient is on Librium p r n  And no evidence of alcohol withdrawal at this time patient has been in the hospital for a while so he is over with the alcohol withdrawal at this time  I reviewed his home medications and medical evaluation    Patient was treated with trazodone Remeron while he was in a psychiatric hospital but currently he is not under psychiatric care and he is not taking any medications he relapse with the depression and alcohol and drug abuse being severely depressed  Social history patient is homeless he has 15 3years old son by ex-girlfriend he denies smoking cigarette but he drinks alcohol off and on as described above he smokes pot he has history of abusing cocaine in the past but he denies abusing other drugs he denies IV drug abuse patient had 3 DUIs and 2 rehabs in the past but he relapse and he has been noncompliance of the follow-up care as an outpatient after the discharge from the rehab and psychiatric hospital in the past   Allergy no known drug allergy    Diagnosis  Major depression recurrent  Alcohol abuse dependent  Marijuana and cocaine abuse  Anxiety  Insomnia  Alcoholic cirrhosis  Gallbladder surgery and urethral surgery    Chief Complaints:  Severe depression with suicidal ideation and alcohol intoxication marijuana abuse    Family History: ''I do not think anyone''  Family History   Problem Relation Age of Onset   • Heart disease Mother    • Heart disease Father        Legal History:  Twice in a halfway related to DUI currently he is not on probation    Mental Status Exam:  65years old white male is alert awake oriented to place and person memory intact affect flat withdrawn psychomotor retardation speech slow and low in tone pessimistic negative reports feeling severely depressed and he looks depressed chronic insomnia anxiety and severe depression mostly due to unable to stop abusing alcohol and drugs not having a job being homeless  Patient is feeling suicidal and he was having thoughts to jump of the bridge or cut his wrist or jump in front of the bus but he wants to get better  Patient denies homicidal ideation  No evidence of auditory or visual hallucinations  Vázquez labile racing thoughts''sometime I cannot think clearly''no paranoia no delusion elucidated poor concentration    Insight and judgments are adequate patient is fully aware of his problems and his addiction and he is able to recognize that he needs help to get better  History of Present Illness     HPI: Lana Strickland is a 65y o  year old male who presents with severe depression with suicidal ideation and alcohol intoxication    Inpatient consult to Psychiatry  Consult performed by: Meagan Wick MD  Consult ordered by: TIMMY Flanagan            Historical Information   Past Psychiatric History:  Patient gives a long history of depression with at least 1 psychiatric admission at [facility] treated with anti depression but he is noncompliance currently he was not taking depression medications and he was not under psychiatric care patient also gives a history of 1 suicide attempt by cutting his wrist he was able to show me the scar in the past   Patient has an extensive history of alcohol abuse since he was 16to 14years old he has been drinking throughout his life off and on with history of 3 DUIs and 2 rehabs in the past currently he is still drinking as described above  Patient also has history of smoking pot and abusing cocaine in the past but no other drugs no history of IV drug abuse no heroin abuse  Patient gives a history of being in [facility] twice after the DUI and he lost his  license currently he is not on probation  Patient is not under psychiatric care he is not on any depression medications and currently he relapsed with the depression and substance abuse    Past Medical History:   Diagnosis Date   • ETOH abuse      Past Surgical History:   Procedure Laterality Date   • GALLBLADDER SURGERY     • URETHRA SURGERY       Social History   Social History     Substance and Sexual Activity   Alcohol Use Yes   • Alcohol/week: 16 0 standard drinks   • Types: 8 Cans of beer, 8 Shots of liquor per week    Comment: drinks several times a week last drink 3 hours ago     Social History     Substance and Sexual Activity   Drug Use Not Currently   • Types: Marijuana    Comment: last use yesterday     Social History     Tobacco Use   Smoking Status Never   Smokeless Tobacco Current       Meds/Allergies   current meds:   Current Facility-Administered Medications   Medication Dose Route Frequency   • acetaminophen (TYLENOL) tablet 650 mg  650 mg Oral Q8H PRN   • calcium carbonate (TUMS) chewable tablet 1,000 mg  1,000 mg Oral Daily PRN   • chlordiazePOXIDE (LIBRIUM) capsule 25 mg  25 mg Oral L8I PRN   • folic acid (FOLVITE) tablet 1 mg  1 mg Oral Daily   • lactulose oral solution 30 g  30 g Oral BID   • melatonin tablet 3 mg  3 mg Oral HS   • multivitamin-minerals (CENTRUM) tablet 1 tablet  1 tablet Oral Daily   • nadolol (CORGARD) tablet 10 mg  10 mg Oral Daily   • nicotine (NICODERM CQ) 14 mg/24hr TD 24 hr patch 1 patch  1 patch Transdermal Daily   • ondansetron (ZOFRAN) injection 4 mg  4 mg Intravenous Q6H PRN   • pantoprazole (PROTONIX) EC tablet 40 mg  40 mg Oral Daily   • polyethylene glycol (MIRALAX) packet 17 g  17 g Oral Daily PRN   • rifaximin (XIFAXAN) tablet 550 mg  550 mg Oral Q12H [address]   • thiamine tablet 100 mg  100 mg Oral Daily    and PTA meds:    Medications Prior to Admission   Medication   • mirtazapine (REMERON) 7 5 MG tablet   • traZODone (DESYREL) 50 mg tablet   • lactulose 20 g/30 mL   • nadolol (CORGARD) 20 mg tablet   • pantoprazole (PROTONIX) 40 mg tablet     No Known Allergies    Objective   Vitals: Blood pressure 107/60, pulse 64, temperature 98 °F (36 7 °C), temperature source Tympanic, resp  rate 18, height 6' (1 829 m), weight 88 6 kg (195 lb 5 2 oz), SpO2 95 %        Routine Lab Results:   Admission on 11/13/2022   Component Date Value Ref Range Status   • WBC 11/13/2022 4 21 (L)  4 31 - 10 16 Thousand/uL Final   • RBC 11/13/2022 3 87 (L)  3 88 - 5 62 Million/uL Final   • Hemoglobin 11/13/2022 12 6  12 0 - 17 0 g/dL Final   • Hematocrit 11/13/2022 37 5  36 5 - 49 3 % Final   • MCV 11/13/2022 97  82 - 98 fL Final   • MCH 11/13/2022 32 6 26 8 - 34 3 pg Final   • MCHC 11/13/2022 33 6  31 4 - 37 4 g/dL Final   • RDW 11/13/2022 15 6 (H)  11 6 - 15 1 % Final   • MPV 11/13/2022 10 3  8 9 - 12 7 fL Final   • Platelets 07/11/6199 49 (LL)  149 - 390 Thousands/uL Final    Results verified by repeat Manual Review of Smear Performed   • nRBC 11/13/2022 0  /100 WBCs Final   • Neutrophils Relative 11/13/2022 49  43 - 75 % Final   • Immat GRANS % 11/13/2022 0  0 - 2 % Final   • Lymphocytes Relative 11/13/2022 31  14 - 44 % Final   • Monocytes Relative 11/13/2022 13 (H)  4 - 12 % Final   • Eosinophils Relative 11/13/2022 6  0 - 6 % Final   • Basophils Relative 11/13/2022 1  0 - 1 % Final   • Neutrophils Absolute 11/13/2022 2 06  1 85 - 7 62 Thousands/µL Final   • Immature Grans Absolute 11/13/2022 0 00  0 00 - 0 20 Thousand/uL Final   • Lymphocytes Absolute 11/13/2022 1 32  0 60 - 4 47 Thousands/µL Final   • Monocytes Absolute 11/13/2022 0 55  0 17 - 1 22 Thousand/µL Final   • Eosinophils Absolute 11/13/2022 0 25  0 00 - 0 61 Thousand/µL Final   • Basophils Absolute 11/13/2022 0 03  0 00 - 0 10 Thousands/µL Final   • Sodium 11/13/2022 144  135 - 147 mmol/L Final   • Potassium 11/13/2022 3 6  3 5 - 5 3 mmol/L Final   • Chloride 11/13/2022 108  96 - 108 mmol/L Final   • CO2 11/13/2022 26  21 - 32 mmol/L Final   • ANION GAP 11/13/2022 10  4 - 13 mmol/L Final   • BUN 11/13/2022 9  5 - 25 mg/dL Final   • Creatinine 11/13/2022 1 00  0 60 - 1 30 mg/dL Final    Standardized to IDMS reference method   • Glucose 11/13/2022 115  65 - 140 mg/dL Final    If the patient is fasting, the ADA then defines impaired fasting glucose as > 100 mg/dL and diabetes as > or equal to 123 mg/dL  Specimen collection should occur prior to Sulfasalazine administration due to the potential for falsely depressed results  Specimen collection should occur prior to Sulfapyridine administration due to the potential for falsely elevated results     • Calcium 11/13/2022 8 3  8 3 - 10 1 mg/dL Final   • Corrected Calcium 11/13/2022 9 4  8 3 - 10 1 mg/dL Final   • AST 11/13/2022    Final    No result  If an AST is required for patient care, it must be ordered separately  Specimen collection should occur prior to Sulfasalazine administration due to the potential for falsely depressed results  • ALT 11/13/2022 35  12 - 78 U/L Final    Specimen collection should occur prior to Sulfasalazine administration due to the potential for falsely depressed results  • Alkaline Phosphatase 11/13/2022 123 (H)  46 - 116 U/L Final   • Total Protein 11/13/2022 6 7  6 4 - 8 4 g/dL Final   • Albumin 11/13/2022 2 6 (L)  3 5 - 5 0 g/dL Final   • Total Bilirubin 11/13/2022 2 14 (H)  0 20 - 1 00 mg/dL Final    Use of this assay is not recommended for patients undergoing treatment with eltrombopag due to the potential for falsely elevated results  • eGFR 11/13/2022 83  ml/min/1 73sq m Final   • Ethanol Lvl 11/13/2022 174 (H)  0 - 3 mg/dL Final   • Ammonia 11/13/2022 51 (H)  11 - 35 umol/L Final    Specimen collection should occur prior to Sulfapyridine administration due to the potential for falsely depressed results      • Amph/Meth UR 11/13/2022 Negative  Negative Final   • Barbiturate Ur 11/13/2022 Negative  Negative Final   • Benzodiazepine Urine 11/13/2022 Negative  Negative Final   • Cocaine Urine 11/13/2022 Negative  Negative Final   • Methadone Urine 11/13/2022 Negative  Negative Final   • Opiate Urine 11/13/2022 Negative  Negative Final   • PCP Ur 11/13/2022 Negative  Negative Final   • THC Urine 11/13/2022 Positive (A)  Negative Final   • Oxycodone Urine 11/13/2022 Negative  Negative Final   • Color, UA 11/14/2022 Yellow   Final   • Clarity, UA 11/14/2022 Clear   Final   • Specific Gravity, UA 11/14/2022 1 015  1 000 - 1 030 Final   • pH, UA 11/14/2022 7 5  5 0, 5 5, 6 0, 6 5, 7 0, 7 5, 8 0, 8 5, 9 0 Final   • Leukocytes, UA 11/14/2022 Negative  Negative Final   • Nitrite, UA 11/14/2022 Negative  Negative Final   • Protein, UA 11/14/2022 Negative  Negative mg/dl Final   • Glucose, UA 11/14/2022 Negative  Negative mg/dl Final   • Ketones, UA 11/14/2022 Negative  Negative mg/dl Final   • Urobilinogen, UA 11/14/2022 2 0 (A)  0 2, 1 0 E U /dl E U /dl Final   • Bilirubin, UA 11/14/2022 Negative  Negative Final   • Occult Blood, UA 11/14/2022 Negative  Negative Final   • RBC, UA 11/14/2022 None Seen  None Seen, 2-4 /hpf Final   • WBC, UA 11/14/2022 None Seen  None Seen, 2-4, 5-60 /hpf Final   • Epithelial Cells 11/14/2022 None Seen  None Seen, Occasional /hpf Final   • Bacteria, UA 11/14/2022 None Seen  None Seen, Occasional /hpf Final   • SARS-CoV-2 11/14/2022 Negative  Negative Final   • INFLUENZA A PCR 11/14/2022 Negative  Negative Final   • INFLUENZA B PCR 11/14/2022 Negative  Negative Final   • RSV PCR 11/14/2022 Negative  Negative Final   • Ammonia 11/15/2022 53 (H)  11 - 35 umol/L Final    Specimen collection should occur prior to Sulfapyridine administration due to the potential for falsely depressed results  • SARS-CoV-2 11/15/2022 Negative  Negative Final   • Ammonia 11/16/2022 72 (H)  11 - 35 umol/L Final    Specimen collection should occur prior to Sulfapyridine administration due to the potential for falsely depressed results      • WBC 11/16/2022 2 96 (L)  4 31 - 10 16 Thousand/uL Final   • RBC 11/16/2022 3 97  3 88 - 5 62 Million/uL Final   • Hemoglobin 11/16/2022 13 0  12 0 - 17 0 g/dL Final   • Hematocrit 11/16/2022 38 7  36 5 - 49 3 % Final   • MCV 11/16/2022 98  82 - 98 fL Final   • MCH 11/16/2022 32 7  26 8 - 34 3 pg Final   • MCHC 11/16/2022 33 6  31 4 - 37 4 g/dL Final   • RDW 11/16/2022 15 6 (H)  11 6 - 15 1 % Final   • Platelets 96/61/1354 37 (LL)  149 - 390 Thousands/uL Final    Previous value <100   • MPV 11/16/2022 10 9  8 9 - 12 7 fL Final   • Sodium 11/16/2022 135  135 - 147 mmol/L Final   • Potassium 11/16/2022 3 5  3 5 - 5 3 mmol/L Final   • Chloride 11/16/2022 105  96 - 108 mmol/L Final   • CO2 11/16/2022 26  21 - 32 mmol/L Final   • ANION GAP 11/16/2022 4  4 - 13 mmol/L Final   • BUN 11/16/2022 10  5 - 25 mg/dL Final   • Creatinine 11/16/2022 0 81  0 60 - 1 30 mg/dL Final    Standardized to IDMS reference method   • Glucose 11/16/2022 166 (H)  65 - 140 mg/dL Final    If the patient is fasting, the ADA then defines impaired fasting glucose as > 100 mg/dL and diabetes as > or equal to 123 mg/dL  Specimen collection should occur prior to Sulfasalazine administration due to the potential for falsely depressed results  Specimen collection should occur prior to Sulfapyridine administration due to the potential for falsely elevated results  • Calcium 11/16/2022 7 9 (L)  8 3 - 10 1 mg/dL Final   • Corrected Calcium 11/16/2022 9 3  8 3 - 10 1 mg/dL Final   • AST 11/16/2022 35  5 - 45 U/L Final    Specimen collection should occur prior to Sulfasalazine administration due to the potential for falsely depressed results  • ALT 11/16/2022 25  12 - 78 U/L Final    Specimen collection should occur prior to Sulfasalazine administration due to the potential for falsely depressed results  • Alkaline Phosphatase 11/16/2022 103  46 - 116 U/L Final   • Total Protein 11/16/2022 6 3 (L)  6 4 - 8 4 g/dL Final   • Albumin 11/16/2022 2 3 (L)  3 5 - 5 0 g/dL Final   • Total Bilirubin 11/16/2022 3 17 (H)  0 20 - 1 00 mg/dL Final    Use of this assay is not recommended for patients undergoing treatment with eltrombopag due to the potential for falsely elevated results  • eGFR 11/16/2022 99  ml/min/1 73sq m Final   • Protime 11/16/2022 17 6 (H)  11 6 - 14 5 seconds Final   • INR 11/16/2022 1 44 (H)  0 84 - 1 19 Final   • Ammonia 11/17/2022 43 (H)  11 - 35 umol/L Final    Specimen collection should occur prior to Sulfapyridine administration due to the potential for falsely depressed results      • Sodium 11/17/2022 139  135 - 147 mmol/L Final   • Potassium 11/17/2022 3 9  3 5 - 5 3 mmol/L Final   • Chloride 11/17/2022 108  96 - 108 mmol/L Final   • CO2 11/17/2022 24  21 - 32 mmol/L Final   • ANION GAP 11/17/2022 7  4 - 13 mmol/L Final   • BUN 11/17/2022 10  5 - 25 mg/dL Final   • Creatinine 11/17/2022 0 87  0 60 - 1 30 mg/dL Final    Standardized to IDMS reference method   • Glucose 11/17/2022 117  65 - 140 mg/dL Final    If the patient is fasting, the ADA then defines impaired fasting glucose as > 100 mg/dL and diabetes as > or equal to 123 mg/dL  Specimen collection should occur prior to Sulfasalazine administration due to the potential for falsely depressed results  Specimen collection should occur prior to Sulfapyridine administration due to the potential for falsely elevated results  • Calcium 11/17/2022 7 8 (L)  8 3 - 10 1 mg/dL Final   • eGFR 11/17/2022 96  ml/min/1 73sq m Final   • Magnesium 11/17/2022 1 7  1 6 - 2 6 mg/dL Final   • Phosphorus 11/17/2022 4 1  2 7 - 4 5 mg/dL Final   • WBC 11/17/2022 3 15 (L)  4 31 - 10 16 Thousand/uL Final   • RBC 11/17/2022 3 85 (L)  3 88 - 5 62 Million/uL Final   • Hemoglobin 11/17/2022 12 6  12 0 - 17 0 g/dL Final   • Hematocrit 11/17/2022 37 7  36 5 - 49 3 % Final   • MCV 11/17/2022 98  82 - 98 fL Final   • MCH 11/17/2022 32 7  26 8 - 34 3 pg Final   • MCHC 11/17/2022 33 4  31 4 - 37 4 g/dL Final   • RDW 11/17/2022 15 7 (H)  11 6 - 15 1 % Final   • MPV 11/17/2022 10 4  8 9 - 12 7 fL Final   • Platelets 61/82/1876 38 (LL)  149 - 390 Thousands/uL Final    Previous Platelet=<100  Results verified by repeat   • nRBC 11/17/2022 0  /100 WBCs Final    This is an appended report  These results have been appended to a previously preliminary verified report     • Neutrophils Relative 11/17/2022 49  43 - 75 % Final   • Immat GRANS % 11/17/2022 0  0 - 2 % Final   • Lymphocytes Relative 11/17/2022 29  14 - 44 % Final   • Monocytes Relative 11/17/2022 13 (H)  4 - 12 % Final   • Eosinophils Relative 11/17/2022 8 (H)  0 - 6 % Final   • Basophils Relative 11/17/2022 1  0 - 1 % Final   • Neutrophils Absolute 11/17/2022 1 53 (L)  1 85 - 7 62 Thousands/µL Final   • Immature Grans Absolute 11/17/2022 0 01  0 00 - 0 20 Thousand/uL Final   • Lymphocytes Absolute 11/17/2022 0 91  0 60 - 4 47 Thousands/µL Final   • Monocytes Absolute 11/17/2022 0 42  0 17 - 1 22 Thousand/µL Final   • Eosinophils Absolute 11/17/2022 0 25  0 00 - 0 61 Thousand/µL Final   • Basophils Absolute 11/17/2022 0 03  0 00 - 0 10 Thousands/µL Final         Diagnosis:  Major depression recurrent  Alcohol abuse dependent  Drug abuse  Anxiety  Insomnia  Noncompliance of the treatment  Plan:  I will recommend inpatient psychiatric care preferably dual diagnostic unit for further treatment and stabilization  Patient is being severely depressed and unable to stop alcohol and drugs and having suicidal thoughts patient will not survive in the community without inpatient psychiatric care patient will benefit to stabilize his depression and substance abuse being in hospital and he will need outpatient psychiatric follow-up with the psychiatrist therapist Sylvia  meetings and do not abuse alcohol or drugs after the discharge  Lexapro 10 mg daily  Continue Librium 24 mg p o  Q 4 hours p r n  For alcohol withdrawal  Psychotherapy  Discussed with the nurse practitioner and the nurse  I will follow up during the hospital stay          Mohamud Mcmahon MD

## 2022-11-17 NOTE — PROGRESS NOTES
Karrie 46  Progress Note - Lianetyeve Learn 1965, 65 y o  male MRN: [id number]  Unit/Bed#: [address] [place] [address] Encounter: [id number]  Primary Care Provider: Claudia Robertson MD   Date and time admitted to hospital: 11/13/2022  5:52 PM    * Suicide ideation  Assessment & Plan  Patient presented to the ED on 11/13/22 with depression, SI, and forgetfulness  · He was seen by PES in ED who recommended inpatient psychiatry   · He was medically stable for discharge to [facility] when his ammonia level started to trend up which prompted admission to the hospital for treatment prior to transfer to [facility]  · Ammonia level has improved with Lactulose BID   · Patient was re-evaluated by psychiatry per Crisis request  · Psychiatry continues to recommend inpatient psychiatry treatment   · Patient is medically stable for discharge to inpatient psych  · Crisis aware; pending placement   · Continue 1:1 for SI   · Finger foods     Alcohol use disorder, severe, dependence ([address] )  Assessment & Plan  Patient presented to [facility] ED on 11/13/22 with forgetfulness  At that time he admitted to drinking alcohol earlier in the day     Alcohol level on admission, 174  Urine drug screen (+) for THC   History of chronic heavy alcohol use with recent admission to [facility] detox unit in January, April, and October 2022   Hst of DUI  · No signs of withdrawal at this time  · Continue Librium 25 mg po q4h PRN alcohol withdrawal, anxiety  · CIWA protocol   · Thiamine, Folic acid, and MVI   · Patient is medically stable for discharge to inpatient psych    Alcoholic cirrhosis of liver ([facility] )  Assessment & Plan  With esophageal varices   Recently seen by GI and treated with Prednisolone   INR 1 44  T Bili 3 17  LFTs stable   · Continue newly started PPI, Nadolol and Lactulose BID (previously on TID)  · Ammonia level trending down; 51 -> 53 -> 72 -> 43   · Alcohol cessation education and counseling   · Patient is medically stable for discharge to inpatient psych    Hematoma of right lower leg  Assessment & Plan  Hst of RLE hematoma from a ruptured Baker's cyst which was treated with compression and leg elevation  · Much improved  · Monitor     Pancytopenia (HCC)  Assessment & Plan  Stable   Likely due to myelosuppression from chronic alcohol use and cirrhosis   · Avoid pharmocological DVT prophylaxis   · Monitor for excessive bruising or bleeding   · SCDs  · Monitor fever curve, CBC/D     Tobacco abuse  Assessment & Plan  No longer smoking but admits to chewing tobacco   · Tobacco cessation education and counseling   · Nicotine patch if needed         VTE Pharmacologic Prophylaxis: VTE Score: 1 Moderate Risk (Score 3-4) - Pharmacological DVT Prophylaxis Contraindicated  Sequential Compression Devices Ordered  Patient Centered Rounds: I performed bedside rounds with nursing staff today  Discussions with Specialists or Other Care Team Provider: nursing, CM, psychiatry, crisis     Education and Discussions with Family / Patient: Patient declined call to   Time Spent for Care: 30 minutes  More than 50% of total time spent on counseling and coordination of care as described above  Current Length of Stay: 1 day(s)  Current Patient Status: Inpatient   Certification Statement: The patient will continue to require additional inpatient hospital stay due to [facility] with need for inpatient psych   Discharge Plan: Anticipate discharge in 24-48 hrs to inpatient psych  Code Status: Level 1 - Full Code    Subjective:   Patient seen and examined at bedside  He is eating and drinking well  Cooperative  Denies HA, dizziness, CP, SOB, abdominal pain, N/V/D  Reports his head isn't right   When asked if he has pain he says "no it's not that " He desires discharge to inpatient psych unit as he would like to get better,     Objective:     Vitals:   Temp (24hrs), Av 3 °F (36 8 °C), Min:98 °F (36 7 °C), Max:98 4 °F (36 9 °C)    Temp:  [98 °F (36 7 °C)-98 4 °F (36 9 °C)] 98 °F (36 7 °C)  HR:  [63-83] 64  Resp:  [18] 18  BP: (102-113)/(56-63) 107/60  SpO2:  [95 %-96 %] 95 %  Body mass index is 26 49 kg/m²  Input and Output Summary (last 24 hours): Intake/Output Summary (Last 24 hours) at 11/17/2022 1534  Last data filed at 11/17/2022 0530  Gross per 24 hour   Intake 240 ml   Output --   Net 240 ml       Physical Exam:   Physical Exam  Vitals and nursing note reviewed  Constitutional:       General: He is not in acute distress  Appearance: He is ill-appearing  He is not toxic-appearing or diaphoretic  HENT:      Head: Normocephalic  Mouth/Throat:      Mouth: Mucous membranes are moist    Eyes:      Conjunctiva/sclera: Conjunctivae normal    Cardiovascular:      Rate and Rhythm: Normal rate  Pulmonary:      Effort: Pulmonary effort is normal       Breath sounds: Normal breath sounds  No wheezing, rhonchi or rales  Abdominal:      General: Bowel sounds are normal  There is no distension  Palpations: Abdomen is soft  Tenderness: There is no abdominal tenderness  Musculoskeletal:         General: Normal range of motion  Cervical back: Normal range of motion  Right lower leg: No edema  Left lower leg: No edema  Skin:     General: Skin is warm and dry  Capillary Refill: Capillary refill takes less than 2 seconds  Neurological:      Mental Status: He is alert and oriented to person, place, and time  Mental status is at baseline  Motor: Weakness present  Psychiatric:         Mood and Affect: Mood is depressed  Affect is flat  Behavior: Behavior is withdrawn            Additional Data:     Labs:  Results from last 7 days   Lab Units 11/17/22  0610   WBC Thousand/uL 3 15*   HEMOGLOBIN g/dL 12 6   HEMATOCRIT % 37 7   PLATELETS Thousands/uL 38*   NEUTROS PCT % 49   LYMPHS PCT % 29   MONOS PCT % 13*   EOS PCT % 8*     Results from last 7 days   Lab Units 11/17/22  0610 11/16/22  0950   SODIUM mmol/L 139 135 POTASSIUM mmol/L 3 9 3 5   CHLORIDE mmol/L 108 105   CO2 mmol/L 24 26   BUN mg/dL 10 10   CREATININE mg/dL 0 87 0 81   ANION GAP mmol/L 7 4   CALCIUM mg/dL 7 8* 7 9*   ALBUMIN g/dL  --  2 3*   TOTAL BILIRUBIN mg/dL  --  3 17*   ALK PHOS U/L  --  103   ALT U/L  --  25   AST U/L  --  35   GLUCOSE RANDOM mg/dL 117 166*     Results from last 7 days   Lab Units 11/16/22  0950   INR  1 44*                   Lines/Drains:  Invasive Devices     Peripheral Intravenous Line  Duration           Peripheral IV 11/16/22 Left;Ventral (anterior) Forearm 1 day                      Imaging: Reviewed radiology reports from this admission including: chest xray and CT head    Recent Cultures (last 7 days):         Last 24 Hours Medication List:   Current Facility-Administered Medications   Medication Dose Route Frequency Provider Last Rate   • acetaminophen  650 mg Oral Q8H PRN TIMMY Magallon     • calcium carbonate  1,000 mg Oral Daily PRN TIMMY Magallon     • chlordiazePOXIDE  25 mg Oral Q4H PRN Domonique Denise MD     • escitalopram  10 mg Oral Daily Mauricio Hall MD     • folic acid  1 mg Oral Daily Ghislaine Michaels MD     • lactulose  30 g Oral BID Ghislaine Michaels MD     • melatonin  3 mg Oral HS TIMMY Magallon     • multivitamin-minerals  1 tablet Oral Daily Ghislaine Michaels MD     • nadolol  10 mg Oral Daily Ghislaine Michaels MD     • nicotine  1 patch Transdermal Daily TIMMY Magallon     • ondansetron  4 mg Intravenous Q6H PRN TIMMY Magallon     • pantoprazole  40 mg Oral Daily Ghislaine Michaels MD     • polyethylene glycol  17 g Oral Daily PRN TIMMY Magallon     • rifaximin  550 mg Oral Q12H [facility] Ghislaine Michaels MD     • thiamine  100 mg Oral Daily Ghislaine Michaels MD          Today, Patient Was Seen By: TIMMY Magallon    **Please Note: This note may have been constructed using a voice recognition system  **

## 2022-11-18 VITALS
DIASTOLIC BLOOD PRESSURE: 64 MMHG | TEMPERATURE: 98.4 F | OXYGEN SATURATION: 96 % | WEIGHT: 195.33 LBS | HEIGHT: 72 IN | BODY MASS INDEX: 26.46 KG/M2 | RESPIRATION RATE: 18 BRPM | SYSTOLIC BLOOD PRESSURE: 118 MMHG | HEART RATE: 64 BPM

## 2022-11-18 LAB
ALBUMIN SERPL BCP-MCNC: 2.1 G/DL (ref 3.5–5)
ALP SERPL-CCNC: 99 U/L (ref 46–116)
ALT SERPL W P-5'-P-CCNC: 20 U/L (ref 12–78)
AMMONIA PLAS-SCNC: 48 UMOL/L (ref 11–35)
ANION GAP SERPL CALCULATED.3IONS-SCNC: 6 MMOL/L (ref 4–13)
AST SERPL W P-5'-P-CCNC: 30 U/L (ref 5–45)
BASOPHILS # BLD AUTO: 0.02 THOUSANDS/ÂΜL (ref 0–0.1)
BASOPHILS NFR BLD AUTO: 1 % (ref 0–1)
BILIRUB SERPL-MCNC: 2.01 MG/DL (ref 0.2–1)
BUN SERPL-MCNC: 10 MG/DL (ref 5–25)
CALCIUM ALBUM COR SERPL-MCNC: 9 MG/DL (ref 8.3–10.1)
CALCIUM SERPL-MCNC: 7.5 MG/DL (ref 8.3–10.1)
CHLORIDE SERPL-SCNC: 112 MMOL/L (ref 96–108)
CO2 SERPL-SCNC: 26 MMOL/L (ref 21–32)
CREAT SERPL-MCNC: 0.81 MG/DL (ref 0.6–1.3)
EOSINOPHIL # BLD AUTO: 0.23 THOUSAND/ÂΜL (ref 0–0.61)
EOSINOPHIL NFR BLD AUTO: 8 % (ref 0–6)
ERYTHROCYTE [DISTWIDTH] IN BLOOD BY AUTOMATED COUNT: 15.8 % (ref 11.6–15.1)
GFR SERPL CREATININE-BSD FRML MDRD: 99 ML/MIN/1.73SQ M
GLUCOSE SERPL-MCNC: 98 MG/DL (ref 65–140)
HCT VFR BLD AUTO: 36.4 % (ref 36.5–49.3)
HGB BLD-MCNC: 12.3 G/DL (ref 12–17)
IMM GRANULOCYTES # BLD AUTO: 0.01 THOUSAND/UL (ref 0–0.2)
IMM GRANULOCYTES NFR BLD AUTO: 0 % (ref 0–2)
LYMPHOCYTES # BLD AUTO: 0.93 THOUSANDS/ÂΜL (ref 0.6–4.47)
LYMPHOCYTES NFR BLD AUTO: 31 % (ref 14–44)
MAGNESIUM SERPL-MCNC: 1.6 MG/DL (ref 1.6–2.6)
MCH RBC QN AUTO: 33 PG (ref 26.8–34.3)
MCHC RBC AUTO-ENTMCNC: 33.8 G/DL (ref 31.4–37.4)
MCV RBC AUTO: 98 FL (ref 82–98)
MONOCYTES # BLD AUTO: 0.37 THOUSAND/ÂΜL (ref 0.17–1.22)
MONOCYTES NFR BLD AUTO: 12 % (ref 4–12)
NEUTROPHILS # BLD AUTO: 1.43 THOUSANDS/ÂΜL (ref 1.85–7.62)
NEUTS SEG NFR BLD AUTO: 48 % (ref 43–75)
NRBC BLD AUTO-RTO: 0 /100 WBCS
PLATELET # BLD AUTO: 35 THOUSANDS/UL (ref 149–390)
PMV BLD AUTO: 12.5 FL (ref 8.9–12.7)
POTASSIUM SERPL-SCNC: 3.8 MMOL/L (ref 3.5–5.3)
PROT SERPL-MCNC: 5.8 G/DL (ref 6.4–8.4)
RBC # BLD AUTO: 3.73 MILLION/UL (ref 3.88–5.62)
SARS-COV-2 RNA RESP QL NAA+PROBE: NEGATIVE
SODIUM SERPL-SCNC: 144 MMOL/L (ref 135–147)
WBC # BLD AUTO: 2.99 THOUSAND/UL (ref 4.31–10.16)

## 2022-11-18 RX ORDER — ACETAMINOPHEN 325 MG/1
650 TABLET ORAL EVERY 8 HOURS PRN
Refills: 0
Start: 2022-11-18

## 2022-11-18 RX ORDER — POLYETHYLENE GLYCOL 3350 17 G/17G
17 POWDER, FOR SOLUTION ORAL DAILY PRN
Refills: 0
Start: 2022-11-18

## 2022-11-18 RX ORDER — LANOLIN ALCOHOL/MO/W.PET/CERES
3 CREAM (GRAM) TOPICAL
Refills: 0
Start: 2022-11-18

## 2022-11-18 RX ORDER — LANOLIN ALCOHOL/MO/W.PET/CERES
100 CREAM (GRAM) TOPICAL DAILY
Refills: 0
Start: 2022-11-19

## 2022-11-18 RX ORDER — NICOTINE 21 MG/24HR
1 PATCH, TRANSDERMAL 24 HOURS TRANSDERMAL DAILY
Qty: 28 PATCH | Refills: 0
Start: 2022-11-19

## 2022-11-18 RX ORDER — FOLIC ACID 1 MG/1
1 TABLET ORAL DAILY
Refills: 0
Start: 2022-11-19

## 2022-11-18 RX ORDER — ESCITALOPRAM OXALATE 10 MG/1
10 TABLET ORAL DAILY
Refills: 0
Start: 2022-11-19

## 2022-11-18 RX ORDER — CALCIUM CARBONATE 200(500)MG
1000 TABLET,CHEWABLE ORAL DAILY PRN
Refills: 0
Start: 2022-11-18

## 2022-11-18 RX ADMIN — LACTULOSE 30 G: 20 SOLUTION ORAL at 17:29

## 2022-11-18 RX ADMIN — FOLIC ACID 1 MG: 1 TABLET ORAL at 08:10

## 2022-11-18 RX ADMIN — ESCITALOPRAM OXALATE 10 MG: 10 TABLET ORAL at 08:10

## 2022-11-18 RX ADMIN — RIFAXIMIN 550 MG: 550 TABLET ORAL at 08:10

## 2022-11-18 RX ADMIN — NADOLOL 10 MG: 20 TABLET ORAL at 08:12

## 2022-11-18 RX ADMIN — PANTOPRAZOLE SODIUM 40 MG: 40 TABLET, DELAYED RELEASE ORAL at 08:10

## 2022-11-18 RX ADMIN — MAGNESIUM OXIDE TAB 400 MG (241.3 MG ELEMENTAL MG) 400 MG: 400 (241.3 MG) TAB at 17:29

## 2022-11-18 RX ADMIN — Medication 1 TABLET: at 08:10

## 2022-11-18 RX ADMIN — LACTULOSE 30 G: 20 SOLUTION ORAL at 08:11

## 2022-11-18 RX ADMIN — THIAMINE HCL TAB 100 MG 100 MG: 100 TAB at 08:10

## 2022-11-18 NOTE — ED NOTES
Called [facility] Dionne/Laurie @ 14:20 for some information - UR would be Hola Douglasrle @ [phone]; fax [phone] [address] [facility] [phone] @ 14:30 - spoke with Care Mgr - Alberto Boland - 11 day authorization, 11/18 - 11/28/22 with concurrent review on 11/28/22 with Sophia House @ [phone]; authorization#: [id number]  Insurance also wanted to provide the patient with [facility] [phone]  This note faxed to [facility] @ 15:15  Called [facility] @ 15:30 - to inquire about ambulance time - they will call back  Spoke with Lela Gill - arrival after 20:00 is reuested  Will arrange round-trip  [place] [facility] @ 19:00; 3S informed; [facility] informed - # for report [phone]  [facility] arrived about 19:15 for this transport

## 2022-11-18 NOTE — PLAN OF CARE
Problem: Potential for Falls  Goal: Patient will remain free of falls  Description: INTERVENTIONS:  - Educate patient/family on patient safety including physical limitations  - Instruct patient to call for assistance with activity   - Consult OT/PT to assist with strengthening/mobility   - Keep Call bell within reach  - Keep bed low and locked with side rails adjusted as appropriate  - Keep care items and personal belongings within reach  - Initiate and maintain comfort rounds  - Make Fall Risk Sign visible to staff  - Offer Toileting every 3 Hours, in advance of need  - Initiate/Maintain bed alarm  - Obtain necessary fall risk management equipment: bed alarm  - Apply yellow socks and bracelet for high fall risk patients  - Consider moving patient to room near nurses station  Outcome: Progressing     Problem: Prexisting or High Potential for Compromised Skin Integrity  Goal: Skin integrity is maintained or improved  Description: INTERVENTIONS:  - Identify patients at risk for skin breakdown  - Assess and monitor skin integrity  - Assess and monitor nutrition and hydration status  - Monitor labs   - Assess for incontinence   - Turn and reposition patient  - Assist with mobility/ambulation  - Relieve pressure over bony prominences  - Avoid friction and shearing  - Provide appropriate hygiene as needed including keeping skin clean and dry  - Evaluate need for skin moisturizer/barrier cream  - Collaborate with interdisciplinary team   - Patient/family teaching  - Consider wound care consult   Outcome: Progressing     Problem: PAIN - ADULT  Goal: Verbalizes/displays adequate comfort level or baseline comfort level  Description: Interventions:  - Encourage patient to monitor pain and request assistance  - Assess pain using appropriate pain scale  - Administer analgesics based on type and severity of pain and evaluate response  - Implement non-pharmacological measures as appropriate and evaluate response  - Consider cultural and social influences on pain and pain management  - Notify physician/advanced practitioner if interventions unsuccessful or patient reports new pain  Outcome: Progressing     Problem: INFECTION - ADULT  Goal: Absence or prevention of progression during hospitalization  Description: INTERVENTIONS:  - Assess and monitor for signs and symptoms of infection  - Monitor lab/diagnostic results  - Monitor all insertion sites, i e  indwelling lines, tubes, and drains  - Monitor endotracheal if appropriate and nasal secretions for changes in amount and color  - [place] appropriate cooling/warming therapies per order  - Administer medications as ordered  - Instruct and encourage patient and family to use good hand hygiene technique  - Identify and instruct in appropriate isolation precautions for identified infection/condition  Outcome: Progressing  Goal: Absence of fever/infection during neutropenic period  Description: INTERVENTIONS:  - Monitor WBC    Outcome: Progressing     Problem: SAFETY ADULT  Goal: Patient will remain free of falls  Description: INTERVENTIONS:  - Educate patient/family on patient safety including physical limitations  - Instruct patient to call for assistance with activity   - Consult OT/PT to assist with strengthening/mobility   - Keep Call bell within reach  - Keep bed low and locked with side rails adjusted as appropriate  - Keep care items and personal belongings within reach  - Initiate and maintain comfort rounds  - Make Fall Risk Sign visible to staff  - Offer Toileting every 3 Hours, in advance of need  - Initiate/Maintain bed alarm  - Obtain necessary fall risk management equipment: bed alarm  - Apply yellow socks and bracelet for high fall risk patients  - Consider moving patient to room near nurses station  Outcome: Progressing  Goal: Maintain or return to baseline ADL function  Description: INTERVENTIONS:  -  Assess patient's ability to carry out ADLs; assess patient's baseline for ADL function and identify physical deficits which impact ability to perform ADLs (bathing, care of mouth/teeth, toileting, grooming, dressing, etc )  - Assess/evaluate cause of self-care deficits   - Assess range of motion  - Assess patient's mobility; develop plan if impaired  - Assess patient's need for assistive devices and provide as appropriate  - Encourage maximum independence but intervene and supervise when necessary  - Involve family in performance of ADLs  - Assess for home care needs following discharge   - Consider OT consult to assist with ADL evaluation and planning for discharge  - Provide patient education as appropriate  Outcome: Progressing  Goal: Maintains/Returns to pre admission functional level  Description: INTERVENTIONS:  - Perform BMAT or MOVE assessment daily    - Set and communicate daily mobility goal to care team and patient/family/caregiver     - Collaborate with rehabilitation services on mobility goals if consulted  - Out of bed for meals 3 times a day  - Out of bed for toileting  - Record patient progress and toleration of activity level   Outcome: Progressing     Problem: DISCHARGE PLANNING  Goal: Discharge to home or other facility with appropriate resources  Description: INTERVENTIONS:  - Identify barriers to discharge w/patient and caregiver  - Arrange for needed discharge resources and transportation as appropriate  - Identify discharge learning needs (meds, wound care, etc )  - Arrange for interpretive services to assist at discharge as needed  - Refer to Case Management Department for coordinating discharge planning if the patient needs post-hospital services based on physician/advanced practitioner order or complex needs related to functional status, cognitive ability, or social support system  Outcome: Progressing     Problem: Knowledge Deficit  Goal: Patient/family/caregiver demonstrates understanding of disease process, treatment plan, medications, and discharge instructions  Description: Complete learning assessment and assess knowledge base    Interventions:  - Provide teaching at level of understanding  - Provide teaching via preferred learning methods  Outcome: Progressing     Problem: METABOLIC, FLUID AND ELECTROLYTES - ADULT  Goal: Electrolytes maintained within normal limits  Description: INTERVENTIONS:  - Monitor labs and assess patient for signs and symptoms of electrolyte imbalances  - Administer electrolyte replacement as ordered  - Monitor response to electrolyte replacements, including repeat lab results as appropriate  - Instruct patient on fluid and nutrition as appropriate  Outcome: Progressing  Goal: Fluid balance maintained  Description: INTERVENTIONS:  - Monitor labs   - Monitor I/O and WT  - Instruct patient on fluid and nutrition as appropriate  - Assess for signs & symptoms of volume excess or deficit  Outcome: Progressing

## 2022-11-18 NOTE — ASSESSMENT & PLAN NOTE
With esophageal varices   Recently seen by GI and treated with Prednisolone   INR 1 44  T Bili 3 17  LFTs stable   · Continue newly started PPI, Nadolol and Lactulose BID (previously on TID)  · Ammonia level trending down; 51 -> 53 -> 72 -> 43   · Alcohol cessation education and counseling provided to patient  · Patient is medically stable for discharge to inpatient psych

## 2022-11-18 NOTE — ED NOTES
Precert completed 26/38/47 @ [address] [address] [phone]  Care Mgr was [place] Clayton   3 day authorization, 11/18 - 11/20/22 with concurrent review on 11/18/22 with Tejal RUSH @ [phone]   authorization#: [id number]

## 2022-11-18 NOTE — ASSESSMENT & PLAN NOTE
Stable   Likely due to myelosuppression from chronic alcohol use and cirrhosis   · Avoid pharmocological DVT prophylaxis   · Monitor for excessive bruising or bleeding   · SCDs  · No signs of active bleeding  · Continue outpatient monitoring

## 2022-11-18 NOTE — ASSESSMENT & PLAN NOTE
Patient presented to the ED on 11/13/22 with depression, SI, and forgetfulness  · He was seen by PES in ED who recommended inpatient psychiatry   · He was medically stable for discharge to [facility] when his ammonia level started to trend up which prompted admission to the hospital for treatment prior to transfer to [facility]  · Ammonia level has improved with Lactulose BID   · Patient was re-evaluated by psychiatry per Crisis request  · Psychiatry continues to recommend inpatient psychiatry treatment   · Patient is medically stable for discharge to inpatient psych  · Crisis aware; indicated that patient has a bed at [facility] inpatient psychiatric unit  · He will be transferred there for further psychiatric care  · Continue 1:1 for SI   · Finger foods   · Discussed with patient, he is aware

## 2022-11-18 NOTE — PROGRESS NOTES
Patient examined spoke with the nurse crisis worker's notes and Naye's notes reviewed and noted  Patient is accepted at [facility] for inpatient psychiatric care  Patient is happy about it  Patient was able to express his feelings well and thanking me for helping him  Patient reports that he wants to give up his drinking he also agreed with my recommendation that he will need outpatient psychiatric follow-up with the psychiatrist and comply with the depression medications go to [place] [id number] meeting alcohol counseling and do not relapse with the drinking patient reports that he is planning to get out of this hump and find a job patient seems motivated even though he has a long history of alcohol abuse patient is reminded that if he continued relapsing he will have a poor prognosis it will affect him mentally and physically he was able to understand this  Patient looks in a good spirit and motivated for the care  Patient is started on Lexapro 10 mg daily yesterday so far he is tolerating medications well patient shows no signs of alcohol withdrawal at this time  Patient is cooperative and he admits being severely depressed mainly because he was relapse with the drinking and could not stop drinking he was severely depressed and he was having a thoughts to hurt himself as described previously but he wants to get better and he does not want to put his family through this  Nurse reported no behavior problem  Patient is waiting for transfer to inpatient psychiatric care for further treatment and stabilization  Patient denies hallucinations  Patient is still severely depressed and emotional labile  Therapy done with good response  Patient will be transfer later today

## 2022-11-18 NOTE — ED NOTES
New COVID results and voluntary consent faxed to [facility], waiting for return call giving the okay to set up transport

## 2022-11-18 NOTE — ASSESSMENT & PLAN NOTE
Patient presented to [facility] ED on 11/13/22 with forgetfulness  At that time he admitted to drinking alcohol earlier in the day     Alcohol level on admission, 174  Urine drug screen (+) for THC   History of chronic heavy alcohol use with recent admission to [facility] detox unit in January, April, and October 2022   Hst of DUI  · No signs of withdrawal at this time  · CIWA protocol   · Thiamine, Folic acid, and MVI   · Patient is medically stable for discharge to inpatient psych  · As noted above bed available for patient at [facility] inpatient psychiatric unit  · Patient will be discharged today
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
## 2022-11-18 NOTE — [place] UNIVERSAL TRANSFER FORM
[place] UNIVERSAL TRANSFER FORM  (ALL ITEMS MUST BE COMPLETED)    1  TRANSFER FROM: [address] [place] [address] Street: [address] [place] [address]    2  DATE OF TRANSFER: 11/18/2022                        TIME OF TRANSFER: TBD    3  PATIENT NAME: Neoma Gosselin, J      YOB: 1965                             GENDER: male    4  LANGUAGE:   English    5  PHYSICIAN NAME:  Erinn Arguelles MD                   PHONE: [phone]    6  CODE STATUS: Level 1 - Full Code        Out of Hospital DNR Attached: No    7  :                                      :  Extended Emergency Contact Information  Primary Emergency Contact: Maryann Mae  Mobile Phone: [phone]  Relation: Sister  Secondary Emergency Contact: foster blue  Mobile Phone: [phone]  Relation: Relative           Health Care Representative/Proxy:  No           Legal Guardian:  No             NAME OF:           HEALTH CARE REPRESENTATIVE/PROXY:                                         OR           LEGAL GUARDIAN, IF NOT :                                               PHONE:  (Day)           (Night)                        (Cell)    8  REASON FOR TRANSFER: (Must include brief medical history and recent changes in physical function or cognition ) Suicidal ideation, transfer to [facility]            V/S: /64 (BP Location: Left arm)   Pulse 64   Temp 98 4 °F (36 9 °C) (Temporal)   Resp 18   Ht 6' (1 829 m)   Wt 88 6 kg (195 lb 5 2 oz)   SpO2 96%   BMI 26 49 kg/m²           PAIN: None    9  PRIMARY DIAGNOSIS: Suicide ideation      Secondary Diagnosis:         Pacemaker: No      Internal Defib: No          Mental Health Diagnosis (if Applicable):    10  RESTRAINTS: No     11  RESPIRATORY NEEDS: None    12  ISOLATION/PRECAUTION: None    13  ALLERGY: Patient has no known allergies  14  SENSORY:  No issues    15  SKIN CONDITION: No Wounds    16  DIET: Regular    17  IV ACCESS: None    18  PERSONAL ITEMS SENT WITH PATIENT: Other Clothing    23  ATTACHED DOCUMENTS: MUST ATTACH CURRENT MEDICATION INFORMATION Face Sheet, MAR, Medication Reconciliation, Diagnostic Studies and Labs    20  AT RISK ALERTS:None        HARM TO: N/A    21  WEIGHT BEARING STATUS:         Left Leg: Full        Right Leg: Full    22  MENTAL STATUS:Alert and Oriented    23  FUNCTION:        Walk: Self        Transfer: Self        Toilet: Self        Feed: Self    24  IMMUNIZATIONS/SCREENING:     There is no immunization history on file for this patient  25  BOWEL: Continent and Date Last BM 11/18/2022 0600    26  BLADDER: Continent    27   SENDING FACILITY CONTACT: Cesar Saleh RN                   Title: RN        Unit: [address]        Phone: [phone] [address] (if known):        Title:        Unit:         Phone:         FORM PREFILLED BY (if applicable)       Title:       Unit:        Phone:         FORM COMPLETED BY Cesar Saleh RN      Title: AMELIA      Phone: [phone]

## 2022-11-18 NOTE — DISCHARGE SUMMARY
Karrie 46  Discharge- Yousuf Vicente 1965, 65 y o  male MRN: [id number]  Unit/Bed#: [address] [place] [address] Encounter: [id number]  Primary Care Provider: Sumanth Wynn MD   Date and time admitted to hospital: 11/13/2022  5:52 PM    * Suicide ideation  Assessment & Plan  Patient presented to the ED on 11/13/22 with depression, SI, and forgetfulness  · He was seen by PES in ED who recommended inpatient psychiatry   · He was medically stable for discharge to [facility] when his ammonia level started to trend up which prompted admission to the hospital for treatment prior to transfer to [facility]  · Ammonia level has improved with Lactulose BID   · Patient was re-evaluated by psychiatry per Crisis request  · Psychiatry continues to recommend inpatient psychiatry treatment   · Patient is medically stable for discharge to inpatient psych  · Crisis aware; indicated that patient has a bed at [address] [place] [address] inpatient psychiatric unit  · He will be transferred there for further psychiatric care  · Continue 1:1 for SI   · Finger foods   · Discussed with patient, he is aware    Pancytopenia ([facility] [address] )  Assessment & Plan  Stable   Likely due to myelosuppression from chronic alcohol use and cirrhosis   · Avoid pharmocological DVT prophylaxis   · Monitor for excessive bruising or bleeding   · SCDs  · No signs of active bleeding  · Continue outpatient monitoring     Alcoholic cirrhosis of liver ([facility] [address] )  Assessment & Plan  With esophageal varices   Recently seen by GI and treated with Prednisolone   INR 1 44  T Bili 3 17  LFTs stable   · Continue newly started PPI, Nadolol and Lactulose BID (previously on TID)  · Ammonia level trending down; 51 -> 53 -> 72 -> 43   · Alcohol cessation education and counseling provided to patient  · Patient is medically stable for discharge to inpatient psych    Tobacco abuse  Assessment & Plan  No longer smoking but admits to chewing tobacco   · Tobacco cessation education and counseling   · Nicotine patch if needed     Alcohol use disorder, severe, dependence [facility])  Assessment & Plan  Patient presented to [facility] ED on 11/13/22 with forgetfulness  At that time he admitted to drinking alcohol earlier in the day  Alcohol level on admission, 174  Urine drug screen (+) for THC   History of chronic heavy alcohol use with recent admission to [facility] detox unit in January, April, and October 2022   Hst of DUI  · No signs of withdrawal at this time  · CIWA protocol   · Thiamine, Folic acid, and MVI   · Patient is medically stable for discharge to inpatient psych  · As noted above bed available for patient at [facility] inpatient psychiatric unit  · Patient will be discharged today        Discharging Physician / Practitioner: TIMMY Birch  PCP: Rocky Hart MD  Admission Date: 11/13/2022  Discharge Date: 11/18/22    Reason for Admission: Altered Mental Status (Came in tearful  States walked here from the other side of [place], states keeps forgetting things   Family members telling him they just told him something and he forgets right away, denies any head injury)        Medical Problems     Resolved Problems  Date Reviewed: 11/18/2022   None         Consultations During Hospital Stay:  IP CONSULT TO PSYCHIATRY    Procedures Performed:     · None     Significant Findings / Test Results:     · None   Results from last 7 days   Lab Units 11/18/22  0501 11/17/22  0610 11/16/22  0950   WBC Thousand/uL 2 99* 3 15* 2 96*   HEMOGLOBIN g/dL 12 3 12 6 13 0   PLATELETS Thousands/uL 35* 38* 37*     Results from last 7 days   Lab Units 11/18/22  0501 11/17/22  0610 11/16/22  0950 11/13/22  1807   SODIUM mmol/L 144 139 135 144   POTASSIUM mmol/L 3 8 3 9 3 5 3 6   CHLORIDE mmol/L 112* 108 105 108   CO2 mmol/L 26 24 26 26   BUN mg/dL 10 10 10 9   CREATININE mg/dL 0 81 0 87 0 81 1 00   CALCIUM mg/dL 7 5* 7 8* 7 9* 8 3   TOTAL BILIRUBIN mg/dL 2 01*  --  3 17* 2 14*   ALK PHOS U/L 99  --  103 123*   ALT U/L 20  --  25 35   AST U/L 30  --  35  --      Results from last 7 days   Lab Units 11/16/22  0950   INR  1 44*         No results found for: HGBA1C          Blood Culture:   Lab Results   Component Value Date    BLOODCX No Growth After 5 Days  12/05/2020    BLOODCX No Growth After 5 Days  12/05/2020     Urine Culture: No results found for: URINECX  Sputum Culture: No components found for: SPUTUMCX  Wound Culture: No results found for: WOUNDCULT     XR chest 1 view portable   Final Result by Franco Munguia MD (11/14 1200)      No acute cardiopulmonary disease  Workstation performed: [id number]         CT head without contrast   Final Result by Kevin Henderson MD (11/13 1836)      No acute intracranial abnormality  Workstation performed: [id number]                Incidental Findings:   · None      Test Results Pending at Discharge (will require follow up):   · none     Outpatient Tests Requested:  · Follow up CBC; BMP, ammonia     Complications:  None    Reason for Admission:   Chief Complaint   Patient presents with   • Altered Mental Status     Came in tearful  States walked here from the other side of [place], states keeps forgetting things  Family members telling him they just told him something and he forgets right away, denies any head injury       Hospital Course:     Per HPI: Yudy Torres is a 65 y o  male patient with a PMH of heavy alcohol abuse, alcoholic liver cirrhosis, right lower extremity hematoma, pancytopenia and tobacco abuse who originally presented to the hospital on 11/13/2022 due to depression and suicide ideation  Patient has a history of multiple hospitalizations including 3 to [address] detox [facility] this year  Patient admitted to ongoing alcohol abuse  Reported that he was living with his cousin but is unsure if he can go back there    The patient was seen by peds in the ED and was recommended for discharge to inpatient psychiatry  Initially patient was accepted at [facility] for inpatient psychiatry, however patient's ammonia level was noted to be elevated and he was admitted for medical management  Patient was seen by inpatient Psychiatry, continues to recommend inpatient psychiatric hospitalization which patient is agreeable to  Patient was medically cleared, crisis  aware  They indicated they have a bed for the patient at Pending sale to [facility] for inpatient psychiatric care  Patient is agreeable to admission and he will be discharged to there today  Hospital Course:    Please see above list of diagnoses and related plan for additional information  Condition at Discharge: good       Discharge Day Visit / Exam:     Subjective:  Patient reports that he is nervous about transferring to a different facility, however he indicated that he is agreeable to it  States that he has been having hard time sleeping at night as his mind is racing and he feels he has no control over that  During exam he does become tearful, stated multiple times during the conversation that he was uncertain of what he was going to do as he has lost his 's license and has not been able to get a job  Vitals: Blood Pressure: 118/64 (11/18/22 0700)  Pulse: 64 (11/18/22 0700)  Temperature: 98 4 °F (36 9 °C) (11/18/22 0700)  Temp Source: Temporal (11/18/22 0700)  Respirations: 18 (11/18/22 0700)  Height: 6' (182 9 cm) (11/17/22 0600)  Weight - Scale: 88 6 kg (195 lb 5 2 oz) (11/17/22 0600)  SpO2: 96 % (11/18/22 0700)     Exam:   Physical Exam  Vitals and nursing note reviewed  Constitutional:       General: He is not in acute distress  HENT:      Head: Normocephalic  Nose: Nose normal       Mouth/Throat:      Mouth: Mucous membranes are moist    Eyes:      Extraocular Movements: Extraocular movements intact        Conjunctiva/sclera: Conjunctivae normal       Pupils: Pupils are equal, round, and reactive to light  Cardiovascular:      Rate and Rhythm: Normal rate and regular rhythm  Pulses: Normal pulses  Heart sounds: Normal heart sounds  Pulmonary:      Effort: Pulmonary effort is normal       Breath sounds: Normal breath sounds  Abdominal:      General: Bowel sounds are normal  There is no distension  Palpations: Abdomen is soft  Tenderness: There is no abdominal tenderness  Genitourinary:     Comments: Voiding spontaneously  Musculoskeletal:         General: Normal range of motion  Cervical back: Normal range of motion  Right lower leg: No edema  Left lower leg: No edema  Skin:     General: Skin is warm and dry  Capillary Refill: Capillary refill takes less than 2 seconds  Neurological:      General: No focal deficit present  Mental Status: He is alert and oriented to person, place, and time  Psychiatric:         Attention and Perception: Attention normal          Mood and Affect: Mood is anxious  Affect is tearful  Speech: Speech normal          Behavior: Behavior is cooperative  Thought Content: Thought content normal            Discharge instructions/Information to patient and family:   See after visit summary for information provided to patient and family  Provisions for Follow-Up Care:  See after visit summary for information related to follow-up care and any pertinent home health orders  Disposition:     Inpatient Psychiatry at [facility]    Planned Readmission:  No, patient being admitted to different facility inpatient psychiatric unit     Discharge Statement:  I spent greater than 30 minutes discharging the patient  This time was spent on the day of discharge  I had direct contact with the patient on the day of discharge   Greater than 50% of the total time was spent examining patient, answering all patient questions, arranging and discussing plan of care with patient as well as directly providing post-discharge instructions  Additional time then spent on discharge activities  Discharge Medications:  See after visit summary for reconciled discharge medications provided to patient and family        ** Please Note: This note has been constructed using a voice recognition system **

## 2022-11-18 NOTE — ED NOTES
Patient is accepted at [address]  Patient is accepted by DR Bryan Erp per [address] is arranged with **   TBD   Transportation is scheduled for **  Patient may go to the floor at **  Nurse report is to be called to [phone] prior to patient transfer

## 2022-11-18 NOTE — PLAN OF CARE
Problem: Potential for Falls  Goal: Patient will remain free of falls  Description: INTERVENTIONS:  - Educate patient/family on patient safety including physical limitations  - Instruct patient to call for assistance with activity   - Consult OT/PT to assist with strengthening/mobility   - Keep Call bell within reach  - Keep bed low and locked with side rails adjusted as appropriate  - Keep care items and personal belongings within reach  - Initiate and maintain comfort rounds  - Make Fall Risk Sign visible to staff  - Offer Toileting every 2 Hours, in advance of need  - Initiate/Maintain alarm  - Obtain necessary fall risk management equipment  - Apply yellow socks and bracelet for high fall risk patients  - Consider moving patient to room near nurses station  Outcome: Progressing     Problem: Prexisting or High Potential for Compromised Skin Integrity  Goal: Skin integrity is maintained or improved  Description: INTERVENTIONS:  - Identify patients at risk for skin breakdown  - Assess and monitor skin integrity  - Assess and monitor nutrition and hydration status  - Monitor labs   - Assess for incontinence   - Turn and reposition patient  - Assist with mobility/ambulation  - Relieve pressure over bony prominences  - Avoid friction and shearing  - Provide appropriate hygiene as needed including keeping skin clean and dry  - Evaluate need for skin moisturizer/barrier cream  - Collaborate with interdisciplinary team   - Patient/family teaching  - Consider wound care consult   Outcome: Progressing     Problem: PAIN - ADULT  Goal: Verbalizes/displays adequate comfort level or baseline comfort level  Description: Interventions:  - Encourage patient to monitor pain and request assistance  - Assess pain using appropriate pain scale  - Administer analgesics based on type and severity of pain and evaluate response  - Implement non-pharmacological measures as appropriate and evaluate response  - Consider cultural and social influences on pain and pain management  - Notify physician/advanced practitioner if interventions unsuccessful or patient reports new pain  Outcome: Progressing     Problem: INFECTION - ADULT  Goal: Absence or prevention of progression during hospitalization  Description: INTERVENTIONS:  - Assess and monitor for signs and symptoms of infection  - Monitor lab/diagnostic results  - Monitor all insertion sites, i e  indwelling lines, tubes, and drains  - Monitor endotracheal if appropriate and nasal secretions for changes in amount and color  - [place] appropriate cooling/warming therapies per order  - Administer medications as ordered  - Instruct and encourage patient and family to use good hand hygiene technique  - Identify and instruct in appropriate isolation precautions for identified infection/condition  Outcome: Progressing  Goal: Absence of fever/infection during neutropenic period  Description: INTERVENTIONS:  - Monitor WBC    Outcome: Progressing     Problem: SAFETY ADULT  Goal: Patient will remain free of falls  Description: INTERVENTIONS:  - Educate patient/family on patient safety including physical limitations  - Instruct patient to call for assistance with activity   - Consult OT/PT to assist with strengthening/mobility   - Keep Call bell within reach  - Keep bed low and locked with side rails adjusted as appropriate  - Keep care items and personal belongings within reach  - Initiate and maintain comfort rounds  - Make Fall Risk Sign visible to staff  - Offer Toileting every 2 Hours, in advance of need  - Initiate/Maintain alarm  - Obtain necessary fall risk management equipment  - Apply yellow socks and bracelet for high fall risk patients  - Consider moving patient to room near nurses station  Outcome: Progressing  Goal: Maintain or return to baseline ADL function  Description: INTERVENTIONS:  -  Assess patient's ability to carry out ADLs; assess patient's baseline for ADL function and identify physical deficits which impact ability to perform ADLs (bathing, care of mouth/teeth, toileting, grooming, dressing, etc )  - Assess/evaluate cause of self-care deficits   - Assess range of motion  - Assess patient's mobility; develop plan if impaired  - Assess patient's need for assistive devices and provide as appropriate  - Encourage maximum independence but intervene and supervise when necessary  - Involve family in performance of ADLs  - Assess for home care needs following discharge   - Consider OT consult to assist with ADL evaluation and planning for discharge  - Provide patient education as appropriate  Outcome: Progressing  Goal: Maintains/Returns to pre admission functional level  Description: INTERVENTIONS:  - Perform BMAT or MOVE assessment daily    - Set and communicate daily mobility goal to care team and patient/family/caregiver  - Collaborate with rehabilitation services on mobility goals if consulted  - Perform Range of Motion 3 times a day  - Reposition patient every 2 hours    - Dangle patient 3 times a day  - Stand patient 3 times a day  - Ambulate patient 3 times a day  - Out of bed to chair 3 times a day   - Out of bed for meals 3 times a day  - Out of bed for toileting  - Record patient progress and toleration of activity level   Outcome: Progressing     Problem: DISCHARGE PLANNING  Goal: Discharge to home or other facility with appropriate resources  Description: INTERVENTIONS:  - Identify barriers to discharge w/patient and caregiver  - Arrange for needed discharge resources and transportation as appropriate  - Identify discharge learning needs (meds, wound care, etc )  - Arrange for interpretive services to assist at discharge as needed  - Refer to Case Management Department for coordinating discharge planning if the patient needs post-hospital services based on physician/advanced practitioner order or complex needs related to functional status, cognitive ability, or social support system  Outcome: Progressing     Problem: Knowledge Deficit  Goal: Patient/family/caregiver demonstrates understanding of disease process, treatment plan, medications, and discharge instructions  Description: Complete learning assessment and assess knowledge base    Interventions:  - Provide teaching at level of understanding  - Provide teaching via preferred learning methods  Outcome: Progressing     Problem: METABOLIC, FLUID AND ELECTROLYTES - ADULT  Goal: Electrolytes maintained within normal limits  Description: INTERVENTIONS:  - Monitor labs and assess patient for signs and symptoms of electrolyte imbalances  - Administer electrolyte replacement as ordered  - Monitor response to electrolyte replacements, including repeat lab results as appropriate  - Instruct patient on fluid and nutrition as appropriate  Outcome: Progressing  Goal: Fluid balance maintained  Description: INTERVENTIONS:  - Monitor labs   - Monitor I/O and WT  - Instruct patient on fluid and nutrition as appropriate  - Assess for signs & symptoms of volume excess or deficit  Outcome: Progressing

## 2022-11-19 NOTE — NURSING NOTE
Pt transferred to [facility]  All belongings on unit sent with pt  IV access removed by previous RN

## 2022-11-22 NOTE — UTILIZATION REVIEW
NOTIFICATION OF ADMISSION DISCHARGE   This is a Notification of Discharge from [address]  Please be advised that this patient has been discharge from our facility  Below you will find the admission and discharge date and time including the patient’s disposition  UTILIZATION REVIEW CONTACT:  Toyin Barlow  Utilization   Network Utilization Review Department  Phone: [phone] x carefully listen to the prompts  All voicemails are confidential   Email: [email or web address]     ADMISSION INFORMATION  PRESENTATION DATE: 11/13/2022  5:52 PM  OBERVATION ADMISSION DATE:   INPATIENT ADMISSION DATE: 11/16/22  6:49 AM   DISCHARGE DATE: 11/18/2022  8:00 PM   DISPOSITION:Non [facility]    IMPORTANT INFORMATION:  Send all requests for admission clinical reviews, approved or denied determinations and any other requests to dedicated fax number below belonging to the campus where the patient is receiving treatment   List of dedicated fax numbers:  [address] DENIALS (Administrative/Medical Necessity) [phone]   [address] (Maternity/NICU/Pediatrics) [phone]   [facility] [phone]   [facility] 87 [phone]   [address] [phone]   220 [facility] [phone] [facility] [phone]   [address] [place] [address] 119 [phone]   [facility]  [phone]   [address] [facility] [phone]   412 [facility] [address] [facility] [phone]

## 2022-12-16 ENCOUNTER — TELEPHONE (OUTPATIENT)
Dept: GASTROENTEROLOGY | Facility: CLINIC | Age: 57
End: 2022-12-16

## 2022-12-16 NOTE — TELEPHONE ENCOUNTER
Please see procedure done 9/16/22  Please call patient to schedule recall egd  for re-evaluation and possible esophageal biopsy or banding - varices with Dr Dorena Osgood   thanks

## 2023-05-30 NOTE — QUICK NOTE
Quality 226: Preventive Care And Screening: Tobacco Use: Screening And Cessation Intervention: Patient screened for tobacco use and is an ex/non-smoker Right lower extremity/calf is soft and compressible, hematoma it appears to continue to spread and become more diffuse in nature with less turgor in the right calf  Patient does not seem to have any discomfort or pain at rest although with palpation and movement does still have some discomfort  Hematoma continues to improve based on physical exam of the right lower extremity  This will take several weeks to completely resolve  Neurovascularly speaking patient is completely intact  Sensory and motor of the right lower extremity are within normal limits  Although pulses were not examined this morning patient has had bounding 2+ PT and DP historically  Patient does not have any pallor in the right lower extremity  Patient feels improved  Hemoglobin and platelets have stayed stable although patient does exhibit signs of mild anemia and significant thrombocytopenia likely related to underlying liver disease  Plan:   Still recommend continue will/aggressive elevation of right lower extremity routinely  Quality 431: Preventive Care And Screening: Unhealthy Alcohol Use - Screening: Patient not identified as an unhealthy alcohol user when screened for unhealthy alcohol use using a systematic screening method Detail Level: Detailed

## 2024-05-24 NOTE — ASSESSMENT & PLAN NOTE
Resume home regimen along with nebs prn    Stable   Likely due to myelosuppression from chronic alcohol use and cirrhosis   · Avoid pharmocological DVT prophylaxis   · Monitor for excessive bruising or bleeding   · SCDs  · Monitor fever curve, CBC/D

## 2024-10-14 NOTE — CONSULTS
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 recanalization of periumbilical veins with large omental and periumbilical varices draining via epigastric venous collateral pathway  A discretely measurable nodular varix to the right of the umbilicus on image 45 of series 2 measures up to 2 7 cm and this same varix measured 10 mm in greatest dimension on March 23, 2021  Small left gastric, paraesophageal, and hemorrhoidal varices are also noted  GALLBLADDER:  Gallbladder is surgically absent  SPLEEN:  Spleen is enlarged and unchanged from April 19, 2022  No discrete splenic mass  PANCREAS:  Unremarkable  ADRENAL GLANDS:  Unremarkable  KIDNEYS/URETERS:  Unremarkable  No hydronephrosis  STOMACH AND BOWEL:  Periampullary duodenal diverticulum reidentified  Stomach and bowel appear otherwise unremarkable  APPENDIX:  No findings to suggest appendicitis   ABDOMINOPELVIC CAVITY: Mild mesenteric edema related to portal hypertension  No ascites  No lymphadenopathy  No pneumoperitoneum  VESSELS: Varices as described above  No abdominal aortic aneurysm  PELVIS REPRODUCTIVE ORGANS:  Unremarkable for patient's age  URINARY BLADDER:  Unremarkable  ABDOMINAL WALL/INGUINAL REGIONS:  Very large periumbilical varices more prominent to the right of the umbilicus accounting for the palpable abnormalities described in the clinical history, and significantly increased in size when compared as far back as March 2021  OSSEOUS STRUCTURES:  No acute fracture or destructive osseous lesion  Healed posterior lower left rib fractures  Healed right pelvic fractures  Impression: Hepatic cirrhosis with signs of increasing portal hypertension  In addition to splenomegaly there are enlarging varices inclusive of prominent periumbilical varices which are significantly increasing in size when compared to March 2021 and account for the palpable abnormalities described in the patient's clinical history  Workstation performed: [id number]     CT lower extremity w contrast right    Result Date: 9/23/2022  Narrative: CT right lower extremity with IV contrast INDICATION: excessive bruising, swelling, pain  COMPARISON: CT right lower summary 9/20/2022 TECHNIQUE: CT examination of the above was performed  This examination, like all CT scans performed in the [address] [place] [address], was performed utilizing techniques to minimize radiation dose exposure, including the use of iterative reconstruction  and automated exposure control software  Sagittal and coronal two dimensional reconstructed images were also submitted for interpretation  IV Contrast: 100 mL of iohexol (OMNIPAQUE) Rad dose  663 8 mGy-cm FINDINGS: OSSEOUS STRUCTURES:  No fracture, dislocation or destructive osseous lesion   VISUALIZED MUSCULATURE:  Ill-defined intramuscular collection within the medial head gastrocnemius muscle measuring 6 3 x 5 5 x 10 cm (AP by transverse by craniocaudal)  SOFT TISSUES:  There is a small popliteal cyst present  OTHER PERTINENT FINDINGS:  There is subcutaneous soft tissue stranding and edema about the leg, ankle and foot  Impression: 1  Heterogeneous collection within the medial head gastrocnemius muscle suspicious for intramuscular hematoma  Close clinical follow-up is recommended  Follow-up CT in 4-6 weeks is recommended to confirm resolution and exclude an underlying soft tissue mass  2   Diffuse subcutaneous edema about the right leg, ankle and foot suspicious for cellulitis  The study was marked in EPIC for significant notification  Workstation performed: [id number]     CT lower extremity w contrast right    Result Date: 9/20/2022  Narrative: CT right lower extremity with IV contrast INDICATION: hematoma/swelling/pain  46-year-old male with history of thrombocytopenia  Swelling and redness in the region of the right calf  Evaluate for hematoma  COMPARISON: None  TECHNIQUE: CT examination of the right lower extremity from knee through the foot was performed  This examination, like all CT scans performed in the [facility], was performed utilizing techniques to minimize radiation dose exposure, including the use of iterative reconstruction and automated exposure control software  Sagittal and coronal two dimensional reconstructed images were also submitted for interpretation  IV Contrast: 100 mL of iohexol (OMNIPAQUE) Rad dose  642 mGy-cm FINDINGS: OSSEOUS STRUCTURES:  No fracture, dislocation or destructive osseous lesion  JOINTS:  Small knee joint effusion  MUSCLES AND TENDONS:  Muscles intact  Fluid tracking along the gastrocnemius tendon without evidence of tendon tear  Soleus muscle and Achilles tendon intact  SOFT TISSUES:  Subcutaneous edema in the right lower leg, ankle and foot  No discrete fluid collection  OTHER PERTINENT FINDINGS:  None  Impression: 1    Diffuse soft tissue swelling without evidence of hematoma  2   Fluid tracking along the gastrocnemius tendon without evidence of myotendinous tear  3   Bones intact  Workstation performed: [id number]       Problem List:   Patient Active Problem List   Diagnosis    Alcohol withdrawal syndrome without complication (HCC)    Alcohol use disorder, severe, dependence ([facility] [address] )    Transaminitis    Hyperbilirubinemia    Thrombocytopenia (HCC)    Tobacco abuse    Open wound of tongue due to bite    Electrolyte abnormality    Closed fracture of nasal bone    Decompensated hepatic cirrhosis (HCC)    Liver mass    Right hip pain    Alcoholic cirrhosis of liver (HCC)    COVID-19    Pancytopenia (HCC)    Alcohol withdrawal syndrome with complication (HCC)    Marijuana use    Anterior cervical lymphadenopathy    Head trauma    Bullae    Hematemesis    Hematochezia    Hypomagnesemia    Pain and swelling of right lower extremity         TIMMY Hager      Please Note: "This note has been constructed using a voice recognition system  Therefore there may be syntax, spelling, and/or grammatical errors   Please call if you have any questions  "** Authored by Resident/PA/NP

## 2024-12-25 NOTE — ASSESSMENT & PLAN NOTE
[facility]     Nephrology Progress Note      Patient Name: Nelson Wang  : 1945  MRN: [id number]  Primary Care Physician:  Domingo Bravo MD  Date of admission: 2024    Subjective   Subjective     Interval History:  No new events.  Comfortable, restless.  Sitting in chair, working with physical therapy.  Sitter present.  Hard of hearing.  Ate 25% of his breakfast today.    Objective   Objective     Vitals:   Temp:  [97.5 °F (36.4 °C)-99 °F (37.2 °C)] 97.9 °F (36.6 °C)  Heart Rate:  [66-99] 86  Resp:  [16-18] 18  BP: (140-165)/(52-97) 140/68  Physical Exam:   constitutional: mildly restless, in chair hard of hearing.     Eyes: sclerae anicteric, no conjunctival injection   HENT: mucous membranes moist.     Neck: Supple, no thyromegaly, no lymphadenopathy, trachea midline, No JVD   Respiratory: Decreased to auscultation bilaterally, nonlabored respirations, some upper airway rhonchi.   Cardiovascular: RRR, no murmurs, rubs, or gallops.   Gastrointestinal: Positive bowel sounds, soft,  nondistended   Musculoskeletal: No edema, no clubbing or cyanosis.  Left upper extremity AV fistula, patent.   Neurologic: moving extremities, answering questions inconsistently   Skin: warm and dry, no rashes, areas of hypopigmentation.    Result Review    Result Reviewed:  I have personally reviewed the results from the time of this admission to 2024 11:36 EST and agree with these findings:  [x]  Laboratory  []  Microbiology  [x]  Radiology  []  EKG/Telemetry   []  Cardiology/Vascular   []  Pathology  [x]  Old records  []  Other:        Lab 24  0810 24  0453 24  1547 24  0650 24  0444 24  0449 24  1453   SODIUM 135* 133* 134* 130*  --  129* 127*   POTASSIUM 4.5 4.4 4.9 4.0  --  4.1 4.1   CHLORIDE 96* 93* 96* 90*  --  90* 93*   CO2 26.4 23.9 23.0 26.4  --  28.7 24.3   BUN 41* 68* 62* 68*  --  41* 72*   CREATININE 5.33* 5.67* 4.89* 4.52*  --  3.11* 5.02*   GLUCOSE 98  · H/o chronic daily alcohol consumption and reports of alcohol withdrawal seizures  · Relapsed 9/28/22 and per report was drinking 8-10 shots of vodka  · Last drink:  09/29/2022  · Ethanol lvl:  244 (9/29/22 11:00 a m )  · SEWS protocol with symptom-triggered phenobarbital for medically-assisted alcohol withdrawal  · Received 194 4 mg total phenobarbital; last dose administered 9/30/2022 2027  · Remains stable off phenobarbital- VSS, baseline trace tremor   · Acute withdrawal resolved 98 118* 146*  --  138* 151*   EGFR 10.3* 9.6* 11.5* 12.6*  --  19.7* 11.1*   ANION GAP 12.6 16.1* 15.0 13.6  --  10.3 9.7   MAGNESIUM  --   --  2.1 2.1  --  2.1 2.2   PHOSPHORUS  --  5.3* 5.1*  --  4.5 3.8 6.1*             Assessment & Plan   Assessment / Plan       Active Hospital Problems:  Active Hospital Problems    Diagnosis     **Hypoxia        Assessment and Plan:    - ESRD, was on home dialysis.  Left upper extremity AV fistula for access.  Electrolytes reviewed.  Next dialysis tomorrow, UF 1.5 L as tolerated.       - Volume overload,  better now.         - Aspiration pneumonia, and possibly recurrent aspiration, per pulmonary.      - Type 2 diabetes with complications.     - Hypotension blood pressure is acceptable now, on ProAmatine and blood pressure stable.     - Anemia of chronic kidney disease, on RONALD as outpatient.  Getting Epogen while here.     - Altered mentation.  Multifactorial including possible dementia?,  Per primary.    Discussed with sitter and therapist..  Please call with any questions    Will follow.

## 2025-05-17 NOTE — PROGRESS NOTES
Progress Note - General Surgery   Betsy Marx 65 y o  male MRN: [id number]  Unit/Bed#: [address] [place] [address] Encounter: [id number]    Assessment:  1) Diffuse RLE hematoma - stable, neurovascular intact, palpable pulses, sensation intact, motor intact, mild to moderate palpable tenderness in posterior calf, no worsening compared to yesterday, hemoglobin stable  2) Multiple Ventral wall Hernia -  palpable fascial defects on ventral aspect of abdominal wall, patient has had surgical history so could be incisional hernias, soft and reducible, patient does report the umbilical hernia was somewhat discolored at the level of the skin but no longer is  3) Liver Cirrhosis - significant liver disease that is related to alcohol use, patient does have observed tremors, MELD score 17, child Erazo score C, elevated ammonia, better oriented today and responding appropriately  4) Hematochezia - seems to have improved, no active reports per patient    Plan:  1-4)   - continue aggressive elevation of right lower extremity any time patient is lying supine in bed  - monitor hemoglobin and platelets and treat as indicated  - continue to follow neurovascular presentation of right lower extremity over the course of the next 48 hours and to ensure that there is no worsening in swelling or her neurovascular status that would warrant evacuation of hematoma  - p r n  Analgesia  - colonoscopy per GI if indicated for hematochezia  - management of cirrhosis per gastroenterology team    Subjective/Objective   Chief Complaint:  The only thing that really bothers me is the back of my thigh    Subjective:  Patient was seen examined at bedside  Patient is responding more appropriately today  Patient still reports that he does not know what happened that all of this bruising and swelling started after he left the hospital last time    Patient reports that the bruising and tenderness is somewhat bothersome on the posterior thigh and calf patient reports that he can feel down to the tips of his toes  Patient has no problem with motor  Patient has been attempting to get up to ambulate although does find it somewhat difficult due to the discomfort in his right lower extremity  Patient does feel some tightness in his calf but otherwise is doing quite well  At    Objective:     Blood pressure 131/87, pulse 75, temperature 98 °F (36 7 °C), temperature source Oral, resp  rate 19, height 5' 11" (1 803 m), weight 92 kg (202 lb 13 2 oz), SpO2 98 %  ,Body mass index is 28 29 kg/m²  Intake/Output Summary (Last 24 hours) at 9/24/2022 1225  Last data filed at 9/24/2022 0241  Gross per 24 hour   Intake --   Output 2200 ml   Net -2200 ml       Invasive Devices  Report    Peripheral Intravenous Line  Duration           Peripheral IV 09/22/22 Dorsal (posterior); Right Forearm 1 day                Physical Exam: /87 (BP Location: Right arm)   Pulse 75   Temp 98 °F (36 7 °C) (Oral)   Resp 19   Ht 5' 11" (1 803 m)   Wt 92 kg (202 lb 13 2 oz)   SpO2 98%   BMI 28 29 kg/m²   General appearance: alert  Head: Normocephalic, without obvious abnormality, atraumatic  Extremities: extremities normal, warm and well-perfused; no cyanosis, clubbing, or edema tenderness on posterior calf, turgor in posterior calf, thigh compartments are soft, sensory intact of distal right lower extremity and motor intact as well  Pulses: 2+ and symmetric  Skin: Significant swelling of right calf and ecchymosis diffusely through the posterior thigh and calf    Lab, Imaging and other studies:  I have personally reviewed pertinent lab results    , CBC:   Lab Results   Component Value Date    WBC 3 94 (L) 09/24/2022    HGB 11 0 (L) 09/24/2022    HCT 33 3 (L) 09/24/2022     (H) 09/24/2022    PLT 44 (LL) 09/24/2022    MCH 33 8 09/24/2022    MCHC 33 0 09/24/2022    RDW 14 8 09/24/2022    MPV 10 5 09/24/2022   , CMP:   Lab Results   Component Value Date    SODIUM 137 09/24/2022    K 4 1 09/24/2022     09/24/2022    CO2 28 09/24/2022    BUN 12 09/24/2022    CREATININE 0 89 09/24/2022    CALCIUM 7 7 (L) 09/24/2022    AST 64 (H) 09/24/2022    ALT 33 09/24/2022    ALKPHOS 108 09/24/2022    EGFR 95 09/24/2022       VTE Mechanical Prophylaxis: sequential compression device Physical debility